# Patient Record
Sex: FEMALE | Race: WHITE | NOT HISPANIC OR LATINO | Employment: UNEMPLOYED | URBAN - METROPOLITAN AREA
[De-identification: names, ages, dates, MRNs, and addresses within clinical notes are randomized per-mention and may not be internally consistent; named-entity substitution may affect disease eponyms.]

---

## 2021-01-22 ENCOUNTER — HISTORICAL (OUTPATIENT)
Dept: LAB | Facility: HOSPITAL | Age: 36
End: 2021-01-22

## 2021-01-22 LAB
ALBUMIN SERPL-MCNC: 3.7 GM/DL (ref 3.5–5)
ALBUMIN/GLOB SERPL: 0.9 RATIO (ref 1.1–2)
ALP SERPL-CCNC: 54 UNIT/L (ref 40–150)
ALT SERPL-CCNC: 44 UNIT/L (ref 0–55)
AST SERPL-CCNC: 25 UNIT/L (ref 5–34)
BILIRUB SERPL-MCNC: 0.5 MG/DL
BILIRUBIN DIRECT+TOT PNL SERPL-MCNC: 0.2 MG/DL
BILIRUBIN DIRECT+TOT PNL SERPL-MCNC: 0.3 MG/DL (ref 0–0.5)
BUN SERPL-MCNC: 7 MG/DL (ref 7–18.7)
CALCIUM SERPL-MCNC: 9 MG/DL (ref 8.4–10.2)
CHLORIDE SERPL-SCNC: 104 MMOL/L (ref 98–107)
CO2 SERPL-SCNC: 27 MEQ/L (ref 22–29)
CREAT SERPL-MCNC: 0.58 MG/DL (ref 0.55–1.02)
GLOBULIN SER-MCNC: 3.9 GM/DL (ref 2.4–3.5)
GLUCOSE SERPL-MCNC: 98 MG/DL (ref 74–100)
POTASSIUM SERPL-SCNC: 4.4 MMOL/L (ref 3.5–5.1)
PROT SERPL-MCNC: 7.6 GM/DL (ref 6.4–8.3)
SODIUM SERPL-SCNC: 139 MMOL/L (ref 136–145)

## 2022-04-11 ENCOUNTER — HISTORICAL (OUTPATIENT)
Dept: ADMINISTRATIVE | Facility: HOSPITAL | Age: 37
End: 2022-04-11
Payer: MEDICAID

## 2022-04-24 VITALS
SYSTOLIC BLOOD PRESSURE: 131 MMHG | HEIGHT: 68 IN | BODY MASS INDEX: 31.67 KG/M2 | DIASTOLIC BLOOD PRESSURE: 84 MMHG | OXYGEN SATURATION: 99 % | WEIGHT: 209 LBS

## 2022-08-04 ENCOUNTER — HOSPITAL ENCOUNTER (EMERGENCY)
Facility: HOSPITAL | Age: 37
Discharge: HOME | End: 2022-08-04
Attending: GENERAL PRACTICE
Payer: MEDICAID

## 2022-08-04 VITALS
HEIGHT: 68 IN | SYSTOLIC BLOOD PRESSURE: 128 MMHG | RESPIRATION RATE: 19 BRPM | WEIGHT: 210 LBS | OXYGEN SATURATION: 99 % | BODY MASS INDEX: 31.83 KG/M2 | HEART RATE: 97 BPM | DIASTOLIC BLOOD PRESSURE: 84 MMHG | TEMPERATURE: 99 F

## 2022-08-04 DIAGNOSIS — F15.10 METHAMPHETAMINE ABUSE: Primary | ICD-10-CM

## 2022-08-04 DIAGNOSIS — M53.87 SCIATICA ASSOCIATED WITH DISORDER OF LUMBOSACRAL SPINE: ICD-10-CM

## 2022-08-04 LAB
AMPHET UR QL SCN: POSITIVE
B-HCG SERPL QL: NEGATIVE
BARBITURATE SCN PRESENT UR: NEGATIVE
BENZODIAZ UR QL SCN: NEGATIVE
CANNABINOIDS UR QL SCN: NEGATIVE
COCAINE UR QL SCN: NEGATIVE
FENTANYL UR QL SCN: POSITIVE
MDMA UR QL SCN: NEGATIVE
OPIATES UR QL SCN: NEGATIVE
PCP UR QL: NEGATIVE
PH UR: 6 [PH] (ref 3–11)
SPECIFIC GRAVITY, URINE AUTO (.000) (OHS): 1.02 (ref 1–1.03)

## 2022-08-04 PROCEDURE — 63600175 PHARM REV CODE 636 W HCPCS: Performed by: GENERAL PRACTICE

## 2022-08-04 PROCEDURE — 25000003 PHARM REV CODE 250: Performed by: GENERAL PRACTICE

## 2022-08-04 PROCEDURE — 96372 THER/PROPH/DIAG INJ SC/IM: CPT | Performed by: GENERAL PRACTICE

## 2022-08-04 PROCEDURE — 80307 DRUG TEST PRSMV CHEM ANLYZR: CPT | Performed by: GENERAL PRACTICE

## 2022-08-04 PROCEDURE — 81025 URINE PREGNANCY TEST: CPT | Performed by: GENERAL PRACTICE

## 2022-08-04 PROCEDURE — 99284 EMERGENCY DEPT VISIT MOD MDM: CPT | Mod: 25

## 2022-08-04 RX ORDER — METHOCARBAMOL 500 MG/1
500 TABLET, FILM COATED ORAL 4 TIMES DAILY
Qty: 40 TABLET | Refills: 0 | Status: SHIPPED | OUTPATIENT
Start: 2022-08-04 | End: 2022-08-14

## 2022-08-04 RX ORDER — HYDROXYZINE HYDROCHLORIDE 25 MG/ML
25 INJECTION, SOLUTION INTRAMUSCULAR
Status: COMPLETED | OUTPATIENT
Start: 2022-08-04 | End: 2022-08-04

## 2022-08-04 RX ORDER — KETOROLAC TROMETHAMINE 10 MG/1
10 TABLET, FILM COATED ORAL
Status: COMPLETED | OUTPATIENT
Start: 2022-08-04 | End: 2022-08-04

## 2022-08-04 RX ORDER — NABUMETONE 500 MG/1
500 TABLET, FILM COATED ORAL 2 TIMES DAILY PRN
Qty: 20 TABLET | Refills: 0 | Status: ON HOLD | OUTPATIENT
Start: 2022-08-04 | End: 2022-09-27

## 2022-08-04 RX ORDER — METHOCARBAMOL 500 MG/1
500 TABLET, FILM COATED ORAL
Status: COMPLETED | OUTPATIENT
Start: 2022-08-04 | End: 2022-08-04

## 2022-08-04 RX ADMIN — KETOROLAC TROMETHAMINE 10 MG: 10 TABLET, FILM COATED ORAL at 10:08

## 2022-08-04 RX ADMIN — HYDROXYZINE HYDROCHLORIDE 25 MG: 25 INJECTION, SOLUTION INTRAMUSCULAR at 10:08

## 2022-08-04 RX ADMIN — METHOCARBAMOL 500 MG: 500 TABLET ORAL at 10:08

## 2022-08-05 NOTE — ED PROVIDER NOTES
Encounter Date: 8/4/2022       History     Chief Complaint   Patient presents with    Leg Pain     Leg pain which she states is her sciatic pain onset this am.     Patient brought in by EMS for back pain.  According to the EMS report, the patient has track marks on her arms this is confirmed on visual inspection when the patient arrived.  She is extremely histrionic.  Received 75 mics of fentanyl in the field.  Per EMS she was cursing out the EMS personnel now using profanities.  The police had to be called, and they were going to have the patient sign refusal but when the police arrived the patient changed her attitude saying that she was not abusive to EMS.  EMS then transported her here, and the patient is extremely histrionic thrashing around despite complaining of back pain.  Patient does admit to recently doing meth.    The history is provided by the EMS personnel and the patient.   Back Pain   This is a recurrent problem. The current episode started 2 to 3 hours ago. The problem occurs constantly. The problem has been gradually worsening. The pain is associated with no known injury. The pain is present in the lumbar spine, sacro-iliac joint and gluteal region. The quality of the pain is described as stabbing and shooting. The pain radiates to the left thigh and left leg. The pain is at a severity of 3/10. The symptoms are aggravated by bending, twisting and certain positions. She has tried nothing for the symptoms. Risk factors include obesity.     Review of patient's allergies indicates:   Allergen Reactions    Acetaminophen Hives     No past medical history on file.  No past surgical history on file.  No family history on file.     Review of Systems   Constitutional: Negative.    HENT: Negative.    Eyes: Negative.    Respiratory: Negative.    Cardiovascular: Negative.    Gastrointestinal: Negative.    Endocrine: Negative.    Genitourinary: Negative.    Musculoskeletal: Positive for back pain and joint  swelling.   Skin: Negative.    Allergic/Immunologic: Negative.    Neurological: Negative.    Hematological: Negative.    Psychiatric/Behavioral: Negative.    All other systems reviewed and are negative.      Physical Exam     Initial Vitals [08/04/22 2134]   BP Pulse Resp Temp SpO2   122/82 (!) 125 18 99.1 °F (37.3 °C) 98 %      MAP       --         Physical Exam    Nursing note and vitals reviewed.  Constitutional: She appears well-developed and well-nourished.   HENT:   Head: Normocephalic and atraumatic.   Eyes: EOM are normal. Pupils are equal, round, and reactive to light.   Neck: Neck supple.   Normal range of motion.  Cardiovascular: Normal rate, regular rhythm, normal heart sounds and intact distal pulses.   Pulmonary/Chest: Breath sounds normal.   Abdominal: Abdomen is soft. Bowel sounds are normal.   Musculoskeletal:      Cervical back: Normal range of motion and neck supple.      Lumbar back: Tenderness present. No deformity.        Back:      Neurological: She is alert and oriented to person, place, and time. GCS score is 15. GCS eye subscore is 4. GCS verbal subscore is 5. GCS motor subscore is 6.   Skin: Skin is warm and dry.   Psychiatric:   Patient is extremely histrionic and extremely dramatic.  Pain out of proportion to exam.  Patient was able to move over from the stretcher to the bed half way before grabbing the EMS sensing of my Goodness my leg is locking up.         ED Course   Procedures  Labs Reviewed   DRUG SCREEN, URINE (BEAKER) - Abnormal; Notable for the following components:       Result Value    Amphetamines, Urine Positive (*)     Fentanyl, Urine Positive (*)     All other components within normal limits    Narrative:     Cut off concentrations:    Amphetamines - 1000 ng/ml  Barbiturates - 200 ng/ml  Benzodiazepine - 200 ng/ml  Cannabinoids (THC) - 50 ng/ml  Cocaine - 300 ng/ml  Fentanyl - 1.0 ng/ml  MDMA - 500 ng/ml  Opiates - 300 ng/ml   Phencyclidine (PCP) - 25 ng/ml    Specimen  submitted for drug analysis and tested for pH and specific gravity in order to evaluate sample integrity. Suspect tampering if specific gravity is <1.003 and/or pH is not within the range of 4.5 - 8.0  False negatives may result form substances such as bleach added to urine.  False positives may result for the presence of a substance with similar chemical structure to the drug or its metabolite.    This test provides only a PRELIMINARY analytical test result. A more specific alternate chemical method must be used in order to obtain a confirmed analytical result. Gas chromatography/mass spectrometry (GC/MS) is the preferred confirmatory method. Other chemical confirmation methods are available. Clinical consideration and professional judgement should be applied to any drug of abuse test result, particularly when preliminary positive results are used.    Positive results will be confirmed only at the physicians request. Unconfirmed screening results are to be used only for medical purposes (treatment).        PREGNANCY TEST, URINE RAPID - Normal          Imaging Results          X-Ray Hip 2 or 3 views Left (with Pelvis when performed) (Preliminary result)  Result time 08/04/22 22:43:39    ED Interpretation by Geraldo Austin MD (08/04/22 22:43:39, [facility] - Emergency Dept, Emergency Medicine)    No acute fractures or dislocations are noted.  There is marked scoliosis.                               Medications   hydrOXYzine injection 25 mg (has no administration in time range)   ketorolac tablet 10 mg (has no administration in time range)   methocarbamoL tablet 500 mg (has no administration in time range)     Medical Decision Making:   ED Management:  Long discussion with the mother.  Patient is still acting out in the room and requesting muscle relaxers.  So we will give Robaxin.  Reported given Toradol.  Patient did test positive for amphetamines.  Did discuss the use pain management for the patient  given her scoliosis with the mother.  She voiced understanding                      Clinical Impression:   Final diagnoses:  [M53.87] Sciatica associated with disorder of lumbosacral spine  [F15.10] Methamphetamine abuse (Primary)          ED Disposition Condition    Discharge Stable        ED Prescriptions     Medication Sig Dispense Start Date End Date Auth. Provider    nabumetone (RELAFEN) 500 MG tablet Take 1 tablet (500 mg total) by mouth 2 (two) times daily as needed for Pain. With food 20 tablet 8/4/2022  Geraldo Austin MD    methocarbamoL (ROBAXIN) 500 MG Tab Take 1 tablet (500 mg total) by mouth 4 (four) times daily. for 10 days 40 tablet 8/4/2022 8/14/2022 Geraldo Austin MD        Follow-up Information     Follow up With Specialties Details Why Contact Info    PCP  In 2 days If symptoms worsen            Geraldo Austin MD  08/04/22 8631

## 2022-08-15 ENCOUNTER — DOCUMENTATION ONLY (OUTPATIENT)
Dept: HEPATOLOGY | Facility: HOSPITAL | Age: 37
End: 2022-08-15
Payer: MEDICAID

## 2022-08-15 NOTE — PROGRESS NOTES
Outside Transfer Acceptance Note / Regional Referral Center    Referring facility: [facility], [facility]   Referring provider: CANDIE PROVIDER  Accepting facility: [facility]  Accepting provider: ELLE CHO  Admitting provider: Elle Cho MD  Reason for transfer:  Epidural abscess  Transfer diagnosis: Epidural abscess  Transfer specialty requested: Neurosurgery  Transfer specialty notified: yes  Transfer level: NUMBER 1-5: 2  Bed type requested: Med Surg  Admission class or status: IP- Inpatient      Narrative     Transfer Diagnosis:  Epidural abscess  Reason for Transfer:  Epidural abscess  Consultants:  ANGELINE  Transferring Facility:  [facility]   Transferring Destination: [place]    Ms. Kayla Clay is a 37 y.o. female with history of IVDA (last used 8/8), sciatica and chronic low back pain, who presented to the [place] ER on 8/9 for evaluation of lower back pain, bilateral leg numbness and inability to ambulate for 4 days.  Physical exam notable for 5/5 strength in BUE, 4/5 strength in BLE, and no spinal tenderness.  Labs on admit showed WBC 11, ESR 70.  UDS was positive for amphetamines.  She was initially started on Ceftriaxone for pyelo, which was stopped after 3 days.  Her hospital stay was complicated by methamphetamine withdrawal, so she was unable to cooperate with spinal imaging.  MRI was originally ordered without contrast because of length of study, she was unable to cooperate because of pain.  MRI lumbar spine without contrast 8/15 showed L3-L4 endplate erosions with fluid in the disc, epidural fluid extending from L2-L4 with moderate central canal narrowing at L2-L3 with severe central canal narrowing at L3-L4, findings suspicious for spondylodiscitis.  Additionally, fluid within the L1-L2 and L2-L3 disc space without significant endplate erosions, early spondylodiscitis can't be excluded. After the MRI resulted, Radiology suggested MRI  with contrast to further determine discitis.  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  The fluid collection may extend further into the thoracic spine as well.  Small millimetric fluid collections within the right and left psoas muscle suggesting early abscess formation.  She was started on Vanc and Ceftriaxone on 8/15.  Her case was discussed with Dr. Brewer ([facility] [place]) who agreed to consult.    · Consult NSGY Dr. Brewer  · Consult ID  · PT/OT      Objective     Allergies:   Review of patient's allergies indicates:   Allergen Reactions    Acetaminophen Hives      NPO: No      Anticoagulation:   Anticoagulants     None           Instructions      [facility]  Admit to Hospital Medicine  Upon patient arrival to floor, please contact Hospital Medicine on call.       Elle Nieto MD, [facility]  Senior Physician  [facility] Medicine

## 2022-08-17 ENCOUNTER — HOSPITAL ENCOUNTER (INPATIENT)
Facility: HOSPITAL | Age: 37
LOS: 4 days | Discharge: ANOTHER HOSPITAL | DRG: 028 | End: 2022-08-21
Attending: INTERNAL MEDICINE | Admitting: INTERNAL MEDICINE
Payer: MEDICAID

## 2022-08-17 DIAGNOSIS — R22.9 LOCALIZED SWELLING, MASS AND LUMP, UNSPECIFIED: ICD-10-CM

## 2022-08-17 DIAGNOSIS — G06.2 EPIDURAL ABSCESS: Primary | ICD-10-CM

## 2022-08-17 DIAGNOSIS — R07.9 CHEST PAIN: ICD-10-CM

## 2022-08-17 DIAGNOSIS — F19.90 IVDU (INTRAVENOUS DRUG USER): ICD-10-CM

## 2022-08-17 DIAGNOSIS — I10 HYPERTENSION, UNSPECIFIED TYPE: ICD-10-CM

## 2022-08-17 DIAGNOSIS — F41.9 ANXIETY: ICD-10-CM

## 2022-08-17 LAB
ABO + RH BLD: NORMAL
ALBUMIN SERPL BCP-MCNC: 2.9 G/DL (ref 3.5–5.2)
ALP SERPL-CCNC: 50 U/L (ref 55–135)
ALT SERPL W/O P-5'-P-CCNC: 246 U/L (ref 10–44)
ANION GAP SERPL CALC-SCNC: 12 MMOL/L (ref 8–16)
AST SERPL-CCNC: 86 U/L (ref 10–40)
B-HCG UR QL: NEGATIVE
BASOPHILS # BLD AUTO: 0.03 K/UL (ref 0–0.2)
BASOPHILS NFR BLD: 0.4 % (ref 0–1.9)
BILIRUB SERPL-MCNC: 0.3 MG/DL (ref 0.1–1)
BILIRUB UR QL STRIP: NEGATIVE
BLD GP AB SCN CELLS X3 SERPL QL: NORMAL
BUN SERPL-MCNC: 9 MG/DL (ref 6–20)
CALCIUM SERPL-MCNC: 9.1 MG/DL (ref 8.7–10.5)
CHLORIDE SERPL-SCNC: 100 MMOL/L (ref 95–110)
CLARITY UR: ABNORMAL
CO2 SERPL-SCNC: 25 MMOL/L (ref 23–29)
COLOR UR: YELLOW
CREAT SERPL-MCNC: 0.6 MG/DL (ref 0.5–1.4)
CRP SERPL-MCNC: 37.2 MG/L (ref 0–8.2)
DIFFERENTIAL METHOD: ABNORMAL
EOSINOPHIL # BLD AUTO: 0.1 K/UL (ref 0–0.5)
EOSINOPHIL NFR BLD: 0.7 % (ref 0–8)
ERYTHROCYTE [DISTWIDTH] IN BLOOD BY AUTOMATED COUNT: 16.7 % (ref 11.5–14.5)
ERYTHROCYTE [SEDIMENTATION RATE] IN BLOOD BY WESTERGREN METHOD: 45 MM/HR (ref 0–20)
EST. GFR  (NO RACE VARIABLE): >60 ML/MIN/1.73 M^2
GLUCOSE SERPL-MCNC: 88 MG/DL (ref 70–110)
GLUCOSE UR QL STRIP: NEGATIVE
HCT VFR BLD AUTO: 27.9 % (ref 37–48.5)
HGB BLD-MCNC: 8.9 G/DL (ref 12–16)
HGB UR QL STRIP: NEGATIVE
IMM GRANULOCYTES # BLD AUTO: 0.07 K/UL (ref 0–0.04)
IMM GRANULOCYTES NFR BLD AUTO: 0.9 % (ref 0–0.5)
KETONES UR QL STRIP: NEGATIVE
LEUKOCYTE ESTERASE UR QL STRIP: NEGATIVE
LYMPHOCYTES # BLD AUTO: 2.4 K/UL (ref 1–4.8)
LYMPHOCYTES NFR BLD: 31.4 % (ref 18–48)
MAGNESIUM SERPL-MCNC: 2.2 MG/DL (ref 1.6–2.6)
MCH RBC QN AUTO: 23.1 PG (ref 27–31)
MCHC RBC AUTO-ENTMCNC: 31.9 G/DL (ref 32–36)
MCV RBC AUTO: 72 FL (ref 82–98)
MICROSCOPIC COMMENT: NORMAL
MONOCYTES # BLD AUTO: 0.7 K/UL (ref 0.3–1)
MONOCYTES NFR BLD: 9.8 % (ref 4–15)
NEUTROPHILS # BLD AUTO: 4.3 K/UL (ref 1.8–7.7)
NEUTROPHILS NFR BLD: 56.8 % (ref 38–73)
NITRITE UR QL STRIP: NEGATIVE
NRBC BLD-RTO: 0 /100 WBC
PH UR STRIP: 8 [PH] (ref 5–8)
PHOSPHATE SERPL-MCNC: 4.8 MG/DL (ref 2.7–4.5)
PLATELET # BLD AUTO: 760 K/UL (ref 150–450)
PMV BLD AUTO: 8.7 FL (ref 9.2–12.9)
POTASSIUM SERPL-SCNC: 4.7 MMOL/L (ref 3.5–5.1)
PROT SERPL-MCNC: 6.9 G/DL (ref 6–8.4)
PROT UR QL STRIP: ABNORMAL
RBC # BLD AUTO: 3.86 M/UL (ref 4–5.4)
SARS-COV-2 RDRP RESP QL NAA+PROBE: NEGATIVE
SODIUM SERPL-SCNC: 137 MMOL/L (ref 136–145)
SP GR UR STRIP: 1.02 (ref 1–1.03)
URN SPEC COLLECT METH UR: ABNORMAL
UROBILINOGEN UR STRIP-ACNC: NEGATIVE EU/DL
WBC # BLD AUTO: 7.57 K/UL (ref 3.9–12.7)

## 2022-08-17 PROCEDURE — 80202 ASSAY OF VANCOMYCIN: CPT | Performed by: FAMILY MEDICINE

## 2022-08-17 PROCEDURE — 36415 COLL VENOUS BLD VENIPUNCTURE: CPT | Performed by: NEUROLOGICAL SURGERY

## 2022-08-17 PROCEDURE — 85651 RBC SED RATE NONAUTOMATED: CPT | Performed by: NEUROLOGICAL SURGERY

## 2022-08-17 PROCEDURE — 87040 BLOOD CULTURE FOR BACTERIA: CPT | Performed by: NURSE PRACTITIONER

## 2022-08-17 PROCEDURE — 11000001 HC ACUTE MED/SURG PRIVATE ROOM

## 2022-08-17 PROCEDURE — 25000003 PHARM REV CODE 250: Performed by: FAMILY MEDICINE

## 2022-08-17 PROCEDURE — 86901 BLOOD TYPING SEROLOGIC RH(D): CPT | Performed by: NEUROLOGICAL SURGERY

## 2022-08-17 PROCEDURE — 84100 ASSAY OF PHOSPHORUS: CPT | Performed by: NURSE PRACTITIONER

## 2022-08-17 PROCEDURE — 63600175 PHARM REV CODE 636 W HCPCS: Performed by: NURSE PRACTITIONER

## 2022-08-17 PROCEDURE — U0002 COVID-19 LAB TEST NON-CDC: HCPCS | Performed by: FAMILY MEDICINE

## 2022-08-17 PROCEDURE — 81000 URINALYSIS NONAUTO W/SCOPE: CPT | Performed by: NEUROLOGICAL SURGERY

## 2022-08-17 PROCEDURE — 83735 ASSAY OF MAGNESIUM: CPT | Performed by: NURSE PRACTITIONER

## 2022-08-17 PROCEDURE — 76937 US GUIDE VASCULAR ACCESS: CPT

## 2022-08-17 PROCEDURE — 81025 URINE PREGNANCY TEST: CPT | Performed by: NEUROLOGICAL SURGERY

## 2022-08-17 PROCEDURE — 36415 COLL VENOUS BLD VENIPUNCTURE: CPT | Performed by: FAMILY MEDICINE

## 2022-08-17 PROCEDURE — 85025 COMPLETE CBC W/AUTO DIFF WBC: CPT | Performed by: NURSE PRACTITIONER

## 2022-08-17 PROCEDURE — C1751 CATH, INF, PER/CENT/MIDLINE: HCPCS

## 2022-08-17 PROCEDURE — 63600175 PHARM REV CODE 636 W HCPCS: Performed by: FAMILY MEDICINE

## 2022-08-17 PROCEDURE — 99223 PR INITIAL HOSPITAL CARE,LEVL III: ICD-10-PCS | Mod: 57,,, | Performed by: NEUROLOGICAL SURGERY

## 2022-08-17 PROCEDURE — 80053 COMPREHEN METABOLIC PANEL: CPT | Performed by: FAMILY MEDICINE

## 2022-08-17 PROCEDURE — 86140 C-REACTIVE PROTEIN: CPT | Performed by: NEUROLOGICAL SURGERY

## 2022-08-17 PROCEDURE — 36415 COLL VENOUS BLD VENIPUNCTURE: CPT | Performed by: NURSE PRACTITIONER

## 2022-08-17 PROCEDURE — 25000003 PHARM REV CODE 250: Performed by: NURSE PRACTITIONER

## 2022-08-17 PROCEDURE — 36410 VNPNXR 3YR/> PHY/QHP DX/THER: CPT

## 2022-08-17 PROCEDURE — 99223 1ST HOSP IP/OBS HIGH 75: CPT | Mod: 57,,, | Performed by: NEUROLOGICAL SURGERY

## 2022-08-17 RX ORDER — MORPHINE SULFATE 4 MG/ML
4 INJECTION, SOLUTION INTRAMUSCULAR; INTRAVENOUS ONCE
Status: COMPLETED | OUTPATIENT
Start: 2022-08-17 | End: 2022-08-17

## 2022-08-17 RX ORDER — DIAZEPAM 10 MG/2ML
5 INJECTION INTRAMUSCULAR ONCE
Status: COMPLETED | OUTPATIENT
Start: 2022-08-17 | End: 2022-08-17

## 2022-08-17 RX ORDER — ONDANSETRON 2 MG/ML
4 INJECTION INTRAMUSCULAR; INTRAVENOUS EVERY 8 HOURS PRN
Status: DISCONTINUED | OUTPATIENT
Start: 2022-08-17 | End: 2022-08-21 | Stop reason: HOSPADM

## 2022-08-17 RX ORDER — NALOXONE HCL 0.4 MG/ML
0.02 VIAL (ML) INJECTION
Status: DISCONTINUED | OUTPATIENT
Start: 2022-08-17 | End: 2022-08-21 | Stop reason: HOSPADM

## 2022-08-17 RX ORDER — MAG HYDROX/ALUMINUM HYD/SIMETH 200-200-20
30 SUSPENSION, ORAL (FINAL DOSE FORM) ORAL 4 TIMES DAILY PRN
Status: DISCONTINUED | OUTPATIENT
Start: 2022-08-17 | End: 2022-08-21 | Stop reason: HOSPADM

## 2022-08-17 RX ORDER — IBUPROFEN 200 MG
16 TABLET ORAL
Status: DISCONTINUED | OUTPATIENT
Start: 2022-08-17 | End: 2022-08-21 | Stop reason: HOSPADM

## 2022-08-17 RX ORDER — SODIUM CHLORIDE 0.9 % (FLUSH) 0.9 %
10 SYRINGE (ML) INJECTION EVERY 12 HOURS PRN
Status: DISCONTINUED | OUTPATIENT
Start: 2022-08-17 | End: 2022-08-21 | Stop reason: HOSPADM

## 2022-08-17 RX ORDER — OXYCODONE HYDROCHLORIDE 5 MG/1
10 TABLET ORAL EVERY 6 HOURS PRN
Status: DISCONTINUED | OUTPATIENT
Start: 2022-08-17 | End: 2022-08-19

## 2022-08-17 RX ORDER — OXYCODONE HYDROCHLORIDE 5 MG/1
5 TABLET ORAL EVERY 6 HOURS PRN
Status: DISCONTINUED | OUTPATIENT
Start: 2022-08-17 | End: 2022-08-20

## 2022-08-17 RX ORDER — POLYETHYLENE GLYCOL 3350 17 G/17G
17 POWDER, FOR SOLUTION ORAL DAILY
Status: DISCONTINUED | OUTPATIENT
Start: 2022-08-17 | End: 2022-08-21 | Stop reason: HOSPADM

## 2022-08-17 RX ORDER — SODIUM CHLORIDE 9 MG/ML
INJECTION, SOLUTION INTRAVENOUS
Status: DISCONTINUED | OUTPATIENT
Start: 2022-08-17 | End: 2022-08-21 | Stop reason: HOSPADM

## 2022-08-17 RX ORDER — GLUCAGON 1 MG
1 KIT INJECTION
Status: DISCONTINUED | OUTPATIENT
Start: 2022-08-17 | End: 2022-08-21 | Stop reason: HOSPADM

## 2022-08-17 RX ORDER — IBUPROFEN 200 MG
24 TABLET ORAL
Status: DISCONTINUED | OUTPATIENT
Start: 2022-08-17 | End: 2022-08-21 | Stop reason: HOSPADM

## 2022-08-17 RX ORDER — LORAZEPAM 0.5 MG/1
2 TABLET ORAL EVERY 4 HOURS PRN
Status: DISCONTINUED | OUTPATIENT
Start: 2022-08-17 | End: 2022-08-21 | Stop reason: HOSPADM

## 2022-08-17 RX ADMIN — DIAZEPAM 5 MG: 10 INJECTION, SOLUTION INTRAMUSCULAR; INTRAVENOUS at 03:08

## 2022-08-17 RX ADMIN — LORAZEPAM 2 MG: 0.5 TABLET ORAL at 09:08

## 2022-08-17 RX ADMIN — OXYCODONE HYDROCHLORIDE 10 MG: 5 TABLET ORAL at 03:08

## 2022-08-17 RX ADMIN — CEFTRIAXONE 2 G: 2 INJECTION, SOLUTION INTRAVENOUS at 04:08

## 2022-08-17 RX ADMIN — POLYETHYLENE GLYCOL 3350 17 G: 17 POWDER, FOR SOLUTION ORAL at 08:08

## 2022-08-17 RX ADMIN — CEFTRIAXONE 2 G: 2 INJECTION, SOLUTION INTRAVENOUS at 08:08

## 2022-08-17 RX ADMIN — TRAZODONE HYDROCHLORIDE 25 MG: 50 TABLET ORAL at 10:08

## 2022-08-17 RX ADMIN — VANCOMYCIN HYDROCHLORIDE 2250 MG: 10 INJECTION, POWDER, LYOPHILIZED, FOR SOLUTION INTRAVENOUS at 11:08

## 2022-08-17 RX ADMIN — SODIUM CHLORIDE: 0.9 INJECTION, SOLUTION INTRAVENOUS at 08:08

## 2022-08-17 RX ADMIN — LORAZEPAM 2 MG: 0.5 TABLET ORAL at 11:08

## 2022-08-17 RX ADMIN — OXYCODONE HYDROCHLORIDE 10 MG: 5 TABLET ORAL at 10:08

## 2022-08-17 RX ADMIN — OXYCODONE HYDROCHLORIDE 10 MG: 5 TABLET ORAL at 04:08

## 2022-08-17 RX ADMIN — MORPHINE SULFATE 4 MG: 4 INJECTION INTRAVENOUS at 08:08

## 2022-08-17 NOTE — CONSULTS
CM left a message for [facility] to check status of delivery of TLSO brace.  CM awaiting a call back.

## 2022-08-17 NOTE — ASSESSMENT & PLAN NOTE
Per report of sending facility, confirmed epidural abscess on MRI. Initiated on ceftriaxone and Vancomycin on 8/15/22.    --Continue Ceftriaxone and Vancomycin  --Pain control per MAR  --CBC,CMP  --Consult neurosurgery  --NPO pending eval per neurosurgery  --Vitals Q4H

## 2022-08-17 NOTE — CONSULTS
Pharmacokinetic Initial Assessment: IV Vancomycin    Assessment/Plan:    Initiate intravenous vancomycin with loading dose of 2250 mg once followed by a maintenance dose of vancomycin 1750mg IV every 12 hours  Desired empiric serum trough concentration is 15 to 20 mcg/mL  Draw vancomycin trough level 60 min prior to fourth dose on 8/18 at approximately 2230  Pharmacy will continue to follow and monitor vancomycin.      Please contact pharmacy at extension [phone] with any questions regarding this assessment.     Thank you for the consult,   Anisa Prado       Patient brief summary:  Kayla Clay is a 37 y.o. female initiated on antimicrobial therapy with IV Vancomycin for treatment of suspected bone/joint infection, epidural abscess    Drug Allergies:   Review of patient's allergies indicates:   Allergen Reactions    Acetaminophen Hives       Actual Body Weight:   93 kg    Renal Function:   Estimated Creatinine Clearance: 151.5 mL/min (based on SCr of 0.6 mg/dL).,     Dialysis Method (if applicable):  N/A    CBC (last 72 hours):  Recent Labs   Lab Result Units 08/17/22  0835   WBC K/uL 7.57   Hemoglobin g/dL 8.9*   Hematocrit % 27.9*   Platelets K/uL 760*   Gran % % 56.8   Lymph % % 31.4   Mono % % 9.8   Eosinophil % % 0.7   Basophil % % 0.4   Differential Method  Automated       Metabolic Panel (last 72 hours):  Recent Labs   Lab Result Units 08/17/22  0835   Sodium mmol/L 137   Potassium mmol/L 4.7   Chloride mmol/L 100   CO2 mmol/L 25   Glucose mg/dL 88   BUN mg/dL 9   Creatinine mg/dL 0.6   Albumin g/dL 2.9*   Total Bilirubin mg/dL 0.3   Alkaline Phosphatase U/L 50*   AST U/L 86*   ALT U/L 246*   Magnesium mg/dL 2.2   Phosphorus mg/dL 4.8*       Drug levels (last 3 results):  No results for input(s): VANCOMYCINRA, VANCORANDOM, VANCOMYCINPE, VANCOPEAK, VANCOMYCINTR, VANCOTROUGH in the last 72 hours.    Microbiologic Results:  Microbiology Results (last 7 days)     ** No results found for the last 168 hours.  **

## 2022-08-17 NOTE — H&P
[facility] Medicine  History & Physical    Patient Name: Kayla Clay  MRN: [id number]  Patient Class: IP- Inpatient  Admission Date: 8/17/2022  Attending Physician: Ken Serrano MD   Primary Care Provider: Primary Doctor No         Patient information was obtained from patient, past medical records and ER records.     Subjective:     Principal Problem:Epidural abscess    Chief Complaint: Back Pain       HPI: 37 y.o. female with history of IVDA (last used 8/8), sciatica and chronic low back pain, who presented to the [place] ER on 8/9 for evaluation of lower back pain, bilateral leg numbness and inability to ambulate for 4 days.  Physical exam notable for 5/5 strength in BUE, 4/5 strength in BLE, and no spinal tenderness.  Labs on admit showed WBC 11, ESR 70.  UDS was positive for amphetamines.  She was initially started on Ceftriaxone for pyelo, which was stopped after 3 days.  Her hospital stay was complicated by methamphetamine withdrawal, so she was unable to cooperate with spinal imaging.  MRI was originally ordered without contrast because of length of study, she was unable to cooperate because of pain.  MRI lumbar spine without contrast 8/15 showed L3-L4 endplate erosions with fluid in the disc, epidural fluid extending from L2-L4 with moderate central canal narrowing at L2-L3 with severe central canal narrowing at L3-L4, findings suspicious for spondylodiscitis.  Additionally, fluid within the L1-L2 and L2-L3 disc space without significant endplate erosions, early spondylodiscitis can't be excluded. After the MRI resulted, Radiology suggested MRI with contrast to further determine discitis.  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  The fluid collection may extend further into the thoracic spine as well.  Small millimetric fluid collections within the right and left  psoas muscle suggesting early abscess formation.  She was started on Vanc and Ceftriaxone on 8/15.  Her case was discussed with Dr. Brewer ([facility] [place]) who agreed to consult. Patient transferred from [place] to [facility] via [facility]. Patient is a full code, surrogate decision maker is her mother, Shaista Engel. Admitted to inpatient under the care of [facility].       No past medical history on file.    No past surgical history on file.    Review of patient's allergies indicates:   Allergen Reactions    Acetaminophen Hives       No current facility-administered medications on file prior to encounter.     Current Outpatient Medications on File Prior to Encounter   Medication Sig    nabumetone (RELAFEN) 500 MG tablet Take 1 tablet (500 mg total) by mouth 2 (two) times daily as needed for Pain. With food     Family History    None       Tobacco Use    Smoking status: Not on file    Smokeless tobacco: Not on file   Substance and Sexual Activity    Alcohol use: Not on file    Drug use: Not on file    Sexual activity: Not on file     Review of Systems   Constitutional:  Positive for activity change and fatigue. Negative for appetite change, chills, diaphoresis, fever and unexpected weight change.   HENT:  Negative for congestion, hearing loss, mouth sores, postnasal drip, rhinorrhea, sore throat and trouble swallowing.    Eyes:  Negative for discharge and visual disturbance.   Respiratory:  Negative for cough, chest tightness and shortness of breath.    Cardiovascular:  Negative for chest pain, palpitations and leg swelling.   Gastrointestinal:  Positive for abdominal distention. Negative for blood in stool, constipation, diarrhea, nausea and vomiting.   Endocrine: Negative for cold intolerance and heat intolerance.   Genitourinary:  Negative for difficulty urinating, dyspareunia, flank pain and hematuria.   Musculoskeletal:  Positive for back pain and gait problem. Negative for arthralgias and  myalgias.   Skin: Negative.    Neurological:  Positive for weakness. Negative for dizziness, light-headedness and headaches.   Hematological:  Negative for adenopathy. Does not bruise/bleed easily.   Psychiatric/Behavioral:  Negative for agitation, behavioral problems and confusion. The patient is not nervous/anxious.    Objective:     Vital Signs (Most Recent):  Temp: 97.9 °F (36.6 °C) (08/17/22 0240)  Pulse: 86 (08/17/22 0240)  Resp: 18 (08/17/22 0240)  BP: (!) 166/79 (08/17/22 0240)  SpO2: 95 % (08/17/22 0240)   Vital Signs (24h Range):  Temp:  [97.9 °F (36.6 °C)] 97.9 °F (36.6 °C)  Pulse:  [86] 86  Resp:  [18] 18  SpO2:  [95 %] 95 %  BP: (166)/(79) 166/79        There is no height or weight on file to calculate BMI.    Physical Exam  Vitals and nursing note reviewed.   Constitutional:       General: She is not in acute distress.     Appearance: She is well-developed.   HENT:      Head: Normocephalic and atraumatic.      Right Ear: Hearing and external ear normal.      Left Ear: Hearing and external ear normal.      Nose: No rhinorrhea.      Right Sinus: No maxillary sinus tenderness or frontal sinus tenderness.      Left Sinus: No maxillary sinus tenderness or frontal sinus tenderness.      Mouth/Throat:      Mouth: No oral lesions.      Pharynx: Uvula midline.   Eyes:      General:         Right eye: No discharge.         Left eye: No discharge.      Conjunctiva/sclera: Conjunctivae normal.      Pupils: Pupils are equal, round, and reactive to light.   Neck:      Thyroid: No thyromegaly.      Vascular: No carotid bruit.      Trachea: No tracheal deviation.   Cardiovascular:      Rate and Rhythm: Normal rate and regular rhythm.      Pulses:           Dorsalis pedis pulses are 2+ on the right side and 2+ on the left side.      Heart sounds: Normal heart sounds, S1 normal and S2 normal. No murmur heard.  Pulmonary:      Effort: Pulmonary effort is normal. No respiratory distress.      Breath sounds: Normal breath  sounds.   Chest:   Breasts:      Right: No supraclavicular adenopathy.      Left: No supraclavicular adenopathy.     Abdominal:      General: Bowel sounds are normal. There is no distension.      Palpations: Abdomen is soft. There is no mass.      Tenderness: There is no abdominal tenderness.   Musculoskeletal:      Cervical back: Normal range of motion.      Thoracic back: Tenderness present. Decreased range of motion.      Lumbar back: Tenderness present. Decreased range of motion.   Lymphadenopathy:      Cervical: No cervical adenopathy.      Upper Body:      Right upper body: No supraclavicular adenopathy.      Left upper body: No supraclavicular adenopathy.   Skin:     General: Skin is warm and dry.      Capillary Refill: Capillary refill takes less than 2 seconds.      Findings: No rash.   Neurological:      Mental Status: She is alert and oriented to person, place, and time.      Sensory: No sensory deficit.      Coordination: Coordination normal.      Gait: Gait normal.   Psychiatric:         Mood and Affect: Mood is not anxious or depressed.         Speech: Speech normal.         Behavior: Behavior normal.         Thought Content: Thought content normal.         Judgment: Judgment normal.         CRANIAL NERVES     CN III, IV, VI   Pupils are equal, round, and reactive to light.     Significant Labs:   Reviewed lab results from sending facility.     Significant Imaging: I have reviewed all pertinent imaging results/findings within the past 24 hours.    Assessment/Plan:     * Epidural abscess  Per report of sending facility, confirmed epidural abscess on MRI. Initiated on ceftriaxone and Vancomycin on 8/15/22.    --Continue Ceftriaxone and Vancomycin  --Pain control per MAR  --CBC,CMP  --Consult neurosurgery  --NPO pending eval per neurosurgery  --Vitals Q4H        Hypertension  Hx HTN, not currently treated with medications as outpatient    --Hydralazine PRN  --Vitals Q4H        VTE Risk Mitigation (From  admission, onward)         Ordered     IP VTE HIGH RISK PATIENT  Once         08/17/22 0259     Place sequential compression device  Until discontinued         08/17/22 0259     Reason for No Pharmacological VTE Prophylaxis  Once        Question:  Reasons:  Answer:  Physician Provided (leave comment)    08/17/22 0259                   Cassy Valdes NP  Department of Hospital Medicine   '[facility]

## 2022-08-17 NOTE — SUBJECTIVE & OBJECTIVE
No past medical history on file.    No past surgical history on file.    Review of patient's allergies indicates:   Allergen Reactions    Acetaminophen Hives       No current facility-administered medications on file prior to encounter.     Current Outpatient Medications on File Prior to Encounter   Medication Sig    nabumetone (RELAFEN) 500 MG tablet Take 1 tablet (500 mg total) by mouth 2 (two) times daily as needed for Pain. With food     Family History    None       Tobacco Use    Smoking status: Not on file    Smokeless tobacco: Not on file   Substance and Sexual Activity    Alcohol use: Not on file    Drug use: Not on file    Sexual activity: Not on file     Review of Systems   Constitutional:  Positive for activity change and fatigue. Negative for appetite change, chills, diaphoresis, fever and unexpected weight change.   HENT:  Negative for congestion, hearing loss, mouth sores, postnasal drip, rhinorrhea, sore throat and trouble swallowing.    Eyes:  Negative for discharge and visual disturbance.   Respiratory:  Negative for cough, chest tightness and shortness of breath.    Cardiovascular:  Negative for chest pain, palpitations and leg swelling.   Gastrointestinal:  Positive for abdominal distention. Negative for blood in stool, constipation, diarrhea, nausea and vomiting.   Endocrine: Negative for cold intolerance and heat intolerance.   Genitourinary:  Negative for difficulty urinating, dyspareunia, flank pain and hematuria.   Musculoskeletal:  Positive for back pain and gait problem. Negative for arthralgias and myalgias.   Skin: Negative.    Neurological:  Positive for weakness. Negative for dizziness, light-headedness and headaches.   Hematological:  Negative for adenopathy. Does not bruise/bleed easily.   Psychiatric/Behavioral:  Negative for agitation, behavioral problems and confusion. The patient is not nervous/anxious.    Objective:     Vital Signs (Most Recent):  Temp: 97.9 °F (36.6 °C)  (08/17/22 0240)  Pulse: 86 (08/17/22 0240)  Resp: 18 (08/17/22 0240)  BP: (!) 166/79 (08/17/22 0240)  SpO2: 95 % (08/17/22 0240)   Vital Signs (24h Range):  Temp:  [97.9 °F (36.6 °C)] 97.9 °F (36.6 °C)  Pulse:  [86] 86  Resp:  [18] 18  SpO2:  [95 %] 95 %  BP: (166)/(79) 166/79        There is no height or weight on file to calculate BMI.    Physical Exam  Vitals and nursing note reviewed.   Constitutional:       General: She is not in acute distress.     Appearance: She is well-developed.   HENT:      Head: Normocephalic and atraumatic.      Right Ear: Hearing and external ear normal.      Left Ear: Hearing and external ear normal.      Nose: No rhinorrhea.      Right Sinus: No maxillary sinus tenderness or frontal sinus tenderness.      Left Sinus: No maxillary sinus tenderness or frontal sinus tenderness.      Mouth/Throat:      Mouth: No oral lesions.      Pharynx: Uvula midline.   Eyes:      General:         Right eye: No discharge.         Left eye: No discharge.      Conjunctiva/sclera: Conjunctivae normal.      Pupils: Pupils are equal, round, and reactive to light.   Neck:      Thyroid: No thyromegaly.      Vascular: No carotid bruit.      Trachea: No tracheal deviation.   Cardiovascular:      Rate and Rhythm: Normal rate and regular rhythm.      Pulses:           Dorsalis pedis pulses are 2+ on the right side and 2+ on the left side.      Heart sounds: Normal heart sounds, S1 normal and S2 normal. No murmur heard.  Pulmonary:      Effort: Pulmonary effort is normal. No respiratory distress.      Breath sounds: Normal breath sounds.   Chest:   Breasts:      Right: No supraclavicular adenopathy.      Left: No supraclavicular adenopathy.     Abdominal:      General: Bowel sounds are normal. There is no distension.      Palpations: Abdomen is soft. There is no mass.      Tenderness: There is no abdominal tenderness.   Musculoskeletal:      Cervical back: Normal range of motion.      Thoracic back: Tenderness  present. Decreased range of motion.      Lumbar back: Tenderness present. Decreased range of motion.   Lymphadenopathy:      Cervical: No cervical adenopathy.      Upper Body:      Right upper body: No supraclavicular adenopathy.      Left upper body: No supraclavicular adenopathy.   Skin:     General: Skin is warm and dry.      Capillary Refill: Capillary refill takes less than 2 seconds.      Findings: No rash.   Neurological:      Mental Status: She is alert and oriented to person, place, and time.      Sensory: No sensory deficit.      Coordination: Coordination normal.      Gait: Gait normal.   Psychiatric:         Mood and Affect: Mood is not anxious or depressed.         Speech: Speech normal.         Behavior: Behavior normal.         Thought Content: Thought content normal.         Judgment: Judgment normal.         CRANIAL NERVES     CN III, IV, VI   Pupils are equal, round, and reactive to light.     Significant Labs:   Reviewed lab results from sending facility.     Significant Imaging: I have reviewed all pertinent imaging results/findings within the past 24 hours.

## 2022-08-17 NOTE — ASSESSMENT & PLAN NOTE
Per report of sending facility, confirmed epidural abscess on MRI. Initiated on ceftriaxone and Vancomycin on 8/15/22.    --Continue Ceftriaxone and Vancomycin  --Pain control per MAR  --CBC,CMP  --Consult neurosurgery  --NPO pending eval per neurosurgery  --Vitals Q4H  08/17:  --MRI thoracic spine pending  --echo pending  --continue IV abx  --ID consulted

## 2022-08-17 NOTE — CONSULTS
[facility]  Neurosurgery  Progress Note    Subjective:         History of Present Illness:   Pt 36 yo Fwith long hx of IVDA as well as back pain presets through outside hospital for epidural abscess   She intially was being treated with vanc and rocepohin   transferred to  for higher level of care     Pain 10/10   Has N/T to LE   Bed rest since diagnosis was made but she can ambulate   Denies weakness   No urinary retention       MRI Lumbar      MRI lumbar spine without contrast 8/15 showed L3-L4 endplate erosions with fluid in the disc, epidural fluid extending from L2-L4 with moderate central canal narrowing at L2-L3 with severe central canal narrowing at L3-L4, findings suspicious for spondylodiscitis.  Additionally, fluid within the L1-L2 and L2-L3 disc space without significant endplate erosions, early spondylodiscitis can't be excluded. After the MRI resulted, Radiology suggested MRI with contrast to further determine discitis.  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  The fluid collection may extend further into the thoracic spine as well.  Small millimetric fluid collections within the right and left psoas muscle suggesting early abscess formation.           Post-Op Info:  Procedure(s) (LRB):  LAMINECTOMY, SPINE, LUMBAR, POSTERIOR APPROACH, WITH EXCISION OF NEOPLASM OR LESION OF SPINAL CORD (N/A)          Medications:  Continuous Infusions:  Scheduled Meds:   cefTRIAXone (ROCEPHIN) IVPB  2 g Intravenous Q12H    polyethylene glycol  17 g Oral Daily    vancomycin (VANCOCIN) IVPB  2,250 mg Intravenous Once     PRN Meds:sodium chloride 0.9%, aluminum-magnesium hydroxide-simethicone, dextrose 10%, dextrose 10%, glucagon (human recombinant), glucose, glucose, LORazepam, naloxone, ondansetron, oxyCODONE, oxyCODONE, sodium chloride 0.9%, trazodone, Pharmacy to dose Vancomycin consult  **AND** vancomycin - pharmacy to dose     Review of Systems   Constitutional: Negative for activity change, appetite change and chills.   HENT: Negative for hearing loss, sore throat and tinnitus.    Eyes: Negative for pain, discharge and itching.   Cardiovascular: Negative for chest pain.   Gastrointestinal: Negative for abdominal pain.   Endocrine: Negative for cold intolerance and heat intolerance.   Genitourinary: Negative for difficulty urinating and dysuria.   Musculoskeletal: Positive for back pain and gait problem.   Allergic/Immunologic: Negative for environmental allergies.   Neurological: Positive for weakness. Negative for dizziness, tremors, light-headedness and headaches.   Hematological: Negative for adenopathy.   Psychiatric/Behavioral: Negative for agitation, behavioral problems and confusion.     Objective:     Weight: 93 kg (205 lb 0.4 oz)  Body mass index is 31.17 kg/m².  Vital Signs (Most Recent):  Temp: 98.4 °F (36.9 °C) (08/17/22 0736)  Pulse: 73 (08/17/22 0736)  Resp: 17 (08/17/22 0839)  BP: (!) 143/82 (08/17/22 0736)  SpO2: 97 % (08/17/22 0736) Vital Signs (24h Range):  Temp:  [97.9 °F (36.6 °C)-98.4 °F (36.9 °C)] 98.4 °F (36.9 °C)  Pulse:  [64-86] 73  Resp:  [16-18] 17  SpO2:  [95 %-97 %] 97 %  BP: (114-166)/(70-82) 143/82                          Physical Exam:    Constitutional: She appears distressed.     Eyes: Pupils are equal, round, and reactive to light. EOM are normal.     Cardiovascular: Normal rate.     Abdominal: Soft.     Skin: Skin displays no rash on trunk.     Psych/Behavior: She is alert. She is oriented to person, place, and time. She has a normal mood and affect.     Musculoskeletal:        Neck: Range of motion is limited. Muscle strength is 5/5. Tone is normal.        Back: Range of motion is limited. There is tenderness. Muscle strength is 5/5. Tone is normal.        Right Upper Extremities: There is no tenderness. Muscle strength is 5/5. Tone is normal.        Left  Upper Extremities: There is no tenderness. Muscle strength is 5/5. Tone is normal.       Right Lower Extremities: There is no tenderness. Muscle strength is 5/5.        Left Lower Extremities: There is no tenderness. Muscle strength is 5/5.     Neurological:        Sensory: There is no sensory deficit in the trunk. There is no sensory deficit in the extremities.        DTRs: DTRs are normal.        Cranial nerves: Cranial nerve(s) II, III, IV, V, VI, VII, VIII, IX, X, XI and XII are intact.       Significant Labs:  Recent Labs   Lab 08/17/22  0835   GLU 88      K 4.7      CO2 25   BUN 9   CREATININE 0.6   CALCIUM 9.1   MG 2.2     Recent Labs   Lab 08/17/22  0835   WBC 7.57   HGB 8.9*   HCT 27.9*   *     No results for input(s): LABPT, INR, APTT in the last 48 hours.  Microbiology Results (last 7 days)     ** No results found for the last 168 hours. **          Assessment/Plan:     Active Diagnoses:    Diagnosis Date Noted POA    PRINCIPAL PROBLEM:  Epidural abscess [G06.2] 08/17/2022 Yes    Hypertension [I10] 08/17/2022 Yes      Problems Resolved During this Admission:     Patient needs MR T spine   Upload her previous MR lumbar into our PACS   She has exsquisit tenderness over the right hip with PROM   Will order MRI  As well   NPO after MN   To OR for lumbar decompression     Floyd Brewer MD  Neurosurgery  [facility]

## 2022-08-17 NOTE — HOSPITAL COURSE
Patient admitted for epidural abscess under the caer [facility] medicine. Neurosurgery was consulted and she was started on IV abx.  08/17: Patient to have MRI of hip and thoracic spine. Will need long term IV abx. - no cultures available - will draw BC despite already getting IV abx. ID consulted. Case management consulted for discharge planing. Echo ordered.   08/18:  Patient scheduled for surgery today. Mother and aunt at bedside. Patient wants to go to rehab after she recovers from this. We discussed need for long term IV abx - likely 3 months - not able to go to Rehab while on IV abx.  08/19:  Patient with high pain - 10/10, crying. 1X Morphine ordered - will change oxycodone 10mg to percocet 10 for severe pain. Added daily miralax - continue IV abx. Once cleared by neurosurgery, will transfer back to [place] for IV abx therapy    8/20: Patient refused MRI today, per review of note from neurosurgery, recommend repeat MRI in 6 weeks, will cancel current order. Neurosurgery has signed off, recommend to have staples removed on 9/1/22. Continue current antibiotic regimen, recommend to deescalate when final culture and sensitivity received. Called transfer center to initiate return transfer to [facility], patient no longer under the care of neurosurgery, can return to sending facility to continue IV antibiotics to be closer to her home and family.     8/21: Confirmation received for [facility], patient has been accepted by facility for return transfer, transport to be arranged.Transfer orders completed, will continue current plan of care, patient to continue IV antibiotics for up to 3 months pending course. Other recs per Neurosurgery. Updated patient, patient became tearful and reports she has not been able to see her family since the transfer, family unable to travel to [place], family will be able to visit at sending facility.

## 2022-08-17 NOTE — SUBJECTIVE & OBJECTIVE
Interval History: Patient to have MRI of hip and thoracic spine. Will need long term IV abx. - no cultures available - will draw BC despite already getting IV abx. ID consulted. Case management consulted for discharge planing. Echo ordered.    Review of Systems   Constitutional:  Positive for activity change. Negative for appetite change, chills, diaphoresis, fatigue, fever and unexpected weight change.   HENT:  Negative for congestion, dental problem, ear pain, hearing loss, mouth sores, postnasal drip, rhinorrhea, sinus pressure, sore throat, tinnitus and trouble swallowing.    Eyes:  Negative for pain, discharge, redness and visual disturbance.   Respiratory:  Negative for apnea, cough, choking, chest tightness, shortness of breath and wheezing.    Cardiovascular:  Negative for chest pain, palpitations and leg swelling.   Gastrointestinal:  Negative for abdominal distention, abdominal pain, anal bleeding, blood in stool, constipation, diarrhea, nausea, rectal pain and vomiting.   Endocrine: Negative for cold intolerance, heat intolerance, polydipsia and polyuria.   Genitourinary:  Negative for difficulty urinating, dyspareunia, dysuria, flank pain, frequency, hematuria and urgency.   Musculoskeletal:  Positive for back pain. Negative for arthralgias, gait problem, joint swelling, myalgias, neck pain and neck stiffness.   Skin: Negative.  Negative for color change, rash and wound.   Allergic/Immunologic: Negative.    Neurological:  Positive for weakness. Negative for dizziness, tremors, seizures, syncope, speech difficulty, light-headedness and headaches.   Hematological: Negative.  Negative for adenopathy. Does not bruise/bleed easily.   Psychiatric/Behavioral:  Negative for agitation, behavioral problems, confusion and sleep disturbance. The patient is nervous/anxious.    All other systems reviewed and are negative.  Objective:     Vital Signs (Most Recent):  Temp: 98.7 °F (37.1 °C) (08/17/22 1647)  Pulse: 92  (08/17/22 1647)  Resp: 16 (08/17/22 1647)  BP: 126/77 (08/17/22 1647)  SpO2: 95 % (08/17/22 1647) Vital Signs (24h Range):  Temp:  [97.9 °F (36.6 °C)-98.8 °F (37.1 °C)] 98.7 °F (37.1 °C)  Pulse:  [64-92] 92  Resp:  [16-18] 16  SpO2:  [95 %-97 %] 95 %  BP: (114-166)/(70-82) 126/77     Weight: 93 kg (205 lb 0.4 oz)  Body mass index is 31.17 kg/m².  No intake or output data in the 24 hours ending 08/17/22 1650   Physical Exam  Vitals and nursing note reviewed.   Constitutional:       General: She is not in acute distress.     Appearance: She is well-developed.   HENT:      Head: Normocephalic and atraumatic.      Right Ear: Hearing and external ear normal.      Left Ear: Hearing and external ear normal.      Nose: No rhinorrhea.      Right Sinus: No maxillary sinus tenderness or frontal sinus tenderness.      Left Sinus: No maxillary sinus tenderness or frontal sinus tenderness.      Mouth/Throat:      Mouth: No oral lesions.      Pharynx: Uvula midline.   Eyes:      General:         Right eye: No discharge.         Left eye: No discharge.      Conjunctiva/sclera: Conjunctivae normal.      Pupils: Pupils are equal, round, and reactive to light.   Neck:      Thyroid: No thyromegaly.      Vascular: No carotid bruit.      Trachea: No tracheal deviation.   Cardiovascular:      Rate and Rhythm: Normal rate and regular rhythm.      Pulses:           Dorsalis pedis pulses are 2+ on the right side and 2+ on the left side.      Heart sounds: Normal heart sounds, S1 normal and S2 normal. No murmur heard.  Pulmonary:      Effort: Pulmonary effort is normal. No respiratory distress.      Breath sounds: Normal breath sounds.   Chest:   Breasts:      Right: No supraclavicular adenopathy.      Left: No supraclavicular adenopathy.     Abdominal:      General: Bowel sounds are normal. There is no distension.      Palpations: Abdomen is soft. There is no mass.      Tenderness: There is no abdominal tenderness.   Musculoskeletal:       Cervical back: Normal range of motion.      Thoracic back: Tenderness present. Decreased range of motion.      Lumbar back: Tenderness present. Decreased range of motion.   Lymphadenopathy:      Cervical: No cervical adenopathy.      Upper Body:      Right upper body: No supraclavicular adenopathy.      Left upper body: No supraclavicular adenopathy.   Skin:     General: Skin is warm and dry.      Capillary Refill: Capillary refill takes less than 2 seconds.      Findings: No rash.   Neurological:      Mental Status: She is alert and oriented to person, place, and time.      Sensory: No sensory deficit.      Coordination: Coordination normal.      Gait: Gait normal.   Psychiatric:         Mood and Affect: Mood is not anxious or depressed.         Speech: Speech normal.         Behavior: Behavior normal.         Thought Content: Thought content normal.         Judgment: Judgment normal.       Significant Labs: All pertinent labs within the past 24 hours have been reviewed.  Recent Lab Results         08/17/22  1355   08/17/22  0835        Albumin   2.9       Alkaline Phosphatase   50       ALT   246       Anion Gap   12       AST   86       Baso #   0.03       Basophil %   0.4       BILIRUBIN TOTAL   0.3  Comment: For infants and newborns, interpretation of results should be based  on gestational age, weight and in agreement with clinical  observations.    Premature Infant recommended reference ranges:  Up to 24 hours.............<8.0 mg/dL  Up to 48 hours............<12.0 mg/dL  3-5 days..................<15.0 mg/dL  6-29 days.................<15.0 mg/dL         BUN   9       Calcium   9.1       Chloride   100       CO2   25       Creatinine   0.6       Differential Method   Automated       eGFR   >60       Eos #   0.1       Eosinophil %   0.7       Glucose   88       Gran # (ANC)   4.3       Gran %   56.8       Group & Rh   A NEG       Hematocrit   27.9       Hemoglobin   8.9       Immature Grans (Abs)    0.07  Comment: Mild elevation in immature granulocytes is non specific and   can be seen in a variety of conditions including stress response,   acute inflammation, trauma and pregnancy. Correlation with other   laboratory and clinical findings is essential.         Immature Granulocytes   0.9       INDIRECT JESSICA   NEG       Lymph #   2.4       Lymph %   31.4       Magnesium   2.2       MCH   23.1       MCHC   31.9       MCV   72       Mono #   0.7       Mono %   9.8       MPV   8.7       nRBC   0       Phosphorus   4.8       Platelets   760       Potassium   4.7       PROTEIN TOTAL   6.9       RBC   3.86       RDW   16.7       SARS-CoV-2 RNA, Amplification, Qual Negative  Comment: This test utilizes isothermal nucleic acid amplification   technology to detect the SARS-CoV-2 RdRp nucleic acid segment.   The analytical sensitivity (limit of detection) is 125 genome   equivalents/mL.     A POSITIVE result implies infection with the SARS-CoV-2 virus;  the patient is presumed to be contagious.    A NEGATIVE result means that SARS-CoV-2 nucleic acids are not  present above the limit of detection. A NEGATIVE result should be   treated as presumptive. It does not rule out the possibility of   COVID-19 and should not be the sole basis for treatment decisions.   If COVID-19 is strongly suspected based on clinical and exposure   history, re-testing using an alternate molecular assay should be   considered.       This test is only for use under the Food and Drug   Administration s Emergency Use Authorization (EUA).   Commercial kits are provided by [employer].   Performance characteristics of the EUA have been independently  verified by [facility] Department of  Pathology and Laboratory Medicine.   _________________________________________________________________  The ID NOW COVID-19 Letter of Authorization, along with the   authorized Fact Sheet for Healthcare Providers, the authorized Fact  Sheet for  Patients, and authorized labeling are available on the FDA   website:  www.fda.gov/MedicalDevices/Safety/EmergencySituations/sdo244106.htm           Sodium   137       WBC   7.57               Significant Imaging: I have reviewed all pertinent imaging results/findings within the past 24 hours.

## 2022-08-17 NOTE — PLAN OF CARE
Pt remains free of falls/injury this shift. Safety precautions maintained. Pain managed with PRN medications. IV abx given. VSS. No signs and symptoms of acute distress noted at this time. 12 hour chart check completed. Will continue to monitor.

## 2022-08-17 NOTE — HPI
37 y.o. female with history of IVDA (last used 8/8), sciatica and chronic low back pain, who presented to the [place] ER on 8/9 for evaluation of lower back pain, bilateral leg numbness and inability to ambulate for 4 days.  Physical exam notable for 5/5 strength in BUE, 4/5 strength in BLE, and no spinal tenderness.  Labs on admit showed WBC 11, ESR 70.  UDS was positive for amphetamines.  She was initially started on Ceftriaxone for pyelo, which was stopped after 3 days.  Her hospital stay was complicated by methamphetamine withdrawal, so she was unable to cooperate with spinal imaging.  MRI was originally ordered without contrast because of length of study, she was unable to cooperate because of pain.  MRI lumbar spine without contrast 8/15 showed L3-L4 endplate erosions with fluid in the disc, epidural fluid extending from L2-L4 with moderate central canal narrowing at L2-L3 with severe central canal narrowing at L3-L4, findings suspicious for spondylodiscitis.  Additionally, fluid within the L1-L2 and L2-L3 disc space without significant endplate erosions, early spondylodiscitis can't be excluded. After the MRI resulted, Radiology suggested MRI with contrast to further determine discitis.  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  The fluid collection may extend further into the thoracic spine as well.  Small millimetric fluid collections within the right and left psoas muscle suggesting early abscess formation.  She was started on Vanc and Ceftriaxone on 8/15.  [place] to [place]. Patient transferred from [place] to [facility] via [facility]. Patient is a full code, surrogate decision maker is her mother, Shaista Engel. Admitted to inpatient under the care of [facility].

## 2022-08-17 NOTE — PLAN OF CARE
Problem: Adult Inpatient Plan of Care  Goal: Readiness for Transition of Care  8/17/2022 0603 by Rosario Ellison RN  Outcome: Ongoing, Progressing  8/17/2022 0603 by Rosario Ellison RN  Outcome: Ongoing, Not Progressing     Problem: Adult Inpatient Plan of Care  Goal: Optimal Comfort and Wellbeing  8/17/2022 0603 by Rosario Ellison RN  Outcome: Ongoing, Progressing  8/17/2022 0603 by Rosario Ellison RN  Outcome: Ongoing, Not Progressing

## 2022-08-17 NOTE — PLAN OF CARE
[facility] - Med Surg  Initial Discharge Assessment       Primary Care Provider: [facility] [place]  Admission Diagnosis: Epidural abscess [G06.2]    Admission Date: 8/17/2022  Expected Discharge Date:     Discharge Barriers Identified: None    Payor: MEDICAID / Plan: [place] / Product Type: Managed Medicaid /     Extended Emergency Contact Information  Primary Emergency Contact: Shaista Engel  Mobile Phone: [phone]  Relation: Mother  Preferred language: English   needed? No    Discharge Plan A: Home with family  Discharge Plan B: Home with family      [id number] [place] - [address]  [address] [place] [address]  Phone: [phone] Fax: [phone]      Initial Assessment (most recent)     Adult Discharge Assessment - 08/17/22 1050        Discharge Assessment    Assessment Type Discharge Planning Assessment     Confirmed/corrected address, phone number and insurance Yes     Confirmed Demographics Correct on Facesheet     Source of Information patient     Communicated KIM with patient/caregiver Date not available/Unable to determine     Reason For Admission Transferred from [facility] for Neurosurgery care     Lives With child(michael), dependent;parent(s)     Facility Arrived From: Home     Do you expect to return to your current living situation? Yes     Do you have help at home or someone to help you manage your care at home? Yes     Who are your caregiver(s) and their phone number(s)? Shaista Engel, mother [phone]     Prior to hospitilization cognitive status: Alert/Oriented     Current cognitive status: Alert/Oriented     Walking or Climbing Stairs Difficulty none     Dressing/Bathing Difficulty none     Equipment Currently Used at Home none     Readmission within 30 days? No     Patient currently being followed by outpatient case management? No     Do you currently have service(s) that help  you manage your care at home? No     Do you take prescription medications? No     Do you have prescription coverage? Yes     Coverage Medicaid     Who is going to help you get home at discharge? Shaista Engel     How do you get to doctors appointments? car, drives self;family or friend will provide     Are you on dialysis? No     Do you take coumadin? No     Discharge Plan A Home with family     Discharge Plan B Home with family     DME Needed Upon Discharge  walker, rolling     Discharge Plan discussed with: Patient     Discharge Barriers Identified None        Relationship/Environment    Name(s) of Who Lives With Patient Shaista [place]               CM met with patient at the bedside to assess for discharge needs.  Patient was transferred from [facility] for a higher level of care.  Patient lives in [place], [place] with her mother and 14 year old son.  Prior to being hospitalized, patient was independent and cared for herself.  Discharge needs will be dependent on hospital progress. CM provided a transitional care folder, information on advanced directives, information on pharmacy bedside delivery, and discharge planning begins on admission with contact information for any needs/questions.

## 2022-08-17 NOTE — PROGRESS NOTES
[facility] Medicine  Progress Note    Patient Name: Kayla Clay  MRN: [id number]  Patient Class: IP- Inpatient   Admission Date: 8/17/2022  Length of Stay: 0 days  Attending Physician: Ken Serrano MD  Primary Care Provider: Primary Doctor No        Subjective:     Principal Problem:Epidural abscess        HPI:  37 y.o. 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  Physical exam notable for 5/5 strength in BUE, 4/5 strength in BLE, and no spinal tenderness.  Labs on admit showed WBC 11, ESR 70.  UDS was positive for amphetamines.  She was initially started on Ceftriaxone for pyelo, which was stopped after 3 days.  Her hospital stay was complicated by methamphetamine withdrawal, so she was unable to cooperate with spinal imaging.  MRI was originally ordered without contrast because of length of study, she was unable to cooperate because of pain.  MRI lumbar spine without contrast 8/15 showed L3-L4 endplate erosions with fluid in the disc, epidural fluid extending from L2-L4 with moderate central canal narrowing at L2-L3 with severe central canal narrowing at L3-L4, findings suspicious for spondylodiscitis.  Additionally, fluid within the L1-L2 and L2-L3 disc space without significant endplate erosions, early spondylodiscitis can't be excluded. After the MRI resulted, Radiology suggested MRI with contrast to further determine discitis.  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  The fluid collection may extend further into the thoracic spine as well.  Small millimetric fluid collections within the right and left psoas muscle suggesting early abscess formation.  She was started on Vanc and Ceftriaxone on 8/15.   Her case was discussed with Dr. Brewer ([facility]) who agreed to consult. Patient transferred from [place] to [facility] via [facility]. Patient is a full code, surrogate decision maker is her mother, Shaista Engel. Admitted to inpatient under the care of [facility].       Overview/Hospital Course:  Patient admitted for epidural abscess under the caer of [facility] medicine. Neurosurgery was consulted and she was started on IV abx.      Interval History: Patient to have MRI of hip and thoracic spine. Will need long term IV abx. - no cultures available - will draw BC despite already getting IV abx. ID consulted. Case management consulted for discharge planing. Echo ordered.    Review of Systems   Constitutional:  Positive for activity change. Negative for appetite change, chills, diaphoresis, fatigue, fever and unexpected weight change.   HENT:  Negative for congestion, dental problem, ear pain, hearing loss, mouth sores, postnasal drip, rhinorrhea, sinus pressure, sore throat, tinnitus and trouble swallowing.    Eyes:  Negative for pain, discharge, redness and visual disturbance.   Respiratory:  Negative for apnea, cough, choking, chest tightness, shortness of breath and wheezing.    Cardiovascular:  Negative for chest pain, palpitations and leg swelling.   Gastrointestinal:  Negative for abdominal distention, abdominal pain, anal bleeding, blood in stool, constipation, diarrhea, nausea, rectal pain and vomiting.   Endocrine: Negative for cold intolerance, heat intolerance, polydipsia and polyuria.   Genitourinary:  Negative for difficulty urinating, dyspareunia, dysuria, flank pain, frequency, hematuria and urgency.   Musculoskeletal:  Positive for back pain. Negative for arthralgias, gait problem, joint swelling, myalgias, neck pain and neck stiffness.   Skin: Negative.  Negative for color change, rash and wound.   Allergic/Immunologic: Negative.    Neurological:  Positive for weakness. Negative for  dizziness, tremors, seizures, syncope, speech difficulty, light-headedness and headaches.   Hematological: Negative.  Negative for adenopathy. Does not bruise/bleed easily.   Psychiatric/Behavioral:  Negative for agitation, behavioral problems, confusion and sleep disturbance. The patient is nervous/anxious.    All other systems reviewed and are negative.  Objective:     Vital Signs (Most Recent):  Temp: 98.7 °F (37.1 °C) (08/17/22 1647)  Pulse: 92 (08/17/22 1647)  Resp: 16 (08/17/22 1647)  BP: 126/77 (08/17/22 1647)  SpO2: 95 % (08/17/22 1647) Vital Signs (24h Range):  Temp:  [97.9 °F (36.6 °C)-98.8 °F (37.1 °C)] 98.7 °F (37.1 °C)  Pulse:  [64-92] 92  Resp:  [16-18] 16  SpO2:  [95 %-97 %] 95 %  BP: (114-166)/(70-82) 126/77     Weight: 93 kg (205 lb 0.4 oz)  Body mass index is 31.17 kg/m².  No intake or output data in the 24 hours ending 08/17/22 1650   Physical Exam  Vitals and nursing note reviewed.   Constitutional:       General: She is not in acute distress.     Appearance: She is well-developed.   HENT:      Head: Normocephalic and atraumatic.      Right Ear: Hearing and external ear normal.      Left Ear: Hearing and external ear normal.      Nose: No rhinorrhea.      Right Sinus: No maxillary sinus tenderness or frontal sinus tenderness.      Left Sinus: No maxillary sinus tenderness or frontal sinus tenderness.      Mouth/Throat:      Mouth: No oral lesions.      Pharynx: Uvula midline.   Eyes:      General:         Right eye: No discharge.         Left eye: No discharge.      Conjunctiva/sclera: Conjunctivae normal.      Pupils: Pupils are equal, round, and reactive to light.   Neck:      Thyroid: No thyromegaly.      Vascular: No carotid bruit.      Trachea: No tracheal deviation.   Cardiovascular:      Rate and Rhythm: Normal rate and regular rhythm.      Pulses:           Dorsalis pedis pulses are 2+ on the right side and 2+ on the left side.      Heart sounds: Normal heart sounds, S1 normal and S2  normal. No murmur heard.  Pulmonary:      Effort: Pulmonary effort is normal. No respiratory distress.      Breath sounds: Normal breath sounds.   Chest:   Breasts:      Right: No supraclavicular adenopathy.      Left: No supraclavicular adenopathy.     Abdominal:      General: Bowel sounds are normal. There is no distension.      Palpations: Abdomen is soft. There is no mass.      Tenderness: There is no abdominal tenderness.   Musculoskeletal:      Cervical back: Normal range of motion.      Thoracic back: Tenderness present. Decreased range of motion.      Lumbar back: Tenderness present. Decreased range of motion.   Lymphadenopathy:      Cervical: No cervical adenopathy.      Upper Body:      Right upper body: No supraclavicular adenopathy.      Left upper body: No supraclavicular adenopathy.   Skin:     General: Skin is warm and dry.      Capillary Refill: Capillary refill takes less than 2 seconds.      Findings: No rash.   Neurological:      Mental Status: She is alert and oriented to person, place, and time.      Sensory: No sensory deficit.      Coordination: Coordination normal.      Gait: Gait normal.   Psychiatric:         Mood and Affect: Mood is not anxious or depressed.         Speech: Speech normal.         Behavior: Behavior normal.         Thought Content: Thought content normal.         Judgment: Judgment normal.       Significant Labs: All pertinent labs within the past 24 hours have been reviewed.  Recent Lab Results         08/17/22  1355   08/17/22  0835        Albumin   2.9       Alkaline Phosphatase   50       ALT   246       Anion Gap   12       AST   86       Baso #   0.03       Basophil %   0.4       BILIRUBIN TOTAL   0.3  Comment: For infants and newborns, interpretation of results should be based  on gestational age, weight and in agreement with clinical  observations.    Premature Infant recommended reference ranges:  Up to 24 hours.............<8.0 mg/dL  Up to 48  hours............<12.0 mg/dL  3-5 days..................<15.0 mg/dL  6-29 days.................<15.0 mg/dL         BUN   9       Calcium   9.1       Chloride   100       CO2   25       Creatinine   0.6       Differential Method   Automated       eGFR   >60       Eos #   0.1       Eosinophil %   0.7       Glucose   88       Gran # (ANC)   4.3       Gran %   56.8       Group & Rh   A NEG       Hematocrit   27.9       Hemoglobin   8.9       Immature Grans (Abs)   0.07  Comment: Mild elevation in immature granulocytes is non specific and   can be seen in a variety of conditions including stress response,   acute inflammation, trauma and pregnancy. Correlation with other   laboratory and clinical findings is essential.         Immature Granulocytes   0.9       INDIRECT JESSICA   NEG       Lymph #   2.4       Lymph %   31.4       Magnesium   2.2       MCH   23.1       MCHC   31.9       MCV   72       Mono #   0.7       Mono %   9.8       MPV   8.7       nRBC   0       Phosphorus   4.8       Platelets   760       Potassium   4.7       PROTEIN TOTAL   6.9       RBC   3.86       RDW   16.7       SARS-CoV-2 RNA, Amplification, Qual Negative  Comment: This test utilizes isothermal nucleic acid amplification   technology to detect the SARS-CoV-2 RdRp nucleic acid segment.   The analytical sensitivity (limit of detection) is 125 genome   equivalents/mL.     A POSITIVE result implies infection with the SARS-CoV-2 virus;  the patient is presumed to be contagious.    A NEGATIVE result means that SARS-CoV-2 nucleic acids are not  present above the limit of detection. A NEGATIVE result should be   treated as presumptive. It does not rule out the possibility of   COVID-19 and should not be the sole basis for treatment decisions.   If COVID-19 is strongly suspected based on clinical and exposure   history, re-testing using an alternate molecular assay should be   considered.       This test is only for use under the Food and Drug    Administration s Emergency Use Authorization (EUA).   Commercial kits are provided by [employer].   Performance characteristics of the EUA have been independently  verified by [facility] Department of  Pathology and Laboratory Medicine.   _________________________________________________________________  The ID NOW COVID-19 Letter of Authorization, along with the   authorized Fact Sheet for Healthcare Providers, the authorized Fact  Sheet for Patients, and authorized labeling are available on the FDA   website:  www.fda.gov/MedicalDevices/Safety/EmergencySituations/mmv635905.htm           Sodium   137       WBC   7.57               Significant Imaging: I have reviewed all pertinent imaging results/findings within the past 24 hours.      Assessment/Plan:      * Epidural abscess  Per report of sending facility, confirmed epidural abscess on MRI. Initiated on ceftriaxone and Vancomycin on 8/15/22.    --Continue Ceftriaxone and Vancomycin  --Pain control per MAR  --CBC,CMP  --Consult neurosurgery  --NPO pending eval per neurosurgery  --Vitals Q4H  08/17:  --MRI thoracic spine pending  --echo pending  --continue IV abx  --ID consulted        Anxiety  --PRN ativan      Hypertension  Hx HTN, not currently treated with medications as outpatient    --Hydralazine PRN  --Vitals Q4H        VTE Risk Mitigation (From admission, onward)         Ordered     IP VTE HIGH RISK PATIENT  Once         08/17/22 0259     Place sequential compression device  Until discontinued         08/17/22 0259     Reason for No Pharmacological VTE Prophylaxis  Once        Question:  Reasons:  Answer:  Physician Provided (leave comment)    08/17/22 0259                Discharge Planning   KIM:      Code Status: Full Code   Is the patient medically ready for discharge?:     Reason for patient still in hospital (select all that apply): Patient trending condition, Treatment, Consult recommendations and Pending disposition  Discharge  Plan A: Home with family                  DWAYNE Torres-C  Department of Hospital Medicine   '[facility] Surg

## 2022-08-17 NOTE — CARE UPDATE
paitent in from transfer epidural abscess   Need tp upload cd of MR KEVIN arteaga into PACS   MR ordered for T spine w and w/o this AM   NPO for possible decompression later this evening vs yesi     Formal consult to follow

## 2022-08-18 ENCOUNTER — ANESTHESIA (OUTPATIENT)
Dept: SURGERY | Facility: HOSPITAL | Age: 37
DRG: 028 | End: 2022-08-18
Payer: MEDICAID

## 2022-08-18 ENCOUNTER — ANESTHESIA EVENT (OUTPATIENT)
Dept: SURGERY | Facility: HOSPITAL | Age: 37
DRG: 028 | End: 2022-08-18
Payer: MEDICAID

## 2022-08-18 PROBLEM — F19.90 IVDU (INTRAVENOUS DRUG USER): Status: ACTIVE | Noted: 2022-08-18

## 2022-08-18 PROBLEM — G06.2 EPIDURAL ABSCESS: Chronic | Status: ACTIVE | Noted: 2022-08-17

## 2022-08-18 PROBLEM — I10 HYPERTENSION: Chronic | Status: ACTIVE | Noted: 2022-08-17

## 2022-08-18 PROBLEM — F41.9 ANXIETY: Chronic | Status: ACTIVE | Noted: 2022-08-17

## 2022-08-18 LAB
ALBUMIN SERPL BCP-MCNC: 3 G/DL (ref 3.5–5.2)
ALP SERPL-CCNC: 59 U/L (ref 55–135)
ALT SERPL W/O P-5'-P-CCNC: 176 U/L (ref 10–44)
ANION GAP SERPL CALC-SCNC: 10 MMOL/L (ref 8–16)
AORTIC ROOT ANNULUS: 3.01 CM
ASCENDING AORTA: 2.51 CM
AST SERPL-CCNC: 35 U/L (ref 10–40)
AV INDEX (PROSTH): 0.75
AV MEAN GRADIENT: 5 MMHG
AV PEAK GRADIENT: 8 MMHG
AV VALVE AREA: 3.53 CM2
AV VELOCITY RATIO: 0.87
BASOPHILS # BLD AUTO: 0.04 K/UL (ref 0–0.2)
BASOPHILS NFR BLD: 0.6 % (ref 0–1.9)
BILIRUB SERPL-MCNC: 0.4 MG/DL (ref 0.1–1)
BSA FOR ECHO PROCEDURE: 2.13 M2
BUN SERPL-MCNC: 10 MG/DL (ref 6–20)
CALCIUM SERPL-MCNC: 9.6 MG/DL (ref 8.7–10.5)
CHLORIDE SERPL-SCNC: 97 MMOL/L (ref 95–110)
CO2 SERPL-SCNC: 27 MMOL/L (ref 23–29)
CREAT SERPL-MCNC: 0.6 MG/DL (ref 0.5–1.4)
CV ECHO LV RWT: 0.79 CM
DIFFERENTIAL METHOD: ABNORMAL
DOP CALC AO PEAK VEL: 1.45 M/S
DOP CALC AO VTI: 26.5 CM
DOP CALC LVOT AREA: 4.7 CM2
DOP CALC LVOT DIAMETER: 2.44 CM
DOP CALC LVOT PEAK VEL: 1.26 M/S
DOP CALC LVOT STROKE VOLUME: 93.47 CM3
DOP CALC RVOT PEAK VEL: 0.74 M/S
DOP CALC RVOT VTI: 12.1 CM
DOP CALCLVOT PEAK VEL VTI: 20 CM
E WAVE DECELERATION TIME: 166.43 MSEC
E/A RATIO: 1.21
E/E' RATIO: 5 M/S
ECHO LV POSTERIOR WALL: 1.41 CM (ref 0.6–1.1)
EJECTION FRACTION: 65 %
EOSINOPHIL # BLD AUTO: 0.1 K/UL (ref 0–0.5)
EOSINOPHIL NFR BLD: 1.1 % (ref 0–8)
ERYTHROCYTE [DISTWIDTH] IN BLOOD BY AUTOMATED COUNT: 17 % (ref 11.5–14.5)
EST. GFR  (NO RACE VARIABLE): >60 ML/MIN/1.73 M^2
FRACTIONAL SHORTENING: 33 % (ref 28–44)
GLUCOSE SERPL-MCNC: 96 MG/DL (ref 70–110)
HCT VFR BLD AUTO: 31 % (ref 37–48.5)
HGB BLD-MCNC: 9.5 G/DL (ref 12–16)
IMM GRANULOCYTES # BLD AUTO: 0.04 K/UL (ref 0–0.04)
IMM GRANULOCYTES NFR BLD AUTO: 0.6 % (ref 0–0.5)
INTERVENTRICULAR SEPTUM: 1.58 CM (ref 0.6–1.1)
IVC DIAMETER: 1.39 CM
IVRT: 39.96 MSEC
LA MAJOR: 4.95 CM
LA MINOR: 4.91 CM
LA WIDTH: 4.4 CM
LEFT ATRIUM SIZE: 3.39 CM
LEFT ATRIUM VOLUME INDEX: 30.1 ML/M2
LEFT ATRIUM VOLUME: 62.5 CM3
LEFT INTERNAL DIMENSION IN SYSTOLE: 2.38 CM (ref 2.1–4)
LEFT VENTRICLE DIASTOLIC VOLUME INDEX: 25.65 ML/M2
LEFT VENTRICLE DIASTOLIC VOLUME: 53.35 ML
LEFT VENTRICLE MASS INDEX: 95 G/M2
LEFT VENTRICLE SYSTOLIC VOLUME INDEX: 9.5 ML/M2
LEFT VENTRICLE SYSTOLIC VOLUME: 19.84 ML
LEFT VENTRICULAR INTERNAL DIMENSION IN DIASTOLE: 3.57 CM (ref 3.5–6)
LEFT VENTRICULAR MASS: 197.62 G
LV LATERAL E/E' RATIO: 3.89 M/S
LV SEPTAL E/E' RATIO: 7 M/S
LVOT MG: 3.52 MMHG
LVOT MV: 0.91 CM/S
LYMPHOCYTES # BLD AUTO: 1.8 K/UL (ref 1–4.8)
LYMPHOCYTES NFR BLD: 25.2 % (ref 18–48)
MAGNESIUM SERPL-MCNC: 2.1 MG/DL (ref 1.6–2.6)
MCH RBC QN AUTO: 22.6 PG (ref 27–31)
MCHC RBC AUTO-ENTMCNC: 30.6 G/DL (ref 32–36)
MCV RBC AUTO: 74 FL (ref 82–98)
MONOCYTES # BLD AUTO: 0.8 K/UL (ref 0.3–1)
MONOCYTES NFR BLD: 11.5 % (ref 4–15)
MV PEAK A VEL: 0.58 M/S
MV PEAK E VEL: 0.7 M/S
MV STENOSIS PRESSURE HALF TIME: 48.26 MS
MV VALVE AREA P 1/2 METHOD: 4.56 CM2
NEUTROPHILS # BLD AUTO: 4.4 K/UL (ref 1.8–7.7)
NEUTROPHILS NFR BLD: 61 % (ref 38–73)
NRBC BLD-RTO: 0 /100 WBC
PHOSPHATE SERPL-MCNC: 5.1 MG/DL (ref 2.7–4.5)
PISA MRMAX VEL: 5.21 M/S
PISA TR MAX VEL: 2.93 M/S
PLATELET # BLD AUTO: 781 K/UL (ref 150–450)
PMV BLD AUTO: 8.8 FL (ref 9.2–12.9)
POTASSIUM SERPL-SCNC: 4.6 MMOL/L (ref 3.5–5.1)
PROT SERPL-MCNC: 7.2 G/DL (ref 6–8.4)
PV MEAN GRADIENT: 1.3 MMHG
RA MAJOR: 5.12 CM
RA PRESSURE: 3 MMHG
RA WIDTH: 2.3 CM
RBC # BLD AUTO: 4.2 M/UL (ref 4–5.4)
SODIUM SERPL-SCNC: 134 MMOL/L (ref 136–145)
STJ: 2.54 CM
TDI LATERAL: 0.18 M/S
TDI SEPTAL: 0.1 M/S
TDI: 0.14 M/S
TR MAX PG: 34 MMHG
TRICUSPID ANNULAR PLANE SYSTOLIC EXCURSION: 2.3 CM
TV REST PULMONARY ARTERY PRESSURE: 37 MMHG
VANCOMYCIN SERPL-MCNC: 9.9 UG/ML
WBC # BLD AUTO: 7.13 K/UL (ref 3.9–12.7)

## 2022-08-18 PROCEDURE — 88304 PR  SURG PATH,LEVEL III: ICD-10-PCS | Mod: 26,,, | Performed by: STUDENT IN AN ORGANIZED HEALTH CARE EDUCATION/TRAINING PROGRAM

## 2022-08-18 PROCEDURE — 25000003 PHARM REV CODE 250: Performed by: NURSE PRACTITIONER

## 2022-08-18 PROCEDURE — 63267 PR EXCIS INTRASP LESN,XDURAL,LUMBAR: ICD-10-PCS | Mod: AS,,, | Performed by: PHYSICIAN ASSISTANT

## 2022-08-18 PROCEDURE — 88312 PR  SPECIAL STAINS,GROUP I: ICD-10-PCS | Mod: 26,,, | Performed by: STUDENT IN AN ORGANIZED HEALTH CARE EDUCATION/TRAINING PROGRAM

## 2022-08-18 PROCEDURE — 11000001 HC ACUTE MED/SURG PRIVATE ROOM

## 2022-08-18 PROCEDURE — 37000009 HC ANESTHESIA EA ADD 15 MINS: Performed by: NEUROLOGICAL SURGERY

## 2022-08-18 PROCEDURE — 61782 PR STEREOTACTIC COMP ASSIST PROC,CRANIAL,EXTRADURAL: ICD-10-PCS | Mod: ,,, | Performed by: NEUROLOGICAL SURGERY

## 2022-08-18 PROCEDURE — 88304 TISSUE EXAM BY PATHOLOGIST: CPT | Mod: 26,,, | Performed by: STUDENT IN AN ORGANIZED HEALTH CARE EDUCATION/TRAINING PROGRAM

## 2022-08-18 PROCEDURE — 71000039 HC RECOVERY, EACH ADD'L HOUR: Performed by: NEUROLOGICAL SURGERY

## 2022-08-18 PROCEDURE — 63600175 PHARM REV CODE 636 W HCPCS: Performed by: NEUROLOGICAL SURGERY

## 2022-08-18 PROCEDURE — 61782 SCAN PROC CRANIAL EXTRA: CPT | Mod: ,,, | Performed by: NEUROLOGICAL SURGERY

## 2022-08-18 PROCEDURE — 80053 COMPREHEN METABOLIC PANEL: CPT | Performed by: FAMILY MEDICINE

## 2022-08-18 PROCEDURE — C1729 CATH, DRAINAGE: HCPCS | Performed by: NEUROLOGICAL SURGERY

## 2022-08-18 PROCEDURE — 88304 TISSUE EXAM BY PATHOLOGIST: CPT | Mod: 59 | Performed by: STUDENT IN AN ORGANIZED HEALTH CARE EDUCATION/TRAINING PROGRAM

## 2022-08-18 PROCEDURE — 87206 SMEAR FLUORESCENT/ACID STAI: CPT | Performed by: NEUROLOGICAL SURGERY

## 2022-08-18 PROCEDURE — 63267 EXCISE INTRSPINL LESION LMBR: CPT | Mod: ,,, | Performed by: NEUROLOGICAL SURGERY

## 2022-08-18 PROCEDURE — 83735 ASSAY OF MAGNESIUM: CPT | Performed by: NURSE PRACTITIONER

## 2022-08-18 PROCEDURE — 99024 PR POST-OP FOLLOW-UP VISIT: ICD-10-PCS | Mod: ,,, | Performed by: NEUROLOGICAL SURGERY

## 2022-08-18 PROCEDURE — 85025 COMPLETE CBC W/AUTO DIFF WBC: CPT | Performed by: NURSE PRACTITIONER

## 2022-08-18 PROCEDURE — 25000003 PHARM REV CODE 250: Performed by: FAMILY MEDICINE

## 2022-08-18 PROCEDURE — 94761 N-INVAS EAR/PLS OXIMETRY MLT: CPT

## 2022-08-18 PROCEDURE — 71000033 HC RECOVERY, INTIAL HOUR: Performed by: NEUROLOGICAL SURGERY

## 2022-08-18 PROCEDURE — 37000008 HC ANESTHESIA 1ST 15 MINUTES: Performed by: NEUROLOGICAL SURGERY

## 2022-08-18 PROCEDURE — 99024 POSTOP FOLLOW-UP VISIT: CPT | Mod: ,,, | Performed by: NEUROLOGICAL SURGERY

## 2022-08-18 PROCEDURE — 36415 COLL VENOUS BLD VENIPUNCTURE: CPT | Performed by: NURSE PRACTITIONER

## 2022-08-18 PROCEDURE — 63600175 PHARM REV CODE 636 W HCPCS: Performed by: FAMILY MEDICINE

## 2022-08-18 PROCEDURE — 87070 CULTURE OTHR SPECIMN AEROBIC: CPT | Mod: 59 | Performed by: NEUROLOGICAL SURGERY

## 2022-08-18 PROCEDURE — 88342 IMHCHEM/IMCYTCHM 1ST ANTB: CPT | Mod: 26,,, | Performed by: STUDENT IN AN ORGANIZED HEALTH CARE EDUCATION/TRAINING PROGRAM

## 2022-08-18 PROCEDURE — 25000003 PHARM REV CODE 250: Performed by: NURSE ANESTHETIST, CERTIFIED REGISTERED

## 2022-08-18 PROCEDURE — 88342 CHG IMMUNOCYTOCHEMISTRY: ICD-10-PCS | Mod: 26,,, | Performed by: STUDENT IN AN ORGANIZED HEALTH CARE EDUCATION/TRAINING PROGRAM

## 2022-08-18 PROCEDURE — 36000710: Performed by: NEUROLOGICAL SURGERY

## 2022-08-18 PROCEDURE — 63600175 PHARM REV CODE 636 W HCPCS: Performed by: NURSE ANESTHETIST, CERTIFIED REGISTERED

## 2022-08-18 PROCEDURE — 63267 PR EXCIS INTRASP LESN,XDURAL,LUMBAR: ICD-10-PCS | Mod: ,,, | Performed by: NEUROLOGICAL SURGERY

## 2022-08-18 PROCEDURE — 36000711: Performed by: NEUROLOGICAL SURGERY

## 2022-08-18 PROCEDURE — 88342 IMHCHEM/IMCYTCHM 1ST ANTB: CPT | Performed by: STUDENT IN AN ORGANIZED HEALTH CARE EDUCATION/TRAINING PROGRAM

## 2022-08-18 PROCEDURE — 88312 SPECIAL STAINS GROUP 1: CPT | Performed by: STUDENT IN AN ORGANIZED HEALTH CARE EDUCATION/TRAINING PROGRAM

## 2022-08-18 PROCEDURE — C1713 ANCHOR/SCREW BN/BN,TIS/BN: HCPCS | Performed by: NEUROLOGICAL SURGERY

## 2022-08-18 PROCEDURE — 87116 MYCOBACTERIA CULTURE: CPT | Performed by: NEUROLOGICAL SURGERY

## 2022-08-18 PROCEDURE — 27000221 HC OXYGEN, UP TO 24 HOURS

## 2022-08-18 PROCEDURE — 63600175 PHARM REV CODE 636 W HCPCS: Performed by: ANESTHESIOLOGY

## 2022-08-18 PROCEDURE — 87102 FUNGUS ISOLATION CULTURE: CPT | Mod: 59 | Performed by: NEUROLOGICAL SURGERY

## 2022-08-18 PROCEDURE — 87075 CULTR BACTERIA EXCEPT BLOOD: CPT | Mod: 59 | Performed by: NEUROLOGICAL SURGERY

## 2022-08-18 PROCEDURE — 84100 ASSAY OF PHOSPHORUS: CPT | Performed by: NURSE PRACTITIONER

## 2022-08-18 PROCEDURE — 99900035 HC TECH TIME PER 15 MIN (STAT)

## 2022-08-18 PROCEDURE — 87205 SMEAR GRAM STAIN: CPT | Mod: 59 | Performed by: NEUROLOGICAL SURGERY

## 2022-08-18 PROCEDURE — 63600175 PHARM REV CODE 636 W HCPCS: Performed by: NURSE PRACTITIONER

## 2022-08-18 PROCEDURE — 88312 SPECIAL STAINS GROUP 1: CPT | Mod: 26,,, | Performed by: STUDENT IN AN ORGANIZED HEALTH CARE EDUCATION/TRAINING PROGRAM

## 2022-08-18 PROCEDURE — 87015 SPECIMEN INFECT AGNT CONCNTJ: CPT | Performed by: NEUROLOGICAL SURGERY

## 2022-08-18 PROCEDURE — 87106 FUNGI IDENTIFICATION YEAST: CPT | Performed by: NEUROLOGICAL SURGERY

## 2022-08-18 PROCEDURE — 63600175 PHARM REV CODE 636 W HCPCS: Performed by: STUDENT IN AN ORGANIZED HEALTH CARE EDUCATION/TRAINING PROGRAM

## 2022-08-18 PROCEDURE — 27201423 OPTIME MED/SURG SUP & DEVICES STERILE SUPPLY: Performed by: NEUROLOGICAL SURGERY

## 2022-08-18 PROCEDURE — 63267 EXCISE INTRSPINL LESION LMBR: CPT | Mod: AS,,, | Performed by: PHYSICIAN ASSISTANT

## 2022-08-18 DEVICE — KIT GRAFT BONE/SUB STIM 10ML: Type: IMPLANTABLE DEVICE | Site: BACK | Status: FUNCTIONAL

## 2022-08-18 RX ORDER — FENTANYL CITRATE 50 UG/ML
INJECTION, SOLUTION INTRAMUSCULAR; INTRAVENOUS
Status: DISCONTINUED | OUTPATIENT
Start: 2022-08-18 | End: 2022-08-18

## 2022-08-18 RX ORDER — QUETIAPINE FUMARATE 25 MG/1
25 TABLET, FILM COATED ORAL EVERY 6 HOURS PRN
Status: DISCONTINUED | OUTPATIENT
Start: 2022-08-18 | End: 2022-08-21 | Stop reason: HOSPADM

## 2022-08-18 RX ORDER — PROPOFOL 10 MG/ML
VIAL (ML) INTRAVENOUS
Status: DISCONTINUED | OUTPATIENT
Start: 2022-08-18 | End: 2022-08-18

## 2022-08-18 RX ORDER — DIPHENHYDRAMINE HYDROCHLORIDE 50 MG/ML
25 INJECTION INTRAMUSCULAR; INTRAVENOUS EVERY 6 HOURS PRN
Status: DISCONTINUED | OUTPATIENT
Start: 2022-08-18 | End: 2022-08-18 | Stop reason: HOSPADM

## 2022-08-18 RX ORDER — ROCURONIUM BROMIDE 10 MG/ML
INJECTION, SOLUTION INTRAVENOUS
Status: DISCONTINUED | OUTPATIENT
Start: 2022-08-18 | End: 2022-08-18

## 2022-08-18 RX ORDER — OXYCODONE HYDROCHLORIDE 5 MG/1
5 TABLET ORAL
Status: DISCONTINUED | OUTPATIENT
Start: 2022-08-18 | End: 2022-08-18 | Stop reason: HOSPADM

## 2022-08-18 RX ORDER — SODIUM CHLORIDE 0.9 % (FLUSH) 0.9 %
3 SYRINGE (ML) INJECTION
Status: DISCONTINUED | OUTPATIENT
Start: 2022-08-18 | End: 2022-08-21 | Stop reason: HOSPADM

## 2022-08-18 RX ORDER — PROCHLORPERAZINE EDISYLATE 5 MG/ML
5 INJECTION INTRAMUSCULAR; INTRAVENOUS EVERY 30 MIN PRN
Status: DISCONTINUED | OUTPATIENT
Start: 2022-08-18 | End: 2022-08-18 | Stop reason: HOSPADM

## 2022-08-18 RX ORDER — VANCOMYCIN HCL IN 5 % DEXTROSE 1G/250ML
1000 PLASTIC BAG, INJECTION (ML) INTRAVENOUS
Status: COMPLETED | OUTPATIENT
Start: 2022-08-18 | End: 2022-08-19

## 2022-08-18 RX ORDER — SCOLOPAMINE TRANSDERMAL SYSTEM 1 MG/1
PATCH, EXTENDED RELEASE TRANSDERMAL
Status: DISCONTINUED | OUTPATIENT
Start: 2022-08-18 | End: 2022-08-18

## 2022-08-18 RX ORDER — ONDANSETRON 2 MG/ML
4 INJECTION INTRAMUSCULAR; INTRAVENOUS DAILY PRN
Status: DISCONTINUED | OUTPATIENT
Start: 2022-08-18 | End: 2022-08-18 | Stop reason: HOSPADM

## 2022-08-18 RX ORDER — HYDROMORPHONE HYDROCHLORIDE 2 MG/ML
0.2 INJECTION, SOLUTION INTRAMUSCULAR; INTRAVENOUS; SUBCUTANEOUS EVERY 5 MIN PRN
Status: DISCONTINUED | OUTPATIENT
Start: 2022-08-18 | End: 2022-08-18 | Stop reason: HOSPADM

## 2022-08-18 RX ORDER — DIPHENHYDRAMINE HCL 25 MG
50 CAPSULE ORAL EVERY 6 HOURS PRN
Status: DISCONTINUED | OUTPATIENT
Start: 2022-08-18 | End: 2022-08-21 | Stop reason: HOSPADM

## 2022-08-18 RX ORDER — TOBRAMYCIN 1.2 G/30ML
1.2 INJECTION, POWDER, LYOPHILIZED, FOR SOLUTION INTRAVENOUS ONCE AS NEEDED
Status: COMPLETED | OUTPATIENT
Start: 2022-08-18 | End: 2022-08-18

## 2022-08-18 RX ORDER — MIRTAZAPINE 15 MG/1
15 TABLET, FILM COATED ORAL NIGHTLY PRN
Status: DISCONTINUED | OUTPATIENT
Start: 2022-08-18 | End: 2022-08-21 | Stop reason: HOSPADM

## 2022-08-18 RX ORDER — CYCLOBENZAPRINE HCL 5 MG
5 TABLET ORAL EVERY 8 HOURS PRN
Status: DISCONTINUED | OUTPATIENT
Start: 2022-08-18 | End: 2022-08-21 | Stop reason: HOSPADM

## 2022-08-18 RX ORDER — PROCHLORPERAZINE EDISYLATE 5 MG/ML
5 INJECTION INTRAMUSCULAR; INTRAVENOUS EVERY 6 HOURS PRN
Status: DISCONTINUED | OUTPATIENT
Start: 2022-08-18 | End: 2022-08-21 | Stop reason: HOSPADM

## 2022-08-18 RX ORDER — SCOLOPAMINE TRANSDERMAL SYSTEM 1 MG/1
1 PATCH, EXTENDED RELEASE TRANSDERMAL
Status: DISPENSED | OUTPATIENT
Start: 2022-08-18 | End: 2022-08-19

## 2022-08-18 RX ORDER — AMOXICILLIN 250 MG
2 CAPSULE ORAL NIGHTLY PRN
Status: DISCONTINUED | OUTPATIENT
Start: 2022-08-18 | End: 2022-08-21 | Stop reason: HOSPADM

## 2022-08-18 RX ORDER — LIDOCAINE HYDROCHLORIDE 20 MG/ML
INJECTION INTRAVENOUS
Status: DISCONTINUED | OUTPATIENT
Start: 2022-08-18 | End: 2022-08-18

## 2022-08-18 RX ORDER — VANCOMYCIN HYDROCHLORIDE 1 G/20ML
INJECTION, POWDER, LYOPHILIZED, FOR SOLUTION INTRAVENOUS
Status: DISCONTINUED | OUTPATIENT
Start: 2022-08-18 | End: 2022-08-18 | Stop reason: HOSPADM

## 2022-08-18 RX ORDER — ALBUTEROL SULFATE 0.83 MG/ML
2.5 SOLUTION RESPIRATORY (INHALATION) EVERY 4 HOURS PRN
Status: DISCONTINUED | OUTPATIENT
Start: 2022-08-18 | End: 2022-08-21 | Stop reason: HOSPADM

## 2022-08-18 RX ORDER — MIDAZOLAM HYDROCHLORIDE 1 MG/ML
INJECTION, SOLUTION INTRAMUSCULAR; INTRAVENOUS
Status: DISCONTINUED | OUTPATIENT
Start: 2022-08-18 | End: 2022-08-18

## 2022-08-18 RX ORDER — PHENYLEPHRINE HYDROCHLORIDE 10 MG/ML
INJECTION INTRAVENOUS
Status: DISCONTINUED | OUTPATIENT
Start: 2022-08-18 | End: 2022-08-18

## 2022-08-18 RX ORDER — MUPIROCIN 20 MG/G
OINTMENT TOPICAL 2 TIMES DAILY
Status: DISCONTINUED | OUTPATIENT
Start: 2022-08-18 | End: 2022-08-21 | Stop reason: HOSPADM

## 2022-08-18 RX ORDER — DOCUSATE SODIUM 100 MG/1
100 CAPSULE, LIQUID FILLED ORAL DAILY
Status: DISCONTINUED | OUTPATIENT
Start: 2022-08-19 | End: 2022-08-21 | Stop reason: HOSPADM

## 2022-08-18 RX ORDER — ONDANSETRON 2 MG/ML
INJECTION INTRAMUSCULAR; INTRAVENOUS
Status: DISCONTINUED | OUTPATIENT
Start: 2022-08-18 | End: 2022-08-18

## 2022-08-18 RX ORDER — MEPERIDINE HYDROCHLORIDE 25 MG/ML
12.5 INJECTION INTRAMUSCULAR; INTRAVENOUS; SUBCUTANEOUS ONCE
Status: COMPLETED | OUTPATIENT
Start: 2022-08-18 | End: 2022-08-18

## 2022-08-18 RX ORDER — SUCCINYLCHOLINE CHLORIDE 20 MG/ML
INJECTION INTRAMUSCULAR; INTRAVENOUS
Status: DISCONTINUED | OUTPATIENT
Start: 2022-08-18 | End: 2022-08-18

## 2022-08-18 RX ORDER — LOPERAMIDE HYDROCHLORIDE 2 MG/1
2 CAPSULE ORAL
Status: DISCONTINUED | OUTPATIENT
Start: 2022-08-18 | End: 2022-08-21 | Stop reason: HOSPADM

## 2022-08-18 RX ORDER — DEXAMETHASONE SODIUM PHOSPHATE 4 MG/ML
INJECTION, SOLUTION INTRA-ARTICULAR; INTRALESIONAL; INTRAMUSCULAR; INTRAVENOUS; SOFT TISSUE
Status: DISCONTINUED | OUTPATIENT
Start: 2022-08-18 | End: 2022-08-18

## 2022-08-18 RX ADMIN — ROCURONIUM BROMIDE 5 MG: 10 INJECTION, SOLUTION INTRAVENOUS at 01:08

## 2022-08-18 RX ADMIN — DEXAMETHASONE SODIUM PHOSPHATE 4 MG: 4 INJECTION, SOLUTION INTRAMUSCULAR; INTRAVENOUS at 04:08

## 2022-08-18 RX ADMIN — PHENYLEPHRINE HYDROCHLORIDE 20 MCG/MIN: 10 INJECTION INTRAVENOUS at 03:08

## 2022-08-18 RX ADMIN — PHENYLEPHRINE HYDROCHLORIDE 100 MCG: 10 INJECTION INTRAVENOUS at 03:08

## 2022-08-18 RX ADMIN — PHENYLEPHRINE HYDROCHLORIDE 50 MCG: 10 INJECTION INTRAVENOUS at 02:08

## 2022-08-18 RX ADMIN — PHENYLEPHRINE HYDROCHLORIDE 100 MCG: 10 INJECTION INTRAVENOUS at 02:08

## 2022-08-18 RX ADMIN — MEPERIDINE HYDROCHLORIDE 12.5 MG: 25 INJECTION INTRAMUSCULAR; INTRAVENOUS; SUBCUTANEOUS at 06:08

## 2022-08-18 RX ADMIN — MUPIROCIN: 20 OINTMENT TOPICAL at 08:08

## 2022-08-18 RX ADMIN — HYDROMORPHONE HYDROCHLORIDE 0.2 MG: 2 INJECTION INTRAMUSCULAR; INTRAVENOUS; SUBCUTANEOUS at 05:08

## 2022-08-18 RX ADMIN — FENTANYL CITRATE 100 MCG: 50 INJECTION, SOLUTION INTRAMUSCULAR; INTRAVENOUS at 01:08

## 2022-08-18 RX ADMIN — CEFTRIAXONE 2 G: 2 INJECTION, SOLUTION INTRAVENOUS at 04:08

## 2022-08-18 RX ADMIN — ONDANSETRON 4 MG: 2 INJECTION, SOLUTION INTRAMUSCULAR; INTRAVENOUS at 04:08

## 2022-08-18 RX ADMIN — MIDAZOLAM 2 MG: 1 INJECTION INTRAMUSCULAR; INTRAVENOUS at 01:08

## 2022-08-18 RX ADMIN — FENTANYL CITRATE 100 MCG: 50 INJECTION, SOLUTION INTRAMUSCULAR; INTRAVENOUS at 04:08

## 2022-08-18 RX ADMIN — PHENYLEPHRINE HYDROCHLORIDE 150 MCG: 10 INJECTION INTRAVENOUS at 03:08

## 2022-08-18 RX ADMIN — LORAZEPAM 2 MG: 0.5 TABLET ORAL at 10:08

## 2022-08-18 RX ADMIN — TOBRAMYCIN SULFATE 1.2 G: 1200 INJECTION, POWDER, FOR SOLUTION INTRAVENOUS at 12:08

## 2022-08-18 RX ADMIN — PROPOFOL 150 MG: 10 INJECTION, EMULSION INTRAVENOUS at 01:08

## 2022-08-18 RX ADMIN — SODIUM CHLORIDE, SODIUM LACTATE, POTASSIUM CHLORIDE, AND CALCIUM CHLORIDE: .6; .31; .03; .02 INJECTION, SOLUTION INTRAVENOUS at 01:08

## 2022-08-18 RX ADMIN — SCOPOLAMINE 1 PATCH: 1 PATCH, EXTENDED RELEASE TRANSDERMAL at 01:08

## 2022-08-18 RX ADMIN — VANCOMYCIN HYDROCHLORIDE 1000 MG: 1 INJECTION, POWDER, LYOPHILIZED, FOR SOLUTION INTRAVENOUS at 09:08

## 2022-08-18 RX ADMIN — SODIUM CHLORIDE, SODIUM LACTATE, POTASSIUM CHLORIDE, AND CALCIUM CHLORIDE: .6; .31; .03; .02 INJECTION, SOLUTION INTRAVENOUS at 03:08

## 2022-08-18 RX ADMIN — SUCCINYLCHOLINE CHLORIDE 120 MG: 20 INJECTION, SOLUTION INTRAMUSCULAR; INTRAVENOUS at 01:08

## 2022-08-18 RX ADMIN — LORAZEPAM 2 MG: 0.5 TABLET ORAL at 08:08

## 2022-08-18 RX ADMIN — VANCOMYCIN HYDROCHLORIDE 1000 MG: 1 INJECTION, POWDER, LYOPHILIZED, FOR SOLUTION INTRAVENOUS at 05:08

## 2022-08-18 RX ADMIN — ROCURONIUM BROMIDE 25 MG: 10 INJECTION, SOLUTION INTRAVENOUS at 02:08

## 2022-08-18 RX ADMIN — OXYCODONE HYDROCHLORIDE 10 MG: 5 TABLET ORAL at 08:08

## 2022-08-18 RX ADMIN — LIDOCAINE HYDROCHLORIDE 100 MG: 20 INJECTION, SOLUTION INTRAVENOUS at 01:08

## 2022-08-18 RX ADMIN — OXYCODONE HYDROCHLORIDE 10 MG: 5 TABLET ORAL at 10:08

## 2022-08-18 RX ADMIN — VANCOMYCIN HYDROCHLORIDE 1000 MG: 1 INJECTION, POWDER, LYOPHILIZED, FOR SOLUTION INTRAVENOUS at 02:08

## 2022-08-18 NOTE — PROGRESS NOTES
[facility] Medicine  Progress Note    Patient Name: Kayla Clay  MRN: [id number]  Patient Class: IP- Inpatient   Admission Date: 8/17/2022  Length of Stay: 1 days  Attending Physician: Ken Serrano MD  Primary Care Provider: Primary Doctor No        Subjective:     Principal Problem:Epidural abscess        HPI:  37 y.o. female with history of IVDA (last used 8/8), sciatica and chronic low back pain, who presented to the [facility] on 8/9 for evaluation of lower back pain, bilateral leg numbness and inability to ambulate for 4 days.  Physical exam notable for 5/5 strength in BUE, 4/5 strength in BLE, and no spinal tenderness.  Labs on admit showed WBC 11, ESR 70.  UDS was positive for amphetamines.  She was initially started on Ceftriaxone for pyelo, which was stopped after 3 days.  Her hospital stay was complicated by methamphetamine withdrawal, so she was unable to cooperate with spinal imaging.  MRI was originally ordered without contrast because of length of study, she was unable to cooperate because of pain.  MRI lumbar spine without contrast 8/15 showed L3-L4 endplate erosions with fluid in the disc, epidural fluid extending from L2-L4 with moderate central canal narrowing at L2-L3 with severe central canal narrowing at L3-L4, findings suspicious for spondylodiscitis.  Additionally, fluid within the L1-L2 and L2-L3 disc space without significant endplate erosions, early spondylodiscitis can't be excluded. After the MRI resulted, Radiology suggested MRI with contrast to further determine discitis.  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  The fluid collection may extend further into the thoracic spine as well.  Small millimetric fluid collections within the right and left psoas muscle suggesting early abscess formation.  She was started on Vanc and Ceftriaxone on  8/15.  Her case was discussed with Dr. Brewer ([facility]) who agreed to consult. Patient transferred from [place] to [facility] via [facility]. Patient is a full code, surrogate decision maker is her mother, Shaista Engel. Admitted to inpatient under the care of [facility].       Overview/Hospital Course:  Patient admitted for epidural abscess under the caer of [facility] medicine. Neurosurgery was consulted and she was started on IV abx.  08/17: Patient to have MRI of hip and thoracic spine. Will need long term IV abx. - no cultures available - will draw BC despite already getting IV abx. ID consulted. Case management consulted for discharge planing. Echo ordered.       Interval History: Patient scheduled for surgery today. Mother and aunt at bedside. Patient wants to go to rehab after she recovers from this. We discussed need for long term IV abx - likely 3 months - not able to go to Rehab while on IV abx.     Review of Systems   Constitutional:  Positive for activity change. Negative for appetite change, chills, diaphoresis, fatigue, fever and unexpected weight change.   HENT:  Negative for congestion, dental problem, ear pain, hearing loss, mouth sores, postnasal drip, rhinorrhea, sinus pressure, sore throat, tinnitus and trouble swallowing.    Eyes:  Negative for pain, discharge, redness and visual disturbance.   Respiratory:  Negative for apnea, cough, choking, chest tightness, shortness of breath and wheezing.    Cardiovascular:  Negative for chest pain, palpitations and leg swelling.   Gastrointestinal:  Negative for abdominal distention, abdominal pain, anal bleeding, blood in stool, constipation, diarrhea, nausea, rectal pain and vomiting.   Endocrine: Negative for cold intolerance, heat intolerance, polydipsia and polyuria.   Genitourinary:  Negative for difficulty urinating, dyspareunia, dysuria, flank pain, frequency, hematuria and urgency.   Musculoskeletal:  Positive for back pain. Negative  for arthralgias, gait problem, joint swelling, myalgias, neck pain and neck stiffness.   Skin: Negative.  Negative for color change, rash and wound.   Allergic/Immunologic: Negative.    Neurological:  Positive for weakness. Negative for dizziness, tremors, seizures, syncope, speech difficulty, light-headedness and headaches.   Hematological: Negative.  Negative for adenopathy. Does not bruise/bleed easily.   Psychiatric/Behavioral:  Negative for agitation, behavioral problems, confusion and sleep disturbance. The patient is nervous/anxious.    All other systems reviewed and are negative.  Objective:     Vital Signs (Most Recent):  Temp: 98.4 °F (36.9 °C) (08/18/22 1727)  Pulse: (!) 117 (08/18/22 1730)  Resp: 14 (08/18/22 1730)  BP: (!) 143/70 (08/18/22 1730)  SpO2: (!) 90 % (08/18/22 1730)   Vital Signs (24h Range):  Temp:  [97.8 °F (36.6 °C)-100.5 °F (38.1 °C)] 98.4 °F (36.9 °C)  Pulse:  [] 117  Resp:  [14-22] 14  SpO2:  [90 %-99 %] 90 %  BP: (111-143)/(60-88) 143/70     Weight: 94.8 kg (209 lb)  Body mass index is 31.78 kg/m².    Intake/Output Summary (Last 24 hours) at 8/18/2022 1811  Last data filed at 8/18/2022 1654  Gross per 24 hour   Intake 1738.97 ml   Output 100 ml   Net 1638.97 ml      Physical Exam  Vitals and nursing note reviewed.   Constitutional:       General: She is not in acute distress.     Appearance: She is well-developed.   HENT:      Head: Normocephalic and atraumatic.      Right Ear: Hearing and external ear normal.      Left Ear: Hearing and external ear normal.      Nose: No rhinorrhea.      Right Sinus: No maxillary sinus tenderness or frontal sinus tenderness.      Left Sinus: No maxillary sinus tenderness or frontal sinus tenderness.      Mouth/Throat:      Mouth: No oral lesions.      Pharynx: Uvula midline.   Eyes:      General:         Right eye: No discharge.         Left eye: No discharge.      Conjunctiva/sclera: Conjunctivae normal.      Pupils: Pupils are equal, round, and  reactive to light.   Neck:      Thyroid: No thyromegaly.      Vascular: No carotid bruit.      Trachea: No tracheal deviation.   Cardiovascular:      Rate and Rhythm: Normal rate and regular rhythm.      Pulses:           Dorsalis pedis pulses are 2+ on the right side and 2+ on the left side.      Heart sounds: Normal heart sounds, S1 normal and S2 normal. No murmur heard.  Pulmonary:      Effort: Pulmonary effort is normal. No respiratory distress.      Breath sounds: Normal breath sounds.   Chest:   Breasts:      Right: No supraclavicular adenopathy.      Left: No supraclavicular adenopathy.     Abdominal:      General: Bowel sounds are normal. There is no distension.      Palpations: Abdomen is soft. There is no mass.      Tenderness: There is no abdominal tenderness.   Musculoskeletal:      Cervical back: Normal range of motion.      Thoracic back: Tenderness present. Decreased range of motion.      Lumbar back: Tenderness present. Decreased range of motion.   Lymphadenopathy:      Cervical: No cervical adenopathy.      Upper Body:      Right upper body: No supraclavicular adenopathy.      Left upper body: No supraclavicular adenopathy.   Skin:     General: Skin is warm and dry.      Capillary Refill: Capillary refill takes less than 2 seconds.      Findings: No rash.   Neurological:      Mental Status: She is alert and oriented to person, place, and time.      Sensory: No sensory deficit.      Coordination: Coordination normal.      Gait: Gait normal.   Psychiatric:         Mood and Affect: Mood is not anxious or depressed.         Speech: Speech normal.         Behavior: Behavior normal.         Thought Content: Thought content normal.         Judgment: Judgment normal.       Significant Labs: All pertinent labs within the past 24 hours have been reviewed.  Recent Lab Results  (Last 5 results in the past 24 hours)        08/18/22  0811   08/18/22  0723   08/17/22  2342   08/17/22  2253   08/17/22  2252         Albumin   3.0             Alkaline Phosphatase   59             ALT   176             Anion Gap   10             Ao root annulus 3.01               Ascending aorta 2.51               Ao peak celestino 1.45               Ao VTI 26.5               Appearance, UA         Cloudy       AST   35             AV valve area 3.53               AV mean gradient 5               AV index (prosthetic) 0.75               AV peak gradient 8               AV Velocity Ratio 0.87               Baso #   0.04             Basophil %   0.6             Bilirubin (UA)         Negative       BILIRUBIN TOTAL   0.4  Comment: For infants and newborns, interpretation of results should be based  on gestational age, weight and in agreement with clinical  observations.    Premature Infant recommended reference ranges:  Up to 24 hours.............<8.0 mg/dL  Up to 48 hours............<12.0 mg/dL  3-5 days..................<15.0 mg/dL  6-29 days.................<15.0 mg/dL               BSA 2.13               BUN   10             Calcium   9.6             Chloride   97             CO2   27             Color, UA         Yellow       Creatinine   0.6             Left Ventricle Relative Wall Thickness 0.79               Differential Method   Automated             E/A ratio 1.21               E/E' ratio 5.00               eGFR   >60             EF 65               Eos #   0.1             Eosinophil %   1.1             E wave deceleration time 166.43               FS 33               Glucose   96             Glucose, UA         Negative       Gran # (ANC)   4.4             Gran %   61.0             Hematocrit   31.0             Hemoglobin   9.5             Immature Grans (Abs)   0.04  Comment: Mild elevation in immature granulocytes is non specific and   can be seen in a variety of conditions including stress response,   acute inflammation, trauma and pregnancy. Correlation with other   laboratory and clinical findings is essential.               Immature  Granulocytes   0.6             IVC diameter 1.39               IVRT 39.96               IVSd 1.58               Ketones, UA         Negative       LA WIDTH 4.40               Left Atrium Major Axis 4.95               Left Atrium Minor Axis 4.91               LA size 3.39               LA volume 62.50               LA vol index 30.1               LVOT area 4.7               Leukocytes, UA         Negative       LV LATERAL E/E' RATIO 3.89               LV SEPTAL E/E' RATIO 7.00               LV EDV BP 53.35               LV Diastolic Volume Index 25.65               LVIDd 3.57               LVIDs 2.38               LV mass 197.62               LV Mass Index 95               Left Ventricular Outflow Tract Mean Gradient 3.52               Left Ventricular Outflow Tract Mean Velocity 0.91               LVOT diameter 2.44               LVOT peak teja 1.26               LVOT stroke volume 93.47               LVOT peak VTI 20.00               LV ESV BP 19.84               LV Systolic Volume Index 9.5               Lymph #   1.8             Lymph %   25.2             Magnesium   2.1             MCH   22.6             MCHC   30.6             MCV   74             Mean e' 0.14               Microscopic Comment         SEE COMMENT  Comment: Other formed elements not mentioned in the report are not   present in the microscopic examination.          Mono #   0.8             Mono %   11.5             MPV   8.8             Mr max teja 5.21               MV valve area p 1/2 method 4.56               MV Peak A Teja 0.58               MV Peak E Teja 0.70               MV stenosis pressure 1/2 time 48.26               NITRITE UA         Negative       nRBC   0             Occult Blood UA         Negative       pH, UA         8.0       Phosphorus   5.1             Platelets   781             Potassium   4.6             Preg Test, Ur       Negative         PROTEIN TOTAL   7.2             Protein, UA         Trace  Comment: Recommend a 24 hour  urine protein or a urine   protein/creatinine ratio if globulin induced proteinuria is  clinically suspected.         PV mean gradient 1.30               Posterior Wall 1.41               Right Atrial Pressure (from IVC) 3               RA Major Axis 5.12               RA Width 2.30               RBC   4.20             RDW   17.0             RVOT peak teja 0.74               RVOT peak VTI 12.1               Sodium   134             Specific Gravity, UA         1.020       Specimen UA         Urine, Clean Catch       STJ 2.54               TAPSE 2.30               TDI SEPTAL 0.10               TDI LATERAL 0.18               Triscuspid Valve Regurgitation Peak Gradient 34               TR Max Teja 2.93               TV rest pulmonary artery pressure 37               UROBILINOGEN UA         Negative       Vancomycin, Random     9.9           WBC   7.13                                    Significant Imaging: I have reviewed all pertinent imaging results/findings within the past 24 hours.      Assessment/Plan:      * Epidural abscess  Per report of sending facility, confirmed epidural abscess on MRI. Initiated on ceftriaxone and Vancomycin on 8/15/22.    --Continue Ceftriaxone and Vancomycin  --Pain control per MAR  --CBC,CMP  --Consult neurosurgery  --NPO pending eval per neurosurgery  --Vitals Q4H  08/17:  --MRI thoracic spine pending  --echo pending  --continue IV abx  --ID consulted  08/18:  --surgery today      IVDU (intravenous drug user)  --pt wanting to quit  --supportive care  --PRN medications for withdrawal      Anxiety  --PRN ativan      Hypertension  Hx HTN, not currently treated with medications as outpatient    --Hydralazine PRN  --Vitals Q4H        VTE Risk Mitigation (From admission, onward)         Ordered     IP VTE HIGH RISK PATIENT  Once         08/17/22 0259     Place sequential compression device  Until discontinued         08/17/22 0259     Reason for No Pharmacological VTE Prophylaxis  Once         Question:  Reasons:  Answer:  Physician Provided (leave comment)    08/17/22 0259                Discharge Planning   KIM:      Code Status: Full Code   Is the patient medically ready for discharge?:     Reason for patient still in hospital (select all that apply): Patient trending condition, Treatment and Consult recommendations  Discharge Plan A: Home with family                  CORNELIA Torres  Department of Hospital Medicine   '[facility] ([facility])

## 2022-08-18 NOTE — PROGRESS NOTES
Pharmacokinetic Assessment Follow Up: IV Vancomycin    Vancomycin serum concentration assessment(s):  Random ordered after further chart review revealed patient was on vancomycin 1000 mg q8h at the previous facility.     The random level was drawn correctly and can be used to guide therapy at this time. The measurement is below the desired definitive target range of 15 to 20 mcg/mL.    Vancomycin Regimen Plan:    Change regimen to Vancomycin 1000 mg IV every 8 hours with next serum trough concentration measured at 0030 prior to 4th dose on 08/19    Drug levels (last 3 results):  Recent Labs   Lab Result Units 08/17/22  2342   Vancomycin, Random ug/mL 9.9       Pharmacy will continue to follow and monitor vancomycin.    Please contact pharmacy at extension [phone] for questions regarding this assessment.    Thank you for the consult,   Pinky Babcock       Patient brief summary:  Kayla Clay is a 37 y.o. female initiated on antimicrobial therapy with IV Vancomycin for treatment of  epidural abscess    The patient's current regimen is vancomycin 1000 mg IV every 8 hours.     Drug Allergies:   Review of patient's allergies indicates:   Allergen Reactions    Acetaminophen Hives       Actual Body Weight:   95.1 kg    Renal Function:   Estimated Creatinine Clearance: 153.3 mL/min (based on SCr of 0.6 mg/dL).,     Dialysis Method (if applicable):  N/A    CBC (last 72 hours):  Recent Labs   Lab Result Units 08/17/22  0835   WBC K/uL 7.57   Hemoglobin g/dL 8.9*   Hematocrit % 27.9*   Platelets K/uL 760*   Gran % % 56.8   Lymph % % 31.4   Mono % % 9.8   Eosinophil % % 0.7   Basophil % % 0.4   Differential Method  Automated       Metabolic Panel (last 72 hours):  Recent Labs   Lab Result Units 08/17/22  0835 08/17/22  2252   Sodium mmol/L 137  --    Potassium mmol/L 4.7  --    Chloride mmol/L 100  --    CO2 mmol/L 25  --    Glucose mg/dL 88  --    Glucose, UA   --  Negative   BUN mg/dL 9  --    Creatinine mg/dL 0.6  --     Albumin g/dL 2.9*  --    Total Bilirubin mg/dL 0.3  --    Alkaline Phosphatase U/L 50*  --    AST U/L 86*  --    ALT U/L 246*  --    Magnesium mg/dL 2.2  --    Phosphorus mg/dL 4.8*  --        Vancomycin Administrations:  vancomycin given in the last 96 hours                     vancomycin (VANCOCIN) 2,250 mg in dextrose 5 % 500 mL IVPB ()  Restarted 08/17/22 1309      Restarted  1208      Restarted  1200      Restarted  1156     2,250 mg New Bag  1135                    Microbiologic Results:  Microbiology Results (last 7 days)       Procedure Component Value Units Date/Time    Urine Culture High Risk [[id number]]     Order Status: No result Specimen: Urine, Clean Catch     Blood culture [[id number]] Collected: 08/17/22 1808    Order Status: Sent Specimen: Blood Updated: 08/17/22 1809    Narrative:      Collection has been rescheduled by KMD at 08/17/2022 16:47 Reason:   Could only collect one set  Unable to collect second  Collection has been rescheduled by KMD at 08/17/2022 16:47 Reason:   Could only collect one set  Unable to collect second    Blood culture [[id number]] Collected: 08/17/22 1647    Order Status: Sent Specimen: Blood from Peripheral, Right Arm Updated: 08/17/22 1647

## 2022-08-18 NOTE — ANESTHESIA PROCEDURE NOTES
Intubation    Date/Time: 8/18/2022 1:18 PM  Performed by: Tylor Macedo CRNA  Authorized by: Waqas Rodriguez MD     Intubation:     Induction:  Intravenous    Intubated:  Postinduction    Mask Ventilation:  Easy mask    Attempts:  1    Attempted By:  Student and CRNA    Method of Intubation:  Direct    Blade:  Carbajal 3    Laryngeal View Grade: Grade I - full view of cords      Difficult Airway Encountered?: No      Complications:  None    Airway Device:  Oral endotracheal tube    Airway Device Size:  7.5    Style/Cuff Inflation:  Cuffed (inflated to minimal occlusive pressure)    Tube secured:  21    Secured at:  The lips    Placement Verified By:  Capnometry    Complicating Factors:  None    Findings Post-Intubation:  BS equal bilateral and atraumatic/condition of teeth unchanged

## 2022-08-18 NOTE — ASSESSMENT & PLAN NOTE
Per report of sending facility, confirmed epidural abscess on MRI. Initiated on ceftriaxone and Vancomycin on 8/15/22.    --Continue Ceftriaxone and Vancomycin  --Pain control per MAR  --CBC,CMP  --Consult neurosurgery  --NPO pending eval per neurosurgery  --Vitals Q4H  08/17:  --MRI thoracic spine pending  --echo pending  --continue IV abx  --ID consulted  08/18:  --surgery today

## 2022-08-18 NOTE — ANESTHESIA PREPROCEDURE EVALUATION
08/18/2022  Kayla Clay is a 37 y.o., female.    Patient Active Problem List   Diagnosis    Hypertension    Epidural abscess    Anxiety     Pre-op Assessment    I have reviewed the Patient Summary Reports.    I have reviewed the NPO Status.   I have reviewed the Medications.     Review of Systems  Anesthesia Hx:  No problems with previous Anesthesia  Denies Family Hx of Anesthesia complications.   Denies Personal Hx of Anesthesia complications.   Social:  Former Smoker IV drug abuse   Hematology/Oncology:     Oncology Normal    -- Anemia:   EENT/Dental:EENT/Dental Normal   Cardiovascular:   Hypertension    Pulmonary:  Pulmonary Normal    Renal/:  Renal/ Normal     Hepatic/GI:  Hepatic/GI Normal    Musculoskeletal:  Musculoskeletal Normal    Neurological:   Epidural abscess   Endocrine:  Endocrine Normal  Obesity / BMI > 30  Dermatological:  Skin Normal    Psych:   anxiety          Physical Exam  General: Alert and Oriented    Airway:  Mallampati: II   Mouth Opening: Normal  TM Distance: Normal  Tongue: Normal  Neck ROM: Normal ROM        Anesthesia Plan  Type of Anesthesia, risks & benefits discussed:    Anesthesia Type: Gen ETT  Intra-op Monitoring Plan: Standard ASA Monitors  Induction:  IV  Informed Consent: Informed consent signed with the Patient and all parties understand the risks and agree with anesthesia plan.  All questions answered.   ASA Score: 3  Day of Surgery Review of History & Physical: I have interviewed and examined the patient. I have reviewed the patient's H&P dated:     Ready For Surgery From Anesthesia Perspective.     .

## 2022-08-18 NOTE — SUBJECTIVE & OBJECTIVE
Interval History: Patient scheduled for surgery today. Mother and aunt at bedside. Patient wants to go to rehab after she recovers from this. We discussed need for long term IV abx - likely 3 months - not able to go to Rehab while on IV abx.     Review of Systems   Constitutional:  Positive for activity change. Negative for appetite change, chills, diaphoresis, fatigue, fever and unexpected weight change.   HENT:  Negative for congestion, dental problem, ear pain, hearing loss, mouth sores, postnasal drip, rhinorrhea, sinus pressure, sore throat, tinnitus and trouble swallowing.    Eyes:  Negative for pain, discharge, redness and visual disturbance.   Respiratory:  Negative for apnea, cough, choking, chest tightness, shortness of breath and wheezing.    Cardiovascular:  Negative for chest pain, palpitations and leg swelling.   Gastrointestinal:  Negative for abdominal distention, abdominal pain, anal bleeding, blood in stool, constipation, diarrhea, nausea, rectal pain and vomiting.   Endocrine: Negative for cold intolerance, heat intolerance, polydipsia and polyuria.   Genitourinary:  Negative for difficulty urinating, dyspareunia, dysuria, flank pain, frequency, hematuria and urgency.   Musculoskeletal:  Positive for back pain. Negative for arthralgias, gait problem, joint swelling, myalgias, neck pain and neck stiffness.   Skin: Negative.  Negative for color change, rash and wound.   Allergic/Immunologic: Negative.    Neurological:  Positive for weakness. Negative for dizziness, tremors, seizures, syncope, speech difficulty, light-headedness and headaches.   Hematological: Negative.  Negative for adenopathy. Does not bruise/bleed easily.   Psychiatric/Behavioral:  Negative for agitation, behavioral problems, confusion and sleep disturbance. The patient is nervous/anxious.    All other systems reviewed and are negative.  Objective:     Vital Signs (Most Recent):  Temp: 98.4 °F (36.9 °C) (08/18/22 1727)  Pulse: (!)  117 (08/18/22 1730)  Resp: 14 (08/18/22 1730)  BP: (!) 143/70 (08/18/22 1730)  SpO2: (!) 90 % (08/18/22 1730)   Vital Signs (24h Range):  Temp:  [97.8 °F (36.6 °C)-100.5 °F (38.1 °C)] 98.4 °F (36.9 °C)  Pulse:  [] 117  Resp:  [14-22] 14  SpO2:  [90 %-99 %] 90 %  BP: (111-143)/(60-88) 143/70     Weight: 94.8 kg (209 lb)  Body mass index is 31.78 kg/m².    Intake/Output Summary (Last 24 hours) at 8/18/2022 1811  Last data filed at 8/18/2022 1654  Gross per 24 hour   Intake 1738.97 ml   Output 100 ml   Net 1638.97 ml      Physical Exam  Vitals and nursing note reviewed.   Constitutional:       General: She is not in acute distress.     Appearance: She is well-developed.   HENT:      Head: Normocephalic and atraumatic.      Right Ear: Hearing and external ear normal.      Left Ear: Hearing and external ear normal.      Nose: No rhinorrhea.      Right Sinus: No maxillary sinus tenderness or frontal sinus tenderness.      Left Sinus: No maxillary sinus tenderness or frontal sinus tenderness.      Mouth/Throat:      Mouth: No oral lesions.      Pharynx: Uvula midline.   Eyes:      General:         Right eye: No discharge.         Left eye: No discharge.      Conjunctiva/sclera: Conjunctivae normal.      Pupils: Pupils are equal, round, and reactive to light.   Neck:      Thyroid: No thyromegaly.      Vascular: No carotid bruit.      Trachea: No tracheal deviation.   Cardiovascular:      Rate and Rhythm: Normal rate and regular rhythm.      Pulses:           Dorsalis pedis pulses are 2+ on the right side and 2+ on the left side.      Heart sounds: Normal heart sounds, S1 normal and S2 normal. No murmur heard.  Pulmonary:      Effort: Pulmonary effort is normal. No respiratory distress.      Breath sounds: Normal breath sounds.   Chest:   Breasts:      Right: No supraclavicular adenopathy.      Left: No supraclavicular adenopathy.     Abdominal:      General: Bowel sounds are normal. There is no distension.       Palpations: Abdomen is soft. There is no mass.      Tenderness: There is no abdominal tenderness.   Musculoskeletal:      Cervical back: Normal range of motion.      Thoracic back: Tenderness present. Decreased range of motion.      Lumbar back: Tenderness present. Decreased range of motion.   Lymphadenopathy:      Cervical: No cervical adenopathy.      Upper Body:      Right upper body: No supraclavicular adenopathy.      Left upper body: No supraclavicular adenopathy.   Skin:     General: Skin is warm and dry.      Capillary Refill: Capillary refill takes less than 2 seconds.      Findings: No rash.   Neurological:      Mental Status: She is alert and oriented to person, place, and time.      Sensory: No sensory deficit.      Coordination: Coordination normal.      Gait: Gait normal.   Psychiatric:         Mood and Affect: Mood is not anxious or depressed.         Speech: Speech normal.         Behavior: Behavior normal.         Thought Content: Thought content normal.         Judgment: Judgment normal.       Significant Labs: All pertinent labs within the past 24 hours have been reviewed.  Recent Lab Results  (Last 5 results in the past 24 hours)        08/18/22  0811   08/18/22  0723   08/17/22  2342   08/17/22  2253   08/17/22  2252        Albumin   3.0             Alkaline Phosphatase   59             ALT   176             Anion Gap   10             Ao root annulus 3.01               Ascending aorta 2.51               Ao peak celestino 1.45               Ao VTI 26.5               Appearance, UA         Cloudy       AST   35             AV valve area 3.53               AV mean gradient 5               AV index (prosthetic) 0.75               AV peak gradient 8               AV Velocity Ratio 0.87               Baso #   0.04             Basophil %   0.6             Bilirubin (UA)         Negative       BILIRUBIN TOTAL   0.4  Comment: For infants and newborns, interpretation of results should be based  on gestational  age, weight and in agreement with clinical  observations.    Premature Infant recommended reference ranges:  Up to 24 hours.............<8.0 mg/dL  Up to 48 hours............<12.0 mg/dL  3-5 days..................<15.0 mg/dL  6-29 days.................<15.0 mg/dL               BSA 2.13               BUN   10             Calcium   9.6             Chloride   97             CO2   27             Color, UA         Yellow       Creatinine   0.6             Left Ventricle Relative Wall Thickness 0.79               Differential Method   Automated             E/A ratio 1.21               E/E' ratio 5.00               eGFR   >60             EF 65               Eos #   0.1             Eosinophil %   1.1             E wave deceleration time 166.43               FS 33               Glucose   96             Glucose, UA         Negative       Gran # (ANC)   4.4             Gran %   61.0             Hematocrit   31.0             Hemoglobin   9.5             Immature Grans (Abs)   0.04  Comment: Mild elevation in immature granulocytes is non specific and   can be seen in a variety of conditions including stress response,   acute inflammation, trauma and pregnancy. Correlation with other   laboratory and clinical findings is essential.               Immature Granulocytes   0.6             IVC diameter 1.39               IVRT 39.96               IVSd 1.58               Ketones, UA         Negative       LA WIDTH 4.40               Left Atrium Major Axis 4.95               Left Atrium Minor Axis 4.91               LA size 3.39               LA volume 62.50               LA vol index 30.1               LVOT area 4.7               Leukocytes, UA         Negative       LV LATERAL E/E' RATIO 3.89               LV SEPTAL E/E' RATIO 7.00               LV EDV BP 53.35               LV Diastolic Volume Index 25.65               LVIDd 3.57               LVIDs 2.38               LV mass 197.62               LV Mass Index 95               Left  Ventricular Outflow Tract Mean Gradient 3.52               Left Ventricular Outflow Tract Mean Velocity 0.91               LVOT diameter 2.44               LVOT peak teja 1.26               LVOT stroke volume 93.47               LVOT peak VTI 20.00               LV ESV BP 19.84               LV Systolic Volume Index 9.5               Lymph #   1.8             Lymph %   25.2             Magnesium   2.1             MCH   22.6             MCHC   30.6             MCV   74             Mean e' 0.14               Microscopic Comment         SEE COMMENT  Comment: Other formed elements not mentioned in the report are not   present in the microscopic examination.          Mono #   0.8             Mono %   11.5             MPV   8.8             Mr max teja 5.21               MV valve area p 1/2 method 4.56               MV Peak A Teja 0.58               MV Peak E Teja 0.70               MV stenosis pressure 1/2 time 48.26               NITRITE UA         Negative       nRBC   0             Occult Blood UA         Negative       pH, UA         8.0       Phosphorus   5.1             Platelets   781             Potassium   4.6             Preg Test, Ur       Negative         PROTEIN TOTAL   7.2             Protein, UA         Trace  Comment: Recommend a 24 hour urine protein or a urine   protein/creatinine ratio if globulin induced proteinuria is  clinically suspected.         PV mean gradient 1.30               Posterior Wall 1.41               Right Atrial Pressure (from IVC) 3               RA Major Axis 5.12               RA Width 2.30               RBC   4.20             RDW   17.0             RVOT peak teja 0.74               RVOT peak VTI 12.1               Sodium   134             Specific Gravity, UA         1.020       Specimen UA         Urine, Clean Catch       STJ 2.54               TAPSE 2.30               TDI SEPTAL 0.10               TDI LATERAL 0.18               Triscuspid Valve Regurgitation Peak Gradient 34                TR Max Teja 2.93               TV rest pulmonary artery pressure 37               UROBILINOGEN UA         Negative       Vancomycin, Random     9.9           WBC   7.13                                    Significant Imaging: I have reviewed all pertinent imaging results/findings within the past 24 hours.

## 2022-08-18 NOTE — OP NOTE
[facility] ([facility])  Neurosurgery  Operative Note    SUMMARY      Date of Procedure: 8/18/2022     Procedure: Procedure(s) (LRB):  LAMINECTOMY, SPINE, LUMBAR, POSTERIOR APPROACH, WITHepidural flow consistent with abscess versus epidural hematoma LESION OF SPINAL CORD (N/A) L2-4 right sided hemilaminectomy     Surgeon(s) and Role:     * Nasrin Brewer MD - Primary    Assistant: Charity OWEN     Pre-Operative Diagnosis: Epidural abscess [G06.2]    Post-Operative Diagnosis: Post-Op Diagnosis Codes:     * Epidural abscess [G06.2]    Anesthesia: General    Operative Findings (including complications, if any):   epidural fluid collection sent for culture and analysis    Description of Technical Procedures:   Right-sided hemilaminectomy from L2-L4 for PACU a shin of epidural fluid collection    Significant Surgical Tasks Conducted by the Assistant(s), if Applicable:  Thecal sac retraction during evaluation of the disc space    Estimated Blood Loss (EBL): * No values recorded between 8/18/2022  2:16 PM and 8/18/2022  4:52 PM *           Specimens:   Specimen (24h ago, onward)             Start     Ordered    08/18/22 1603  Specimen to Pathology, Surgery Neurosurgery  Once        Comments: Pre-op Diagnosis: Epidural abscess [G06.2]Procedure(s):LAMINECTOMY, SPINE, LUMBAR, POSTERIOR APPROACH, WITH EXCISION OF NEOPLASM OR LESION OF SPINAL CORD Number of specimens: 2Name of specimens: 1. Epidural ligament/ possible abscess-perm2. Epidural tissue-perm     References:    Click here for ordering Quick Tip   Question Answer Comment   Procedure Type: Neurosurgery    Specimen Class: Routine/Screening    Which provider would you like to cc? NASRIN BREWER    Release to patient Immediate        08/18/22 0310                 Implants:   Implant Name Type Inv. Item Serial No.  Lot No. LRB No. Used Action   KIT GRAFT BONE/SUB STIM 10ML - [id number]  KIT GRAFT BONE/SUB STIM 10ML  [employer] [id number] N/A  2 Implanted               Condition: Good    Disposition: PACU - hemodynamically stable.    Attestation: I was present for the entire procedure.     Patient is a 37-year-old female is transfer from an outside facility with the intractable back pain.  She had MRI which did show an epidural fluid collection expanding or extending from the epidural space from approximately L2-L4.  It was contrast-enhancing.  Patient had an elevated ESR and CRP with a previous history of IV drug abuse.  Fluid was consistent with probable epidural abscess causing compression.  She initially was placed on vancomycin and Rocephin and was transferred to [facility]. She also has a hx of severe scoliotic deformity         After discussing treatment options patient did agree and elect to undergo decompressive  laminectomy for drainage and biopsy of fluid.      Surgical Risks:  The patient was well apprised of all objectives, benefits, risks and potential complications of the procedure, including but not limited to:  Worsening of current status, the possible need for further procedures, the risk of infection, headaches, CSF leak, possible spinal nerve injury resulting in paralysis, infection, injury to major vessels causing hemorrhage, stroke, loss of language function, coma and even death.  No assurance was given whether the symptoms would improve following the procedure.  Informed consent was obtained and secured to the chart after the patient voiced understanding of these risks and decided to proceed with the operation.     Description of procedure  The patient was transferred to the operating room and was given preoperative prophylactic IV antibiotics.  The Anesthesia Service sedated and intubated the patient.  The eyes were taped shut after ointment was applied to prevent corneal abrasion.  A Alessandra Hugger was placed over the upper body to maintain control of the core body temperature.  Renner catheter was inserted.  The patient was  turned prone on the Joseph table.  All pressure points were carefully padded.  The electrophysiology monitoring team inserted needles in their proper locations.     Operative Technique:  The patient was prepped and draped in the standard sterile fashion.  The C-arm fluoroscopy was draped and brought into the operative field and the L2-4 was identified.  Local anesthetic was infiltrated midline.  The skin was subsequently opened sharply with a #10 blade.  Dissection was carried down superiorly and inferiorly in the midline to expose the supraspinous ligament and the lamina.  The musculature was elevated subperiosteally to expose the facets on the bilaterally with the Bovie electrocautery as well as the Maxwell elevator.  Hemostasis was achieved.  Self retraining retractors were then inserted.    Patient has severe scoliotic deformity in her anatomy was extremely difficult to ascertain the level based on anatomy.   It was at this time the O arm was brought and to provide an intraoperative reference point to make sure that we were indeed at the correct levels.     Next the stereotactic reference frame was fastened to the spinous process at the level above the planned procedure.  Sterile drapes were then prior placed around the operative site.  The O-arm navigation system was then brought into the operative field and intraoperative spin was done.  The O-arm was then removed.      At this point, a high-speed drill was used to remove the , right-sided usama lamina, and partial medial facets up. The thecal sac was decompressed on the right side the spinous process was undercut and the lateral recess on left was visualized     A blaise was placed to the epidural space.  Fluid was seen and cultured and further sent for analysis.  At this point its position was confirmed with an AP and lateral floro film.    A red rubber catheter was palced superior and inferior and the epidural space was washed out     The wound was copiously  irrigated with antibiotic saline solution.  A 1/8 Hemovac drain was placed and brought out through a separate stab incision. 1 g of vancomycin powder was placed to the surgical cavity.     Antibiotic vancomycin and tobramycin beads were placed to the incision cavity.    The fascia was subsequently closed utilizing 0 Vicryl sutures.  Exparel was injected into the muscle for postoperative pain control.  The subcuticular layer was closed with a 2 0 Vicryl in an inverted fashion.  The skin was then approximated with staples.  The incision was dressed in a clean and dry dressing.     All sponge counts, needle counts and instrument counts were correct at the end of the case x 2.  The patient tolerated the procedure well without any complication and was transferred in a stable condition to the recovery room.        Floyd Brewer MD  Neurosurgery     Disclaimer: This note was prepared using a voice recognition system and is likely to have sound alike errors within the text.

## 2022-08-18 NOTE — TRANSFER OF CARE
"Anesthesia Transfer of Care Note    Patient: Kayla Clay    Procedure(s) Performed: Procedure(s) (LRB):  LAMINECTOMY, SPINE, LUMBAR, POSTERIOR APPROACH, WITH EXCISION OF NEOPLASM OR LESION OF SPINAL CORD (N/A)    Patient location: PACU    Anesthesia Type: general    Transport from OR: Transported from OR on room air with adequate spontaneous ventilation    Post pain: adequate analgesia    Post assessment: no apparent anesthetic complications    Post vital signs: stable    Level of consciousness: sedated    Nausea/Vomiting: no nausea/vomiting    Complications: none    Transfer of care protocol was followed      Last vitals:   Visit Vitals  /88   Pulse 109   Temp 36.6 °C (97.9 °F)   Resp 17   Ht 5' 8" (1.727 m)   Wt 94.8 kg (209 lb)   LMP 08/07/2022 (Approximate)   SpO2 99%   Breastfeeding No   BMI 31.78 kg/m²     "

## 2022-08-18 NOTE — PROGRESS NOTES
[place] [facility] ([facility])  Neurosurgery  Progress Note    Subjective:     Interval History:   MR T spine and MR  Hip completed   Patient remains in pain   Radiates through B/L LE   N /T   No B/B     MR L spine please see report     Post-Op Info:  Procedure(s) (LRB):  LAMINECTOMY, SPINE, LUMBAR, POSTERIOR APPROACH, WITH EXCISION OF epidural abscess   Day of Surgery      Medications:  Continuous Infusions:  Scheduled Meds:   cefTRIAXone (ROCEPHIN) IVPB  2 g Intravenous Q12H    polyethylene glycol  17 g Oral Daily    vancomycin (VANCOCIN) IVPB  1,000 mg Intravenous Q8H     PRN Meds:sodium chloride 0.9%, aluminum-magnesium hydroxide-simethicone, dextrose 10%, dextrose 10%, glucagon (human recombinant), glucose, glucose, LORazepam, naloxone, ondansetron, oxyCODONE, oxyCODONE, sodium chloride 0.9%, trazodone, Pharmacy to dose Vancomycin consult **AND** vancomycin - pharmacy to dose     Review of Systems   Constitutional: Negative for activity change, appetite change and chills.   HENT: Negative for hearing loss, sore throat and tinnitus.    Eyes: Negative for pain, discharge and itching.   Cardiovascular: Negative for chest pain.   Gastrointestinal: Negative for abdominal pain.   Endocrine: Negative for cold intolerance and heat intolerance.   Genitourinary: Negative for difficulty urinating and dysuria.   Musculoskeletal: Positive for back pain and gait problem.   Allergic/Immunologic: Negative for environmental allergies.   Neurological: Negative for dizziness, tremors, light-headedness and headaches.   Hematological: Negative for adenopathy.   Psychiatric/Behavioral: Negative for agitation, behavioral problems and confusion.     Objective:     Weight: 94.8 kg (209 lb)  Body mass index is 31.78 kg/m².  Vital Signs (Most Recent):  Temp: 97.9 °F (36.6 °C) (08/18/22 1155)  Pulse: 109 (08/18/22 1155)  Resp: 17 (08/18/22 1155)  BP: 134/88 (08/18/22 1155)  SpO2: 99 % (08/18/22 1155) Vital Signs (24h Range):  Temp:  [97.8  °F (36.6 °C)-100.5 °F (38.1 °C)] 97.9 °F (36.6 °C)  Pulse:  [] 109  Resp:  [16-19] 17  SpO2:  [93 %-99 %] 99 %  BP: (111-134)/(60-88) 134/88     Date 08/18/22 0700 - 08/19/22 0659   Shift 9600-8774 6864-7167 0242-6718 24 Hour Total   INTAKE   P.O. 0   0   I.V.(mL/kg) 41.7(0.4)   41.7(0.4)   IV Piggyback 297.3   297.3   Shift Total(mL/kg) 339(3.6)   339(3.6)   OUTPUT   Shift Total(mL/kg)       Weight (kg) 94.8 94.8 94.8 94.8               MR Hip   Not completed secondary to patient not lying flat       MR T spine     There is suggestion of an  epidural collection adjacent to the T12 and T11 vertebral body measuring 5.1 cm in superior inferior dimension 5 mm in AP dimension and 17 mm in transverse dimension.  Comparison to the prior exam not available as prior exam is not diagnostic.  Recommend post-contrast imaging.  There is some extension of this smaller collection along the T10 vertebral body.       Physical Exam:  Nursing note and vitals reviewed.    Constitutional: She is not diaphoretic. She appears distressed.     Eyes: Pupils are equal, round, and reactive to light. EOM are normal.     Cardiovascular: Normal pulses, intact distal pulses, normal distal pulses, intact carotid pulses, normal carotid pulses and no edema.     Psych/Behavior: She is alert. She is oriented to person, place, and time. She has a normal mood and affect.     Musculoskeletal:        Neck: Range of motion is limited. There is no tenderness. Muscle strength is 5/5. Tone is normal.        Back: Range of motion is limited. There is tenderness. Muscle strength is 5/5. Tone is normal.        Right Upper Extremities: Range of motion is full. There is no tenderness. Muscle strength is 5/5. Tone is normal.        Left Upper Extremities: Range of motion is full. There is no tenderness. Muscle strength is 5/5. Tone is normal.       Right Lower Extremities: Range of motion is limited. There is tenderness. Muscle strength is 5/5. Tone is  normal.        Left Lower Extremities: Range of motion is limited. There is tenderness. Muscle strength is 5/5. Tone is normal.     Neurological:        Coordination: She has a normal Romberg Test, normal finger to nose coordination, normal heel to shin coordination and normal tandem walking coordination.        Sensory: There is no sensory deficit in the trunk. There is sensory deficit in the extremities. Sensory deficit is located L3 dim to light touch .        DTRs: DTRs are DTRS NORMAL AND SYMMETRICnormal and symmetric. Tricep reflexes are 2+ on the right side and 2+ on the left side. Bicep reflexes are 2+ on the right side and 2+ on the left side. Brachioradialis reflexes are 2+ on the right side and 2+ on the left side. Patellar reflexes are 2+ on the right side and 2+ on the left side. Achilles reflexes are 2+ on the right side and 2+ on the left side. She displays no Babinski's sign on the right side. She displays no Babinski's sign on the left side.        Cranial nerves: Cranial nerve(s) II, III, IV, V, VI, VII, VIII, IX, X, XI and XII are intact.       Significant Labs:  Recent Labs   Lab 08/17/22  0835 08/18/22  0723   GLU 88 96    134*   K 4.7 4.6    97   CO2 25 27   BUN 9 10   CREATININE 0.6 0.6   CALCIUM 9.1 9.6   MG 2.2 2.1     Recent Labs   Lab 08/17/22  0835 08/18/22  0723   WBC 7.57 7.13   HGB 8.9* 9.5*   HCT 27.9* 31.0*   * 781*     No results for input(s): LABPT, INR, APTT in the last 48 hours.  Microbiology Results (last 7 days)     Procedure Component Value Units Date/Time    Blood culture [[id number]] Collected: 08/17/22 1808    Order Status: Completed Specimen: Blood Updated: 08/18/22 0915     Blood Culture, Routine No Growth to date    Narrative:      Collection has been rescheduled by KMD at 08/17/2022 16:47 Reason:   Could only collect one set  Unable to collect second  Collection has been rescheduled by KMD at 08/17/2022 16:47 Reason:   Could only collect one set   Unable to collect second    Blood culture [[id number]] Collected: 08/17/22 1647    Order Status: Completed Specimen: Blood from Peripheral, Right Arm Updated: 08/18/22 0915     Blood Culture, Routine No Growth to date    Urine Culture High Risk [[id number]]     Order Status: No result Specimen: Urine, Clean Catch           Assessment/Plan:     Active Diagnoses:    Diagnosis Date Noted POA    PRINCIPAL PROBLEM:  Epidural abscess [G06.2] 08/17/2022 Yes    Hypertension [I10] 08/17/2022 Yes    Anxiety [F41.9] 08/17/2022 Yes      Problems Resolved During this Admission:     To OR for washout and laminectomy epidural abscess     Floyd Brewer MD  Neurosurgery  [facility] - Surgery ([facility])

## 2022-08-18 NOTE — PLAN OF CARE
Pt aaox4, poc reviewed with pt, verbalizes understanding. Pt remains free from injury throughout the shift, safety measures in place. No acute s/s of distress. Pt advised to stay in bed, per strict bed rest order. Pain managed per PRN medications. Hourly rounding done, will cont with poc.    Problem: Infection  Goal: Absence of Infection Signs and Symptoms  Outcome: Ongoing, Progressing     Problem: Adult Inpatient Plan of Care  Goal: Optimal Comfort and Wellbeing  Outcome: Ongoing, Progressing

## 2022-08-19 LAB
BASOPHILS # BLD AUTO: 0.05 K/UL (ref 0–0.2)
BASOPHILS NFR BLD: 0.6 % (ref 0–1.9)
DIFFERENTIAL METHOD: ABNORMAL
EOSINOPHIL # BLD AUTO: 0 K/UL (ref 0–0.5)
EOSINOPHIL NFR BLD: 0.1 % (ref 0–8)
ERYTHROCYTE [DISTWIDTH] IN BLOOD BY AUTOMATED COUNT: 16.7 % (ref 11.5–14.5)
GRAM STN SPEC: NORMAL
HCT VFR BLD AUTO: 34 % (ref 37–48.5)
HGB BLD-MCNC: 9.3 G/DL (ref 12–16)
IMM GRANULOCYTES # BLD AUTO: 0.04 K/UL (ref 0–0.04)
IMM GRANULOCYTES NFR BLD AUTO: 0.4 % (ref 0–0.5)
LYMPHOCYTES # BLD AUTO: 1.7 K/UL (ref 1–4.8)
LYMPHOCYTES NFR BLD: 18.5 % (ref 18–48)
MAGNESIUM SERPL-MCNC: 2.1 MG/DL (ref 1.6–2.6)
MCH RBC QN AUTO: 22.7 PG (ref 27–31)
MCHC RBC AUTO-ENTMCNC: 27.4 G/DL (ref 32–36)
MCV RBC AUTO: 83 FL (ref 82–98)
MONOCYTES # BLD AUTO: 0.7 K/UL (ref 0.3–1)
MONOCYTES NFR BLD: 7.9 % (ref 4–15)
NEUTROPHILS # BLD AUTO: 6.5 K/UL (ref 1.8–7.7)
NEUTROPHILS NFR BLD: 72.5 % (ref 38–73)
NRBC BLD-RTO: 0 /100 WBC
PHOSPHATE SERPL-MCNC: 4.2 MG/DL (ref 2.7–4.5)
PLATELET # BLD AUTO: 256 K/UL (ref 150–450)
PMV BLD AUTO: 10.9 FL (ref 9.2–12.9)
RBC # BLD AUTO: 4.1 M/UL (ref 4–5.4)
VANCOMYCIN TROUGH SERPL-MCNC: 8.2 UG/ML (ref 10–22)
WBC # BLD AUTO: 8.9 K/UL (ref 3.9–12.7)

## 2022-08-19 PROCEDURE — 25000003 PHARM REV CODE 250: Performed by: PHYSICIAN ASSISTANT

## 2022-08-19 PROCEDURE — 63600175 PHARM REV CODE 636 W HCPCS: Performed by: PHYSICIAN ASSISTANT

## 2022-08-19 PROCEDURE — 84100 ASSAY OF PHOSPHORUS: CPT | Performed by: PHYSICIAN ASSISTANT

## 2022-08-19 PROCEDURE — 36415 COLL VENOUS BLD VENIPUNCTURE: CPT | Performed by: PHYSICIAN ASSISTANT

## 2022-08-19 PROCEDURE — 99024 PR POST-OP FOLLOW-UP VISIT: ICD-10-PCS | Mod: ,,, | Performed by: PHYSICIAN ASSISTANT

## 2022-08-19 PROCEDURE — 80202 ASSAY OF VANCOMYCIN: CPT | Performed by: PHYSICIAN ASSISTANT

## 2022-08-19 PROCEDURE — 63600175 PHARM REV CODE 636 W HCPCS: Performed by: FAMILY MEDICINE

## 2022-08-19 PROCEDURE — 11000001 HC ACUTE MED/SURG PRIVATE ROOM

## 2022-08-19 PROCEDURE — 94760 N-INVAS EAR/PLS OXIMETRY 1: CPT

## 2022-08-19 PROCEDURE — 99024 POSTOP FOLLOW-UP VISIT: CPT | Mod: ,,, | Performed by: PHYSICIAN ASSISTANT

## 2022-08-19 PROCEDURE — 83735 ASSAY OF MAGNESIUM: CPT | Performed by: PHYSICIAN ASSISTANT

## 2022-08-19 PROCEDURE — 25000003 PHARM REV CODE 250: Performed by: NURSE PRACTITIONER

## 2022-08-19 PROCEDURE — 63600175 PHARM REV CODE 636 W HCPCS: Performed by: NURSE PRACTITIONER

## 2022-08-19 PROCEDURE — 85025 COMPLETE CBC W/AUTO DIFF WBC: CPT | Performed by: PHYSICIAN ASSISTANT

## 2022-08-19 PROCEDURE — 25000003 PHARM REV CODE 250: Performed by: FAMILY MEDICINE

## 2022-08-19 PROCEDURE — 94799 UNLISTED PULMONARY SVC/PX: CPT

## 2022-08-19 RX ORDER — OXYCODONE AND ACETAMINOPHEN 10; 325 MG/1; MG/1
1 TABLET ORAL EVERY 4 HOURS PRN
Status: DISCONTINUED | OUTPATIENT
Start: 2022-08-19 | End: 2022-08-20

## 2022-08-19 RX ORDER — MORPHINE SULFATE 4 MG/ML
4 INJECTION, SOLUTION INTRAMUSCULAR; INTRAVENOUS ONCE
Status: COMPLETED | OUTPATIENT
Start: 2022-08-19 | End: 2022-08-19

## 2022-08-19 RX ORDER — POLYETHYLENE GLYCOL 3350 17 G/17G
17 POWDER, FOR SOLUTION ORAL DAILY
Status: DISCONTINUED | OUTPATIENT
Start: 2022-08-19 | End: 2022-08-19

## 2022-08-19 RX ADMIN — LORAZEPAM 2 MG: 0.5 TABLET ORAL at 08:08

## 2022-08-19 RX ADMIN — MUPIROCIN: 20 OINTMENT TOPICAL at 08:08

## 2022-08-19 RX ADMIN — CEFTRIAXONE 2 G: 2 INJECTION, SOLUTION INTRAVENOUS at 04:08

## 2022-08-19 RX ADMIN — MORPHINE SULFATE 4 MG: 4 INJECTION INTRAVENOUS at 11:08

## 2022-08-19 RX ADMIN — POLYETHYLENE GLYCOL 3350 17 G: 17 POWDER, FOR SOLUTION ORAL at 08:08

## 2022-08-19 RX ADMIN — VANCOMYCIN HYDROCHLORIDE 1500 MG: 1.5 INJECTION, POWDER, LYOPHILIZED, FOR SOLUTION INTRAVENOUS at 05:08

## 2022-08-19 RX ADMIN — VANCOMYCIN HYDROCHLORIDE 1500 MG: 1.5 INJECTION, POWDER, LYOPHILIZED, FOR SOLUTION INTRAVENOUS at 09:08

## 2022-08-19 RX ADMIN — OXYCODONE AND ACETAMINOPHEN 1 TABLET: 10; 325 TABLET ORAL at 11:08

## 2022-08-19 RX ADMIN — CEFTRIAXONE 2 G: 2 INJECTION, SOLUTION INTRAVENOUS at 03:08

## 2022-08-19 RX ADMIN — VANCOMYCIN HYDROCHLORIDE 1000 MG: 1 INJECTION, POWDER, LYOPHILIZED, FOR SOLUTION INTRAVENOUS at 01:08

## 2022-08-19 RX ADMIN — OXYCODONE HYDROCHLORIDE 10 MG: 5 TABLET ORAL at 02:08

## 2022-08-19 RX ADMIN — OXYCODONE AND ACETAMINOPHEN 1 TABLET: 10; 325 TABLET ORAL at 06:08

## 2022-08-19 RX ADMIN — OXYCODONE AND ACETAMINOPHEN 1 TABLET: 10; 325 TABLET ORAL at 02:08

## 2022-08-19 RX ADMIN — THERA TABS 1 TABLET: TAB at 08:08

## 2022-08-19 RX ADMIN — LORAZEPAM 2 MG: 0.5 TABLET ORAL at 05:08

## 2022-08-19 RX ADMIN — CYCLOBENZAPRINE HYDROCHLORIDE 5 MG: 5 TABLET, FILM COATED ORAL at 01:08

## 2022-08-19 RX ADMIN — OXYCODONE HYDROCHLORIDE 10 MG: 5 TABLET ORAL at 08:08

## 2022-08-19 RX ADMIN — LORAZEPAM 2 MG: 0.5 TABLET ORAL at 02:08

## 2022-08-19 RX ADMIN — CYCLOBENZAPRINE HYDROCHLORIDE 5 MG: 5 TABLET, FILM COATED ORAL at 05:08

## 2022-08-19 RX ADMIN — DOCUSATE SODIUM 100 MG: 100 CAPSULE, LIQUID FILLED ORAL at 08:08

## 2022-08-19 NOTE — PROGRESS NOTES
Pt continues to refuse bed alarm, is able to turn off alarm herself. Ambulates with steady gait, however is mostly noncompliant with TLSO brace, impulsive. OX4, BETANCUR Pt educated on purpose of TLSO brace and importance of use. Pt ambulated in hallway with RN, TLSO brace and walker this evening. Rpts moderate relief with pain meds for back pain and relief with Ativan x2 for anxiety. Currently resting quietly in bed, SUDEEP.

## 2022-08-19 NOTE — PROGRESS NOTES
[facility] Medicine  Progress Note    Patient Name: Kayla Clay  MRN: [id number]  Patient Class: IP- Inpatient   Admission Date: 8/17/2022  Length of Stay: 2 days  Attending Physician: Ken Serrano MD  Primary Care Provider: Primary Doctor No        Subjective:     Principal Problem:Epidural abscess        HPI:  37 y.o. female with history of IVDA (last used 8/8), sciatica and chronic low back pain, who presented to the [place] ER on 8/9 for evaluation of lower back pain, bilateral leg numbness and inability to ambulate for 4 days.  Physical exam notable for 5/5 strength in BUE, 4/5 strength in BLE, and no spinal tenderness.  Labs on admit showed WBC 11, ESR 70.  UDS was positive for amphetamines.  She was initially started on Ceftriaxone for pyelo, which was stopped after 3 days.  Her hospital stay was complicated by methamphetamine withdrawal, so she was unable to cooperate with spinal imaging.  MRI was originally ordered without contrast because of length of study, she was unable to cooperate because of pain.  MRI lumbar spine without contrast 8/15 showed L3-L4 endplate erosions with fluid in the disc, epidural fluid extending from L2-L4 with moderate central canal narrowing at L2-L3 with severe central canal narrowing at L3-L4, findings suspicious for spondylodiscitis.  Additionally, fluid within the L1-L2 and L2-L3 disc space without significant endplate erosions, early spondylodiscitis can't be excluded. After the MRI resulted, Radiology suggested MRI with contrast to further determine discitis.  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  The fluid collection may extend further into the thoracic spine as well.  Small millimetric fluid collections within the right and left psoas muscle suggesting early abscess formation.  She was started on Vanc and Ceftriaxone on 8/15.   Her case was discussed with Dr. Brewer ([facility] who agreed to consult. Patient transferred from [place] to [facility] via [facility]. Patient is a full code, surrogate decision maker is her mother, Shaista Engel. Admitted to inpatient under the care of [facility] Medicine.       Overview/Hospital Course:  Patient admitted for epidural abscess under the caer of [facility] medicine. Neurosurgery was consulted and she was started on IV abx.  08/17: Patient to have MRI of hip and thoracic spine. Will need long term IV abx. - no cultures available - will draw BC despite already getting IV abx. ID consulted. Case management consulted for discharge planing. Echo ordered.   08/18:  Patient scheduled for surgery today. Mother and aunt at bedside. Patient wants to go to rehab after she recovers from this. We discussed need for long term IV abx - likely 3 months - not able to go to Rehab while on IV abx.        Interval History: Patient with high pain - 10/10, crying. 1X Morphine ordered - will change oxycodone 10mg to percocet 10 for severe pain. Added daily miralax - continue IV abx. Once cleared by neurosurgery, will transfer back to [place] for IV abx therapy.    Review of Systems   Constitutional:  Positive for activity change. Negative for appetite change, chills, diaphoresis, fatigue, fever and unexpected weight change.   HENT:  Negative for congestion, dental problem, ear pain, hearing loss, mouth sores, postnasal drip, rhinorrhea, sinus pressure, sore throat, tinnitus and trouble swallowing.    Eyes:  Negative for pain, discharge, redness and visual disturbance.   Respiratory:  Negative for apnea, cough, choking, chest tightness, shortness of breath and wheezing.    Cardiovascular:  Negative for chest pain, palpitations and leg swelling.   Gastrointestinal:  Negative for abdominal distention, abdominal pain, anal bleeding, blood in stool, constipation, diarrhea, nausea, rectal pain and vomiting.   Endocrine:  Negative for cold intolerance, heat intolerance, polydipsia and polyuria.   Genitourinary:  Negative for difficulty urinating, dyspareunia, dysuria, flank pain, frequency, hematuria and urgency.   Musculoskeletal:  Positive for back pain. Negative for arthralgias, gait problem, joint swelling, myalgias, neck pain and neck stiffness.   Skin: Negative.  Negative for color change, rash and wound.   Allergic/Immunologic: Negative.    Neurological:  Positive for weakness. Negative for dizziness, tremors, seizures, syncope, speech difficulty, light-headedness and headaches.   Hematological: Negative.  Negative for adenopathy. Does not bruise/bleed easily.   Psychiatric/Behavioral:  Negative for agitation, behavioral problems, confusion and sleep disturbance. The patient is nervous/anxious.    All other systems reviewed and are negative.  Objective:     Vital Signs (Most Recent):  Temp: 97.1 °F (36.2 °C) (08/19/22 0727)  Pulse: 90 (08/19/22 0727)  Resp: (!) 22 (08/19/22 0818)  BP: 129/87 (08/19/22 0727)  SpO2: 96 % (08/19/22 0831)   Vital Signs (24h Range):  Temp:  [97.1 °F (36.2 °C)-98.4 °F (36.9 °C)] 97.1 °F (36.2 °C)  Pulse:  [] 90  Resp:  [12-22] 22  SpO2:  [90 %-99 %] 96 %  BP: (118-143)/(65-88) 129/87     Weight: 94.8 kg (209 lb)  Body mass index is 31.78 kg/m².    Intake/Output Summary (Last 24 hours) at 8/19/2022 1046  Last data filed at 8/19/2022 0413  Gross per 24 hour   Intake 1794.68 ml   Output 540 ml   Net 1254.68 ml      Physical Exam  Vitals and nursing note reviewed.   Constitutional:       General: She is not in acute distress.     Appearance: She is well-developed.   HENT:      Head: Normocephalic and atraumatic.      Right Ear: Hearing and external ear normal.      Left Ear: Hearing and external ear normal.      Nose: No rhinorrhea.      Right Sinus: No maxillary sinus tenderness or frontal sinus tenderness.      Left Sinus: No maxillary sinus tenderness or frontal sinus tenderness.       Mouth/Throat:      Mouth: No oral lesions.      Pharynx: Uvula midline.   Eyes:      General:         Right eye: No discharge.         Left eye: No discharge.      Conjunctiva/sclera: Conjunctivae normal.      Pupils: Pupils are equal, round, and reactive to light.   Neck:      Thyroid: No thyromegaly.      Vascular: No carotid bruit.      Trachea: No tracheal deviation.   Cardiovascular:      Rate and Rhythm: Normal rate and regular rhythm.      Pulses:           Dorsalis pedis pulses are 2+ on the right side and 2+ on the left side.      Heart sounds: Normal heart sounds, S1 normal and S2 normal. No murmur heard.  Pulmonary:      Effort: Pulmonary effort is normal. No respiratory distress.      Breath sounds: Normal breath sounds.   Chest:   Breasts:      Right: No supraclavicular adenopathy.      Left: No supraclavicular adenopathy.     Abdominal:      General: Bowel sounds are normal. There is no distension.      Palpations: Abdomen is soft. There is no mass.      Tenderness: There is no abdominal tenderness.   Musculoskeletal:      Cervical back: Normal range of motion.      Thoracic back: Tenderness present. Decreased range of motion.      Lumbar back: Tenderness present. Decreased range of motion.   Lymphadenopathy:      Cervical: No cervical adenopathy.      Upper Body:      Right upper body: No supraclavicular adenopathy.      Left upper body: No supraclavicular adenopathy.   Skin:     General: Skin is warm and dry.      Capillary Refill: Capillary refill takes less than 2 seconds.      Findings: No rash.   Neurological:      Mental Status: She is alert and oriented to person, place, and time.      Sensory: No sensory deficit.      Coordination: Coordination normal.      Gait: Gait normal.   Psychiatric:         Mood and Affect: Mood is not anxious or depressed.         Speech: Speech normal.         Behavior: Behavior normal.         Thought Content: Thought content normal.         Judgment: Judgment  normal.       Significant Labs: All pertinent labs within the past 24 hours have been reviewed.  Recent Lab Results         08/19/22  0839   08/19/22  0052   08/18/22  1642   08/18/22  1559        Baso # 0.05             Basophil % 0.6             Differential Method Automated             Eos # 0.0             Eosinophil % 0.1             Gram Stain Result     No WBC's   Rare WBC's            No organisms seen   Rare Gram positive cocci       Gran # (ANC) 6.5             Gran % 72.5             Hematocrit 34.0             Hemoglobin 9.3             Immature Grans (Abs) 0.04  Comment: Mild elevation in immature granulocytes is non specific and   can be seen in a variety of conditions including stress response,   acute inflammation, trauma and pregnancy. Correlation with other   laboratory and clinical findings is essential.               Immature Granulocytes 0.4             Lymph # 1.7             Lymph % 18.5             Magnesium 2.1             MCH 22.7             MCHC 27.4             MCV 83             Mono # 0.7             Mono % 7.9             MPV 10.9             nRBC 0             Phosphorus 4.2             Platelets 256             RBC 4.10             RDW 16.7             Vancomycin-Trough   8.2           WBC 8.90                     Significant Imaging: I have reviewed all pertinent imaging results/findings within the past 24 hours.      Assessment/Plan:      * Epidural abscess  Per report of sending facility, confirmed epidural abscess on MRI. Initiated on ceftriaxone and Vancomycin on 8/15/22.    --Continue Ceftriaxone and Vancomycin  --Pain control per MAR  --CBC,CMP  --Consult neurosurgery  --NPO pending eval per neurosurgery  --Vitals Q4H  08/17:  --MRI thoracic spine pending  --echo pending  --continue IV abx  --ID consulted  08/18:  --surgery today  08/19:  --POD#1  --dressing c/d/i  --TLSO when OOB  --Neurosurgery managing      IVDU (intravenous drug user)  --pt wanting to quit  --supportive  care  --PRN medications for withdrawal      Anxiety  --PRN ativan      Hypertension  Hx HTN, not currently treated with medications as outpatient    --Hydralazine PRN  --Vitals Q4H        VTE Risk Mitigation (From admission, onward)         Ordered     IP VTE HIGH RISK PATIENT  Once         08/17/22 0259     Place sequential compression device  Until discontinued         08/17/22 0259     Reason for No Pharmacological VTE Prophylaxis  Once        Question:  Reasons:  Answer:  Physician Provided (leave comment)    08/17/22 0259                Discharge Planning   KIM:      Code Status: Full Code   Is the patient medically ready for discharge?:     Reason for patient still in hospital (select all that apply): Patient trending condition, Treatment and Consult recommendations  Discharge Plan A: Home with family                  CORNELIA Torres  Department of Hospital Medicine   [place] - Med Surg

## 2022-08-19 NOTE — ANESTHESIA POSTPROCEDURE EVALUATION
Anesthesia Post Evaluation    Patient: Kayla Clay    Procedure(s) Performed: Procedure(s) (LRB):  LAMINECTOMY, SPINE, LUMBAR, POSTERIOR APPROACH, WITH EXCISION OF NEOPLASM OR LESION OF SPINAL CORD (N/A)    Final Anesthesia Type: general      Patient location during evaluation: PACU  Patient participation: Yes- Able to Participate  Level of consciousness: awake  Post-procedure vital signs: reviewed and stable  Pain management: adequate  Airway patency: patent    PONV status at discharge: No PONV  Anesthetic complications: no      Cardiovascular status: hemodynamically stable  Respiratory status: unassisted  Hydration status: euvolemic  Follow-up not needed.          Vitals Value Taken Time   /72 08/18/22 1927   Temp 36.8 °C (98.3 °F) 08/18/22 1927   Pulse 95 08/18/22 1927   Resp 17 08/18/22 1927   SpO2 95 % 08/18/22 1927         Event Time   Out of Recovery 08/18/2022 18:45:00         Pain/Jarrod Score: Pain Rating Prior to Med Admin: 7 (8/18/2022  6:14 PM)  Pain Rating Post Med Admin: 5 (8/18/2022 11:35 AM)  Jarrod Score: 9 (8/18/2022  6:45 PM)

## 2022-08-19 NOTE — PROGRESS NOTES
[facility] Surg  Neurosurgery  Progress Note    Subjective:     Interval History:   The patient was seen earlier this morning.  Notified by nurse pt found walking out of bathroom after shower and not using TLSO.  HV drain 40 mL output  Afebrile  Reports back pain, incisional pain.  Denies leg pain, numbness/tingling, HA, dizziness, chest pain and shortness of breath.  No issues urinating.    Gram stain from OR (deep epidural space)- No orgs seen  Gram stain (epidural fluid collection)- Rare WBCs, Gram positive cocci    History of Present Illness: See H&P.    Post-Op Info:  Procedure(s) (LRB):  LAMINECTOMY, SPINE, LUMBAR, POSTERIOR APPROACH, WITH EXCISION OF NEOPLASM OR LESION OF SPINAL CORD (N/A)   1 Day Post-Op      Medications:  Continuous Infusions:  Scheduled Meds:   cefTRIAXone (ROCEPHIN) IVPB  2 g Intravenous Q12H    docusate sodium  100 mg Oral Daily    multivitamin  1 tablet Oral Daily    mupirocin   Nasal BID    polyethylene glycol  17 g Oral Daily    vancomycin (VANCOCIN) IVPB  15 mg/kg Intravenous Q8H     PRN Meds:sodium chloride 0.9%, albuterol sulfate, aluminum-magnesium hydroxide-simethicone, cyclobenzaprine, dextrose 10%, dextrose 10%, diphenhydrAMINE, glucagon (human recombinant), glucose, glucose, loperamide, LORazepam, mirtazapine, naloxone, ondansetron, oxyCODONE, oxyCODONE, prochlorperazine, QUEtiapine, senna-docusate 8.6-50 mg, sodium chloride 0.9%, sodium chloride 0.9%, sodium chloride 0.9%, trazodone, Pharmacy to dose Vancomycin consult **AND** vancomycin - pharmacy to dose     Review of Systems  Objective:     Weight: 94.8 kg (209 lb)  Body mass index is 31.78 kg/m².  Vital Signs (Most Recent):  Temp: 97.1 °F (36.2 °C) (08/19/22 0727)  Pulse: 90 (08/19/22 0727)  Resp: (!) 22 (08/19/22 0818)  BP: 129/87 (08/19/22 0727)  SpO2: 96 % (08/19/22 0831) Vital Signs (24h Range):  Temp:  [97.1 °F (36.2 °C)-98.4 °F (36.9 °C)] 97.1 °F (36.2 °C)  Pulse:  [] 90  Resp:  [12-22] 22  SpO2:  [90  %-99 %] 96 %  BP: (118-143)/(65-88) 129/87                Oxygen Concentration (%):  [2] 2         Closed/Suction Drain 08/18/22 1637 Right Back Accordion 10 Fr. (Active)   Site Description Unable to view 08/18/22 1935   Dressing Type Biopatch in place 08/18/22 1935   Dressing Status Clean;Dry;Intact 08/18/22 1935   Dressing Intervention Integrity maintained 08/18/22 1935   Drainage Bloody 08/18/22 1935   Status To bulb suction 08/18/22 1935   Output (mL) 40 mL 08/19/22 0413       Neurosurgery Physical Exam  Vitals and labs reviewed  Moves all 4 ext well  Incision/dressing CDI  HV drain removed  Drain site reinforced    Significant Labs:  Recent Labs   Lab 08/18/22 0723 08/19/22  0839   GLU 96  --    *  --    K 4.6  --    CL 97  --    CO2 27  --    BUN 10  --    CREATININE 0.6  --    CALCIUM 9.6  --    MG 2.1 2.1     Recent Labs   Lab 08/18/22 0723 08/19/22  0839   WBC 7.13 8.90   HGB 9.5* 9.3*   HCT 31.0* 34.0*   * 256     No results for input(s): LABPT, INR, APTT in the last 48 hours.  Microbiology Results (last 7 days)     Procedure Component Value Units Date/Time    Blood culture [[id number]] Collected: 08/17/22 1808    Order Status: Completed Specimen: Blood Updated: 08/19/22 0613     Blood Culture, Routine No Growth to date      No Growth to date    Narrative:      Collection has been rescheduled by KMD at 08/17/2022 16:47 Reason:   Could only collect one set  Unable to collect second  Collection has been rescheduled by KMD at 08/17/2022 16:47 Reason:   Could only collect one set  Unable to collect second    Blood culture [[id number]] Collected: 08/17/22 1647    Order Status: Completed Specimen: Blood from Peripheral, Right Arm Updated: 08/19/22 0613     Blood Culture, Routine No Growth to date      No Growth to date    Gram stain [[id number]] Collected: 08/18/22 1642    Order Status: Completed Specimen: Abscess from Back Updated: 08/19/22 0436     Gram Stain Result No WBC's      No organisms seen     Narrative:      Deep Epidural Space    Gram stain [[id number]] Collected: 08/18/22 1559    Order Status: Completed Specimen: Abscess from Back Updated: 08/19/22 0434     Gram Stain Result Rare WBC's      Rare Gram positive cocci    Narrative:      Epidural fluid collection    Culture, Anaerobic [[id number]] Collected: 08/18/22 1559    Order Status: Sent Specimen: Abscess from Back Updated: 08/19/22 0237    Aerobic culture [[id number]] Collected: 08/18/22 1559    Order Status: Sent Specimen: Abscess from Back Updated: 08/19/22 0237    AFB Culture & Smear [[id number]] Collected: 08/18/22 1559    Order Status: Sent Specimen: Abscess from Back Updated: 08/19/22 0237    AFB Culture & Smear [[id number]] Collected: 08/18/22 1642    Order Status: Sent Specimen: Abscess from Back Updated: 08/19/22 0237    Fungus culture [[id number]] Collected: 08/18/22 1559    Order Status: Sent Specimen: Abscess from Back Updated: 08/19/22 0237    Culture, Anaerobic [[id number]] Collected: 08/18/22 1642    Order Status: Sent Specimen: Abscess from Back Updated: 08/19/22 0237    Aerobic culture [[id number]] Collected: 08/18/22 1642    Order Status: Sent Specimen: Abscess from Back Updated: 08/19/22 0237    Fungus culture [[id number]] Collected: 08/18/22 1642    Order Status: Sent Specimen: Abscess from Back Updated: 08/19/22 0237    Urine Culture High Risk [[id number]]     Order Status: No result Specimen: Urine, Clean Catch         All pertinent labs from the last 24 hours have been reviewed.  Significant Diagnostics:  I have reviewed all pertinent imaging results/findings within the past 24 hours.    Assessment/Plan:     Active Diagnoses:    Diagnosis Date Noted POA    PRINCIPAL PROBLEM:  Epidural abscess [G06.2] 08/17/2022 Yes     Chronic    IVDU (intravenous drug user) [F19.90] 08/18/2022 Yes    Hypertension [I10] 08/17/2022 Yes     Chronic    Anxiety [F41.9] 08/17/2022 Yes     Chronic      Problems Resolved During this Admission:     POD  #1  - okay to patient to get up, amb around room  - TLSO when OOB  - Await all cultures.  --Gram stain (epidural fluid collection)- Rare WBCs, Gram positive cocci  Will consult ID regarding Abx    Please call with any changes.    Charity Davies PA-C  Neurosurgery  [facility] - Med S[facility]

## 2022-08-19 NOTE — ASSESSMENT & PLAN NOTE
Per report of sending facility, confirmed epidural abscess on MRI. Initiated on ceftriaxone and Vancomycin on 8/15/22.    --Continue Ceftriaxone and Vancomycin  --Pain control per MAR  --CBC,CMP  --Consult neurosurgery  --NPO pending eval per neurosurgery  --Vitals Q4H  08/17:  --MRI thoracic spine pending  --echo pending  --continue IV abx  --ID consulted  08/18:  --surgery today  08/19:  --POD#1  --dressing c/d/i  --TLSO when OOB  --Neurosurgery managing

## 2022-08-19 NOTE — NURSING
Pt advised multiple times throughout shift not get out of bed without TLSO brace as well as to call for assistance. Bed alarm is placed on pt, but pt continues to turn it off herself & get out of bed without brace. During my 5 o'clock rounds, I found my pt walking out of the bathroom from taking a shower. Aquacel dressing reinforced, hemovac remains in place. Pt educated on the importance of wearing TLSO when OOB. Bed alarm set, call bell within reach, pt advised to call for assistance.

## 2022-08-19 NOTE — PROGRESS NOTES
Pharmacokinetic Assessment Follow Up: IV Vancomycin    Vancomycin serum concentration assessment(s):    The trough level was drawn correctly and can be used to guide therapy at this time. The measurement is below the desired definitive target range of 15 to 20 mcg/mL.    Vancomycin Regimen Plan:    Change regimen to Vancomycin 1500 mg IV every 8 hours with next serum trough concentration measured at 0030 prior to 3rd dose on 08/20 to check for accumulation    Drug levels (last 3 results):  Recent Labs   Lab Result Units 08/17/22  2342 08/19/22  0052   Vancomycin, Random ug/mL 9.9  --    Vancomycin-Trough ug/mL  --  8.2*       Pharmacy will continue to follow and monitor vancomycin.    Please contact pharmacy at extension [phone] for questions regarding this assessment.    Thank you for the consult,   Pinky Babcock       Patient brief summary:  Kayla Clay is a 37 y.o. female initiated on antimicrobial therapy with IV Vancomycin for treatment of  epidural abscess    The patient's current regimen is vancomycin 1500 mg IV every 8 hours.     Drug Allergies:   Review of patient's allergies indicates:   Allergen Reactions    Acetaminophen Hives       Actual Body Weight:   94.8 kg    Renal Function:   Estimated Creatinine Clearance: 153.1 mL/min (based on SCr of 0.6 mg/dL).,     Dialysis Method (if applicable):  N/A    CBC (last 72 hours):  Recent Labs   Lab Result Units 08/17/22  0835 08/18/22  0723   WBC K/uL 7.57 7.13   Hemoglobin g/dL 8.9* 9.5*   Hematocrit % 27.9* 31.0*   Platelets K/uL 760* 781*   Gran % % 56.8 61.0   Lymph % % 31.4 25.2   Mono % % 9.8 11.5   Eosinophil % % 0.7 1.1   Basophil % % 0.4 0.6   Differential Method  Automated Automated       Metabolic Panel (last 72 hours):  Recent Labs   Lab Result Units 08/17/22  0835 08/17/22  2252 08/18/22  0723   Sodium mmol/L 137  --  134*   Potassium mmol/L 4.7  --  4.6   Chloride mmol/L 100  --  97   CO2 mmol/L 25  --  27   Glucose mg/dL 88  --  96   Glucose, UA    --  Negative  --    BUN mg/dL 9  --  10   Creatinine mg/dL 0.6  --  0.6   Albumin g/dL 2.9*  --  3.0*   Total Bilirubin mg/dL 0.3  --  0.4   Alkaline Phosphatase U/L 50*  --  59   AST U/L 86*  --  35   ALT U/L 246*  --  176*   Magnesium mg/dL 2.2  --  2.1   Phosphorus mg/dL 4.8*  --  5.1*       Vancomycin Administrations:  vancomycin given in the last 96 hours                     vancomycin in dextrose 5 % 1 gram/250 mL IVPB 1,000 mg (mg) 1,000 mg New Bag 08/19/22 0123     1,000 mg New Bag 08/18/22 1729     1,000 mg New Bag  0937     1,000 mg New Bag  0215    vancomycin injection (g) 2 g Given 08/18/22 1433    vancomycin (VANCOCIN) 2,250 mg in dextrose 5 % 500 mL IVPB ()  Restarted 08/17/22 1309      Restarted  1208      Restarted  1200      Restarted  1156     2,250 mg New Bag  1135                    Microbiologic Results:  Microbiology Results (last 7 days)       Procedure Component Value Units Date/Time    Gram stain [[id number]] Collected: 08/18/22 1559    Order Status: Sent Specimen: Abscess from Back Updated: 08/18/22 1757    AFB Culture & Smear [[id number]] Collected: 08/18/22 1559    Order Status: Sent Specimen: Abscess from Back Updated: 08/18/22 1756    Fungus culture [[id number]] Collected: 08/18/22 1559    Order Status: Sent Specimen: Abscess from Back Updated: 08/18/22 1756    Aerobic culture [[id number]] Collected: 08/18/22 1559    Order Status: Sent Specimen: Abscess from Back Updated: 08/18/22 1756    Culture, Anaerobic [[id number]] Collected: 08/18/22 1559    Order Status: Sent Specimen: Abscess from Back Updated: 08/18/22 1755    Fungus culture [[id number]] Collected: 08/18/22 1642    Order Status: Sent Specimen: Abscess from Back Updated: 08/18/22 1753    Gram stain [[id number]] Collected: 08/18/22 1642    Order Status: Sent Specimen: Abscess from Back Updated: 08/18/22 1752    AFB Culture & Smear [[id number]] Collected: 08/18/22 1642    Order Status: Sent Specimen: Abscess from Back Updated: 08/18/22  1752    Culture, Anaerobic [[id number]] Collected: 08/18/22 1642    Order Status: Sent Specimen: Abscess from Back Updated: 08/18/22 1751    Aerobic culture [[id number]] Collected: 08/18/22 1642    Order Status: Sent Specimen: Abscess from Back Updated: 08/18/22 1751    Blood culture [[id number]] Collected: 08/17/22 1808    Order Status: Completed Specimen: Blood Updated: 08/18/22 0915     Blood Culture, Routine No Growth to date    Narrative:      Collection has been rescheduled by KMD at 08/17/2022 16:47 Reason:   Could only collect one set  Unable to collect second  Collection has been rescheduled by KMD at 08/17/2022 16:47 Reason:   Could only collect one set  Unable to collect second    Blood culture [[id number]] Collected: 08/17/22 1647    Order Status: Completed Specimen: Blood from Peripheral, Right Arm Updated: 08/18/22 0915     Blood Culture, Routine No Growth to date    Urine Culture High Risk [[id number]]     Order Status: No result Specimen: Urine, Clean Catch

## 2022-08-19 NOTE — PLAN OF CARE
Pt aaox4, VSS. Poc reviewed with pt, verbalizes understanding. Pain managed with PRN medication. Hemovac remains in place. Pt remains free of injury throughout the shift, safety measures remain in place. No acute s/s of distress, hourly rounding done, will cont with poc.    Problem: Infection  Goal: Absence of Infection Signs and Symptoms  Outcome: Ongoing, Progressing     Problem: Adult Inpatient Plan of Care  Goal: Optimal Comfort and Wellbeing  Outcome: Ongoing, Progressing

## 2022-08-19 NOTE — SUBJECTIVE & OBJECTIVE
Interval History: Patient with high pain - 10/10, crying. 1X Morphine ordered - will change oxycodone 10mg to percocet 10 for severe pain. Added daily miralax - continue IV abx. Once cleared by neurosurgery, will transfer back to [place] for IV abx therapy.    Review of Systems   Constitutional:  Positive for activity change. Negative for appetite change, chills, diaphoresis, fatigue, fever and unexpected weight change.   HENT:  Negative for congestion, dental problem, ear pain, hearing loss, mouth sores, postnasal drip, rhinorrhea, sinus pressure, sore throat, tinnitus and trouble swallowing.    Eyes:  Negative for pain, discharge, redness and visual disturbance.   Respiratory:  Negative for apnea, cough, choking, chest tightness, shortness of breath and wheezing.    Cardiovascular:  Negative for chest pain, palpitations and leg swelling.   Gastrointestinal:  Negative for abdominal distention, abdominal pain, anal bleeding, blood in stool, constipation, diarrhea, nausea, rectal pain and vomiting.   Endocrine: Negative for cold intolerance, heat intolerance, polydipsia and polyuria.   Genitourinary:  Negative for difficulty urinating, dyspareunia, dysuria, flank pain, frequency, hematuria and urgency.   Musculoskeletal:  Positive for back pain. Negative for arthralgias, gait problem, joint swelling, myalgias, neck pain and neck stiffness.   Skin: Negative.  Negative for color change, rash and wound.   Allergic/Immunologic: Negative.    Neurological:  Positive for weakness. Negative for dizziness, tremors, seizures, syncope, speech difficulty, light-headedness and headaches.   Hematological: Negative.  Negative for adenopathy. Does not bruise/bleed easily.   Psychiatric/Behavioral:  Negative for agitation, behavioral problems, confusion and sleep disturbance. The patient is nervous/anxious.    All other systems reviewed and are negative.  Objective:     Vital Signs (Most Recent):  Temp: 97.1 °F (36.2 °C) (08/19/22  0727)  Pulse: 90 (08/19/22 0727)  Resp: (!) 22 (08/19/22 0818)  BP: 129/87 (08/19/22 0727)  SpO2: 96 % (08/19/22 0831)   Vital Signs (24h Range):  Temp:  [97.1 °F (36.2 °C)-98.4 °F (36.9 °C)] 97.1 °F (36.2 °C)  Pulse:  [] 90  Resp:  [12-22] 22  SpO2:  [90 %-99 %] 96 %  BP: (118-143)/(65-88) 129/87     Weight: 94.8 kg (209 lb)  Body mass index is 31.78 kg/m².    Intake/Output Summary (Last 24 hours) at 8/19/2022 1046  Last data filed at 8/19/2022 0413  Gross per 24 hour   Intake 1794.68 ml   Output 540 ml   Net 1254.68 ml      Physical Exam  Vitals and nursing note reviewed.   Constitutional:       General: She is not in acute distress.     Appearance: She is well-developed.   HENT:      Head: Normocephalic and atraumatic.      Right Ear: Hearing and external ear normal.      Left Ear: Hearing and external ear normal.      Nose: No rhinorrhea.      Right Sinus: No maxillary sinus tenderness or frontal sinus tenderness.      Left Sinus: No maxillary sinus tenderness or frontal sinus tenderness.      Mouth/Throat:      Mouth: No oral lesions.      Pharynx: Uvula midline.   Eyes:      General:         Right eye: No discharge.         Left eye: No discharge.      Conjunctiva/sclera: Conjunctivae normal.      Pupils: Pupils are equal, round, and reactive to light.   Neck:      Thyroid: No thyromegaly.      Vascular: No carotid bruit.      Trachea: No tracheal deviation.   Cardiovascular:      Rate and Rhythm: Normal rate and regular rhythm.      Pulses:           Dorsalis pedis pulses are 2+ on the right side and 2+ on the left side.      Heart sounds: Normal heart sounds, S1 normal and S2 normal. No murmur heard.  Pulmonary:      Effort: Pulmonary effort is normal. No respiratory distress.      Breath sounds: Normal breath sounds.   Chest:   Breasts:      Right: No supraclavicular adenopathy.      Left: No supraclavicular adenopathy.     Abdominal:      General: Bowel sounds are normal. There is no distension.       Palpations: Abdomen is soft. There is no mass.      Tenderness: There is no abdominal tenderness.   Musculoskeletal:      Cervical back: Normal range of motion.      Thoracic back: Tenderness present. Decreased range of motion.      Lumbar back: Tenderness present. Decreased range of motion.   Lymphadenopathy:      Cervical: No cervical adenopathy.      Upper Body:      Right upper body: No supraclavicular adenopathy.      Left upper body: No supraclavicular adenopathy.   Skin:     General: Skin is warm and dry.      Capillary Refill: Capillary refill takes less than 2 seconds.      Findings: No rash.   Neurological:      Mental Status: She is alert and oriented to person, place, and time.      Sensory: No sensory deficit.      Coordination: Coordination normal.      Gait: Gait normal.   Psychiatric:         Mood and Affect: Mood is not anxious or depressed.         Speech: Speech normal.         Behavior: Behavior normal.         Thought Content: Thought content normal.         Judgment: Judgment normal.       Significant Labs: All pertinent labs within the past 24 hours have been reviewed.  Recent Lab Results         08/19/22  0839   08/19/22  0052   08/18/22  1642   08/18/22  1559        Baso # 0.05             Basophil % 0.6             Differential Method Automated             Eos # 0.0             Eosinophil % 0.1             Gram Stain Result     No WBC's   Rare WBC's            No organisms seen   Rare Gram positive cocci       Gran # (ANC) 6.5             Gran % 72.5             Hematocrit 34.0             Hemoglobin 9.3             Immature Grans (Abs) 0.04  Comment: Mild elevation in immature granulocytes is non specific and   can be seen in a variety of conditions including stress response,   acute inflammation, trauma and pregnancy. Correlation with other   laboratory and clinical findings is essential.               Immature Granulocytes 0.4             Lymph # 1.7             Lymph % 18.5              Magnesium 2.1             MCH 22.7             MCHC 27.4             MCV 83             Mono # 0.7             Mono % 7.9             MPV 10.9             nRBC 0             Phosphorus 4.2             Platelets 256             RBC 4.10             RDW 16.7             Vancomycin-Trough   8.2           WBC 8.90                     Significant Imaging: I have reviewed all pertinent imaging results/findings within the past 24 hours.

## 2022-08-20 LAB
ALBUMIN SERPL BCP-MCNC: 2.9 G/DL (ref 3.5–5.2)
ALP SERPL-CCNC: 53 U/L (ref 55–135)
ALT SERPL W/O P-5'-P-CCNC: 121 U/L (ref 10–44)
ANION GAP SERPL CALC-SCNC: 15 MMOL/L (ref 8–16)
AST SERPL-CCNC: 21 U/L (ref 10–40)
BASOPHILS # BLD AUTO: 0.06 K/UL (ref 0–0.2)
BASOPHILS NFR BLD: 0.9 % (ref 0–1.9)
BILIRUB SERPL-MCNC: 0.3 MG/DL (ref 0.1–1)
BUN SERPL-MCNC: 10 MG/DL (ref 6–20)
CALCIUM SERPL-MCNC: 9 MG/DL (ref 8.7–10.5)
CHLORIDE SERPL-SCNC: 98 MMOL/L (ref 95–110)
CO2 SERPL-SCNC: 22 MMOL/L (ref 23–29)
CREAT SERPL-MCNC: 0.6 MG/DL (ref 0.5–1.4)
CREAT SERPL-MCNC: 0.7 MG/DL (ref 0.5–1.4)
DIFFERENTIAL METHOD: ABNORMAL
EOSINOPHIL # BLD AUTO: 0.1 K/UL (ref 0–0.5)
EOSINOPHIL NFR BLD: 1.3 % (ref 0–8)
ERYTHROCYTE [DISTWIDTH] IN BLOOD BY AUTOMATED COUNT: 16.5 % (ref 11.5–14.5)
EST. GFR  (NO RACE VARIABLE): >60 ML/MIN/1.73 M^2
EST. GFR  (NO RACE VARIABLE): >60 ML/MIN/1.73 M^2
GLUCOSE SERPL-MCNC: 91 MG/DL (ref 70–110)
HCT VFR BLD AUTO: 29.8 % (ref 37–48.5)
HGB BLD-MCNC: 9 G/DL (ref 12–16)
IMM GRANULOCYTES # BLD AUTO: 0.03 K/UL (ref 0–0.04)
IMM GRANULOCYTES NFR BLD AUTO: 0.4 % (ref 0–0.5)
LYMPHOCYTES # BLD AUTO: 1.4 K/UL (ref 1–4.8)
LYMPHOCYTES NFR BLD: 21.5 % (ref 18–48)
MCH RBC QN AUTO: 22.8 PG (ref 27–31)
MCHC RBC AUTO-ENTMCNC: 30.2 G/DL (ref 32–36)
MCV RBC AUTO: 75 FL (ref 82–98)
MONOCYTES # BLD AUTO: 0.7 K/UL (ref 0.3–1)
MONOCYTES NFR BLD: 10.8 % (ref 4–15)
NEUTROPHILS # BLD AUTO: 4.4 K/UL (ref 1.8–7.7)
NEUTROPHILS NFR BLD: 65.1 % (ref 38–73)
NRBC BLD-RTO: 0 /100 WBC
PLATELET # BLD AUTO: 623 K/UL (ref 150–450)
PLATELET BLD QL SMEAR: ABNORMAL
PMV BLD AUTO: 8.6 FL (ref 9.2–12.9)
POTASSIUM SERPL-SCNC: 4.2 MMOL/L (ref 3.5–5.1)
PROT SERPL-MCNC: 6.6 G/DL (ref 6–8.4)
RBC # BLD AUTO: 3.95 M/UL (ref 4–5.4)
SODIUM SERPL-SCNC: 135 MMOL/L (ref 136–145)
VANCOMYCIN TROUGH SERPL-MCNC: 23.4 UG/ML (ref 10–22)
WBC # BLD AUTO: 6.69 K/UL (ref 3.9–12.7)

## 2022-08-20 PROCEDURE — 36415 COLL VENOUS BLD VENIPUNCTURE: CPT | Performed by: NURSE PRACTITIONER

## 2022-08-20 PROCEDURE — 82565 ASSAY OF CREATININE: CPT | Performed by: FAMILY MEDICINE

## 2022-08-20 PROCEDURE — 80202 ASSAY OF VANCOMYCIN: CPT | Performed by: FAMILY MEDICINE

## 2022-08-20 PROCEDURE — 25000003 PHARM REV CODE 250: Performed by: PHYSICIAN ASSISTANT

## 2022-08-20 PROCEDURE — 63600175 PHARM REV CODE 636 W HCPCS: Performed by: FAMILY MEDICINE

## 2022-08-20 PROCEDURE — 63600175 PHARM REV CODE 636 W HCPCS: Performed by: PHYSICIAN ASSISTANT

## 2022-08-20 PROCEDURE — 94799 UNLISTED PULMONARY SVC/PX: CPT

## 2022-08-20 PROCEDURE — 25000003 PHARM REV CODE 250: Performed by: NURSE PRACTITIONER

## 2022-08-20 PROCEDURE — 80053 COMPREHEN METABOLIC PANEL: CPT | Performed by: NURSE PRACTITIONER

## 2022-08-20 PROCEDURE — 11000001 HC ACUTE MED/SURG PRIVATE ROOM

## 2022-08-20 PROCEDURE — 25000003 PHARM REV CODE 250: Performed by: FAMILY MEDICINE

## 2022-08-20 PROCEDURE — 85025 COMPLETE CBC W/AUTO DIFF WBC: CPT | Performed by: NURSE PRACTITIONER

## 2022-08-20 RX ORDER — OXYCODONE AND ACETAMINOPHEN 10; 325 MG/1; MG/1
1 TABLET ORAL EVERY 8 HOURS PRN
Status: DISCONTINUED | OUTPATIENT
Start: 2022-08-20 | End: 2022-08-21 | Stop reason: HOSPADM

## 2022-08-20 RX ADMIN — VANCOMYCIN HYDROCHLORIDE 1250 MG: 1.25 INJECTION, POWDER, LYOPHILIZED, FOR SOLUTION INTRAVENOUS at 07:08

## 2022-08-20 RX ADMIN — MUPIROCIN: 20 OINTMENT TOPICAL at 09:08

## 2022-08-20 RX ADMIN — DOCUSATE SODIUM 100 MG: 100 CAPSULE, LIQUID FILLED ORAL at 09:08

## 2022-08-20 RX ADMIN — OXYCODONE AND ACETAMINOPHEN 1 TABLET: 10; 325 TABLET ORAL at 03:08

## 2022-08-20 RX ADMIN — THERA TABS 1 TABLET: TAB at 09:08

## 2022-08-20 RX ADMIN — OXYCODONE AND ACETAMINOPHEN 1 TABLET: 10; 325 TABLET ORAL at 06:08

## 2022-08-20 RX ADMIN — VANCOMYCIN HYDROCHLORIDE 1500 MG: 1.5 INJECTION, POWDER, LYOPHILIZED, FOR SOLUTION INTRAVENOUS at 12:08

## 2022-08-20 RX ADMIN — CEFTRIAXONE 2 G: 2 INJECTION, SOLUTION INTRAVENOUS at 04:08

## 2022-08-20 RX ADMIN — LORAZEPAM 2 MG: 0.5 TABLET ORAL at 02:08

## 2022-08-20 RX ADMIN — CEFTRIAXONE 2 G: 2 INJECTION, SOLUTION INTRAVENOUS at 03:08

## 2022-08-20 RX ADMIN — POLYETHYLENE GLYCOL 3350 17 G: 17 POWDER, FOR SOLUTION ORAL at 09:08

## 2022-08-20 RX ADMIN — LORAZEPAM 2 MG: 0.5 TABLET ORAL at 09:08

## 2022-08-20 RX ADMIN — VANCOMYCIN HYDROCHLORIDE 1500 MG: 1.5 INJECTION, POWDER, LYOPHILIZED, FOR SOLUTION INTRAVENOUS at 08:08

## 2022-08-20 RX ADMIN — OXYCODONE AND ACETAMINOPHEN 1 TABLET: 10; 325 TABLET ORAL at 08:08

## 2022-08-20 NOTE — PROGRESS NOTES
[facility] Medicine  Progress Note    Patient Name: Kayla Clay  MRN: [id number]  Patient Class: IP- Inpatient   Admission Date: 8/17/2022  Length of Stay: 3 days  Attending Physician: Ken Serrano MD  Primary Care Provider: Primary Doctor No        Subjective:     Principal Problem:Epidural abscess        HPI:  37 y.o. female with history of IVDA (last used 8/8), sciatica and chronic low back pain, who presented to the [place] ER on 8/9 for evaluation of lower back pain, bilateral leg numbness and inability to ambulate for 4 days.  Physical exam notable for 5/5 strength in BUE, 4/5 strength in BLE, and no spinal tenderness.  Labs on admit showed WBC 11, ESR 70.  UDS was positive for amphetamines.  She was initially started on Ceftriaxone for pyelo, which was stopped after 3 days.  Her hospital stay was complicated by methamphetamine withdrawal, so she was unable to cooperate with spinal imaging.  MRI was originally ordered without contrast because of length of study, she was unable to cooperate because of pain.  MRI lumbar spine without contrast 8/15 showed L3-L4 endplate erosions with fluid in the disc, epidural fluid extending from L2-L4 with moderate central canal narrowing at L2-L3 with severe central canal narrowing at L3-L4, findings suspicious for spondylodiscitis.  Additionally, fluid within the L1-L2 and L2-L3 disc space without significant endplate erosions, early spondylodiscitis can't be excluded. After the MRI resulted, Radiology suggested MRI with contrast to further determine discitis.  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  The fluid collection may extend further into the thoracic spine as well.  Small millimetric fluid collections within the right and left psoas muscle suggesting early abscess formation.  She was started on Vanc and Ceftriaxone on 8/15.   Her case was discussed with Dr. Brewer (NS) who agreed to consult. Patient transferred from [place] to [facility] via [facility]. Patient is a full code, surrogate decision maker is her mother, Shaista Engel. Admitted to inpatient under the care of [facility] Medicine.       Overview/Hospital Course:  Patient admitted for epidural abscess under the caer of [facility] medicine. Neurosurgery was consulted and she was started on IV abx.  08/17: Patient to have MRI of hip and thoracic spine. Will need long term IV abx. - no cultures available - will draw BC despite already getting IV abx. ID consulted. Case management consulted for discharge planing. Echo ordered.   08/18:  Patient scheduled for surgery today. Mother and aunt at bedside. Patient wants to go to rehab after she recovers from this. We discussed need for long term IV abx - likely 3 months - not able to go to Rehab while on IV abx.  08/19:  Patient with high pain - 10/10, crying. 1X Morphine ordered - will change oxycodone 10mg to percocet 10 for severe pain. Added daily miralax - continue IV abx. Once cleared by neurosurgery, will transfer back to [place] for IV abx therapy    8/20: Patient refused MRI today, per review of note from neurosurgery, recommend repeat MRI in 6 weeks, will cancel current order. Neurosurgery has signed off, recommend to have staples removed on 9/1/22. Continue current antibiotic regimen, recommend to deescalate when final culture and sensitivity received. Called transfer center to initiate return transfer to [facility], patient no longer under the care of neurosurgery, can return to sending facility to continue IV antibiotics to be closer to her home and family.       Interval History: Pain better controlled today, plan to return transfer to sending facility, [facility]. Transfer Center notified.     Review of Systems   Constitutional:  Positive for activity change and fatigue. Negative  for appetite change, chills, diaphoresis, fever and unexpected weight change.   HENT:  Negative for congestion, hearing loss, mouth sores, postnasal drip, rhinorrhea, sore throat and trouble swallowing.    Eyes:  Negative for discharge and visual disturbance.   Respiratory:  Negative for cough, chest tightness and shortness of breath.    Cardiovascular:  Negative for chest pain, palpitations and leg swelling.   Gastrointestinal:  Positive for abdominal distention. Negative for blood in stool, constipation, diarrhea, nausea and vomiting.   Endocrine: Negative for cold intolerance and heat intolerance.   Genitourinary:  Negative for difficulty urinating, dyspareunia, flank pain and hematuria.   Musculoskeletal:  Positive for back pain. Negative for arthralgias, gait problem and myalgias.   Skin: Negative.    Neurological:  Positive for weakness. Negative for dizziness, light-headedness and headaches.   Hematological:  Negative for adenopathy. Does not bruise/bleed easily.   Psychiatric/Behavioral:  Negative for agitation, behavioral problems and confusion. The patient is not nervous/anxious.    Objective:     Vital Signs (Most Recent):  Temp: 98.6 °F (37 °C) (08/20/22 1221)  Pulse: 105 (08/20/22 1221)  Resp: 20 (08/20/22 1221)  BP: 115/67 (08/20/22 1221)  SpO2: 98 % (08/20/22 1221) Vital Signs (24h Range):  Temp:  [96.7 °F (35.9 °C)-98.6 °F (37 °C)] 98.6 °F (37 °C)  Pulse:  [] 105  Resp:  [16-20] 20  SpO2:  [92 %-98 %] 98 %  BP: (102-142)/(55-67) 115/67     Weight: 94 kg (207 lb 3.7 oz)  Body mass index is 31.51 kg/m².    Intake/Output Summary (Last 24 hours) at 8/20/2022 1507  Last data filed at 8/20/2022 0818  Gross per 24 hour   Intake 380 ml   Output --   Net 380 ml      Physical Exam  Vitals and nursing note reviewed.   Constitutional:       General: She is not in acute distress.     Appearance: She is well-developed.   HENT:      Head: Normocephalic and atraumatic.      Right Ear: Hearing and external ear  normal.      Left Ear: Hearing and external ear normal.      Nose: No rhinorrhea.      Right Sinus: No maxillary sinus tenderness or frontal sinus tenderness.      Left Sinus: No maxillary sinus tenderness or frontal sinus tenderness.      Mouth/Throat:      Mouth: No oral lesions.      Pharynx: Uvula midline.   Eyes:      General:         Right eye: No discharge.         Left eye: No discharge.      Conjunctiva/sclera: Conjunctivae normal.      Pupils: Pupils are equal, round, and reactive to light.   Neck:      Thyroid: No thyromegaly.      Vascular: No carotid bruit.      Trachea: No tracheal deviation.   Cardiovascular:      Rate and Rhythm: Normal rate and regular rhythm.      Pulses:           Dorsalis pedis pulses are 2+ on the right side and 2+ on the left side.      Heart sounds: Normal heart sounds, S1 normal and S2 normal. No murmur heard.  Pulmonary:      Effort: Pulmonary effort is normal. No respiratory distress.      Breath sounds: Normal breath sounds.   Chest:   Breasts:      Right: No supraclavicular adenopathy.      Left: No supraclavicular adenopathy.     Abdominal:      General: Bowel sounds are normal. There is no distension.      Palpations: Abdomen is soft. There is no mass.      Tenderness: There is no abdominal tenderness.   Musculoskeletal:      Cervical back: Normal range of motion.      Thoracic back: Tenderness present. Decreased range of motion.      Lumbar back: Tenderness present. Decreased range of motion.   Lymphadenopathy:      Cervical: No cervical adenopathy.      Upper Body:      Right upper body: No supraclavicular adenopathy.      Left upper body: No supraclavicular adenopathy.   Skin:     General: Skin is warm and dry.      Capillary Refill: Capillary refill takes less than 2 seconds.      Findings: No rash.          Neurological:      Mental Status: She is alert and oriented to person, place, and time.      Sensory: No sensory deficit.      Coordination: Coordination  normal.      Gait: Gait normal.   Psychiatric:         Mood and Affect: Mood is not anxious or depressed.         Speech: Speech normal.         Behavior: Behavior normal.         Thought Content: Thought content normal.         Judgment: Judgment normal.       Significant Labs: All pertinent labs within the past 24 hours have been reviewed.  CBC:   Recent Labs   Lab 08/19/22  0839 08/20/22  1206   WBC 8.90 6.69   HGB 9.3* 9.0*   HCT 34.0* 29.8*    623*     CMP:   Recent Labs   Lab 08/20/22  1206   *   K 4.2   CL 98   CO2 22*   GLU 91   BUN 10   CREATININE 0.6   CALCIUM 9.0   PROT 6.6   ALBUMIN 2.9*   BILITOT 0.3   ALKPHOS 53*   AST 21   *   ANIONGAP 15       Significant Imaging: I have reviewed all pertinent imaging results/findings within the past 24 hours.      Assessment/Plan:      * Epidural abscess  Per report of sending facility, confirmed epidural abscess on MRI. Initiated on ceftriaxone and Vancomycin on 8/15/22.    --Continue Ceftriaxone and Vancomycin  --Pain control per MAR  --CBC,CMP  --Consult neurosurgery  --NPO pending eval per neurosurgery  --Vitals Q4H  08/17:  --MRI thoracic spine pending  --echo pending  --continue IV abx  --ID consulted  08/18:  --surgery today  08/19:  --POD#1  --dressing c/d/i  --TLSO when OOB  --Neurosurgery managing    8/20: Neurosurgery, recommends repeat MRI in 6 weeks, staples to be removed on 9/1/22, will likely need to continue IV antibiotics for up to 3 months.   --Plan to transfer back to [facility] since Neurosurgery has signed off. Initiated transfer with transfer center.       IVDU (intravenous drug user)  --pt wanting to quit  --supportive care  --PRN medications for withdrawal      Anxiety  --PRN ativan      Hypertension  Hx HTN, not currently treated with medications as outpatient    --Hydralazine PRN  --Vitals Q4H        VTE Risk Mitigation (From admission, onward)         Ordered     IP VTE HIGH RISK PATIENT  Once         08/17/22  0259     Place sequential compression device  Until discontinued         08/17/22 0259     Reason for No Pharmacological VTE Prophylaxis  Once        Question:  Reasons:  Answer:  Physician Provided (leave comment)    08/17/22 0259                Discharge Planning   KIM:      Code Status: Full Code   Is the patient medically ready for discharge?:     Reason for patient still in hospital (select all that apply): Patient trending condition  Discharge Plan A: Home with family                  Cassy Valdes NP  Department of Hospital Medicine   [facility]

## 2022-08-20 NOTE — SUBJECTIVE & OBJECTIVE
Interval History: Pain better controlled today, plan to return transfer to sending facility, [facility]. Transfer Center notified.     Review of Systems   Constitutional:  Positive for activity change and fatigue. Negative for appetite change, chills, diaphoresis, fever and unexpected weight change.   HENT:  Negative for congestion, hearing loss, mouth sores, postnasal drip, rhinorrhea, sore throat and trouble swallowing.    Eyes:  Negative for discharge and visual disturbance.   Respiratory:  Negative for cough, chest tightness and shortness of breath.    Cardiovascular:  Negative for chest pain, palpitations and leg swelling.   Gastrointestinal:  Positive for abdominal distention. Negative for blood in stool, constipation, diarrhea, nausea and vomiting.   Endocrine: Negative for cold intolerance and heat intolerance.   Genitourinary:  Negative for difficulty urinating, dyspareunia, flank pain and hematuria.   Musculoskeletal:  Positive for back pain. Negative for arthralgias, gait problem and myalgias.   Skin: Negative.    Neurological:  Positive for weakness. Negative for dizziness, light-headedness and headaches.   Hematological:  Negative for adenopathy. Does not bruise/bleed easily.   Psychiatric/Behavioral:  Negative for agitation, behavioral problems and confusion. The patient is not nervous/anxious.    Objective:     Vital Signs (Most Recent):  Temp: 98.6 °F (37 °C) (08/20/22 1221)  Pulse: 105 (08/20/22 1221)  Resp: 20 (08/20/22 1221)  BP: 115/67 (08/20/22 1221)  SpO2: 98 % (08/20/22 1221) Vital Signs (24h Range):  Temp:  [96.7 °F (35.9 °C)-98.6 °F (37 °C)] 98.6 °F (37 °C)  Pulse:  [] 105  Resp:  [16-20] 20  SpO2:  [92 %-98 %] 98 %  BP: (102-142)/(55-67) 115/67     Weight: 94 kg (207 lb 3.7 oz)  Body mass index is 31.51 kg/m².    Intake/Output Summary (Last 24 hours) at 8/20/2022 1507  Last data filed at 8/20/2022 0818  Gross per 24 hour   Intake 380 ml   Output --   Net 380 ml       Physical Exam  Vitals and nursing note reviewed.   Constitutional:       General: She is not in acute distress.     Appearance: She is well-developed.   HENT:      Head: Normocephalic and atraumatic.      Right Ear: Hearing and external ear normal.      Left Ear: Hearing and external ear normal.      Nose: No rhinorrhea.      Right Sinus: No maxillary sinus tenderness or frontal sinus tenderness.      Left Sinus: No maxillary sinus tenderness or frontal sinus tenderness.      Mouth/Throat:      Mouth: No oral lesions.      Pharynx: Uvula midline.   Eyes:      General:         Right eye: No discharge.         Left eye: No discharge.      Conjunctiva/sclera: Conjunctivae normal.      Pupils: Pupils are equal, round, and reactive to light.   Neck:      Thyroid: No thyromegaly.      Vascular: No carotid bruit.      Trachea: No tracheal deviation.   Cardiovascular:      Rate and Rhythm: Normal rate and regular rhythm.      Pulses:           Dorsalis pedis pulses are 2+ on the right side and 2+ on the left side.      Heart sounds: Normal heart sounds, S1 normal and S2 normal. No murmur heard.  Pulmonary:      Effort: Pulmonary effort is normal. No respiratory distress.      Breath sounds: Normal breath sounds.   Chest:   Breasts:      Right: No supraclavicular adenopathy.      Left: No supraclavicular adenopathy.     Abdominal:      General: Bowel sounds are normal. There is no distension.      Palpations: Abdomen is soft. There is no mass.      Tenderness: There is no abdominal tenderness.   Musculoskeletal:      Cervical back: Normal range of motion.      Thoracic back: Tenderness present. Decreased range of motion.      Lumbar back: Tenderness present. Decreased range of motion.   Lymphadenopathy:      Cervical: No cervical adenopathy.      Upper Body:      Right upper body: No supraclavicular adenopathy.      Left upper body: No supraclavicular adenopathy.   Skin:     General: Skin is warm and dry.      Capillary  Refill: Capillary refill takes less than 2 seconds.      Findings: No rash.          Neurological:      Mental Status: She is alert and oriented to person, place, and time.      Sensory: No sensory deficit.      Coordination: Coordination normal.      Gait: Gait normal.   Psychiatric:         Mood and Affect: Mood is not anxious or depressed.         Speech: Speech normal.         Behavior: Behavior normal.         Thought Content: Thought content normal.         Judgment: Judgment normal.       Significant Labs: All pertinent labs within the past 24 hours have been reviewed.  CBC:   Recent Labs   Lab 08/19/22  0839 08/20/22  1206   WBC 8.90 6.69   HGB 9.3* 9.0*   HCT 34.0* 29.8*    623*     CMP:   Recent Labs   Lab 08/20/22  1206   *   K 4.2   CL 98   CO2 22*   GLU 91   BUN 10   CREATININE 0.6   CALCIUM 9.0   PROT 6.6   ALBUMIN 2.9*   BILITOT 0.3   ALKPHOS 53*   AST 21   *   ANIONGAP 15       Significant Imaging: I have reviewed all pertinent imaging results/findings within the past 24 hours.

## 2022-08-20 NOTE — ASSESSMENT & PLAN NOTE
Per report of sending facility, confirmed epidural abscess on MRI. Initiated on ceftriaxone and Vancomycin on 8/15/22.    --Continue Ceftriaxone and Vancomycin  --Pain control per MAR  --CBC,CMP  --Consult neurosurgery  --NPO pending eval per neurosurgery  --Vitals Q4H  08/17:  --MRI thoracic spine pending  --echo pending  --continue IV abx  --ID consulted  08/18:  --surgery today  08/19:  --POD#1  --dressing c/d/i  --TLSO when OOB  --Neurosurgery managing    8/20: Neurosurgery, recommends repeat MRI in 6 weeks, staples to be removed on 9/1/22, will likely need to continue IV antibiotics for up to 3 months.   --Plan to transfer back to [facility] since Neurosurgery has signed off. Initiated transfer with transfer center.

## 2022-08-20 NOTE — PROGRESS NOTES
[facility]  Neurosurgery  Progress Note    Subjective:     History of Present Illness: No notes on file    Post-Op Info:  Procedure(s) (LRB):  LAMINECTOMY, SPINE, LUMBAR, POSTERIOR APPROACH, WITH EXCISION OF epidural abscses SPINAL CORD (N/A)   2 Days Post-Op     Pt c/o back pain   No leg pain or weakness    alexis diet   HV removed yest     Wound cultures + for G + cocci sensitivity pending   Antibiotics per ID       Assessment/Plan:     Wound care-change dressing daily and cover with anabiotic ointment   OK to shower, cover incision after and keep dry.    LSO when OOB   Remove staples August 1   Repeat MRI lumbar  in 6 week with and without contrast   OK for dvt prophylaxis    Please call with any questions     Floyd Brewer MD  Neurosurgery  [facility]

## 2022-08-20 NOTE — CONSULTS
Pharmacokinetic Assessment Follow Up: IV Vancomycin    Vancomycin serum concentration assessment(s):    · Vancomycin trough level on 08/20 @ 15:04 (~7 hours post dose) was 23.4 mcg/mL  · The trough level was drawn correctly and can be used to guide therapy at this time. The measurement is above the desired definitive target range of 15 to 20 mcg/mL.   · Renal function is stable (Scr = 0.7 mg/dL)    Vancomycin Regimen Plan:    Change regimen to Vancomycin 1250 mg IV every 8 hours with next serum trough concentration measured at 1830 prior to 4th dose of new regimen on 08/21    Drug levels (last 3 results):  Recent Labs   Lab Result Units 08/17/22  2342 08/19/22  0052 08/20/22  1504   Vancomycin, Random ug/mL 9.9  --   --    Vancomycin-Trough ug/mL  --  8.2* 23.4*       Pharmacy will continue to follow and monitor vancomycin.    Please contact pharmacy at extension [phone] for questions regarding this assessment.    Thank you for the consult,   Anisa Prado       Patient brief summary:  Kayla [place] is a 37 y.o. female initiated on antimicrobial therapy with IV Vancomycin for treatment of epidural abscess    The patient's current regimen is Vancomycin 1500 mg IV Q8H    Drug Allergies:   Review of patient's allergies indicates:   Allergen Reactions    Acetaminophen Hives       Actual Body Weight:   94 kg    Renal Function:   Estimated Creatinine Clearance: 130.6 mL/min (based on SCr of 0.7 mg/dL).,     Dialysis Method (if applicable):  N/A    CBC (last 72 hours):  Recent Labs   Lab Result Units 08/18/22  0723 08/19/22  0839 08/20/22  1206   WBC K/uL 7.13 8.90 6.69   Hemoglobin g/dL 9.5* 9.3* 9.0*   Hematocrit % 31.0* 34.0* 29.8*   Platelets K/uL 781* 256 623*   Gran % % 61.0 72.5 65.1   Lymph % % 25.2 18.5 21.5   Mono % % 11.5 7.9 10.8   Eosinophil % % 1.1 0.1 1.3   Basophil % % 0.6 0.6 0.9   Differential Method  Automated Automated Automated       Metabolic Panel (last 72 hours):  Recent Labs   Lab Result Units  08/17/22  2252 08/18/22  0723 08/19/22  0839 08/20/22  1206 08/20/22  1504   Sodium mmol/L  --  134*  --  135*  --    Potassium mmol/L  --  4.6  --  4.2  --    Chloride mmol/L  --  97  --  98  --    CO2 mmol/L  --  27  --  22*  --    Glucose mg/dL  --  96  --  91  --    Glucose, UA  Negative  --   --   --   --    BUN mg/dL  --  10  --  10  --    Creatinine mg/dL  --  0.6  --  0.6 0.7   Albumin g/dL  --  3.0*  --  2.9*  --    Total Bilirubin mg/dL  --  0.4  --  0.3  --    Alkaline Phosphatase U/L  --  59  --  53*  --    AST U/L  --  35  --  21  --    ALT U/L  --  176*  --  121*  --    Magnesium mg/dL  --  2.1 2.1  --   --    Phosphorus mg/dL  --  5.1* 4.2  --   --        Vancomycin Administrations:  vancomycin given in the last 96 hours                   vancomycin 1.5 g in dextrose 5 % 250 mL IVPB (ready to mix) (mg) 1,500 mg New Bag 08/20/22 0818     1,500 mg New Bag  0037     1,500 mg New Bag 08/19/22 1726     1,500 mg New Bag  0901    vancomycin in dextrose 5 % 1 gram/250 mL IVPB 1,000 mg (mg) 1,000 mg New Bag 08/19/22 0123     1,000 mg New Bag 08/18/22 1729     1,000 mg New Bag  0937     1,000 mg New Bag  0215    vancomycin injection (g) 2 g Given 08/18/22 1433    vancomycin (VANCOCIN) 2,250 mg in dextrose 5 % 500 mL IVPB ()  Restarted 08/17/22 1309      Restarted  1208      Restarted  1200      Restarted  1156     2,250 mg New Bag  1135                Microbiologic Results:  Microbiology Results (last 7 days)     Procedure Component Value Units Date/Time    AFB Culture & Smear [[id number]] Collected: 08/18/22 1642    Order Status: Completed Specimen: Abscess from Back Updated: 08/20/22 0927     AFB Culture & Smear Culture in progress     AFB CULTURE STAIN No acid fast bacilli seen.    Narrative:      Deep Epidural Space    AFB Culture & Smear [[id number]] Collected: 08/18/22 1559    Order Status: Completed Specimen: Abscess from Back Updated: 08/20/22 0927     AFB Culture & Smear Culture in progress     AFB  CULTURE STAIN No acid fast bacilli seen.    Narrative:      Epidural fluid collection    Aerobic culture [[id number]] Collected: 08/18/22 1559    Order Status: Completed Specimen: Abscess from Back Updated: 08/20/22 0902     Aerobic Bacterial Culture No growth    Narrative:      Epidural fluid collection    Aerobic culture [[id number]] Collected: 08/18/22 1642    Order Status: Completed Specimen: Abscess from Back Updated: 08/20/22 0902     Aerobic Bacterial Culture No growth    Narrative:      Deep Epidural Space    Blood culture [[id number]] Collected: 08/17/22 1808    Order Status: Completed Specimen: Blood Updated: 08/20/22 0612     Blood Culture, Routine No Growth to date      No Growth to date      No Growth to date    Narrative:      Collection has been rescheduled by KMD at 08/17/2022 16:47 Reason:   Could only collect one set  Unable to collect second  Collection has been rescheduled by KMD at 08/17/2022 16:47 Reason:   Could only collect one set  Unable to collect second    Blood culture [[id number]] Collected: 08/17/22 1647    Order Status: Completed Specimen: Blood from Peripheral, Right Arm Updated: 08/20/22 0612     Blood Culture, Routine No Growth to date      No Growth to date      No Growth to date    Gram stain [[id number]] Collected: 08/18/22 1642    Order Status: Completed Specimen: Abscess from Back Updated: 08/19/22 0436     Gram Stain Result No WBC's      No organisms seen    Narrative:      Deep Epidural Space    Gram stain [[id number]] Collected: 08/18/22 1559    Order Status: Completed Specimen: Abscess from Back Updated: 08/19/22 0434     Gram Stain Result Rare WBC's      Rare Gram positive cocci    Narrative:      Epidural fluid collection    Culture, Anaerobic [[id number]] Collected: 08/18/22 1559    Order Status: Sent Specimen: Abscess from Back Updated: 08/19/22 0237    Fungus culture [[id number]] Collected: 08/18/22 1559    Order Status: Sent Specimen: Abscess from Back Updated: 08/19/22 0237     Culture, Anaerobic [[id number]] Collected: 08/18/22 1642    Order Status: Sent Specimen: Abscess from Back Updated: 08/19/22 0237    Fungus culture [[id number]] Collected: 08/18/22 1642    Order Status: Sent Specimen: Abscess from Back Updated: 08/19/22 0237    Urine Culture High Risk [[id number]]     Order Status: No result Specimen: Urine, Clean Catch

## 2022-08-21 VITALS
HEART RATE: 105 BPM | SYSTOLIC BLOOD PRESSURE: 114 MMHG | TEMPERATURE: 99 F | BODY MASS INDEX: 31.41 KG/M2 | RESPIRATION RATE: 18 BRPM | OXYGEN SATURATION: 97 % | DIASTOLIC BLOOD PRESSURE: 57 MMHG | HEIGHT: 68 IN | WEIGHT: 207.25 LBS

## 2022-08-21 LAB
ALBUMIN SERPL BCP-MCNC: 3 G/DL (ref 3.5–5.2)
ALP SERPL-CCNC: 56 U/L (ref 55–135)
ALT SERPL W/O P-5'-P-CCNC: 97 U/L (ref 10–44)
ANION GAP SERPL CALC-SCNC: 13 MMOL/L (ref 8–16)
AST SERPL-CCNC: 13 U/L (ref 10–40)
BASOPHILS # BLD AUTO: 0.04 K/UL (ref 0–0.2)
BASOPHILS NFR BLD: 0.5 % (ref 0–1.9)
BILIRUB SERPL-MCNC: 0.6 MG/DL (ref 0.1–1)
BUN SERPL-MCNC: 8 MG/DL (ref 6–20)
CALCIUM SERPL-MCNC: 9.6 MG/DL (ref 8.7–10.5)
CHLORIDE SERPL-SCNC: 97 MMOL/L (ref 95–110)
CO2 SERPL-SCNC: 24 MMOL/L (ref 23–29)
CREAT SERPL-MCNC: 0.6 MG/DL (ref 0.5–1.4)
DIFFERENTIAL METHOD: ABNORMAL
EOSINOPHIL # BLD AUTO: 0 K/UL (ref 0–0.5)
EOSINOPHIL NFR BLD: 0.1 % (ref 0–8)
ERYTHROCYTE [DISTWIDTH] IN BLOOD BY AUTOMATED COUNT: 16.3 % (ref 11.5–14.5)
EST. GFR  (NO RACE VARIABLE): >60 ML/MIN/1.73 M^2
GLUCOSE SERPL-MCNC: 114 MG/DL (ref 70–110)
HCT VFR BLD AUTO: 29.5 % (ref 37–48.5)
HGB BLD-MCNC: 9.1 G/DL (ref 12–16)
IMM GRANULOCYTES # BLD AUTO: 0.06 K/UL (ref 0–0.04)
IMM GRANULOCYTES NFR BLD AUTO: 0.7 % (ref 0–0.5)
LYMPHOCYTES # BLD AUTO: 1.1 K/UL (ref 1–4.8)
LYMPHOCYTES NFR BLD: 12.1 % (ref 18–48)
MCH RBC QN AUTO: 22.6 PG (ref 27–31)
MCHC RBC AUTO-ENTMCNC: 30.8 G/DL (ref 32–36)
MCV RBC AUTO: 73 FL (ref 82–98)
MONOCYTES # BLD AUTO: 0.9 K/UL (ref 0.3–1)
MONOCYTES NFR BLD: 10.5 % (ref 4–15)
NEUTROPHILS # BLD AUTO: 6.6 K/UL (ref 1.8–7.7)
NEUTROPHILS NFR BLD: 76.1 % (ref 38–73)
NRBC BLD-RTO: 0 /100 WBC
PLATELET # BLD AUTO: 697 K/UL (ref 150–450)
PMV BLD AUTO: 8.9 FL (ref 9.2–12.9)
POTASSIUM SERPL-SCNC: 4.3 MMOL/L (ref 3.5–5.1)
PROT SERPL-MCNC: 6.9 G/DL (ref 6–8.4)
RBC # BLD AUTO: 4.03 M/UL (ref 4–5.4)
SODIUM SERPL-SCNC: 134 MMOL/L (ref 136–145)
WBC # BLD AUTO: 8.7 K/UL (ref 3.9–12.7)

## 2022-08-21 PROCEDURE — 25000003 PHARM REV CODE 250: Performed by: PHYSICIAN ASSISTANT

## 2022-08-21 PROCEDURE — 25000003 PHARM REV CODE 250: Performed by: FAMILY MEDICINE

## 2022-08-21 PROCEDURE — 80053 COMPREHEN METABOLIC PANEL: CPT | Performed by: NURSE PRACTITIONER

## 2022-08-21 PROCEDURE — 94799 UNLISTED PULMONARY SVC/PX: CPT

## 2022-08-21 PROCEDURE — 63600175 PHARM REV CODE 636 W HCPCS: Performed by: PHYSICIAN ASSISTANT

## 2022-08-21 PROCEDURE — 36415 COLL VENOUS BLD VENIPUNCTURE: CPT | Performed by: NURSE PRACTITIONER

## 2022-08-21 PROCEDURE — 63600175 PHARM REV CODE 636 W HCPCS: Performed by: NURSE PRACTITIONER

## 2022-08-21 PROCEDURE — 63600175 PHARM REV CODE 636 W HCPCS: Performed by: FAMILY MEDICINE

## 2022-08-21 PROCEDURE — 85025 COMPLETE CBC W/AUTO DIFF WBC: CPT | Performed by: NURSE PRACTITIONER

## 2022-08-21 RX ORDER — ONDANSETRON 2 MG/ML
4 INJECTION INTRAMUSCULAR; INTRAVENOUS EVERY 8 HOURS PRN
Status: ON HOLD
Start: 2022-08-21 | End: 2022-09-27

## 2022-08-21 RX ORDER — OXYCODONE AND ACETAMINOPHEN 10; 325 MG/1; MG/1
1 TABLET ORAL EVERY 8 HOURS PRN
Refills: 0 | Status: ON HOLD
Start: 2022-08-21 | End: 2022-09-27

## 2022-08-21 RX ORDER — TRAZODONE HYDROCHLORIDE 50 MG/1
25 TABLET ORAL NIGHTLY PRN
Status: ON HOLD
Start: 2022-08-21 | End: 2022-09-27

## 2022-08-21 RX ORDER — DOCUSATE SODIUM 100 MG/1
100 CAPSULE, LIQUID FILLED ORAL DAILY
Refills: 0 | Status: ON HOLD
Start: 2022-08-22 | End: 2022-09-27

## 2022-08-21 RX ORDER — ALBUTEROL SULFATE 0.83 MG/ML
2.5 SOLUTION RESPIRATORY (INHALATION) EVERY 4 HOURS PRN
Refills: 0 | Status: ON HOLD
Start: 2022-08-21 | End: 2022-09-27

## 2022-08-21 RX ORDER — MIRTAZAPINE 15 MG/1
15 TABLET, FILM COATED ORAL NIGHTLY PRN
Status: ON HOLD
Start: 2022-08-21 | End: 2022-09-27

## 2022-08-21 RX ORDER — IBUPROFEN 200 MG
24 TABLET ORAL
Refills: 12 | Status: ON HOLD
Start: 2022-08-21 | End: 2022-09-27

## 2022-08-21 RX ORDER — CYCLOBENZAPRINE HCL 5 MG
5 TABLET ORAL EVERY 8 HOURS PRN
Status: ON HOLD
Start: 2022-08-21 | End: 2022-09-27

## 2022-08-21 RX ORDER — QUETIAPINE FUMARATE 25 MG/1
25 TABLET, FILM COATED ORAL EVERY 6 HOURS PRN
Status: ON HOLD
Start: 2022-08-21 | End: 2022-09-27

## 2022-08-21 RX ORDER — IBUPROFEN 200 MG
16 TABLET ORAL
Refills: 12 | Status: ON HOLD
Start: 2022-08-21 | End: 2022-09-27

## 2022-08-21 RX ORDER — LORAZEPAM 2 MG/1
2 TABLET ORAL EVERY 4 HOURS PRN
Status: ON HOLD
Start: 2022-08-21 | End: 2022-09-27 | Stop reason: HOSPADM

## 2022-08-21 RX ORDER — DIPHENHYDRAMINE HCL 50 MG
50 CAPSULE ORAL EVERY 6 HOURS PRN
Refills: 0 | Status: ON HOLD
Start: 2022-08-21 | End: 2022-09-27

## 2022-08-21 RX ORDER — PROCHLORPERAZINE EDISYLATE 5 MG/ML
5 INJECTION INTRAMUSCULAR; INTRAVENOUS EVERY 6 HOURS PRN
Status: ON HOLD
Start: 2022-08-21 | End: 2022-09-27

## 2022-08-21 RX ORDER — LOPERAMIDE HYDROCHLORIDE 2 MG/1
2 CAPSULE ORAL
Refills: 0 | Status: ON HOLD
Start: 2022-08-21 | End: 2022-09-27

## 2022-08-21 RX ORDER — MUPIROCIN 20 MG/G
OINTMENT TOPICAL 2 TIMES DAILY
Status: ON HOLD
Start: 2022-08-21 | End: 2022-09-27

## 2022-08-21 RX ORDER — POLYETHYLENE GLYCOL 3350 17 G/17G
17 POWDER, FOR SOLUTION ORAL DAILY
Refills: 0 | Status: ON HOLD
Start: 2022-08-22 | End: 2022-09-27

## 2022-08-21 RX ORDER — AMOXICILLIN 250 MG
2 CAPSULE ORAL NIGHTLY PRN
Status: ON HOLD
Start: 2022-08-21 | End: 2022-09-27

## 2022-08-21 RX ORDER — MAG HYDROX/ALUMINUM HYD/SIMETH 200-200-20
30 SUSPENSION, ORAL (FINAL DOSE FORM) ORAL 4 TIMES DAILY PRN
Status: ON HOLD
Start: 2022-08-21 | End: 2022-09-27

## 2022-08-21 RX ADMIN — CEFTRIAXONE 2 G: 2 INJECTION, SOLUTION INTRAVENOUS at 05:08

## 2022-08-21 RX ADMIN — LORAZEPAM 2 MG: 0.5 TABLET ORAL at 06:08

## 2022-08-21 RX ADMIN — VANCOMYCIN HYDROCHLORIDE 1250 MG: 1.25 INJECTION, POWDER, LYOPHILIZED, FOR SOLUTION INTRAVENOUS at 03:08

## 2022-08-21 RX ADMIN — MUPIROCIN: 20 OINTMENT TOPICAL at 08:08

## 2022-08-21 RX ADMIN — IRON SUCROSE 200 MG: 20 INJECTION, SOLUTION INTRAVENOUS at 09:08

## 2022-08-21 RX ADMIN — DOCUSATE SODIUM 100 MG: 100 CAPSULE, LIQUID FILLED ORAL at 08:08

## 2022-08-21 RX ADMIN — POLYETHYLENE GLYCOL 3350 17 G: 17 POWDER, FOR SOLUTION ORAL at 08:08

## 2022-08-21 RX ADMIN — THERA TABS 1 TABLET: TAB at 08:08

## 2022-08-21 RX ADMIN — OXYCODONE AND ACETAMINOPHEN 1 TABLET: 10; 325 TABLET ORAL at 06:08

## 2022-08-21 NOTE — PLAN OF CARE
Pt is stable and eager to go home. New IV placed this AM. Pt received IV push of iron. Pt is on RA. Continuing to receive IV abx. Pain is well controlled with ordered meds.

## 2022-08-21 NOTE — DISCHARGE SUMMARY
[facility] Medicine  Discharge Summary      Patient Name: Kayla Clay  MRN: [id number]  Patient Class: IP- Inpatient  Admission Date: 8/17/2022  Hospital Length of Stay: 4 days  Discharge Date and Time:  08/21/2022 10:46 AM  Attending Physician: Ken Serrano MD   Discharging Provider: Cassy Valdes NP  Primary Care Provider: Primary Doctor No      HPI:   37 y.o. female with history of IVDA (last used 8/8), sciatica and chronic low back pain, who presented to the [place] ER on 8/9 for evaluation of lower back pain, bilateral leg numbness and inability to ambulate for 4 days.  Physical exam notable for 5/5 strength in BUE, 4/5 strength in BLE, and no spinal tenderness.  Labs on admit showed WBC 11, ESR 70.  UDS was positive for amphetamines.  She was initially started on Ceftriaxone for pyelo, which was stopped after 3 days.  Her hospital stay was complicated by methamphetamine withdrawal, so she was unable to cooperate with spinal imaging.  MRI was originally ordered without contrast because of length of study, she was unable to cooperate because of pain.  MRI lumbar spine without contrast 8/15 showed L3-L4 endplate erosions with fluid in the disc, epidural fluid extending from L2-L4 with moderate central canal narrowing at L2-L3 with severe central canal narrowing at L3-L4, findings suspicious for spondylodiscitis.  Additionally, fluid within the L1-L2 and L2-L3 disc space without significant endplate erosions, early spondylodiscitis can't be excluded. After the MRI resulted, Radiology suggested MRI with contrast to further determine discitis.  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  The fluid collection may extend further into the thoracic spine as well.  Small millimetric fluid collections within the right and left psoas muscle suggesting early abscess formation.  She  was started on Vanc and Ceftriaxone on 8/15.  Her case was discussed with Dr. Brewer ([facility]) who agreed to consult. Patient transferred from [place] to [facility] via [facility]. Patient is a full code, surrogate decision maker is her mother, Shaista Engel. Admitted to inpatient under the care of [facility].       Procedure(s) (LRB):  LAMINECTOMY, SPINE, LUMBAR, POSTERIOR APPROACH, WITH EXCISION OF NEOPLASM OR LESION OF SPINAL CORD (N/A)      Hospital Course:   Patient admitted for epidural abscess under the caer of [facility] medicine. Neurosurgery was consulted and she was started on IV abx.  08/17: Patient to have MRI of hip and thoracic spine. Will need long term IV abx. - no cultures available - will draw BC despite already getting IV abx. ID consulted. Case management consulted for discharge planing. Echo ordered.   08/18:  Patient scheduled for surgery today. Mother and aunt at bedside. Patient wants to go to rehab after she recovers from this. We discussed need for long term IV abx - likely 3 months - not able to go to Rehab while on IV abx.  08/19:  Patient with high pain - 10/10, crying. 1X Morphine ordered - will change oxycodone 10mg to percocet 10 for severe pain. Added daily miralax - continue IV abx. Once cleared by neurosurgery, will transfer back to [place] for IV abx therapy    8/20: Patient refused MRI today, per review of note from neurosurgery, recommend repeat MRI in 6 weeks, will cancel current order. Neurosurgery has signed off, recommend to have staples removed on 9/1/22. Continue current antibiotic regimen, recommend to deescalate when final culture and sensitivity received. Called transfer center to initiate return transfer to [facility], patient no longer under the care of neurosurgery, can return to sending facility to continue IV antibiotics to be closer to her home and family.     8/21: Confirmation received for [facility], patient has been  "accepted by facility for return transfer, transport to be arranged.Transfer orders completed, will continue current plan of care, patient to continue IV antibiotics for up to 3 months pending course. Other recs per Neurosurgery. Updated patient, patient became tearful and reports she has not been able to see her family since the transfer, family unable to travel to [place], family will be able to visit at sending facility.        Goals of Care Treatment Preferences:  Code Status: Full Code      Consults:   Consults (From admission, onward)        Status Ordering Provider     Inpatient consult to Infectious Diseases  Once        Provider:  (Not yet assigned)    Acknowledged LIZETH BRUNER     Inpatient consult to Infectious Diseases  Once        Provider:  James Paez MD    Acknowledged LIZETH BRUNER     Inpatient consult to Social Work  Once        Provider:  (Not yet assigned)    Completed NASRIN ASIF     Inpatient consult to Neurosurgery  Once        Provider:  (Not yet assigned)    Completed ASHLEY WAGNER     Inpatient consult to Midline team  Once        Provider:  (Not yet assigned)    Acknowledged LIZETH BRUNER     Pharmacy to dose Vancomycin consult  Once        Provider:  (Not yet assigned)   "And" Linked Group Details    Acknowledged LIZETH BRUNER          * Epidural abscess  Per report of sending facility, confirmed epidural abscess on MRI. Initiated on ceftriaxone and Vancomycin on 8/15/22.    --Continue Ceftriaxone and Vancomycin  --Pain control per MAR  --CBC,CMP  --Consult neurosurgery  --NPO pending eval per neurosurgery  --Vitals Q4H  08/17:  --MRI thoracic spine pending  --echo pending  --continue IV abx  --ID consulted  08/18:  --surgery today  08/19:  --POD#1  --dressing c/d/i  --TLSO when OOB  --Neurosurgery managing    8/20: Neurosurgery, recommends repeat MRI in 6 weeks, staples to be removed on 9/1/22, will likely need to continue IV antibiotics for up " to 3 months.   --Plan to transfer back to [facility] since Neurosurgery has signed off. Initiated transfer with transfer center.     8/21: Acceptance by [facility]. Transport to be arranged, will continue current plan of care, transfer orders completed. Patient in agreement with transfer.       IVDU (intravenous drug user)  --pt wanting to quit  --supportive care  --PRN medications for withdrawal      Anxiety  --PRN ativan      Hypertension  Hx HTN, not currently treated with medications as outpatient    --Hydralazine PRN  --Vitals Q4H        Final Active Diagnoses:    Diagnosis Date Noted POA    PRINCIPAL PROBLEM:  Epidural abscess [G06.2] 08/17/2022 Yes     Chronic    IVDU (intravenous drug user) [F19.90] 08/18/2022 Yes    Hypertension [I10] 08/17/2022 Yes     Chronic    Anxiety [F41.9] 08/17/2022 Yes     Chronic      Problems Resolved During this Admission:       Discharged Condition: stable    Disposition: Home or Self Care    Follow Up:   Follow-up Information     Floyd Brewer MD Follow up in 6 week(s).    Specialty: Neurosurgery  Contact information:  [address] [place] [address]  [place] [address]  [phone]             [facility] Follow up today.    Contact information:  [address] [place] [address] [address] [place] [address] [phone]                       Patient Instructions:      Diet Adult Regular     Notify your health care provider if you experience any of the following:  temperature >100.4     Notify your health care provider if you experience any of the following:  persistent nausea and vomiting or diarrhea     Notify your health care provider if you experience any of the following:  severe uncontrolled pain     Change dressing (specify)   Order Comments: Dressing change:     Dry dressing to lower back daily     Activity as tolerated   Order Comments: Must have TLSO brace in place when OOB       Significant Diagnostic Studies: Labs: All labs within the past 24  hours have been reviewed  Radiology: All imaging studies reviewed.     Pending Diagnostic Studies:     Procedure Component Value Units Date/Time    MRI Hip W WO Contrast Right [[id number]] Resulted: 08/17/22 1526    Order Status: Sent Lab Status: No result Updated: 08/20/22 1246    Specimen to Pathology, Surgery Neurosurgery [[id number]] Collected: 08/18/22 1642    Order Status: Sent Lab Status: In process Updated: 08/19/22 0891    Specimen: Tissue          Medications:  Reconciled Home Medications:      Medication List      START taking these medications    albuterol 2.5 mg /3 mL (0.083 %) nebulizer solution  Commonly known as: PROVENTIL  Take 3 mLs (2.5 mg total) by nebulization every 4 (four) hours as needed (shortness of breath). Rescue     aluminum-magnesium hydroxide-simethicone 200-200-20 mg/5 mL Susp  Commonly known as: MAALOX  Take 30 mLs by mouth 4 (four) times daily as needed.     cefTRIAXone 2 g/50 mL Pgbk IVPB  Commonly known as: ROCEPHIN  Inject 50 mLs (2 g total) into the vein every 12 (twelve) hours.     cyclobenzaprine 5 MG tablet  Commonly known as: FLEXERIL  Take 1 tablet (5 mg total) by mouth every 8 (eight) hours as needed for Muscle spasms.     diphenhydrAMINE 50 MG capsule  Commonly known as: BENADRYL  Take 1 capsule (50 mg total) by mouth every 6 (six) hours as needed for Itching.     docusate sodium 100 MG capsule  Commonly known as: COLACE  Take 1 capsule (100 mg total) by mouth once daily.  Start taking on: August 22, 2022     * glucose 4 GM chewable tablet  Take 4 tablets (16 g total) by mouth as needed.     * glucose 4 GM chewable tablet  Take 6 tablets (24 g total) by mouth as needed.     iron sucrose 100 mg iron/5 mL injection  Commonly known as: VENOFER  Inject 10 mLs (200 mg total) into the vein once daily.     loperamide 2 mg capsule  Commonly known as: IMODIUM  Take 1 capsule (2 mg total) by mouth as needed for Diarrhea (after each loose stool for a maximum of 16 mg per 24 hours).      LORazepam 2 MG Tab  Commonly known as: ATIVAN  Take 1 tablet (2 mg total) by mouth every 4 (four) hours as needed for Anxiety.     mirtazapine 15 MG tablet  Commonly known as: REMERON  Take 1 tablet (15 mg total) by mouth nightly as needed (insomnia).     multivitamin Tab  Take 1 tablet by mouth once daily.  Start taking on: August 22, 2022     mupirocin 2 % ointment  Commonly known as: BACTROBAN  by Nasal route 2 (two) times daily.     ondansetron 4 mg/2 mL Soln  Inject 4 mg into the vein every 8 (eight) hours as needed.     oxyCODONE-acetaminophen  mg per tablet  Commonly known as: PERCOCET  Take 1 tablet by mouth every 8 (eight) hours as needed.     polyethylene glycol 17 gram Pwpk  Commonly known as: GLYCOLAX  Take 17 g by mouth once daily.  Start taking on: August 22, 2022     prochlorperazine 10 mg/2 mL (5 mg/mL) Soln injection  Commonly known as: COMPAZINE  Inject 1 mL (5 mg total) into the vein every 6 (six) hours as needed.     QUEtiapine 25 MG Tab  Commonly known as: SEROQUEL  Take 1 tablet (25 mg total) by mouth every 6 (six) hours as needed (anxiety/agitation).     senna-docusate 8.6-50 mg 8.6-50 mg per tablet  Commonly known as: PERICOLACE  Take 2 tablets by mouth nightly as needed for Constipation.     traZODone 50 MG tablet  Commonly known as: DESYREL  Take 0.5 tablets (25 mg total) by mouth nightly as needed for Insomnia.     VANCOMYCIN 1.25 G/250 ML D5W (READY TO MIX)  Inject 250 mLs (1,250 mg total) into the vein every 8 (eight) hours.         * This list has 2 medication(s) that are the same as other medications prescribed for you. Read the directions carefully, and ask your doctor or other care provider to review them with you.            CONTINUE taking these medications    nabumetone 500 MG tablet  Commonly known as: RELAFEN  Take 1 tablet (500 mg total) by mouth 2 (two) times daily as needed for Pain. With food            Indwelling Lines/Drains at time of discharge:    Lines/Drains/Airways     None                 Time spent on the discharge of patient: 60 minutes         April CORINNA Valdes NP  Department of Hospital Medicine  [facility] Surg

## 2022-08-21 NOTE — NURSING
Pt discharged via ambulance to [facility]. IV intact. Pt in stable condition. Pt mother notified of transfer.

## 2022-08-21 NOTE — PLAN OF CARE
08/21/22 1119   Final Note   Assessment Type Final Discharge Note   Anticipated Discharge Disposition ANOTHER INST

## 2022-08-21 NOTE — DISCHARGE INSTRUCTIONS
[facility] Surg  Facility Transfer Orders        Admit to: [facility]    Diagnoses:   Active Hospital Problems    Diagnosis  POA    *Epidural abscess [G06.2]  Yes     Chronic    IVDU (intravenous drug user) [F19.90]  Yes    Hypertension [I10]  Yes     Chronic    Anxiety [F41.9]  Yes     Chronic      Resolved Hospital Problems   No resolved problems to display.     Allergies:   Review of patient's allergies indicates:   Allergen Reactions    Acetaminophen Hives       Code Status: Full Code    Vitals: Routine       Diet: regular diet    Activity: Activity as tolerated, Must have TLSO brace in place when OOB and ambulating    Nursing Precautions: Fall    Bed/Surface: Low Air Loss    Consults: PT to evaluate and treat- 5 times a week and OT to evaluate and treat- 5 times a week    Oxygen: room air    Dialysis: Patient is not on dialysis.     Labs: per Routine    Pending Diagnostic Studies:       Procedure Component Value Units Date/Time    MRI Hip W WO Contrast Right [[id number]] Resulted: 08/17/22 1526    Order Status: Sent Lab Status: No result Updated: 08/20/22 1246    Specimen to Pathology, Surgery Neurosurgery [[id number]] Collected: 08/18/22 1642    Order Status: Sent Lab Status: In process Updated: 08/19/22 0835    Specimen: Tissue           Imaging: MRI of Lumbar in 6 weeks    Miscellaneous Care:   Wound Care: Dry dressing to mid-back, remove staples on 9/1/22    IV Access: PICC     Medications: Discontinue all previous medication orders, if any. See new list below.  Current Discharge Medication List        START taking these medications    Details   albuterol (PROVENTIL) 2.5 mg /3 mL (0.083 %) nebulizer solution Take 3 mLs (2.5 mg total) by nebulization every 4 (four) hours as needed (shortness of breath). Rescue  Refills: 0      aluminum-magnesium hydroxide-simethicone (MAALOX) 200-200-20 mg/5 mL Susp Take 30 mLs by mouth 4 (four) times daily as needed.      cefTRIAXone (ROCEPHIN) 2 g/50 mL  PgBk IVPB Inject 50 mLs (2 g total) into the vein every 12 (twelve) hours.      cyclobenzaprine (FLEXERIL) 5 MG tablet Take 1 tablet (5 mg total) by mouth every 8 (eight) hours as needed for Muscle spasms.      diphenhydrAMINE (BENADRYL) 50 MG capsule Take 1 capsule (50 mg total) by mouth every 6 (six) hours as needed for Itching.  Refills: 0      docusate sodium (COLACE) 100 MG capsule Take 1 capsule (100 mg total) by mouth once daily.  Refills: 0      !! glucose 4 GM chewable tablet Take 4 tablets (16 g total) by mouth as needed.  Refills: 12      !! glucose 4 GM chewable tablet Take 6 tablets (24 g total) by mouth as needed.  Refills: 12      iron sucrose (VENOFER) 100 mg iron/5 mL injection Inject 10 mLs (200 mg total) into the vein once daily.      loperamide (IMODIUM) 2 mg capsule Take 1 capsule (2 mg total) by mouth as needed for Diarrhea (after each loose stool for a maximum of 16 mg per 24 hours).  Refills: 0      LORazepam (ATIVAN) 2 MG Tab Take 1 tablet (2 mg total) by mouth every 4 (four) hours as needed for Anxiety.      mirtazapine (REMERON) 15 MG tablet Take 1 tablet (15 mg total) by mouth nightly as needed (insomnia).      multivitamin Tab Take 1 tablet by mouth once daily.      mupirocin (BACTROBAN) 2 % ointment by Nasal route 2 (two) times daily.      ondansetron 4 mg/2 mL Soln Inject 4 mg into the vein every 8 (eight) hours as needed.      oxyCODONE-acetaminophen (PERCOCET)  mg per tablet Take 1 tablet by mouth every 8 (eight) hours as needed.  Refills: 0    Comments: Quantity prescribed more than 7 day supply? No      polyethylene glycol (GLYCOLAX) 17 gram PwPk Take 17 g by mouth once daily.  Refills: 0      prochlorperazine (COMPAZINE) 10 mg/2 mL (5 mg/mL) Soln injection Inject 1 mL (5 mg total) into the vein every 6 (six) hours as needed.      QUEtiapine (SEROQUEL) 25 MG Tab Take 1 tablet (25 mg total) by mouth every 6 (six) hours as needed (anxiety/agitation).      senna-docusate 8.6-50  mg (PERICOLACE) 8.6-50 mg per tablet Take 2 tablets by mouth nightly as needed for Constipation.      traZODone (DESYREL) 50 MG tablet Take 0.5 tablets (25 mg total) by mouth nightly as needed for Insomnia.      vancomycin HCl (VANCOMYCIN 1.25 G/250 ML D5W, READY TO MIX,) Inject 250 mLs (1,250 mg total) into the vein every 8 (eight) hours.       !! - Potential duplicate medications found. Please discuss with provider.        CONTINUE these medications which have NOT CHANGED    Details   nabumetone (RELAFEN) 500 MG tablet Take 1 tablet (500 mg total) by mouth 2 (two) times daily as needed for Pain. With food  Qty: 20 tablet, Refills: 0           Follow up:       Immunizations Administered as of 8/21/2022       No immunizations on file.            This patient has had both covid vaccinations    Some patients may experience side effects after vaccination.  These may include fever, headache, muscle or joint aches.  Most symptoms resolve with 24-48 hours and do not require urgent medical evaluation unless they persist for more than 72 hours or symptoms are concerning for an unrelated medical condition.          Cassy Valdes NP

## 2022-08-21 NOTE — ASSESSMENT & PLAN NOTE
Per report of sending facility, confirmed epidural abscess on MRI. Initiated on ceftriaxone and Vancomycin on 8/15/22.    --Continue Ceftriaxone and Vancomycin  --Pain control per MAR  --CBC,CMP  --Consult neurosurgery  --NPO pending eval per neurosurgery  --Vitals Q4H  08/17:  --MRI thoracic spine pending  --echo pending  --continue IV abx  --ID consulted  08/18:  --surgery today  08/19:  --POD#1  --dressing c/d/i  --TLSO when OOB  --Neurosurgery managing    8/20: Neurosurgery, recommends repeat MRI in 6 weeks, staples to be removed on 9/1/22, will likely need to continue IV antibiotics for up to 3 months.   --Plan to transfer back to [facility] since Neurosurgery has signed off. Initiated transfer with transfer center.     8/21: Acceptance by [place] confirmed. Transport to be arranged, will continue current plan of care, transfer orders completed. Patient in agreement with transfer.

## 2022-08-22 LAB
BACTERIA SPEC AEROBE CULT: NO GROWTH
BACTERIA SPEC AEROBE CULT: NO GROWTH

## 2022-08-22 NOTE — PLAN OF CARE
[facility] - Med Surg  Discharge Final Note    Primary Care Provider: Primary Doctor No    Expected Discharge Date: 8/21/2022    Final Discharge Note (most recent)     Final Note - 08/22/22 0811        Final Note    Assessment Type Final Discharge Note     Anticipated Discharge Disposition Short Term Hospital                 Important Message from Medicare             Contact Info     Floyd Brewer MD   Specialty: Neurosurgery    [address] [place] [address] [place] [address]   Phone: [phone]       Next Steps: Follow up in 6 week(s)    [address] [place] [address]   Phone: [phone]       Next Steps: Follow up today        Transferred to [facility]

## 2022-08-23 LAB
BACTERIA BLD CULT: NORMAL
BACTERIA BLD CULT: NORMAL
BACTERIA SPEC ANAEROBE CULT: NORMAL

## 2022-08-26 LAB
BACTERIA SPEC ANAEROBE CULT: NORMAL
FINAL PATHOLOGIC DIAGNOSIS: NORMAL
GROSS: NORMAL
Lab: NORMAL
MICROSCOPIC EXAM: NORMAL

## 2022-08-30 NOTE — PHYSICIAN QUERY
PT Name: Kayla Clay  MR #: [id number]    DOCUMENTATION CLARIFICATION     CDS/: Jcarlos Chowdhury, RN, BSN   Contact information: [email or web address]   This form is a permanent document in the medical record.     Query Date: August 30, 2022    By submitting this query, we are merely seeking further clarification of documentation.  Please utilize your independent clinical judgment when addressing the question(s) below.    The medical record contains the following:  Pathology Findings Location in Medical Record   Final Pathologic Diagnosis  1. Soft tissue, epidural ligament, excision:   - Acute osteomyelitis   - No infectious organisms identified on H&E stain or special stains   - Please correlate these findings with results of concurrent culture studies   (EPIC, 08/18/2022)   - Fragments of viable lamellar bone   - Negative for malignancy   NOTE: Special stains for infectious organisms (Gram, AFB, GMS) were performed   and were negative.  Immunostain for AE1/AE3 was performed and was negative.   Positive controls and internal negative controls for special stains and   immunostain were examined and were appropriate.   2. Soft tissue, epidural tissue, excision:   - Fragment of fibroadipose tissue with marked chronic inflammation   - No infectious organisms identified on H&E stain   - Negative for malignancy    Narrative  Pre-op Diagnosis: Epidural abscess [G06.2]   Procedure(s):   LAMINECTOMY, SPINE, LUMBAR, POSTERIOR APPROACH, WITH EXCISION   OF NEOPLASM OR LESION OF SPINAL CORD   Number of specimens: 2   Name of specimens:   1. Epidural ligament/ possible abscess-perm   2. Epidural tissue-perm   Pathology Results 8/18/22       Please clarify:    [  ] Pathology findings noted above are ruled in/confirmed as diagnoses     [  ] Pathology findings noted above are not confirmed as diagnoses     [  ] Pathology findings noted above are incidental     [  ] Other diagnosis (please specify): ___________     [ X ]  Clinically Undetermined  THIS CULTURE WAS NEGATIVE HOWEVER SEVERAL OTHER SWAB WERE SENT CONFIRMING INFECTION       Please document in your progress notes daily for the duration of treatment until resolved and include in your discharge summary.    Form No. [id number]

## 2022-09-06 ENCOUNTER — APPOINTMENT (OUTPATIENT)
Dept: RADIOLOGY | Facility: HOSPITAL | Age: 37
End: 2022-09-06
Attending: INTERNAL MEDICINE
Payer: MEDICAID

## 2022-09-06 PROCEDURE — 76705 ECHO EXAM OF ABDOMEN: CPT | Mod: TC

## 2022-09-09 PROBLEM — M46.46 SEPTIC DISCITIS OF LUMBAR REGION: Status: ACTIVE | Noted: 2022-09-09

## 2022-09-09 PROBLEM — T45.525A: Status: ACTIVE | Noted: 2022-09-09

## 2022-09-09 PROBLEM — R21 RASH: Status: ACTIVE | Noted: 2022-09-09

## 2022-09-09 PROBLEM — T50.905A DRUG REACTION: Status: ACTIVE | Noted: 2022-09-09

## 2022-09-09 PROBLEM — D72.819 LEUKOPENIA: Status: ACTIVE | Noted: 2022-09-09

## 2022-09-09 PROBLEM — M86.9: Status: ACTIVE | Noted: 2022-09-09

## 2022-09-19 LAB
FUNGUS SPEC CULT: ABNORMAL
FUNGUS SPEC CULT: NORMAL

## 2022-09-27 ENCOUNTER — TELEPHONE (OUTPATIENT)
Dept: NEUROSURGERY | Facility: CLINIC | Age: 37
End: 2022-09-27
Payer: MEDICAID

## 2022-09-27 PROBLEM — G06.2 EPIDURAL ABSCESS: Chronic | Status: RESOLVED | Noted: 2022-08-17 | Resolved: 2022-09-27

## 2022-09-27 NOTE — TELEPHONE ENCOUNTER
Spoke with pt informed pt of her post op appt pt states that her staples are removed pt vu and she's aware of her appt.

## 2022-09-27 NOTE — TELEPHONE ENCOUNTER
----- Message from Desire Warren sent at 9/27/2022  2:09 PM CDT -----  Contact: [phone]  Pt is being d/c today from [facility] and they are calling to scheduled pt a post op f/u. Pt had medicaid and nothing is showing up available. Can you please call pt to discuss. Doctor told nurse there pt needs f/u in 2 weeks and an MRI of lumbar and thoracic to go with that appt.

## 2022-10-04 ENCOUNTER — TELEPHONE (OUTPATIENT)
Dept: INFECTIOUS DISEASES | Facility: HOSPITAL | Age: 37
End: 2022-10-04
Payer: MEDICAID

## 2022-10-04 ENCOUNTER — TELEPHONE (OUTPATIENT)
Dept: INFECTIOUS DISEASES | Facility: CLINIC | Age: 37
End: 2022-10-04
Payer: MEDICAID

## 2022-10-04 NOTE — TELEPHONE ENCOUNTER
Called patient. Patient was told to stop taking doxycycline and continue taking fluconazole. Patient verbalized understanding.        ----- Message from Terry Roberts MD sent at 10/4/2022 10:00 AM CDT -----  Regarding: RE: PT. call  Please call patient, this is potential side effect to doxycycline.  Advise to stop doxycycline and continue fluconazole.  She has been on fluconazole without side effects for the past 5-6 weeks.  Thank you    ----- Message -----  From: Malathi Christianson MA  Sent: 10/4/2022   9:11 AM CDT  To: Terry Roberts MD  Subject: PT. call                                         Patient called and stated that the abx that she is on is causing her to break out with a rash all over her face and arms. Patient would like a call back. Patients f/u with ID is on 12/28/22.    Thanks

## 2022-10-04 NOTE — TELEPHONE ENCOUNTER
Patient reports rash developing on face and arms since discharge, likely reaction to doxycycline.  Given cultures have all been negative except for fungal culture growing Candida, will recommend discontinuing doxycycline, continuing fluconazole until I see the patient to decide duration of maintenance.  The patient has completed a course of 6 weeks of IV antibiotics with broad-spectrum antibiotics (daptomycin, ceftriaxone, high-dose fluconazole) and hardware was placed after initial course of antibiotics and washout of the area.  It is therefore reasonable to discontinue p.o. antibiotics in the absence of known bacterial culture and in the setting of her reaction to empiric suppressive therapy.

## 2022-10-07 LAB
ACID FAST MOD KINY STN SPEC: NORMAL
ACID FAST MOD KINY STN SPEC: NORMAL
MYCOBACTERIUM SPEC QL CULT: NORMAL
MYCOBACTERIUM SPEC QL CULT: NORMAL

## 2022-10-19 ENCOUNTER — OFFICE VISIT (OUTPATIENT)
Dept: FAMILY MEDICINE | Facility: CLINIC | Age: 37
End: 2022-10-19
Payer: MEDICAID

## 2022-10-19 VITALS
WEIGHT: 221 LBS | BODY MASS INDEX: 33.49 KG/M2 | DIASTOLIC BLOOD PRESSURE: 92 MMHG | HEART RATE: 95 BPM | SYSTOLIC BLOOD PRESSURE: 158 MMHG | HEIGHT: 68 IN | TEMPERATURE: 97 F | RESPIRATION RATE: 20 BRPM

## 2022-10-19 DIAGNOSIS — Z09 POSTOP CHECK: ICD-10-CM

## 2022-10-19 DIAGNOSIS — I10 HYPERTENSION, UNSPECIFIED TYPE: ICD-10-CM

## 2022-10-19 DIAGNOSIS — R42 DIZZINESS ON STANDING: ICD-10-CM

## 2022-10-19 DIAGNOSIS — F32.A DEPRESSION, UNSPECIFIED DEPRESSION TYPE: ICD-10-CM

## 2022-10-19 DIAGNOSIS — F41.9 ANXIETY: Chronic | ICD-10-CM

## 2022-10-19 PROCEDURE — 3080F DIAST BP >= 90 MM HG: CPT | Mod: CPTII,,, | Performed by: NURSE PRACTITIONER

## 2022-10-19 PROCEDURE — 1111F DSCHRG MED/CURRENT MED MERGE: CPT | Mod: CPTII,,, | Performed by: NURSE PRACTITIONER

## 2022-10-19 PROCEDURE — 90686 FLU VACCINE (QUAD) GREATER THAN OR EQUAL TO 3YO PRESERVATIVE FREE IM: ICD-10-PCS | Mod: ,,, | Performed by: NURSE PRACTITIONER

## 2022-10-19 PROCEDURE — 3077F SYST BP >= 140 MM HG: CPT | Mod: CPTII,,, | Performed by: NURSE PRACTITIONER

## 2022-10-19 PROCEDURE — 90471 FLU VACCINE (QUAD) GREATER THAN OR EQUAL TO 3YO PRESERVATIVE FREE IM: ICD-10-PCS | Mod: ,,, | Performed by: NURSE PRACTITIONER

## 2022-10-19 PROCEDURE — 3080F PR MOST RECENT DIASTOLIC BLOOD PRESSURE >= 90 MM HG: ICD-10-PCS | Mod: CPTII,,, | Performed by: NURSE PRACTITIONER

## 2022-10-19 PROCEDURE — 1111F PR DISCHARGE MEDS RECONCILED W/ CURRENT OUTPATIENT MED LIST: ICD-10-PCS | Mod: CPTII,,, | Performed by: NURSE PRACTITIONER

## 2022-10-19 PROCEDURE — 1159F MED LIST DOCD IN RCRD: CPT | Mod: CPTII,,, | Performed by: NURSE PRACTITIONER

## 2022-10-19 PROCEDURE — 1159F PR MEDICATION LIST DOCUMENTED IN MEDICAL RECORD: ICD-10-PCS | Mod: CPTII,,, | Performed by: NURSE PRACTITIONER

## 2022-10-19 PROCEDURE — 99213 OFFICE O/P EST LOW 20 MIN: CPT | Mod: 25,,, | Performed by: NURSE PRACTITIONER

## 2022-10-19 PROCEDURE — 90686 IIV4 VACC NO PRSV 0.5 ML IM: CPT | Mod: ,,, | Performed by: NURSE PRACTITIONER

## 2022-10-19 PROCEDURE — 99213 PR OFFICE/OUTPT VISIT, EST, LEVL III, 20-29 MIN: ICD-10-PCS | Mod: 25,,, | Performed by: NURSE PRACTITIONER

## 2022-10-19 PROCEDURE — 90471 IMMUNIZATION ADMIN: CPT | Mod: ,,, | Performed by: NURSE PRACTITIONER

## 2022-10-19 PROCEDURE — 3077F PR MOST RECENT SYSTOLIC BLOOD PRESSURE >= 140 MM HG: ICD-10-PCS | Mod: CPTII,,, | Performed by: NURSE PRACTITIONER

## 2022-10-19 RX ORDER — ESCITALOPRAM OXALATE 10 MG/1
10 TABLET ORAL DAILY
Qty: 10 TABLET | Refills: 6 | Status: SHIPPED | OUTPATIENT
Start: 2022-10-19 | End: 2022-10-31

## 2022-10-21 ENCOUNTER — OFFICE VISIT (OUTPATIENT)
Dept: NEUROSURGERY | Facility: CLINIC | Age: 37
End: 2022-10-21
Payer: MEDICAID

## 2022-10-21 VITALS
DIASTOLIC BLOOD PRESSURE: 95 MMHG | HEIGHT: 68 IN | WEIGHT: 220.44 LBS | RESPIRATION RATE: 18 BRPM | BODY MASS INDEX: 33.41 KG/M2 | HEART RATE: 87 BPM | SYSTOLIC BLOOD PRESSURE: 129 MMHG

## 2022-10-21 DIAGNOSIS — M46.46 SEPTIC DISCITIS OF LUMBAR REGION: ICD-10-CM

## 2022-10-21 DIAGNOSIS — Z98.890 STATUS POST LUMBAR LAMINECTOMY: Primary | ICD-10-CM

## 2022-10-21 DIAGNOSIS — Z48.89 ENCOUNTER FOR POSTOPERATIVE WOUND CHECK: ICD-10-CM

## 2022-10-21 PROCEDURE — 3080F DIAST BP >= 90 MM HG: CPT | Mod: CPTII,,, | Performed by: PHYSICIAN ASSISTANT

## 2022-10-21 PROCEDURE — 3074F SYST BP LT 130 MM HG: CPT | Mod: CPTII,,, | Performed by: PHYSICIAN ASSISTANT

## 2022-10-21 PROCEDURE — 99999 PR PBB SHADOW E&M-EST. PATIENT-LVL IV: ICD-10-PCS | Mod: PBBFAC,,, | Performed by: PHYSICIAN ASSISTANT

## 2022-10-21 PROCEDURE — 3074F PR MOST RECENT SYSTOLIC BLOOD PRESSURE < 130 MM HG: ICD-10-PCS | Mod: CPTII,,, | Performed by: PHYSICIAN ASSISTANT

## 2022-10-21 PROCEDURE — 3008F BODY MASS INDEX DOCD: CPT | Mod: CPTII,,, | Performed by: PHYSICIAN ASSISTANT

## 2022-10-21 PROCEDURE — 3080F PR MOST RECENT DIASTOLIC BLOOD PRESSURE >= 90 MM HG: ICD-10-PCS | Mod: CPTII,,, | Performed by: PHYSICIAN ASSISTANT

## 2022-10-21 PROCEDURE — 99214 OFFICE O/P EST MOD 30 MIN: CPT | Mod: PBBFAC | Performed by: PHYSICIAN ASSISTANT

## 2022-10-21 PROCEDURE — 3008F PR BODY MASS INDEX (BMI) DOCUMENTED: ICD-10-PCS | Mod: CPTII,,, | Performed by: PHYSICIAN ASSISTANT

## 2022-10-21 PROCEDURE — 99024 PR POST-OP FOLLOW-UP VISIT: ICD-10-PCS | Mod: ,,, | Performed by: PHYSICIAN ASSISTANT

## 2022-10-21 PROCEDURE — 99024 POSTOP FOLLOW-UP VISIT: CPT | Mod: ,,, | Performed by: PHYSICIAN ASSISTANT

## 2022-10-21 PROCEDURE — 99999 PR PBB SHADOW E&M-EST. PATIENT-LVL IV: CPT | Mod: PBBFAC,,, | Performed by: PHYSICIAN ASSISTANT

## 2022-10-21 RX ORDER — CIPROFLOXACIN 500 MG/1
TABLET ORAL
COMMUNITY
Start: 2022-08-07 | End: 2023-04-06

## 2022-10-21 RX ORDER — TRAMADOL HYDROCHLORIDE 50 MG/1
50 TABLET ORAL EVERY 6 HOURS PRN
COMMUNITY
Start: 2022-08-07 | End: 2023-04-06

## 2022-10-21 NOTE — PROGRESS NOTES
Subjective:      Patient ID: Kayla Clya is a 37 y.o. female.    Chief Complaint: Post-op Evaluation (The pt presents today for Po#1 Lami 8/18/22. Pt states that she's having very mild pain in her lbp pt denies physical therapy and currently takes Baclofen & Gabapentin to help ease pain. )    HPI  The patient is here today for postop evaluation.  Since discharge, she was transferred to [facility] in [place].  Released to home prior to October 1.   The patient states she tires easily- even with small amounts of walking or standing.  She does have back pain but it fluctuates- not always present.  Denies leg pain.  Denies numbness/tingling and weakness.  No bladder/bowel changes.  Patient denies HA, shortness of breath and chest pain.  Has not noticed any fevers or wound drainage.  Patient has swelling to left lower ext-calf, ankle and foot.  Has noticed color changes since surgery.  Patient was supposed to have ultrasound but never did.       Date of Procedure: 8/18/2022    Procedure: Procedure(s) (LRB):  LAMINECTOMY, SPINE, LUMBAR, POSTERIOR APPROACH, WITHepidural flow consistent with abscess versus epidural hematoma LESION OF SPINAL CORD (N/A) L2-4 right sided hemilaminectomy     Operative Findings (including complications, if any):   epidural fluid collection sent for culture and analysis   Description of Technical Procedures:   Right-sided hemilaminectomy from L2-L4 for PACU a shin of epidural fluid collection           Objective:            General    Constitutional: She is oriented to person, place, and time. She appears well-developed and well-nourished.   Cardiovascular:  Normal rate and regular rhythm.            Pulmonary/Chest: Effort normal.   Abdominal: Soft.   Neurological: She is alert and oriented to person, place, and time.   Psychiatric: She has a normal mood and affect. Her behavior is normal.           Neurosurgery exam:  Vitals reviewed  Moves all 4 ext  Mild swelling to left calf, ankle and  foot    Incision- lumbar spine CDI  Healing well  There is no swelling, erythema or fluctuance            Pathology:  Diagnosis 1. Soft tissue, epidural ligament, excision:   - Acute osteomyelitis   - No infectious organisms identified on H&E stain or special stains   - Please correlate these findings with results of concurrent culture studies   (EPIC, 08/18/2022)   - Fragments of viable lamellar bone   - Negative for malignancy   NOTE: Special stains for infectious organisms (Gram, AFB, GMS) were performed   and were negative.  Immunostain for AE1/AE3 was performed and was negative.   Positive controls and internal negative controls for special stains and   immunostain were examined and were appropriate.   2. Soft tissue, epidural tissue, excision:   - Fragment of fibroadipose tissue with marked chronic inflammation   - No infectious organisms identified on H&E stain   - Negative for malignancy        Component 2 mo ago    Fungus (Mycology) Culture  Abnormal   GERALDINE ALBICANS   Rare     Resulting Agency [facility]           Narrative  Performed by: [facility]  Epidural fluid collection             Assessment:       Encounter Diagnoses   Name Primary?    Septic discitis of lumbar region     Encounter for postoperative wound check     Status post lumbar laminectomy Yes          Plan:       Kayla was seen today for post-op evaluation.    Diagnoses and all orders for this visit:    Status post lumbar laminectomy  -     US Lower Extremity Veins Left; Future  -     MRI Lumbar Spine W WO Contrast; Future  -     MRI Lumbar Spine W WO Contrast    Septic discitis of lumbar region  -     US Lower Extremity Veins Left; Future  -     MRI Lumbar Spine W WO Contrast; Future  -     MRI Lumbar Spine W WO Contrast    Encounter for postoperative wound check  -     US Lower Extremity Veins Left; Future  -     MRI Lumbar Spine W WO Contrast; Future  -     MRI Lumbar Spine W WO Contrast    Will order US of LE- left to rule out possible DVT  .  Patient will also get MRI w and w/out contrast for follow-up postop study.  She will follow-up in approximately 3 weeks after MRI to discuss findings.  Please call with any changes.            Charity Davies PA-C

## 2022-10-27 ENCOUNTER — HOSPITAL ENCOUNTER (OUTPATIENT)
Dept: RADIOLOGY | Facility: HOSPITAL | Age: 37
Discharge: HOME | End: 2022-10-27
Attending: PHYSICIAN ASSISTANT
Payer: MEDICAID

## 2022-10-27 DIAGNOSIS — Z98.890 STATUS POST LUMBAR LAMINECTOMY: ICD-10-CM

## 2022-10-27 DIAGNOSIS — M46.46 SEPTIC DISCITIS OF LUMBAR REGION: ICD-10-CM

## 2022-10-27 DIAGNOSIS — Z48.89 ENCOUNTER FOR POSTOPERATIVE WOUND CHECK: ICD-10-CM

## 2022-10-27 PROCEDURE — 93971 US LOWER EXTREMITY VEINS LEFT: ICD-10-PCS | Mod: 26,LT,, | Performed by: RADIOLOGY

## 2022-10-27 PROCEDURE — 93971 EXTREMITY STUDY: CPT | Mod: 26,LT,, | Performed by: RADIOLOGY

## 2022-10-27 PROCEDURE — 93971 EXTREMITY STUDY: CPT | Mod: TC,LT

## 2022-10-31 DIAGNOSIS — F32.A DEPRESSION, UNSPECIFIED DEPRESSION TYPE: ICD-10-CM

## 2022-10-31 RX ORDER — ESCITALOPRAM OXALATE 20 MG/1
20 TABLET ORAL DAILY
Qty: 30 TABLET | Refills: 11 | Status: SHIPPED | OUTPATIENT
Start: 2022-10-31 | End: 2023-12-14 | Stop reason: SDUPTHER

## 2022-11-04 ENCOUNTER — OFFICE VISIT (OUTPATIENT)
Dept: FAMILY MEDICINE | Facility: CLINIC | Age: 37
End: 2022-11-04
Payer: MEDICAID

## 2022-11-04 VITALS
HEIGHT: 68 IN | WEIGHT: 220 LBS | HEART RATE: 107 BPM | DIASTOLIC BLOOD PRESSURE: 86 MMHG | TEMPERATURE: 97 F | SYSTOLIC BLOOD PRESSURE: 147 MMHG | BODY MASS INDEX: 33.34 KG/M2 | RESPIRATION RATE: 20 BRPM

## 2022-11-04 DIAGNOSIS — K65.1 LEFT UPPER QUADRANT ABDOMINAL ABSCESS: ICD-10-CM

## 2022-11-04 DIAGNOSIS — G47.00 INSOMNIA, UNSPECIFIED TYPE: ICD-10-CM

## 2022-11-04 PROBLEM — L02.91 ABSCESS: Status: ACTIVE | Noted: 2022-11-04

## 2022-11-04 PROCEDURE — 99213 OFFICE O/P EST LOW 20 MIN: CPT | Mod: ,,, | Performed by: NURSE PRACTITIONER

## 2022-11-04 PROCEDURE — 3077F PR MOST RECENT SYSTOLIC BLOOD PRESSURE >= 140 MM HG: ICD-10-PCS | Mod: CPTII,,, | Performed by: NURSE PRACTITIONER

## 2022-11-04 PROCEDURE — 3008F PR BODY MASS INDEX (BMI) DOCUMENTED: ICD-10-PCS | Mod: CPTII,,, | Performed by: NURSE PRACTITIONER

## 2022-11-04 PROCEDURE — 3008F BODY MASS INDEX DOCD: CPT | Mod: CPTII,,, | Performed by: NURSE PRACTITIONER

## 2022-11-04 PROCEDURE — 3079F PR MOST RECENT DIASTOLIC BLOOD PRESSURE 80-89 MM HG: ICD-10-PCS | Mod: CPTII,,, | Performed by: NURSE PRACTITIONER

## 2022-11-04 PROCEDURE — 3079F DIAST BP 80-89 MM HG: CPT | Mod: CPTII,,, | Performed by: NURSE PRACTITIONER

## 2022-11-04 PROCEDURE — 99213 PR OFFICE/OUTPT VISIT, EST, LEVL III, 20-29 MIN: ICD-10-PCS | Mod: ,,, | Performed by: NURSE PRACTITIONER

## 2022-11-04 PROCEDURE — 1159F PR MEDICATION LIST DOCUMENTED IN MEDICAL RECORD: ICD-10-PCS | Mod: CPTII,,, | Performed by: NURSE PRACTITIONER

## 2022-11-04 PROCEDURE — 3077F SYST BP >= 140 MM HG: CPT | Mod: CPTII,,, | Performed by: NURSE PRACTITIONER

## 2022-11-04 PROCEDURE — 1159F MED LIST DOCD IN RCRD: CPT | Mod: CPTII,,, | Performed by: NURSE PRACTITIONER

## 2022-11-04 RX ORDER — HYDROXYZINE PAMOATE 25 MG/1
25 CAPSULE ORAL NIGHTLY
Qty: 30 CAPSULE | Refills: 0 | Status: SHIPPED | OUTPATIENT
Start: 2022-11-04 | End: 2022-12-04

## 2022-11-04 RX ORDER — CEPHALEXIN 500 MG/1
500 CAPSULE ORAL EVERY 8 HOURS
Qty: 30 CAPSULE | Refills: 0 | Status: SHIPPED | OUTPATIENT
Start: 2022-11-04 | End: 2022-11-14

## 2022-11-04 RX ORDER — FLUCONAZOLE 200 MG/1
TABLET ORAL
COMMUNITY
Start: 2022-10-26 | End: 2023-04-06

## 2022-11-04 NOTE — PROGRESS NOTES
Subjective:       Patient ID: Kayla Clay is a 37 y.o. female.    Chief Complaint: Mass (Lump left side of abdomen X's 2 days)      Patient is a 37-year-old obese female who presents with concerns about a bump on her left upper quadrant of her abdomen.  Visual inspection reveals an abscess.  There is a white spot in it which is indicative of a head forming.    Abscess  Chronicity:  New  Onset:  2 days ago  Progression Since Onset: gradually worsening  Size:  Less than 2 cm  Location:  Torso (left upper quad abdomen)  Characteristics: painful    Pain Scale:  4/10  Treatments Tried:  NSAIDs and warm compresses  Review of Systems   Integumentary:         Abscess left upper quadrant abdomen.  See HPI   All other systems reviewed and are negative.      Objective:      Physical Exam  Vitals and nursing note reviewed.   Constitutional:       Appearance: She is obese.   HENT:      Head: Normocephalic.      Nose: Nose normal.      Mouth/Throat:      Mouth: Mucous membranes are moist.   Eyes:      Extraocular Movements: Extraocular movements intact.   Cardiovascular:      Rate and Rhythm: Normal rate and regular rhythm.      Pulses: Normal pulses.      Heart sounds: No murmur heard.  Pulmonary:      Effort: Pulmonary effort is normal.      Breath sounds: Normal breath sounds.   Abdominal:      General: Bowel sounds are normal.      Palpations: Abdomen is soft.   Musculoskeletal:         General: Normal range of motion.      Cervical back: Normal range of motion and neck supple.   Skin:     Findings: Erythema present.      Comments: Small red spot less than 2 cm.  Location left upper quadrant of the abdomen.  Tender to touch.   Neurological:      Mental Status: She is alert and oriented to person, place, and time.       Assessment:       Problem List Items Addressed This Visit          ID    Abscess    Relevant Medications    cephALEXin (KEFLEX) 500 MG capsule       Other    Insomnia    Relevant Medications    hydrOXYzine  pamoate (VISTARIL) 25 MG Cap       Plan:         Abscess:  Patient counseled that the abscess needs to be drained in order to heal because if it in closed pocket it will continue to get larger.  Patient would like to try warm compresses and black salve to cause the abscess to perform head.  Patient would like antibiotics.  I told her I would order them but she should be aware that they will have little effect and tell the abscess opens.  Patient instructed to return to the office if she would like for me to perform an incision and drainage.    Insomnia:  Take prescribed medication as ordered    Call the office with any questions or concerns

## 2022-12-07 ENCOUNTER — TELEPHONE (OUTPATIENT)
Dept: NEUROSURGERY | Facility: CLINIC | Age: 37
End: 2022-12-07
Payer: MEDICAID

## 2022-12-07 NOTE — TELEPHONE ENCOUNTER
Spoke with pt informed pt that she need to first complete her mri before proceeding to see the provider pt stated that she will call me back to send me the fax # to fax the mri order over pt was requesting to complete mri in [place].    Offered pt options to complete external open mri at [facility] imaging center pt declined offer.

## 2022-12-08 ENCOUNTER — PATIENT MESSAGE (OUTPATIENT)
Dept: PAIN MEDICINE | Facility: CLINIC | Age: 37
End: 2022-12-08
Payer: MEDICAID

## 2022-12-12 ENCOUNTER — TELEPHONE (OUTPATIENT)
Dept: NEUROSURGERY | Facility: CLINIC | Age: 37
End: 2022-12-12
Payer: MEDICAID

## 2022-12-12 NOTE — TELEPHONE ENCOUNTER
Spoke with pt pt states that she wants us to fax her mri order over to [facility] d/t her not being able to find a facility near home, pt was advised that I will fax order over and once she get a date please let us know so we can schedule her f/u appt pt also was advised to bring in a copy of her mri disc and report once she completes her mri. Pt darrin

## 2022-12-28 ENCOUNTER — OFFICE VISIT (OUTPATIENT)
Dept: INFECTIOUS DISEASES | Facility: CLINIC | Age: 37
End: 2022-12-28
Payer: MEDICAID

## 2022-12-28 VITALS
OXYGEN SATURATION: 97 % | DIASTOLIC BLOOD PRESSURE: 88 MMHG | BODY MASS INDEX: 38.09 KG/M2 | HEART RATE: 71 BPM | TEMPERATURE: 98 F | WEIGHT: 251.31 LBS | RESPIRATION RATE: 18 BRPM | HEIGHT: 68 IN | SYSTOLIC BLOOD PRESSURE: 131 MMHG

## 2022-12-28 DIAGNOSIS — L02.91 ABSCESS: ICD-10-CM

## 2022-12-28 DIAGNOSIS — M86.9 FUNGAL OSTEOMYELITIS: ICD-10-CM

## 2022-12-28 DIAGNOSIS — G06.2 EPIDURAL ABSCESS: Primary | ICD-10-CM

## 2022-12-28 DIAGNOSIS — M46.46 SEPTIC DISCITIS OF LUMBAR REGION: ICD-10-CM

## 2022-12-28 DIAGNOSIS — F19.90 IVDU (INTRAVENOUS DRUG USER): ICD-10-CM

## 2022-12-28 PROCEDURE — 3008F PR BODY MASS INDEX (BMI) DOCUMENTED: ICD-10-PCS | Mod: CPTII,,, | Performed by: GENERAL PRACTICE

## 2022-12-28 PROCEDURE — 99213 OFFICE O/P EST LOW 20 MIN: CPT | Mod: PBBFAC | Performed by: GENERAL PRACTICE

## 2022-12-28 PROCEDURE — 99215 OFFICE O/P EST HI 40 MIN: CPT | Mod: S$PBB,,, | Performed by: GENERAL PRACTICE

## 2022-12-28 PROCEDURE — 3075F SYST BP GE 130 - 139MM HG: CPT | Mod: CPTII,,, | Performed by: GENERAL PRACTICE

## 2022-12-28 PROCEDURE — 1159F PR MEDICATION LIST DOCUMENTED IN MEDICAL RECORD: ICD-10-PCS | Mod: CPTII,,, | Performed by: GENERAL PRACTICE

## 2022-12-28 PROCEDURE — 3008F BODY MASS INDEX DOCD: CPT | Mod: CPTII,,, | Performed by: GENERAL PRACTICE

## 2022-12-28 PROCEDURE — 99999 PR PBB SHADOW E&M-EST. PATIENT-LVL III: ICD-10-PCS | Mod: PBBFAC,,, | Performed by: GENERAL PRACTICE

## 2022-12-28 PROCEDURE — 3079F DIAST BP 80-89 MM HG: CPT | Mod: CPTII,,, | Performed by: GENERAL PRACTICE

## 2022-12-28 PROCEDURE — 3075F PR MOST RECENT SYSTOLIC BLOOD PRESS GE 130-139MM HG: ICD-10-PCS | Mod: CPTII,,, | Performed by: GENERAL PRACTICE

## 2022-12-28 PROCEDURE — 99999 PR PBB SHADOW E&M-EST. PATIENT-LVL III: CPT | Mod: PBBFAC,,, | Performed by: GENERAL PRACTICE

## 2022-12-28 PROCEDURE — 3079F PR MOST RECENT DIASTOLIC BLOOD PRESSURE 80-89 MM HG: ICD-10-PCS | Mod: CPTII,,, | Performed by: GENERAL PRACTICE

## 2022-12-28 PROCEDURE — 1159F MED LIST DOCD IN RCRD: CPT | Mod: CPTII,,, | Performed by: GENERAL PRACTICE

## 2022-12-28 PROCEDURE — 99215 PR OFFICE/OUTPT VISIT, EST, LEVL V, 40-54 MIN: ICD-10-PCS | Mod: S$PBB,,, | Performed by: GENERAL PRACTICE

## 2022-12-28 NOTE — PROGRESS NOTES
Subjective:       Patient ID: Kayla Clay 37 y.o.     Chief Complaint:   Chief Complaint   Patient presents with    OM Lumbar region    Follow-up    [facility] f/u        Hospital consultation 09/30/2022:  29-year-old female patient known to have a past medical history significant for active IVDU (drug of choice is methamphetamine), complicated by more epidural abscess status post washout and laminectomy who presented for continuation of IV antibiotics, ID is consulted for assistance and antibiotics management.  1. Epidural abscess  2. Candida albicans abscess  3. Surgical site swelling  4. IVDU  5. Rash  6. Leukopenia  -patient's major risk factor is IVDU, Candida albicans found in operative cultures on 08/18/2022, unfortunately this will be a difficult infection to treat, likely requiring prolonged period of anti fungal, it is also likely that this is a polymicrobial infection, the patient had been on at least 3-4 days of IV antibiotics prior to surgery being done, it is therefore likely that other pathogens may have been partly responsible for epidural abscess however masked by use of IV antibiotics    -continue ceftriaxone 2g IV q12h  -Continue Daptomycin 8mg/Kg IV q24h  -Continue fluconazole 400 mg p.o. Q 24 hours   -patient will require at least 6 weeks antibiotics from day of surgery 08/18/2022   -anticipated end date 09/29/2022   -following which would transition the patient to doxycycline 100 mg p.o. Q 12 hours and fluconazole 200 mg p.o. Q 24 hours  -will likely need this p.o. tail of antibiotics for at least 3 additional months depending on her clinical progression    12/28/2022:  Doing well, no residual pain, no fevers, no chills, completed Fluconazole 3 months course 2 weeks ago. Neurosurgery has scheduled a follow up MRI but otherwise she is doing very well.        Past Medical History:   Diagnosis Date    Epidural abscess     Generalized anxiety disorder     Hypertension     Obesity,  "unspecified         Past Surgical History:   Procedure Laterality Date    LUMBAR LAMINECTOMY WITH SURGICAL REMOVAL OF LESION OF SPINAL CORD BY POSTERIOR APPROACH N/A 8/18/2022    Procedure: LAMINECTOMY, SPINE, LUMBAR, POSTERIOR APPROACH, WITH EXCISION OF NEOPLASM OR LESION OF SPINAL CORD;  Surgeon: Floyd Brewer MD;  Location: [facility];  Service: Neurosurgery;  Laterality: N/A;        Social History     Socioeconomic History    Marital status: Single   Tobacco Use    Smoking status: Former    Smokeless tobacco: Never   Substance and Sexual Activity    Alcohol use: Not Currently    Drug use: Yes     Types: Heroin, Methamphetamines    Sexual activity: Yes     Partners: Male        Family History   Problem Relation Age of Onset    No Known Problems Mother     No Known Problems Father         Review of patient's allergies indicates:   Allergen Reactions    Acetaminophen Hives    Doxycycline Rash     Rash on face and arms        Immunization History   Administered Date(s) Administered    Influenza - Quadrivalent - PF *Preferred* (6 months and older) 10/19/2022        Review of Systems   All other systems reviewed and are negative.       Objective:      /88 (BP Location: Right arm)   Pulse 71   Temp 98.2 °F (36.8 °C) (Oral)   Resp 18   Ht 5' 8" (1.727 m)   Wt 114 kg (251 lb 5.2 oz)   SpO2 97%   BMI 38.21 kg/m²      Conducted with presence of MsNey Skinner as Chaperone    Physical Exam  Constitutional:       Appearance: Normal appearance.   HENT:      Head: Normocephalic and atraumatic.      Mouth/Throat:      Pharynx: No oropharyngeal exudate or posterior oropharyngeal erythema.   Eyes:      Extraocular Movements: Extraocular movements intact.      Pupils: Pupils are equal, round, and reactive to light.   Cardiovascular:      Rate and Rhythm: Normal rate and regular rhythm.      Heart sounds: No murmur heard.  Pulmonary:      Effort: No respiratory distress.      Breath sounds: No " wheezing, rhonchi or rales.   Abdominal:      General: Bowel sounds are normal. There is no distension.      Palpations: Abdomen is soft.      Tenderness: There is no abdominal tenderness. There is no right CVA tenderness or left CVA tenderness.   Musculoskeletal:         General: No swelling or tenderness.      Cervical back: Neck supple. No rigidity or tenderness.      Comments: Well healed surgical scar at the lumbar area. No tenderness to palpation, no erythema, no swelling.   Lymphadenopathy:      Cervical: No cervical adenopathy.   Skin:     Findings: No lesion or rash.   Neurological:      General: No focal deficit present.      Mental Status: She is alert and oriented to person, place, and time. Mental status is at baseline.      Cranial Nerves: No cranial nerve deficit.      Motor: No weakness.   Psychiatric:         Mood and Affect: Mood normal.         Behavior: Behavior normal.        Labs: Reviewed most recent relevant labs available, notable results highlighted in this note    Imaging: Reviewed most recent relevant imaging studies available, notable results highlighted in this note    Assessment:       Problem List Items Addressed This Visit          Psychiatric    IVDU (intravenous drug user)       ID    Septic discitis of lumbar region    Fungal osteomyelitis    Abscess     Other Visit Diagnoses       Epidural abscess    -  Primary    Relevant Orders    CBC Auto Differential (Completed)    Comprehensive Metabolic Panel (Completed)    C-Reactive Protein (Completed)    Sedimentation rate (Completed)               Plan:       -completed 6 weeks of IV antibiotics, empirically since initial cultures were negative Ceftriaxone and Daptomycin, completed 3 months of Fluconazole high dose at 400mg PO q24h given Candida albicans positive cultures  -Did not tolerate Vancomycin or Doxycycline  -doing well with negative inflammatory markers  -outpatient repeat MRI ordered by neurosurgery, would not expect it to be  back to normal but in setting of negative markers and significant clinical improvement, would not  at this time  -Would not recommend ongoing antimicrobial therapy at this time  -Counseled patient about drug use and she has been sober since prior to her hospitalization     Follow up if symptoms worsen or fail to improve.    40 minutes of total time spent on the encounter, which includes face to face time and non-face to face time preparing to see the patient (eg, review of tests), Obtaining and/or reviewing separately obtained history, Documenting clinical information in the electronic or other health record, Independently interpreting results (not separately reported) and communicating results to the patient/family/caregiver, or Care coordination (not separately reported).

## 2022-12-30 ENCOUNTER — TELEPHONE (OUTPATIENT)
Dept: NEUROSURGERY | Facility: CLINIC | Age: 37
End: 2022-12-30
Payer: MEDICAID

## 2022-12-30 NOTE — TELEPHONE ENCOUNTER
I called and spoke with Natacha at [facility] to give her the auth for the pt I was able to obtain auth information and provide to Natacha     approved auth#[id number] valid 12/30-2/27/23    Information verified      ----- Message from Claribel Lizama sent at 12/30/2022  8:42 AM CST -----  Contact: Malathi from [facility]  Malathi stated that patient will be having a MRI on 01/03/2023, will need authorization. Please call her back at

## 2023-01-06 ENCOUNTER — OFFICE VISIT (OUTPATIENT)
Dept: FAMILY MEDICINE | Facility: CLINIC | Age: 38
End: 2023-01-06
Payer: MEDICAID

## 2023-01-06 VITALS
DIASTOLIC BLOOD PRESSURE: 92 MMHG | HEART RATE: 98 BPM | RESPIRATION RATE: 20 BRPM | TEMPERATURE: 97 F | HEIGHT: 68 IN | BODY MASS INDEX: 38.49 KG/M2 | WEIGHT: 254 LBS | SYSTOLIC BLOOD PRESSURE: 145 MMHG

## 2023-01-06 DIAGNOSIS — L91.8 SKIN TAG: ICD-10-CM

## 2023-01-06 DIAGNOSIS — L65.9 ALOPECIA OF SCALP: ICD-10-CM

## 2023-01-06 DIAGNOSIS — F32.A DEPRESSION, UNSPECIFIED DEPRESSION TYPE: ICD-10-CM

## 2023-01-06 DIAGNOSIS — J32.9 SINUSITIS, UNSPECIFIED CHRONICITY, UNSPECIFIED LOCATION: ICD-10-CM

## 2023-01-06 PROCEDURE — 1159F MED LIST DOCD IN RCRD: CPT | Mod: CPTII,,, | Performed by: NURSE PRACTITIONER

## 2023-01-06 PROCEDURE — 3077F SYST BP >= 140 MM HG: CPT | Mod: CPTII,,, | Performed by: NURSE PRACTITIONER

## 2023-01-06 PROCEDURE — 3080F DIAST BP >= 90 MM HG: CPT | Mod: CPTII,,, | Performed by: NURSE PRACTITIONER

## 2023-01-06 PROCEDURE — 1159F PR MEDICATION LIST DOCUMENTED IN MEDICAL RECORD: ICD-10-PCS | Mod: CPTII,,, | Performed by: NURSE PRACTITIONER

## 2023-01-06 PROCEDURE — 3008F BODY MASS INDEX DOCD: CPT | Mod: CPTII,,, | Performed by: NURSE PRACTITIONER

## 2023-01-06 PROCEDURE — 99213 OFFICE O/P EST LOW 20 MIN: CPT | Mod: 25,,, | Performed by: NURSE PRACTITIONER

## 2023-01-06 PROCEDURE — 3080F PR MOST RECENT DIASTOLIC BLOOD PRESSURE >= 90 MM HG: ICD-10-PCS | Mod: CPTII,,, | Performed by: NURSE PRACTITIONER

## 2023-01-06 PROCEDURE — 99213 PR OFFICE/OUTPT VISIT, EST, LEVL III, 20-29 MIN: ICD-10-PCS | Mod: 25,,, | Performed by: NURSE PRACTITIONER

## 2023-01-06 PROCEDURE — 17000 DESTRUCT PREMALG LESION: CPT | Mod: ,,, | Performed by: NURSE PRACTITIONER

## 2023-01-06 PROCEDURE — 3008F PR BODY MASS INDEX (BMI) DOCUMENTED: ICD-10-PCS | Mod: CPTII,,, | Performed by: NURSE PRACTITIONER

## 2023-01-06 PROCEDURE — 3077F PR MOST RECENT SYSTOLIC BLOOD PRESSURE >= 140 MM HG: ICD-10-PCS | Mod: CPTII,,, | Performed by: NURSE PRACTITIONER

## 2023-01-06 PROCEDURE — 17000 PR DESTRUCTION(LASER SURGERY,CRYOSURGERY,CHEMOSURGERY),PREMALIGNANT LESIONS,FIRST LESION: ICD-10-PCS | Mod: ,,, | Performed by: NURSE PRACTITIONER

## 2023-01-06 RX ORDER — AMOXICILLIN 875 MG/1
875 TABLET, FILM COATED ORAL EVERY 12 HOURS
Qty: 20 TABLET | Refills: 0 | Status: SHIPPED | OUTPATIENT
Start: 2023-01-06 | End: 2023-01-16

## 2023-01-06 RX ORDER — MONTELUKAST SODIUM 10 MG/1
10 TABLET ORAL NIGHTLY
Qty: 30 TABLET | Refills: 6 | Status: SHIPPED | OUTPATIENT
Start: 2023-01-06 | End: 2023-02-05

## 2023-01-06 RX ORDER — METHYLPREDNISOLONE 4 MG/1
TABLET ORAL
Qty: 21 EACH | Refills: 0 | Status: SHIPPED | OUTPATIENT
Start: 2023-01-06 | End: 2023-01-27

## 2023-01-06 RX ORDER — LORATADINE 10 MG/1
10 TABLET ORAL DAILY
Qty: 30 TABLET | Refills: 6 | Status: SHIPPED | OUTPATIENT
Start: 2023-01-06 | End: 2023-03-16 | Stop reason: SDUPTHER

## 2023-01-06 NOTE — PROGRESS NOTES
Subjective:       Patient ID: Kayla Clay is a 37 y.o. female.    Chief Complaint: Mole (Mole to chest area X's 2 months), Cough (Cough, congestion, sinus HA X's 2 weeks), and Hair/Scalp Problem (Losing hair X's 3 months)      The patient is a 37-year-old obese female.  She presents complaining of a growth on her chest.  Visual inspection reveals a skin tag.      Patient states she is had sinus headaches and sinus congestion for 2 weeks.  She states the sinus congestion is rapidly becoming worse.  She is requesting antibiotics.    Patient states that she  has been losing her hair for 3 months.  Patient does have a history of COVID.  Patient tells me she is taking biotin, hair skin and nails, and collagen.    The patient is crying in the office stating that she is been through a lot this year.  She is requesting counseling services    Review of Systems   HENT:  Positive for nasal congestion, postnasal drip and sinus pressure/congestion.    Respiratory:  Positive for cough.    Integumentary:         Skin tag   Neurological:  Positive for headaches.   All other systems reviewed and are negative.      Objective:      Physical Exam  Vitals and nursing note reviewed.   Constitutional:       Appearance: She is obese.   HENT:      Head: Normocephalic.      Ears:      Comments: Bilateral TMs bulging with effusion     Nose: Congestion and rhinorrhea present.      Mouth/Throat:      Mouth: Mucous membranes are moist.      Pharynx: Oropharyngeal exudate and posterior oropharyngeal erythema present.   Eyes:      Extraocular Movements: Extraocular movements intact.   Cardiovascular:      Rate and Rhythm: Normal rate and regular rhythm.      Heart sounds: No murmur heard.  Pulmonary:      Effort: Pulmonary effort is normal.      Breath sounds: Normal breath sounds.   Abdominal:      General: Bowel sounds are normal.   Musculoskeletal:         General: Normal range of motion.      Cervical back: Normal range of motion and  neck supple.   Skin:     General: Skin is warm and dry.      Comments: Skin tag midline between breasts   Neurological:      Mental Status: She is alert and oriented to person, place, and time.       Assessment:       Problem List Items Addressed This Visit          Psychiatric    Depression    Relevant Orders    Ambulatory referral/consult to Psychiatry       ENT    Sinusitis    Relevant Medications    methylPREDNISolone (MEDROL DOSEPACK) 4 mg tablet    montelukast (SINGULAIR) 10 mg tablet    loratadine (CLARITIN) 10 mg tablet       Derm    Alopecia of scalp    Skin tag       Plan:         Depression:  Continue prescription SSRI  Refer to counseling services    Sinusitis  Take Singulair and antihistamine as prescribed  Medrol Dosepak  Call the office with any questions    Alopecia of the scalp:  Continue biotin, hair, skin and Nails vitamin and collagen.  Try biotin shampoo over-the-counter    Skin tag:  * Procedure Note *  Consent:  Signed consent placed in chart.  I reviewed the risk of procedure with patient.  Patient consented to procedure and had opportunity to ask questions.  Area of cleansed as per protocol for verruca freeze  Cryotherapy performed to skin tag  Post cryotherapy instructions given to patient   Patient tolerated procedure well  No complications noted    Call the office with any questions or concerns

## 2023-01-19 ENCOUNTER — PATIENT MESSAGE (OUTPATIENT)
Dept: NEUROSURGERY | Facility: CLINIC | Age: 38
End: 2023-01-19
Payer: MEDICAID

## 2023-01-19 DIAGNOSIS — Z98.890 STATUS POST LUMBAR LAMINECTOMY: Primary | ICD-10-CM

## 2023-01-19 DIAGNOSIS — M46.46 SEPTIC DISCITIS OF LUMBAR REGION: ICD-10-CM

## 2023-01-23 PROBLEM — Z09 POSTOP CHECK: Status: RESOLVED | Noted: 2022-10-19 | Resolved: 2023-01-23

## 2023-03-16 ENCOUNTER — PATIENT MESSAGE (OUTPATIENT)
Dept: NEUROSURGERY | Facility: CLINIC | Age: 38
End: 2023-03-16
Payer: MEDICAID

## 2023-03-27 ENCOUNTER — PATIENT OUTREACH (OUTPATIENT)
Dept: ADMINISTRATIVE | Facility: HOSPITAL | Age: 38
End: 2023-03-27
Payer: MEDICAID

## 2023-03-30 ENCOUNTER — OFFICE VISIT (OUTPATIENT)
Dept: INFECTIOUS DISEASES | Facility: CLINIC | Age: 38
End: 2023-03-30
Payer: MEDICAID

## 2023-03-30 ENCOUNTER — TELEPHONE (OUTPATIENT)
Dept: INFECTIOUS DISEASES | Facility: CLINIC | Age: 38
End: 2023-03-30
Payer: MEDICAID

## 2023-03-30 VITALS
SYSTOLIC BLOOD PRESSURE: 141 MMHG | HEIGHT: 68 IN | HEART RATE: 65 BPM | RESPIRATION RATE: 18 BRPM | DIASTOLIC BLOOD PRESSURE: 90 MMHG | OXYGEN SATURATION: 96 % | BODY MASS INDEX: 41.32 KG/M2 | WEIGHT: 272.63 LBS

## 2023-03-30 DIAGNOSIS — R29.898 WEAKNESS OF BOTH LOWER EXTREMITIES: ICD-10-CM

## 2023-03-30 DIAGNOSIS — L02.91 ABSCESS: ICD-10-CM

## 2023-03-30 DIAGNOSIS — M79.2 NEUROPATHIC PAIN: ICD-10-CM

## 2023-03-30 DIAGNOSIS — F32.A DEPRESSION, UNSPECIFIED DEPRESSION TYPE: ICD-10-CM

## 2023-03-30 DIAGNOSIS — F41.9 ANXIETY: Chronic | ICD-10-CM

## 2023-03-30 DIAGNOSIS — M46.26 OSTEOMYELITIS OF LUMBAR SPINE: Primary | ICD-10-CM

## 2023-03-30 DIAGNOSIS — M46.46 SEPTIC DISCITIS OF LUMBAR REGION: ICD-10-CM

## 2023-03-30 PROCEDURE — 99215 OFFICE O/P EST HI 40 MIN: CPT | Mod: S$PBB,,, | Performed by: GENERAL PRACTICE

## 2023-03-30 PROCEDURE — 3077F PR MOST RECENT SYSTOLIC BLOOD PRESSURE >= 140 MM HG: ICD-10-PCS | Mod: CPTII,,, | Performed by: GENERAL PRACTICE

## 2023-03-30 PROCEDURE — 99214 OFFICE O/P EST MOD 30 MIN: CPT | Mod: PBBFAC | Performed by: GENERAL PRACTICE

## 2023-03-30 PROCEDURE — 1159F MED LIST DOCD IN RCRD: CPT | Mod: CPTII,,, | Performed by: GENERAL PRACTICE

## 2023-03-30 PROCEDURE — 3008F BODY MASS INDEX DOCD: CPT | Mod: CPTII,,, | Performed by: GENERAL PRACTICE

## 2023-03-30 PROCEDURE — 3077F SYST BP >= 140 MM HG: CPT | Mod: CPTII,,, | Performed by: GENERAL PRACTICE

## 2023-03-30 PROCEDURE — 3080F DIAST BP >= 90 MM HG: CPT | Mod: CPTII,,, | Performed by: GENERAL PRACTICE

## 2023-03-30 PROCEDURE — 99215 PR OFFICE/OUTPT VISIT, EST, LEVL V, 40-54 MIN: ICD-10-PCS | Mod: S$PBB,,, | Performed by: GENERAL PRACTICE

## 2023-03-30 PROCEDURE — 99999 PR PBB SHADOW E&M-EST. PATIENT-LVL IV: CPT | Mod: PBBFAC,,, | Performed by: GENERAL PRACTICE

## 2023-03-30 PROCEDURE — 1159F PR MEDICATION LIST DOCUMENTED IN MEDICAL RECORD: ICD-10-PCS | Mod: CPTII,,, | Performed by: GENERAL PRACTICE

## 2023-03-30 PROCEDURE — 3080F PR MOST RECENT DIASTOLIC BLOOD PRESSURE >= 90 MM HG: ICD-10-PCS | Mod: CPTII,,, | Performed by: GENERAL PRACTICE

## 2023-03-30 PROCEDURE — 3008F PR BODY MASS INDEX (BMI) DOCUMENTED: ICD-10-PCS | Mod: CPTII,,, | Performed by: GENERAL PRACTICE

## 2023-03-30 PROCEDURE — 99999 PR PBB SHADOW E&M-EST. PATIENT-LVL IV: ICD-10-PCS | Mod: PBBFAC,,, | Performed by: GENERAL PRACTICE

## 2023-03-30 RX ORDER — GABAPENTIN 300 MG/1
300 CAPSULE ORAL 2 TIMES DAILY
Qty: 60 CAPSULE | Refills: 0 | Status: SHIPPED | OUTPATIENT
Start: 2023-03-30 | End: 2023-05-03

## 2023-03-30 NOTE — TELEPHONE ENCOUNTER
Called Roya at [facility] at the [facility]/ she states, referral must be faxed they do not have Epic  Faxed manually referral---success  [phone] fax #

## 2023-03-30 NOTE — PROGRESS NOTES
Subjective:       Patient ID: Kayla Clay 37 y.o.     Chief Complaint:   No chief complaint on file.       HPI:  Hospital consultation 09/30/2022:  29-year-old female patient known to have a past medical history significant for active IVDU (drug of choice is methamphetamine), complicated by more epidural abscess status post washout and laminectomy who presented for continuation of IV antibiotics, ID is consulted for assistance and antibiotics management.  1. Epidural abscess  2. Candida albicans abscess  3. Surgical site swelling  4. IVDU  5. Rash  6. Leukopenia  -patient's major risk factor is IVDU, Candida albicans found in operative cultures on 08/18/2022, unfortunately this will be a difficult infection to treat, likely requiring prolonged period of anti fungal, it is also likely that this is a polymicrobial infection, the patient had been on at least 3-4 days of IV antibiotics prior to surgery being done, it is therefore likely that other pathogens may have been partly responsible for epidural abscess however masked by use of IV antibiotics    -continue ceftriaxone 2g IV q12h  -Continue Daptomycin 8mg/Kg IV q24h  -Continue fluconazole 400 mg p.o. Q 24 hours   -patient will require at least 6 weeks antibiotics from day of surgery 08/18/2022   -anticipated end date 09/29/2022   -following which would transition the patient to doxycycline 100 mg p.o. Q 12 hours and fluconazole 200 mg p.o. Q 24 hours  -will likely need this p.o. tail of antibiotics for at least 3 additional months depending on her clinical progression    12/28/2022:  Doing well, no residual pain, no fevers, no chills, completed Fluconazole 3 months course 2 weeks ago. Neurosurgery has scheduled a follow up MRI but otherwise she is doing very well.     03/30/2023:  Patient presents today with no new infectious concerns but has requested evaluation due to ongoing lower extremity weakness and reported inability to be seen by her neurosurgeons  until May. She is concerned that she is not improving and her lower extremities have been weak to the point where she is unable to perform her daily tasks and she has significantly limited mobility. This has not been necessarily worsening but certainly not improving, she does have some neuropathic pain in he r back and down her legs which occurs sporadically and she used to be on gabapentin and baclofen for and she thinks this was helping. She also was using a walker and other assistance devices while in hospital but has not been able to get help with this as outpatient.     She is very overwhelmed and crying int he room, she is accompanied by her mother. Patient reports feelings of doom and feels like she will die in the next few years if this continues. She is on Escitalopram but does not think this is helping. She had an MRI done in early 02/2023 which has no evidence of ongoing infection, no enhancement and no ongoing inflammatory findings, only post operative changes per the report obtained (scanned in media)      Past Medical History:   Diagnosis Date    Epidural abscess     Generalized anxiety disorder     Hypertension     Obesity, unspecified         Past Surgical History:   Procedure Laterality Date    LUMBAR LAMINECTOMY WITH SURGICAL REMOVAL OF LESION OF SPINAL CORD BY POSTERIOR APPROACH N/A 8/18/2022    Procedure: LAMINECTOMY, SPINE, LUMBAR, POSTERIOR APPROACH, WITH EXCISION OF NEOPLASM OR LESION OF SPINAL CORD;  Surgeon: Floyd Brewer MD;  Location: [facility];  Service: Neurosurgery;  Laterality: N/A;        Social History     Socioeconomic History    Marital status: Single   Tobacco Use    Smoking status: Former    Smokeless tobacco: Never   Substance and Sexual Activity    Alcohol use: Not Currently    Drug use: Yes     Types: Heroin, Methamphetamines    Sexual activity: Yes     Partners: Male        Family History   Problem Relation Age of Onset    No Known Problems Mother     No Known Problems  "Father         Review of patient's allergies indicates:   Allergen Reactions    Acetaminophen Hives    Doxycycline Rash     Rash on face and arms        Immunization History   Administered Date(s) Administered    COVID-19, MRNA, LN-S, PF (MODERNA FULL 0.5 ML DOSE) 01/30/2021, 04/14/2021    Influenza - Quadrivalent - PF *Preferred* (6 months and older) 10/19/2022        Review of Systems   All other systems reviewed and are negative.       Objective:      BP (!) 141/90 (BP Location: Right arm)   Pulse 65   Resp 18   Ht 5' 8" (1.727 m)   Wt 123.7 kg (272 lb 9.6 oz)   SpO2 96%   BMI 41.45 kg/m²      Conducted with presence of MsNey Suni Skinner as Chaperone    Physical Exam  Constitutional:       Appearance: Normal appearance.   HENT:      Head: Normocephalic and atraumatic.      Mouth/Throat:      Pharynx: No oropharyngeal exudate or posterior oropharyngeal erythema.   Eyes:      Extraocular Movements: Extraocular movements intact.      Pupils: Pupils are equal, round, and reactive to light.   Cardiovascular:      Rate and Rhythm: Normal rate and regular rhythm.      Heart sounds: No murmur heard.  Pulmonary:      Effort: No respiratory distress.      Breath sounds: No wheezing, rhonchi or rales.   Abdominal:      General: Bowel sounds are normal. There is no distension.      Palpations: Abdomen is soft.      Tenderness: There is no abdominal tenderness. There is no right CVA tenderness or left CVA tenderness.   Musculoskeletal:         General: No swelling or tenderness.      Cervical back: Neck supple. No rigidity or tenderness.      Comments: Well healed surgical scar at the lumbar area. No tenderness to palpation, no erythema, no swelling.   Lymphadenopathy:      Cervical: No cervical adenopathy.   Skin:     Findings: No lesion or rash.   Neurological:      General: No focal deficit present.      Mental Status: She is alert and oriented to person, place, and time. Mental status is at baseline.      Cranial " Nerves: No cranial nerve deficit.      Motor: Weakness (bilateral lower extremities) present.   Psychiatric:         Mood and Affect: Mood normal.         Behavior: Behavior normal.        Labs: Reviewed most recent relevant labs available, notable results highlighted in this note    Imaging: Reviewed most recent relevant imaging studies available, notable results highlighted in this note    Assessment:       Problem List Items Addressed This Visit          Psychiatric    Anxiety (Chronic)    Depression    Relevant Orders    Ambulatory referral/consult to Physical/Occupational Therapy       ID    Septic discitis of lumbar region    Abscess       Orthopedic    Weakness of both lower extremities     Other Visit Diagnoses       Osteomyelitis of lumbar spine    -  Primary    Relevant Medications    gabapentin (NEURONTIN) 300 MG capsule    Other Relevant Orders    Ambulatory referral/consult to Psychiatry    Ambulatory referral/consult to Physical/Occupational Therapy    Neuropathic pain        Relevant Medications    gabapentin (NEURONTIN) 300 MG capsule                 Plan:       -completed 6 weeks of IV antibiotics, empirically since initial cultures were negative Ceftriaxone and Daptomycin, completed 3 months of Fluconazole high dose at 400mg PO q24h given Candida albicans positive cultures  -Did not tolerate Vancomycin or Doxycycline  -MRI with no findings significant for residual infection  -No indications from ID perspective for further investigations or management, she does certainly need follow up with her neurosurgeon to evaluate her MRI and weakness and determine if this is residual weakness from the previous cord compression or if some findings on MRI can explain this  -In the meantime, I think it I reasonable to add the following while awaiting neurosurgical evaluation  -Physical therapy for evaluation of gait and strength and determine if need for assistive device such as walker or cane  -Gabapentin refill  for the next month for help with neuropathic pain and assistance in tolerating some activities, I did however explain to the patient I will not be edna to continue refilling this medication and would prefer he surgeon to take this over especially in absence of active concern for infection  -Psychiatry referral as patient is clearly depressed and while she denies any ideation of self harm or harm to other she is very anxious and overwhelmed with current condition    Follow up in about 3 months (around 6/30/2023).    40 minutes of total time spent on the encounter, which includes face to face time and non-face to face time preparing to see the patient (eg, review of tests), Obtaining and/or reviewing separately obtained history, Documenting clinical information in the electronic or other health record, Independently interpreting results (not separately reported) and communicating results to the patient/family/caregiver, or Care coordination (not separately reported).

## 2023-03-30 NOTE — TELEPHONE ENCOUNTER
Called patient regarding psychiatry referral, patient states, she only has medicaid, advised patient to call [facility], no referral needed and they accept medicaid at  [phone], as they do not accept referrals patients must call themselves for an initial screening. She voiced understanding.       Alana with Dr Leiva office called/they donot accept medicaid healthy blue...

## 2023-04-06 ENCOUNTER — OFFICE VISIT (OUTPATIENT)
Dept: FAMILY MEDICINE | Facility: CLINIC | Age: 38
End: 2023-04-06
Payer: MEDICAID

## 2023-04-06 VITALS
WEIGHT: 268 LBS | HEART RATE: 93 BPM | SYSTOLIC BLOOD PRESSURE: 144 MMHG | RESPIRATION RATE: 20 BRPM | TEMPERATURE: 97 F | DIASTOLIC BLOOD PRESSURE: 90 MMHG | BODY MASS INDEX: 40.62 KG/M2 | HEIGHT: 68 IN

## 2023-04-06 DIAGNOSIS — R63.2 BINGE EATING: ICD-10-CM

## 2023-04-06 DIAGNOSIS — F90.9 ATTENTION DEFICIT HYPERACTIVITY DISORDER (ADHD), UNSPECIFIED ADHD TYPE: ICD-10-CM

## 2023-04-06 DIAGNOSIS — E66.01 CLASS 3 SEVERE OBESITY WITH BODY MASS INDEX (BMI) OF 40.0 TO 44.9 IN ADULT, UNSPECIFIED OBESITY TYPE, UNSPECIFIED WHETHER SERIOUS COMORBIDITY PRESENT: ICD-10-CM

## 2023-04-06 PROBLEM — E66.813 CLASS 3 SEVERE OBESITY WITH BODY MASS INDEX (BMI) OF 40.0 TO 44.9 IN ADULT: Status: ACTIVE | Noted: 2023-04-06

## 2023-04-06 PROCEDURE — 99401 PR PREVENT COUNSEL,INDIV,15 MIN: ICD-10-PCS | Mod: ,,, | Performed by: NURSE PRACTITIONER

## 2023-04-06 PROCEDURE — 1159F MED LIST DOCD IN RCRD: CPT | Mod: CPTII,,, | Performed by: NURSE PRACTITIONER

## 2023-04-06 PROCEDURE — 99213 PR OFFICE/OUTPT VISIT, EST, LEVL III, 20-29 MIN: ICD-10-PCS | Mod: 25,,, | Performed by: NURSE PRACTITIONER

## 2023-04-06 PROCEDURE — 99213 OFFICE O/P EST LOW 20 MIN: CPT | Mod: 25,,, | Performed by: NURSE PRACTITIONER

## 2023-04-06 PROCEDURE — 3008F BODY MASS INDEX DOCD: CPT | Mod: CPTII,,, | Performed by: NURSE PRACTITIONER

## 2023-04-06 PROCEDURE — 3077F SYST BP >= 140 MM HG: CPT | Mod: CPTII,,, | Performed by: NURSE PRACTITIONER

## 2023-04-06 PROCEDURE — 1159F PR MEDICATION LIST DOCUMENTED IN MEDICAL RECORD: ICD-10-PCS | Mod: CPTII,,, | Performed by: NURSE PRACTITIONER

## 2023-04-06 PROCEDURE — 99401 PREV MED CNSL INDIV APPRX 15: CPT | Mod: ,,, | Performed by: NURSE PRACTITIONER

## 2023-04-06 PROCEDURE — 3080F PR MOST RECENT DIASTOLIC BLOOD PRESSURE >= 90 MM HG: ICD-10-PCS | Mod: CPTII,,, | Performed by: NURSE PRACTITIONER

## 2023-04-06 PROCEDURE — 3077F PR MOST RECENT SYSTOLIC BLOOD PRESSURE >= 140 MM HG: ICD-10-PCS | Mod: CPTII,,, | Performed by: NURSE PRACTITIONER

## 2023-04-06 PROCEDURE — 3008F PR BODY MASS INDEX (BMI) DOCUMENTED: ICD-10-PCS | Mod: CPTII,,, | Performed by: NURSE PRACTITIONER

## 2023-04-06 PROCEDURE — 3080F DIAST BP >= 90 MM HG: CPT | Mod: CPTII,,, | Performed by: NURSE PRACTITIONER

## 2023-04-06 RX ORDER — LISDEXAMFETAMINE DIMESYLATE CAPSULES 20 MG/1
20 CAPSULE ORAL EVERY MORNING
Qty: 30 CAPSULE | Refills: 0 | Status: SHIPPED | OUTPATIENT
Start: 2023-04-06 | End: 2023-05-03 | Stop reason: DRUGHIGH

## 2023-04-06 NOTE — PROGRESS NOTES
"Subjective:       Patient ID: Kayla Clay is a 37 y.o. female.    Chief Complaint: ADHD (Evaluation for ADHD) and Binge Eating      The patient is a 37-year-old female who presents for evaluation of ADHD.  The patient filled out the scales and the responses are indicative of ADHD.      The patient is binge eating and gained 48 lb in 5 months.  I told the patient I can not manage weight gain but I can give her copy a weight loss diet.    Review of [id number] point review of systems conducted, negative except as stated in the history of present illness. See HPI for details.      Objective:      Visit Vitals  BP (!) 144/93   Pulse 93   Temp 97.3 °F (36.3 °C) (Temporal)   Resp 20   Ht 5' 8" (1.727 m)   Wt 121.6 kg (268 lb)   LMP 2023   BMI 40.75 kg/m²     Physical Exam  Vitals and nursing note reviewed.   Constitutional:       Appearance: She is obese.   HENT:      Head: Normocephalic.      Right Ear: Tympanic membrane normal.      Left Ear: Tympanic membrane normal.      Nose: Nose normal.      Mouth/Throat:      Mouth: Mucous membranes are moist.   Eyes:      Extraocular Movements: Extraocular movements intact.   Cardiovascular:      Rate and Rhythm: Normal rate and regular rhythm.      Heart sounds: No murmur heard.  Pulmonary:      Effort: Pulmonary effort is normal.      Breath sounds: Normal breath sounds.   Abdominal:      General: Bowel sounds are normal.      Palpations: Abdomen is soft.   Musculoskeletal:         General: Normal range of motion.      Cervical back: Normal range of motion and neck supple.   Skin:     General: Skin is warm and dry.   Neurological:      Mental Status: She is alert and oriented to person, place, and time.     Current Outpatient Medications   Medication Instructions    baclofen (LIORESAL) 20 mg, Oral, 2 times daily    ciprofloxacin HCl (CIPRO) 500 MG tablet SMARTSI Tablet(s) By Mouth Every 12 Hours    doxycycline (VIBRA-TABS) 100 MG tablet No dose, route, or frequency " recorded.    EScitalopram oxalate (LEXAPRO) 20 mg, Oral, Daily    fluconazole (DIFLUCAN) 200 MG Tab No dose, route, or frequency recorded.    gabapentin (NEURONTIN) 300 mg, Oral, 2 times daily    lisdexamfetamine (VYVANSE) 20 mg, Oral, Every morning    loratadine (CLARITIN) 10 mg, Oral, Daily    traMADoL (ULTRAM) 50 mg, Oral, Every 6 hours PRN     is allergic to acetaminophen and doxycycline.       Assessment:         ICD-10-CM ICD-9-CM   1. Attention deficit hyperactivity disorder (ADHD), unspecified ADHD type  F90.9 314.01   2. Binge eating  R63.2 783.6   3. Class 3 severe obesity with body mass index (BMI) of 40.0 to 44.9 in adult, unspecified obesity type, unspecified whether serious comorbidity present  E66.01 278.01    Z68.41 V85.41          Plan:       1. Attention deficit hyperactivity disorder (ADHD), unspecified ADHD type  We will start the patient on Vyvanse 20 mg  We will follow protocol and have her return in a month to evaluate effectiveness of medication  We will perform a urine drug screen upon return    2. Binge eating  Patient instructed to follow the weight loss diet    3. Obesity  Obesity  Reduced calorie diet modification  Frequent self weighing discussed  Exercise/lifestyle modification  8 minutes spent counseling    Call the office with any questions or concerns        Follow up in about 4 weeks (around 5/4/2023).     Future Appointments   Date Time Provider Department Center   5/4/2023 10:30 AM Cee Mccauley NP [facility] [place]   5/17/2023 12:00 PM HEIKE García [facility]   5/26/2023 10:00 AM Charity Davies PA-C [facility] [place]   7/3/2023 10:00 AM Terry Roberts MD [facility] [place]

## 2023-04-17 ENCOUNTER — PATIENT MESSAGE (OUTPATIENT)
Dept: ADMINISTRATIVE | Facility: HOSPITAL | Age: 38
End: 2023-04-17
Payer: MEDICAID

## 2023-05-03 ENCOUNTER — OFFICE VISIT (OUTPATIENT)
Dept: FAMILY MEDICINE | Facility: CLINIC | Age: 38
End: 2023-05-03
Payer: MEDICAID

## 2023-05-03 VITALS
BODY MASS INDEX: 39.56 KG/M2 | HEIGHT: 68 IN | DIASTOLIC BLOOD PRESSURE: 87 MMHG | HEART RATE: 79 BPM | TEMPERATURE: 97 F | SYSTOLIC BLOOD PRESSURE: 137 MMHG | WEIGHT: 261 LBS | RESPIRATION RATE: 20 BRPM

## 2023-05-03 DIAGNOSIS — F90.9 ATTENTION DEFICIT HYPERACTIVITY DISORDER (ADHD), UNSPECIFIED ADHD TYPE: Primary | ICD-10-CM

## 2023-05-03 PROCEDURE — 3008F PR BODY MASS INDEX (BMI) DOCUMENTED: ICD-10-PCS | Mod: CPTII,,, | Performed by: NURSE PRACTITIONER

## 2023-05-03 PROCEDURE — 3075F PR MOST RECENT SYSTOLIC BLOOD PRESS GE 130-139MM HG: ICD-10-PCS | Mod: CPTII,,, | Performed by: NURSE PRACTITIONER

## 2023-05-03 PROCEDURE — 3079F PR MOST RECENT DIASTOLIC BLOOD PRESSURE 80-89 MM HG: ICD-10-PCS | Mod: CPTII,,, | Performed by: NURSE PRACTITIONER

## 2023-05-03 PROCEDURE — 3008F BODY MASS INDEX DOCD: CPT | Mod: CPTII,,, | Performed by: NURSE PRACTITIONER

## 2023-05-03 PROCEDURE — 1159F PR MEDICATION LIST DOCUMENTED IN MEDICAL RECORD: ICD-10-PCS | Mod: CPTII,,, | Performed by: NURSE PRACTITIONER

## 2023-05-03 PROCEDURE — 3079F DIAST BP 80-89 MM HG: CPT | Mod: CPTII,,, | Performed by: NURSE PRACTITIONER

## 2023-05-03 PROCEDURE — 99213 OFFICE O/P EST LOW 20 MIN: CPT | Mod: ,,, | Performed by: NURSE PRACTITIONER

## 2023-05-03 PROCEDURE — 1159F MED LIST DOCD IN RCRD: CPT | Mod: CPTII,,, | Performed by: NURSE PRACTITIONER

## 2023-05-03 PROCEDURE — 99213 PR OFFICE/OUTPT VISIT, EST, LEVL III, 20-29 MIN: ICD-10-PCS | Mod: ,,, | Performed by: NURSE PRACTITIONER

## 2023-05-03 PROCEDURE — 3075F SYST BP GE 130 - 139MM HG: CPT | Mod: CPTII,,, | Performed by: NURSE PRACTITIONER

## 2023-05-03 RX ORDER — LISDEXAMFETAMINE DIMESYLATE 30 MG/1
30 CAPSULE ORAL EVERY MORNING
Qty: 30 CAPSULE | Refills: 0 | Status: SHIPPED | OUTPATIENT
Start: 2023-05-03 | End: 2023-06-05 | Stop reason: SDUPTHER

## 2023-05-03 NOTE — PROGRESS NOTES
"Subjective:       Patient ID: Kayla Clay is a 37 y.o. female.    Chief Complaint: ADHD (1 month follow up)      The patient is a 37 year female presents for ADHD follow-up.  The patient states that the medication is wearing around 2:00 a.m. in the afternoon.  The patient states she notices a loss concentration and focus.    Review of [id number] point review of systems conducted, negative except as stated in the history of present illness. See HPI for details.      Objective:      Visit Vitals  /87   Pulse 79   Temp 97.2 °F (36.2 °C) (Temporal)   Resp 20   Ht 5' 8" (1.727 m)   Wt 118.4 kg (261 lb)   LMP 04/25/2023   BMI 39.68 kg/m²     Physical Exam  Vitals and nursing note reviewed.   Constitutional:       Appearance: She is obese.   HENT:      Head: Normocephalic.      Right Ear: Tympanic membrane normal.      Left Ear: Tympanic membrane normal.      Nose: Nose normal.      Mouth/Throat:      Mouth: Mucous membranes are moist.   Eyes:      Extraocular Movements: Extraocular movements intact.   Cardiovascular:      Rate and Rhythm: Normal rate and regular rhythm.      Heart sounds: No murmur heard.  Pulmonary:      Effort: Pulmonary effort is normal.      Breath sounds: Normal breath sounds.   Abdominal:      General: Bowel sounds are normal.      Palpations: Abdomen is soft.   Musculoskeletal:         General: Normal range of motion.      Cervical back: Normal range of motion and neck supple.   Skin:     General: Skin is warm and dry.   Neurological:      Mental Status: She is alert and oriented to person, place, and time.     Current Outpatient Medications   Medication Instructions    EScitalopram oxalate (LEXAPRO) 20 mg, Oral, Daily    gabapentin (NEURONTIN) 300 mg, Oral, 2 times daily    lisdexamfetamine (VYVANSE) 30 mg, Oral, Every morning    loratadine (CLARITIN) 10 mg, Oral, Daily     is allergic to acetaminophen and doxycycline.       Assessment:         ICD-10-CM ICD-9-CM   1. Attention deficit " hyperactivity disorder (ADHD), unspecified ADHD type  F90.9 314.01          Plan:       1. Attention deficit hyperactivity disorder (ADHD), unspecified ADHD type  Increase the dose of medication from Vyvanse 20 mg to Vyvanse 30 oral daily  - POCT Urine Drug Screen (With BUP)  Call the office if this dose is not effective.              Follow up in about 3 months (around 8/3/2023).     Future Appointments   Date Time Provider Department Center   5[facility]17[facility]2023 12:[facility]   5/26[facility]2023 10:00 [facility]   6/15/2023  9:30 AM WALDEMAR Weber   7/3/2023 10:00 AM MD KURT Galindo [facility] [place]   8/2/2023  9:30 AM WALDEMAR Weber

## 2023-05-26 ENCOUNTER — OFFICE VISIT (OUTPATIENT)
Dept: NEUROSURGERY | Facility: CLINIC | Age: 38
End: 2023-05-26
Payer: MEDICAID

## 2023-05-26 VITALS
HEIGHT: 68 IN | BODY MASS INDEX: 39.4 KG/M2 | WEIGHT: 260 LBS | DIASTOLIC BLOOD PRESSURE: 88 MMHG | SYSTOLIC BLOOD PRESSURE: 126 MMHG

## 2023-05-26 DIAGNOSIS — G06.2 EPIDURAL ABSCESS: ICD-10-CM

## 2023-05-26 DIAGNOSIS — M46.46 SEPTIC DISCITIS OF LUMBAR REGION: ICD-10-CM

## 2023-05-26 DIAGNOSIS — Z98.890 STATUS POST LUMBAR LAMINECTOMY: Primary | ICD-10-CM

## 2023-05-26 DIAGNOSIS — M51.36 DEGENERATIVE DISC DISEASE, LUMBAR: ICD-10-CM

## 2023-05-26 PROCEDURE — 3074F SYST BP LT 130 MM HG: CPT | Mod: CPTII,,, | Performed by: NEUROLOGICAL SURGERY

## 2023-05-26 PROCEDURE — 1159F MED LIST DOCD IN RCRD: CPT | Mod: CPTII,,, | Performed by: NEUROLOGICAL SURGERY

## 2023-05-26 PROCEDURE — 3008F BODY MASS INDEX DOCD: CPT | Mod: CPTII,,, | Performed by: NEUROLOGICAL SURGERY

## 2023-05-26 PROCEDURE — 1159F PR MEDICATION LIST DOCUMENTED IN MEDICAL RECORD: ICD-10-PCS | Mod: CPTII,,, | Performed by: NEUROLOGICAL SURGERY

## 2023-05-26 PROCEDURE — 99213 OFFICE O/P EST LOW 20 MIN: CPT | Mod: PBBFAC | Performed by: NEUROLOGICAL SURGERY

## 2023-05-26 PROCEDURE — 3008F PR BODY MASS INDEX (BMI) DOCUMENTED: ICD-10-PCS | Mod: CPTII,,, | Performed by: NEUROLOGICAL SURGERY

## 2023-05-26 PROCEDURE — 3079F PR MOST RECENT DIASTOLIC BLOOD PRESSURE 80-89 MM HG: ICD-10-PCS | Mod: CPTII,,, | Performed by: NEUROLOGICAL SURGERY

## 2023-05-26 PROCEDURE — 99213 OFFICE O/P EST LOW 20 MIN: CPT | Mod: S$PBB,,, | Performed by: NEUROLOGICAL SURGERY

## 2023-05-26 PROCEDURE — 3079F DIAST BP 80-89 MM HG: CPT | Mod: CPTII,,, | Performed by: NEUROLOGICAL SURGERY

## 2023-05-26 PROCEDURE — 99999 PR PBB SHADOW E&M-EST. PATIENT-LVL III: CPT | Mod: PBBFAC,,, | Performed by: NEUROLOGICAL SURGERY

## 2023-05-26 PROCEDURE — 3074F PR MOST RECENT SYSTOLIC BLOOD PRESSURE < 130 MM HG: ICD-10-PCS | Mod: CPTII,,, | Performed by: NEUROLOGICAL SURGERY

## 2023-05-26 PROCEDURE — 99999 PR PBB SHADOW E&M-EST. PATIENT-LVL III: ICD-10-PCS | Mod: PBBFAC,,, | Performed by: NEUROLOGICAL SURGERY

## 2023-05-26 PROCEDURE — 99213 PR OFFICE/OUTPT VISIT, EST, LEVL III, 20-29 MIN: ICD-10-PCS | Mod: S$PBB,,, | Performed by: NEUROLOGICAL SURGERY

## 2023-05-26 RX ORDER — GABAPENTIN 300 MG/1
300 CAPSULE ORAL 3 TIMES DAILY
Qty: 90 CAPSULE | Refills: 11 | Status: SHIPPED | OUTPATIENT
Start: 2023-05-26 | End: 2023-09-19 | Stop reason: ALTCHOICE

## 2023-05-26 RX ORDER — BACLOFEN 10 MG/1
10 TABLET ORAL 3 TIMES DAILY
Qty: 90 TABLET | Refills: 11 | Status: SHIPPED | OUTPATIENT
Start: 2023-05-26 | End: 2024-05-25

## 2023-05-26 NOTE — PROGRESS NOTES
Subjective:      Patient ID: Kayla Clya is a 38 y.o. female.    Chief Complaint: Follow-up (Patient is here today for follow up MRI. Patient c/o lower back pain radiating into the buttocks and numbness in bilateral legs.Pain worsens with sitting and standing.  Patient rates her pain as a 5/10. She is taking Gabapentin but needs a RF. )    Pt here for follow up  S/p lumbar laminectomy for epidural abscess 8/2022  Hx of IVDA   Since then she has finished abx   Pain rated 5/10 bilateral le symptoms   Denies weakness   Ambulates unassisted   Was taking gabapentin and baclofen which helped but she ran out of medication   Currently doing outpatient PT     Here with a MR lumbar spine from outside facility  Showing resolution of epidural abscess   She has underlying DDD/ thoracolumbar scoliosis       Review of Systems   Constitutional:  Negative for activity change, appetite change and chills.   HENT:  Negative for hearing loss, sore throat and tinnitus.    Eyes:  Negative for pain, discharge and itching.   Cardiovascular:  Negative for chest pain.   Gastrointestinal:  Negative for abdominal pain.   Endocrine: Negative for cold intolerance and heat intolerance.   Genitourinary:  Negative for difficulty urinating and dysuria.   Musculoskeletal:  Positive for back pain and gait problem.   Allergic/Immunologic: Negative for environmental allergies.   Neurological:  Positive for weakness. Negative for dizziness, tremors, light-headedness and headaches.   Hematological:  Negative for adenopathy.   Psychiatric/Behavioral:  Negative for agitation, behavioral problems and confusion.        Objective:       Physical Exam:    Constitutional: She appears well-developed and well-nourished. No distress.     Eyes: Pupils are equal, round, and reactive to light. EOM are normal.     Cardiovascular: Normal rate.     Abdominal: Soft.     Skin: Skin displays no rash on trunk.     Psych/Behavior: She is alert. She is oriented to person,  place, and time. She has a normal mood and affect.     Musculoskeletal:        Neck: Range of motion is full. Muscle strength is 5/5. Tone is normal.        Back: Range of motion is limited. There is tenderness. Muscle strength is 5/5. Tone is normal.        Right Upper Extremities: There is no tenderness. Tone is normal.        Left Upper Extremities: There is no tenderness. Tone is normal.       Right Lower Extremities: There is no tenderness.        Left Lower Extremities: There is no tenderness.     Neurological:        Sensory: There is no sensory deficit in the trunk. There is no sensory deficit in the extremities.        DTRs: DTRs are normal.        Cranial nerves: Cranial nerve(s) II, III, IV, V, VI, VII, VIII, IX, X, XI and XII are intact.   General    Constitutional: She is oriented to person, place, and time. She appears well-developed and well-nourished. No distress.   Eyes: EOM are normal. Pupils are equal, round, and reactive to light.   Cardiovascular:  Normal rate.            Abdominal: Soft.   Neurological: She is alert and oriented to person, place, and time.   Psychiatric: She has a normal mood and affect.           Ortho Exam        See MRI lumbar results as interpreted in HPI   Radiology report located in the media folder     Assessment:     1. Status post lumbar laminectomy    2. Septic discitis of lumbar region    3. Epidural abscess    4. Degenerative disc disease, lumbar      Plan:     Status post lumbar laminectomy    Septic discitis of lumbar region    Epidural abscess    Degenerative disc disease, lumbar    Other orders  -     baclofen (LIORESAL) 10 MG tablet; Take 1 tablet (10 mg total) by mouth 3 (three) times daily.  Dispense: 90 tablet; Refill: 11  -     gabapentin (NEURONTIN) 300 MG capsule; Take 1 capsule (300 mg total) by mouth 3 (three) times daily.  Dispense: 90 capsule; Refill: 11    Post op lumbar laminectomy for epidural abscess   MR showing resolution of abscess   She has  underlying DDD with scoliosis   I recommend medications to take for symptom relief   Continue outpatient PT   RTC as needed     Thank you for the referral   Please call with any questions    Floyd Brewer MD  Neurosurgery     Disclaimer: This note was prepared using a voice recognition system and is likely to have sound alike errors within the text.

## 2023-06-01 ENCOUNTER — TELEPHONE (OUTPATIENT)
Dept: NEUROSURGERY | Facility: CLINIC | Age: 38
End: 2023-06-01
Payer: MEDICAID

## 2023-06-01 ENCOUNTER — OFFICE VISIT (OUTPATIENT)
Dept: FAMILY MEDICINE | Facility: CLINIC | Age: 38
End: 2023-06-01
Payer: MEDICAID

## 2023-06-01 VITALS
TEMPERATURE: 97 F | SYSTOLIC BLOOD PRESSURE: 158 MMHG | BODY MASS INDEX: 39.25 KG/M2 | WEIGHT: 259 LBS | RESPIRATION RATE: 20 BRPM | HEART RATE: 78 BPM | HEIGHT: 68 IN | DIASTOLIC BLOOD PRESSURE: 90 MMHG

## 2023-06-01 DIAGNOSIS — R29.898 WEAKNESS OF BOTH LOWER EXTREMITIES: ICD-10-CM

## 2023-06-01 DIAGNOSIS — E66.01 CLASS 2 SEVERE OBESITY DUE TO EXCESS CALORIES WITH SERIOUS COMORBIDITY IN ADULT, UNSPECIFIED BMI: ICD-10-CM

## 2023-06-01 DIAGNOSIS — F41.9 ANXIETY: Chronic | ICD-10-CM

## 2023-06-01 DIAGNOSIS — M54.50 LUMBAR PAIN: ICD-10-CM

## 2023-06-01 DIAGNOSIS — I10 HYPERTENSION, UNSPECIFIED TYPE: Chronic | ICD-10-CM

## 2023-06-01 PROBLEM — E66.812 CLASS 2 OBESITY DUE TO EXCESS CALORIES IN ADULT: Status: ACTIVE | Noted: 2023-04-06

## 2023-06-01 PROBLEM — E66.09 CLASS 2 OBESITY DUE TO EXCESS CALORIES IN ADULT: Status: ACTIVE | Noted: 2023-04-06

## 2023-06-01 PROCEDURE — 99213 OFFICE O/P EST LOW 20 MIN: CPT | Mod: ,,, | Performed by: NURSE PRACTITIONER

## 2023-06-01 PROCEDURE — 3080F PR MOST RECENT DIASTOLIC BLOOD PRESSURE >= 90 MM HG: ICD-10-PCS | Mod: CPTII,,, | Performed by: NURSE PRACTITIONER

## 2023-06-01 PROCEDURE — 1159F MED LIST DOCD IN RCRD: CPT | Mod: CPTII,,, | Performed by: NURSE PRACTITIONER

## 2023-06-01 PROCEDURE — 3008F BODY MASS INDEX DOCD: CPT | Mod: CPTII,,, | Performed by: NURSE PRACTITIONER

## 2023-06-01 PROCEDURE — 1159F PR MEDICATION LIST DOCUMENTED IN MEDICAL RECORD: ICD-10-PCS | Mod: CPTII,,, | Performed by: NURSE PRACTITIONER

## 2023-06-01 PROCEDURE — 3080F DIAST BP >= 90 MM HG: CPT | Mod: CPTII,,, | Performed by: NURSE PRACTITIONER

## 2023-06-01 PROCEDURE — 3077F SYST BP >= 140 MM HG: CPT | Mod: CPTII,,, | Performed by: NURSE PRACTITIONER

## 2023-06-01 PROCEDURE — 3077F PR MOST RECENT SYSTOLIC BLOOD PRESSURE >= 140 MM HG: ICD-10-PCS | Mod: CPTII,,, | Performed by: NURSE PRACTITIONER

## 2023-06-01 PROCEDURE — 99213 PR OFFICE/OUTPT VISIT, EST, LEVL III, 20-29 MIN: ICD-10-PCS | Mod: ,,, | Performed by: NURSE PRACTITIONER

## 2023-06-01 PROCEDURE — 3008F PR BODY MASS INDEX (BMI) DOCUMENTED: ICD-10-PCS | Mod: CPTII,,, | Performed by: NURSE PRACTITIONER

## 2023-06-01 NOTE — PROGRESS NOTES
"Subjective:       Patient ID: Kayla Clay is a 38 y.o. female.    Chief Complaint: Physical Exam (Physical exam for disability)    The patient is a 38-year-old female who presents for low back pain that she states is causing disability for her.  She just saw her neurosurgeon on May 26 and the surgeon wrote that she walks unassisted, is attending physical therapy, and states that the baclofen and gabapentin help her.    The patient's blood pressure is high this morning.  The patient is not taking any medication for her blood pressure.  The patient states she checks it at home and it is around 130/80.    The patient is complaining of pain to low back and weakness to lower extremities.  The patient states she does use a cane but she just does not have it with her today.  The patient went to see a neurosurgeon on 05/26.  The patient is post laminectomy and spinal abscess.  Patient states her level of pain is 5/10 all the time.  Patient is on baclofen and gabapentin which she says does help "some".  Patient is currently getting physical therapy.      Review of Systems 12 point review of systems conducted, negative except as stated in the history of present illness. See HPI for details.        Objective:        Visit Vitals  BP (!) 158/90   Pulse 78   Temp 97.2 °F (36.2 °C) (Temporal)   Resp 20   Ht 5' 8" (1.727 m)   Wt 117.5 kg (259 lb)   LMP 04/25/2023   BMI 39.38 kg/m²        Physical Exam  Vitals and nursing note reviewed.   Constitutional:       Appearance: She is obese.   HENT:      Head: Normocephalic.      Right Ear: Tympanic membrane normal.      Left Ear: Tympanic membrane normal.      Nose: Nose normal.      Mouth/Throat:      Mouth: Mucous membranes are moist.   Eyes:      Extraocular Movements: Extraocular movements intact.   Cardiovascular:      Rate and Rhythm: Normal rate and regular rhythm.      Heart sounds: No murmur heard.  Pulmonary:      Effort: Pulmonary effort is normal.      Breath sounds: " Normal breath sounds.   Abdominal:      General: Bowel sounds are normal.      Palpations: Abdomen is soft.   Musculoskeletal:         General: Normal range of motion.      Cervical back: Normal range of motion.   Skin:     General: Skin is warm.   Neurological:      Mental Status: She is alert and oriented to person, place, and time.         Assessment:       Lumbar pain  Weakness of both lower extremities  Anxiety  /2 obesity due to excess calories with serious comorbidity an adult, BMI unspecified  Hypertension      Plan:       1. Lumbar pain  I filled out paperwork for the patient  She is blind for disability  Continue baclofen and gabapentin  Continue physical therapy    2. Weakness of both lower extremities  Continue physical therapy    3. Anxiety  Continue Lexapro    4. Class 2 severe obesity due to excess calories with serious comorbidity in adult, unspecified BMI  Obesity  Reduced calorie diet modification  Frequent self weighing discussed  Exercise/lifestyle modification    5. Hypertension, unspecified type  Keep a log of blood pressure readings for 1 week and deliver to our office  Instructed patient she may to be on blood pressure medication        Follow up if symptoms worsen or fail to improve.     Future Appointments   Date Time Provider Department Center   7/3/2023 10:00 AM Terry Roberts MD [facility] [place]   7/6/2023 10:30 AM WALDEMAR Weber   7/17/2023  8:00 AM HEIKE García [facility]   8/2/2023  9:30 AM WALDEMAR Weber        Past Medical History:   Diagnosis Date    Epidural abscess     Generalized anxiety disorder     Hypertension     Obesity, unspecified         Review of patient's allergies indicates:   Allergen Reactions    Acetaminophen Hives    Doxycycline Rash     Rash on face and arms        Current Outpatient Medications   Medication Instructions    baclofen (LIORESAL) 10 mg, Oral, 3 times daily    EScitalopram  oxalate (LEXAPRO) 20 mg, Oral, Daily    gabapentin (NEURONTIN) 300 mg, Oral, 3 times daily    lisdexamfetamine (VYVANSE) 30 mg, Oral, Every morning    loratadine (CLARITIN) 10 mg, Oral, Daily          Patient Active Problem List   Diagnosis    Hypertension    Anxiety    IVDU (intravenous drug user)    Septic discitis of lumbar region    Rash    Leukopenia    Fungal osteomyelitis    Drug reaction    Depression    Dizziness on standing    Abscess    Insomnia    Sinusitis    Alopecia of scalp    Skin tag    Weakness of both lower extremities    Attention deficit hyperactivity disorder (ADHD)    Binge eating    Class 2 obesity due to excess calories in adult    Lumbar pain             Past Medical History:   Diagnosis Date    Epidural abscess     Generalized anxiety disorder     Hypertension     Obesity, unspecified           Past Surgical History:   Procedure Laterality Date    LUMBAR LAMINECTOMY WITH SURGICAL REMOVAL OF LESION OF SPINAL CORD BY POSTERIOR APPROACH N/A 8/18/2022    Procedure: LAMINECTOMY, SPINE, LUMBAR, POSTERIOR APPROACH, WITH EXCISION OF NEOPLASM OR LESION OF SPINAL CORD;  Surgeon: Floyd Brewer MD;  Location: [facility];  Service: Neurosurgery;  Laterality: N/A;           reports that she has quit smoking. She has never used smokeless tobacco. She reports that she does not currently use alcohol. She reports current drug use. Drugs: Heroin and Methamphetamines.    Immunization History   Administered Date(s) Administered    COVID-19, MRNA, LN-S, PF (MODERNA FULL 0.5 ML DOSE) 01/30/2021, 04/14/2021, 11/19/2021    Influenza - Quadrivalent - PF *Preferred* (6 months and older) 10/01/2021, 10/19/2022        Health Maintenance   Topic Date Due    Lipid Panel  Never done    TETANUS VACCINE  Never done    Hepatitis C Screening  Completed

## 2023-06-01 NOTE — TELEPHONE ENCOUNTER
I spoke with the pt regarding her concerns with her notes form her office visit on 5/26. After reviewing the pt notes I informed her that her office note is consistent with her weakness and having gait concerns. She stated the information was needed for her disability claim, I informed her that her official office notes reflects her concerns outlined above.    Understanding was verbalized      ----- Message from Chanelle Murphy sent at 6/1/2023 11:04 AM CDT -----  Contact: Kayla  Patient is calling to speak with someone regarding visit report. Patient reports receiving visit report and reports comments from provider are different than patient stated and request assistance. Please give patient a call back at [phone] to discuss further.   Thank you,  GH

## 2023-06-05 DIAGNOSIS — F90.9 ATTENTION DEFICIT HYPERACTIVITY DISORDER (ADHD), UNSPECIFIED ADHD TYPE: ICD-10-CM

## 2023-06-06 RX ORDER — LISDEXAMFETAMINE DIMESYLATE 30 MG/1
30 CAPSULE ORAL EVERY MORNING
Qty: 30 CAPSULE | Refills: 0 | Status: SHIPPED | OUTPATIENT
Start: 2023-06-06 | End: 2023-06-30 | Stop reason: SDUPTHER

## 2023-06-13 ENCOUNTER — TELEPHONE (OUTPATIENT)
Dept: FAMILY MEDICINE | Facility: CLINIC | Age: 38
End: 2023-06-13
Payer: MEDICAID

## 2023-06-13 DIAGNOSIS — I10 HYPERTENSION, UNSPECIFIED TYPE: Primary | ICD-10-CM

## 2023-06-13 RX ORDER — LISINOPRIL 10 MG/1
10 TABLET ORAL DAILY
Qty: 90 TABLET | Refills: 3 | Status: SHIPPED | OUTPATIENT
Start: 2023-06-13 | End: 2023-08-30

## 2023-06-13 NOTE — TELEPHONE ENCOUNTER
I called the patient and talked to her about her blood pressure log.  Her blood pressure is running 144/92, 132/94, 143/92.  I told her these blood pressures are too high and I will place her on some blood pressure medication.  I sent in some lisinopril

## 2023-06-26 ENCOUNTER — PATIENT MESSAGE (OUTPATIENT)
Dept: ADMINISTRATIVE | Facility: HOSPITAL | Age: 38
End: 2023-06-26
Payer: MEDICAID

## 2023-06-30 DIAGNOSIS — F90.9 ATTENTION DEFICIT HYPERACTIVITY DISORDER (ADHD), UNSPECIFIED ADHD TYPE: ICD-10-CM

## 2023-06-30 RX ORDER — LISDEXAMFETAMINE DIMESYLATE 30 MG/1
30 CAPSULE ORAL EVERY MORNING
Qty: 30 CAPSULE | Refills: 0 | Status: SHIPPED | OUTPATIENT
Start: 2023-06-30 | End: 2023-08-02

## 2023-06-30 NOTE — TELEPHONE ENCOUNTER
----- Message from Mireille Guajardo sent at 6/30/2023 10:50 AM CDT -----  Refill Vyvanse  [facility]

## 2023-07-05 ENCOUNTER — OFFICE VISIT (OUTPATIENT)
Dept: INFECTIOUS DISEASES | Facility: CLINIC | Age: 38
End: 2023-07-05
Payer: MEDICAID

## 2023-07-05 VITALS
SYSTOLIC BLOOD PRESSURE: 118 MMHG | DIASTOLIC BLOOD PRESSURE: 83 MMHG | HEIGHT: 68 IN | WEIGHT: 253 LBS | RESPIRATION RATE: 18 BRPM | BODY MASS INDEX: 38.34 KG/M2 | OXYGEN SATURATION: 96 % | HEART RATE: 99 BPM

## 2023-07-05 DIAGNOSIS — M46.46 SEPTIC DISCITIS OF LUMBAR REGION: ICD-10-CM

## 2023-07-05 DIAGNOSIS — E66.01 CLASS 2 SEVERE OBESITY DUE TO EXCESS CALORIES WITH SERIOUS COMORBIDITY AND BODY MASS INDEX (BMI) OF 38.0 TO 38.9 IN ADULT: ICD-10-CM

## 2023-07-05 DIAGNOSIS — F41.9 ANXIETY: Chronic | ICD-10-CM

## 2023-07-05 DIAGNOSIS — M54.50 LUMBAR PAIN: Primary | ICD-10-CM

## 2023-07-05 DIAGNOSIS — R29.898 WEAKNESS OF BOTH LOWER EXTREMITIES: ICD-10-CM

## 2023-07-05 PROCEDURE — 3008F BODY MASS INDEX DOCD: CPT | Mod: CPTII,,,

## 2023-07-05 PROCEDURE — 99213 OFFICE O/P EST LOW 20 MIN: CPT | Mod: S$PBB,,,

## 2023-07-05 PROCEDURE — 99213 PR OFFICE/OUTPT VISIT, EST, LEVL III, 20-29 MIN: ICD-10-PCS | Mod: S$PBB,,,

## 2023-07-05 PROCEDURE — 99999 PR PBB SHADOW E&M-EST. PATIENT-LVL III: CPT | Mod: PBBFAC,,,

## 2023-07-05 PROCEDURE — 3079F PR MOST RECENT DIASTOLIC BLOOD PRESSURE 80-89 MM HG: ICD-10-PCS | Mod: CPTII,,,

## 2023-07-05 PROCEDURE — 3079F DIAST BP 80-89 MM HG: CPT | Mod: CPTII,,,

## 2023-07-05 PROCEDURE — 4010F ACE/ARB THERAPY RXD/TAKEN: CPT | Mod: CPTII,,,

## 2023-07-05 PROCEDURE — 99213 OFFICE O/P EST LOW 20 MIN: CPT | Mod: PBBFAC

## 2023-07-05 PROCEDURE — 3074F SYST BP LT 130 MM HG: CPT | Mod: CPTII,,,

## 2023-07-05 PROCEDURE — 99999 PR PBB SHADOW E&M-EST. PATIENT-LVL III: ICD-10-PCS | Mod: PBBFAC,,,

## 2023-07-05 PROCEDURE — 3074F PR MOST RECENT SYSTOLIC BLOOD PRESSURE < 130 MM HG: ICD-10-PCS | Mod: CPTII,,,

## 2023-07-05 PROCEDURE — 4010F PR ACE/ARB THEARPY RXD/TAKEN: ICD-10-PCS | Mod: CPTII,,,

## 2023-07-05 PROCEDURE — 3008F PR BODY MASS INDEX (BMI) DOCUMENTED: ICD-10-PCS | Mod: CPTII,,,

## 2023-07-05 NOTE — PROGRESS NOTES
Subjective:       Patient ID: Kayla Clay 38 y.o.     Chief Complaint:   Chief Complaint   Patient presents with    3mnth f/u Fungal osteomyelitis        HPI:  Hospital consultation 09/30/2022:  29-year-old female patient known to have a past medical history significant for active IVDU (drug of choice is methamphetamine), complicated by more epidural abscess status post washout and laminectomy who presented for continuation of IV antibiotics, ID is consulted for assistance and antibiotics management.  1. Epidural abscess  2. Candida albicans abscess  3. Surgical site swelling  4. IVDU  5. Rash  6. Leukopenia  -patient's major risk factor is IVDU, Candida albicans found in operative cultures on 08/18/2022, unfortunately this will be a difficult infection to treat, likely requiring prolonged period of anti fungal, it is also likely that this is a polymicrobial infection, the patient had been on at least 3-4 days of IV antibiotics prior to surgery being done, it is therefore likely that other pathogens may have been partly responsible for epidural abscess however masked by use of IV antibiotics    -continue ceftriaxone 2g IV q12h  -Continue Daptomycin 8mg/Kg IV q24h  -Continue fluconazole 400 mg p.o. Q 24 hours   -patient will require at least 6 weeks antibiotics from day of surgery 08/18/2022   -anticipated end date 09/29/2022   -following which would transition the patient to doxycycline 100 mg p.o. Q 12 hours and fluconazole 200 mg p.o. Q 24 hours  -will likely need this p.o. tail of antibiotics for at least 3 additional months depending on her clinical progression    12/28/2022:  Doing well, no residual pain, no fevers, no chills, completed Fluconazole 3 months course 2 weeks ago. Neurosurgery has scheduled a follow up MRI but otherwise she is doing very well.     03/30/2023:  Patient presents today with no new infectious concerns but has requested evaluation due to ongoing lower extremity weakness and  reported inability to be seen by her neurosurgeons until May. She is concerned that she is not improving and her lower extremities have been weak to the point where she is unable to perform her daily tasks and she has significantly limited mobility. This has not been necessarily worsening but certainly not improving, she does have some neuropathic pain in he r back and down her legs which occurs sporadically and she used to be on gabapentin and baclofen for and she thinks this was helping. She also was using a walker and other assistance devices while in hospital but has not been able to get help with this as outpatient.     She is very overwhelmed and crying int he room, she is accompanied by her mother. Patient reports feelings of doom and feels like she will die in the next few years if this continues. She is on Escitalopram but does not think this is helping. She had an MRI done in early 02/2023 which has no evidence of ongoing infection, no enhancement and no ongoing inflammatory findings, only post operative changes per the report obtained (scanned in media)    7/5/23:   Ms. Miller presents for follow-up today. She continues to have ongoing lower extremity weakness. She uses a cane for assistance with ambulation. She denies any fevers, chills, nausea, vomiting and diarrhea. She had been working with PT, but hasn't been able to follow-up in the past month. She saw a neurosurgeon on 5/26/23 in which he reports her most recent MRI showed resolution of epidural abscess and degenerative disc disease. No planned interventions at this time.       Past Medical History:   Diagnosis Date    Epidural abscess     Generalized anxiety disorder     Hypertension     Obesity, unspecified         Past Surgical History:   Procedure Laterality Date    LUMBAR LAMINECTOMY WITH SURGICAL REMOVAL OF LESION OF SPINAL CORD BY POSTERIOR APPROACH N/A 8/18/2022    Procedure: LAMINECTOMY, SPINE, LUMBAR, POSTERIOR APPROACH, WITH EXCISION  "OF NEOPLASM OR LESION OF SPINAL CORD;  Surgeon: Floyd Brewer MD;  Location: [facility];  Service: Neurosurgery;  Laterality: N/A;        Social History     Socioeconomic History    Marital status: Single   Tobacco Use    Smoking status: Former    Smokeless tobacco: Never   Substance and Sexual Activity    Alcohol use: Not Currently    Drug use: Yes     Types: Heroin, Methamphetamines    Sexual activity: Yes     Partners: Male        Family History   Problem Relation Age of Onset    No Known Problems Mother     No Known Problems Father         Review of patient's allergies indicates:   Allergen Reactions    Acetaminophen Hives    Doxycycline Rash     Rash on face and arms        Immunization History   Administered Date(s) Administered    COVID-19, MRNA, LN-S, PF (MODERNA FULL 0.5 ML DOSE) 01/30/2021, 04/14/2021, 11/19/2021    Influenza - Quadrivalent - PF *Preferred* (6 months and older) 10/01/2021, 10/19/2022        Review of Systems   All other systems reviewed and are negative.       Objective:      /83 (BP Location: Right arm)   Pulse 99   Resp 18   Ht 5' 8" (1.727 m)   Wt 114.8 kg (253 lb)   SpO2 96%   BMI 38.47 kg/m²      Conducted with presence of Ms. Suni Skinner as Chaperone    Physical Exam  Constitutional:       Appearance: Normal appearance. She is obese.   HENT:      Head: Normocephalic and atraumatic.      Mouth/Throat:      Pharynx: No oropharyngeal exudate or posterior oropharyngeal erythema.   Eyes:      Extraocular Movements: Extraocular movements intact.      Pupils: Pupils are equal, round, and reactive to light.   Cardiovascular:      Rate and Rhythm: Normal rate and regular rhythm.      Heart sounds: No murmur heard.  Pulmonary:      Effort: No respiratory distress.      Breath sounds: No wheezing, rhonchi or rales.   Abdominal:      General: Bowel sounds are normal. There is no distension.      Palpations: Abdomen is soft.      Tenderness: There is no abdominal tenderness. " There is no right CVA tenderness or left CVA tenderness.   Musculoskeletal:         General: No swelling or tenderness.      Cervical back: Neck supple. No rigidity or tenderness.      Comments: Well healed surgical scar at the lumbar area. No tenderness to palpation, no erythema, no swelling.   Lymphadenopathy:      Cervical: No cervical adenopathy.   Skin:     Findings: No lesion or rash.   Neurological:      General: No focal deficit present.      Mental Status: She is alert and oriented to person, place, and time. Mental status is at baseline.      Cranial Nerves: No cranial nerve deficit.      Motor: Weakness (bilateral lower extremities) present.   Psychiatric:         Mood and Affect: Mood normal.         Behavior: Behavior normal.        Labs: Reviewed most recent relevant labs available, notable results highlighted in this note    Imaging: Reviewed most recent relevant imaging studies available, notable results highlighted in this note    Assessment:       Problem List Items Addressed This Visit          Psychiatric    Anxiety (Chronic)       ID    Septic discitis of lumbar region       Endocrine    Class 2 obesity due to excess calories in adult       Orthopedic    Weakness of both lower extremities    Lumbar pain - Primary              Plan:       -completed 6 weeks of IV antibiotics, empirically since initial cultures were negative Ceftriaxone and Daptomycin, completed 3 months of Fluconazole high dose at 400mg PO q24h given Candida albicans positive cultures  -Did not tolerate Vancomycin or Doxycycline  -MRI with no findings significant for residual infection  -Neurosurgery without any interventions planned at this time. MRI with resolution of epidural abscess.   -Continue to work with PT to work on gait strength.  -No need for ongoing infectious workup at this time. I believe her symptoms are unfortunately all residual side effects from her previous infection.   -Will follow-up in clinic as needed.    -Instructed patient that if she is to develop fever, chills, significant weight loss, worsening back pain, or bowel and bladder incontinence to report to the ER for further workup.        Follow up if symptoms worsen or fail to improve.

## 2023-07-06 ENCOUNTER — OFFICE VISIT (OUTPATIENT)
Dept: FAMILY MEDICINE | Facility: CLINIC | Age: 38
End: 2023-07-06
Payer: MEDICAID

## 2023-07-06 ENCOUNTER — PATIENT MESSAGE (OUTPATIENT)
Dept: NEUROSURGERY | Facility: CLINIC | Age: 38
End: 2023-07-06
Payer: MEDICAID

## 2023-07-06 VITALS
TEMPERATURE: 97 F | HEIGHT: 68 IN | RESPIRATION RATE: 20 BRPM | HEART RATE: 88 BPM | SYSTOLIC BLOOD PRESSURE: 133 MMHG | WEIGHT: 251 LBS | BODY MASS INDEX: 38.04 KG/M2 | DIASTOLIC BLOOD PRESSURE: 82 MMHG

## 2023-07-06 DIAGNOSIS — Z01.419 WELL WOMAN EXAM WITH ROUTINE GYNECOLOGICAL EXAM: ICD-10-CM

## 2023-07-06 DIAGNOSIS — Z12.4 ROUTINE CERVICAL SMEAR: ICD-10-CM

## 2023-07-06 PROCEDURE — 3075F SYST BP GE 130 - 139MM HG: CPT | Mod: CPTII,,, | Performed by: NURSE PRACTITIONER

## 2023-07-06 PROCEDURE — 3008F BODY MASS INDEX DOCD: CPT | Mod: CPTII,,, | Performed by: NURSE PRACTITIONER

## 2023-07-06 PROCEDURE — 4010F ACE/ARB THERAPY RXD/TAKEN: CPT | Mod: CPTII,,, | Performed by: NURSE PRACTITIONER

## 2023-07-06 PROCEDURE — 3075F PR MOST RECENT SYSTOLIC BLOOD PRESS GE 130-139MM HG: ICD-10-PCS | Mod: CPTII,,, | Performed by: NURSE PRACTITIONER

## 2023-07-06 PROCEDURE — 3008F PR BODY MASS INDEX (BMI) DOCUMENTED: ICD-10-PCS | Mod: CPTII,,, | Performed by: NURSE PRACTITIONER

## 2023-07-06 PROCEDURE — 3079F DIAST BP 80-89 MM HG: CPT | Mod: CPTII,,, | Performed by: NURSE PRACTITIONER

## 2023-07-06 PROCEDURE — 1159F MED LIST DOCD IN RCRD: CPT | Mod: CPTII,,, | Performed by: NURSE PRACTITIONER

## 2023-07-06 PROCEDURE — 4010F PR ACE/ARB THEARPY RXD/TAKEN: ICD-10-PCS | Mod: CPTII,,, | Performed by: NURSE PRACTITIONER

## 2023-07-06 PROCEDURE — 3079F PR MOST RECENT DIASTOLIC BLOOD PRESSURE 80-89 MM HG: ICD-10-PCS | Mod: CPTII,,, | Performed by: NURSE PRACTITIONER

## 2023-07-06 PROCEDURE — 99395 PREV VISIT EST AGE 18-39: CPT | Mod: ,,, | Performed by: NURSE PRACTITIONER

## 2023-07-06 PROCEDURE — 99395 PR PREVENTIVE VISIT,EST,18-39: ICD-10-PCS | Mod: ,,, | Performed by: NURSE PRACTITIONER

## 2023-07-06 PROCEDURE — 1159F PR MEDICATION LIST DOCUMENTED IN MEDICAL RECORD: ICD-10-PCS | Mod: CPTII,,, | Performed by: NURSE PRACTITIONER

## 2023-07-06 NOTE — PROGRESS NOTES
"Subjective:       Patient ID: Kayla Clay is a 38 y.o. female here today for annual wellness visit.     Chief Complaint: Gynecologic Exam (Well woman visit)    The patient is a 38-year-old female  She is   Menses started at 11 years of age  Menstrual cycles are regular and every 28 days  LMP 2023  The patient denies being sexually active and is not using any birth control.  Calcium: Dietary on a multivitamin as supplement  Mammo:  4 years ago normal  Colon cancer screening-never      Review of Systems   Integumentary:  Negative for color change, breast mass, breast discharge and breast tenderness.   Breast: Negative for mass and tenderness 12 point review of systems conducted, negative except as stated in the history of present illness. See HPI for details.        Objective:        Visit Vitals  /82   Pulse 88   Temp 96.7 °F (35.9 °C) (Temporal)   Resp 20   Ht 5' 8" (1.727 m)   Wt 113.9 kg (251 lb)   LMP 2023   BMI 38.16 kg/m²        Physical Exam  Vitals and nursing note reviewed.   Constitutional:       Appearance: She is obese.   HENT:      Head: Normocephalic.      Right Ear: Tympanic membrane normal.      Left Ear: Tympanic membrane normal.      Nose: Nose normal.      Mouth/Throat:      Mouth: Mucous membranes are moist.   Eyes:      Extraocular Movements: Extraocular movements intact.   Cardiovascular:      Rate and Rhythm: Normal rate and regular rhythm.   Pulmonary:      Effort: Pulmonary effort is normal.      Breath sounds: Normal breath sounds.      Comments: Breasts:  S no orange peel appearance, no masses palpated, no tenderness on palpation, and no redness or lesions visible  Abdominal:      Palpations: Abdomen is soft.      Hernia: There is no hernia in the left inguinal area or right inguinal area.   Genitourinary:     General: Normal vulva.      Exam position: Lithotomy position.      Labia:         Right: No rash, tenderness or lesion.         Left: No rash, tenderness " or lesion.       Rectum: Normal.          Comments: External genitalia:  Within normal limits for age.  Labia minora and majora:  No erythema or lesions visible.  Vagina:  Pinkish tissue with normal rugae.  Cervix: Nontender and nonfriable.  Uterus:  Normal mobility.  Uterus is midline and nontender to palpation.  Adnexa:  Nontender to palpation and no masses palpated.  It bladder:  Normal smooth contour without tenderness on palpation.  Groin: No lymphadenopathy.   rectum: Deferred    Musculoskeletal:         General: Normal range of motion.      Cervical back: Normal range of motion and neck supple.   Skin:     General: Skin is warm and dry.   Neurological:      Mental Status: She is alert and oriented to person, place, and time.         Assessment:         ICD-10-CM ICD-9-CM   1. Well woman exam with routine gynecological exam  Z01.419 V72.31   2. Routine cervical smear  Z12.4 V76.2            Plan:       1. Well woman exam with routine gynecological exam  Return to clinic next year for well-woman exam  mammogram ordered  Hemoccult slides x3    2. Routine cervical smear  Call patient with results  - Liquid-Based Pap Smear, Screening Screening    Cervical Cancer Screening - performed today.  Call patient with results  Breast Cancer Screening - Last Mammogram 4 years ago.  One ordered today   Dental Exam - Recommend biannually.  Vaccinations -   Immunization History   Administered Date(s) Administered    COVID-19, MRNA, LN-S, PF (MODERNA FULL 0.5 ML DOSE) 01/30/2021, 04/14/2021, 11/19/2021    Influenza - Quadrivalent - PF *Preferred* (6 months and older) 10/01/2021, 10/19/2022              Follow up in about 1 year (around 7/6/2024) for Well woman exam.     Future Appointments   Date Time Provider Department Center   7/17/2023  8:00 AM HEIKE García [facility]   8/2/2023  9:30 AM Cee Mccauley NP [facility]        Past Medical History:   Diagnosis Date    Epidural abscess      Generalized anxiety disorder     Hypertension     Obesity, unspecified         Review of patient's allergies indicates:   Allergen Reactions    Acetaminophen Hives    Doxycycline Rash     Rash on face and arms        Current Outpatient Medications   Medication Instructions    baclofen (LIORESAL) 10 mg, Oral, 3 times daily    EScitalopram oxalate (LEXAPRO) 20 mg, Oral, Daily    gabapentin (NEURONTIN) 300 mg, Oral, 3 times daily    lisdexamfetamine (VYVANSE) 30 mg, Oral, Every morning    lisinopriL 10 mg, Oral, Daily    loratadine (CLARITIN) 10 mg, Oral, Daily          Patient Active Problem List   Diagnosis    Hypertension    Anxiety    IVDU (intravenous drug user)    Septic discitis of lumbar region    Rash    Leukopenia    Fungal osteomyelitis    Drug reaction    Depression    Dizziness on standing    Abscess    Insomnia    Sinusitis    Alopecia of scalp    Skin tag    Weakness of both lower extremities    Attention deficit hyperactivity disorder (ADHD)    Binge eating    Class 2 obesity due to excess calories in adult    Lumbar pain    Well woman exam with routine gynecological exam             Past Medical History:   Diagnosis Date    Epidural abscess     Generalized anxiety disorder     Hypertension     Obesity, unspecified           Past Surgical History:   Procedure Laterality Date    LUMBAR LAMINECTOMY WITH SURGICAL REMOVAL OF LESION OF SPINAL CORD BY POSTERIOR APPROACH N/A 8/18/2022    Procedure: LAMINECTOMY, SPINE, LUMBAR, POSTERIOR APPROACH, WITH EXCISION OF NEOPLASM OR LESION OF SPINAL CORD;  Surgeon: Floyd Brewer MD;  Location: [facility];  Service: Neurosurgery;  Laterality: N/A;           reports that she has quit smoking. She has never used smokeless tobacco. She reports that she does not currently use alcohol. She reports current drug use. Drugs: Heroin and Methamphetamines.    Health Maintenance   Topic Date Due    Lipid Panel  Never done    TETANUS VACCINE  Never done    Hepatitis C Screening   Completed

## 2023-07-07 DIAGNOSIS — B37.2 YEAST DERMATITIS: Primary | ICD-10-CM

## 2023-07-07 RX ORDER — DOXYLAMINE SUCCINATE 25 MG
TABLET ORAL 2 TIMES DAILY
Qty: 18 G | Refills: 0 | Status: SHIPPED | OUTPATIENT
Start: 2023-07-07 | End: 2023-08-02

## 2023-07-07 NOTE — PROGRESS NOTES
There is a interaction Lexapro and fluconazole.  So I sent in Monistat cream for the yeast infection.

## 2023-07-11 LAB — PSYCHE PATHOLOGY RESULT: NORMAL

## 2023-08-02 ENCOUNTER — OFFICE VISIT (OUTPATIENT)
Dept: FAMILY MEDICINE | Facility: CLINIC | Age: 38
End: 2023-08-02
Payer: MEDICAID

## 2023-08-02 VITALS
WEIGHT: 249 LBS | DIASTOLIC BLOOD PRESSURE: 86 MMHG | BODY MASS INDEX: 37.74 KG/M2 | SYSTOLIC BLOOD PRESSURE: 132 MMHG | RESPIRATION RATE: 20 BRPM | TEMPERATURE: 97 F | HEART RATE: 92 BPM | HEIGHT: 68 IN

## 2023-08-02 DIAGNOSIS — F90.9 ATTENTION DEFICIT HYPERACTIVITY DISORDER (ADHD), UNSPECIFIED ADHD TYPE: ICD-10-CM

## 2023-08-02 DIAGNOSIS — R30.0 DYSURIA: ICD-10-CM

## 2023-08-02 DIAGNOSIS — N30.01 ACUTE CYSTITIS WITH HEMATURIA: ICD-10-CM

## 2023-08-02 LAB
AMP AMPHETAMINE 1000 NM/ML POC: ABNORMAL
BAR BARBITURATES 300 NG/ML POC: NEGATIVE
BILIRUB SERPL-MCNC: ABNORMAL MG/DL
BLOOD URINE, POC: ABNORMAL
BUP BUPRENORPHINE 10 NG/ML POC: NEGATIVE
BZO BENZODIAZEPINES 300 NG/ML POC: NEGATIVE
CLARITY, POC UA: ABNORMAL
COC COCAINE 300 NG/ML POC: NEGATIVE
COLOR, POC UA: ABNORMAL
CREATININE (CR) POC: NEGATIVE
CTP QC/QA: YES
GLUCOSE UR QL STRIP: ABNORMAL
KETONES UR QL STRIP: ABNORMAL
LEUKOCYTE ESTERASE URINE, POC: ABNORMAL
MET METHAMPHETAMINE 1000 NG/ML POC: NEGATIVE
MOP/OPI300 MORPHINE 300 NG/ML POC: NEGATIVE
MTD METHADONE 300 NG/ML POC: NEGATIVE
NITRITE, POC UA: ABNORMAL
OXIDANT (OX) POC: NEGATIVE
OXY OXYCODONE 100 NG/ML POC: NEGATIVE
PH, POC UA: 5.5
PROTEIN, POC: 300
SPECIFIC GRAVITY (SG) POC: 1.01
SPECIFIC GRAVITY, POC UA: 1.01
TEMPERATURE (°F) POC: 94
THC MARIJUANA 50 NG/ML POC: NEGATIVE
UROBILINOGEN, POC UA: 4

## 2023-08-02 PROCEDURE — 1159F MED LIST DOCD IN RCRD: CPT | Mod: CPTII,,, | Performed by: NURSE PRACTITIONER

## 2023-08-02 PROCEDURE — 3075F SYST BP GE 130 - 139MM HG: CPT | Mod: CPTII,,, | Performed by: NURSE PRACTITIONER

## 2023-08-02 PROCEDURE — 81002 POCT URINE DIPSTICK WITHOUT MICROSCOPE: ICD-10-PCS | Mod: 59,,, | Performed by: NURSE PRACTITIONER

## 2023-08-02 PROCEDURE — 80305 POCT URINE DRUG SCREEN (WITH BUP): ICD-10-PCS | Mod: QW,,, | Performed by: NURSE PRACTITIONER

## 2023-08-02 PROCEDURE — 3075F PR MOST RECENT SYSTOLIC BLOOD PRESS GE 130-139MM HG: ICD-10-PCS | Mod: CPTII,,, | Performed by: NURSE PRACTITIONER

## 2023-08-02 PROCEDURE — 4010F ACE/ARB THERAPY RXD/TAKEN: CPT | Mod: CPTII,,, | Performed by: NURSE PRACTITIONER

## 2023-08-02 PROCEDURE — 1159F PR MEDICATION LIST DOCUMENTED IN MEDICAL RECORD: ICD-10-PCS | Mod: CPTII,,, | Performed by: NURSE PRACTITIONER

## 2023-08-02 PROCEDURE — 3079F DIAST BP 80-89 MM HG: CPT | Mod: CPTII,,, | Performed by: NURSE PRACTITIONER

## 2023-08-02 PROCEDURE — 80305 DRUG TEST PRSMV DIR OPT OBS: CPT | Mod: QW,,, | Performed by: NURSE PRACTITIONER

## 2023-08-02 PROCEDURE — 3008F PR BODY MASS INDEX (BMI) DOCUMENTED: ICD-10-PCS | Mod: CPTII,,, | Performed by: NURSE PRACTITIONER

## 2023-08-02 PROCEDURE — 3008F BODY MASS INDEX DOCD: CPT | Mod: CPTII,,, | Performed by: NURSE PRACTITIONER

## 2023-08-02 PROCEDURE — 99214 OFFICE O/P EST MOD 30 MIN: CPT | Mod: ,,, | Performed by: NURSE PRACTITIONER

## 2023-08-02 PROCEDURE — 3079F PR MOST RECENT DIASTOLIC BLOOD PRESSURE 80-89 MM HG: ICD-10-PCS | Mod: CPTII,,, | Performed by: NURSE PRACTITIONER

## 2023-08-02 PROCEDURE — 81002 URINALYSIS NONAUTO W/O SCOPE: CPT | Mod: 59,,, | Performed by: NURSE PRACTITIONER

## 2023-08-02 PROCEDURE — 4010F PR ACE/ARB THEARPY RXD/TAKEN: ICD-10-PCS | Mod: CPTII,,, | Performed by: NURSE PRACTITIONER

## 2023-08-02 PROCEDURE — 99214 PR OFFICE/OUTPT VISIT, EST, LEVL IV, 30-39 MIN: ICD-10-PCS | Mod: ,,, | Performed by: NURSE PRACTITIONER

## 2023-08-02 RX ORDER — SULFAMETHOXAZOLE AND TRIMETHOPRIM 800; 160 MG/1; MG/1
1 TABLET ORAL 2 TIMES DAILY
Qty: 20 TABLET | Refills: 0 | Status: SHIPPED | OUTPATIENT
Start: 2023-08-02 | End: 2023-08-12

## 2023-08-02 RX ORDER — LISDEXAMFETAMINE DIMESYLATE 40 MG/1
40 CAPSULE ORAL DAILY
Qty: 30 CAPSULE | Refills: 0 | Status: SHIPPED | OUTPATIENT
Start: 2023-08-02 | End: 2023-09-06 | Stop reason: SDUPTHER

## 2023-08-02 NOTE — PROGRESS NOTES
"Subjective:       Patient ID: Kayla Clay is a 38 y.o. female.    Chief Complaint: ADHD (3 month follow up for ADHD) and Dysuria (Dysuria X's 3 days)    The patient presents for ADHD follow-up.  The patient is on Vyvanse 30 daily. Patient states that about 2 weeks ago she has noticed "brain fog and lack of concentration".     The patient is also complaining UTI symptoms.  The patient states she has had urinary discomfort and frequency with urgency for 3 days.      Her dip UA is as follows:      Color, UA Red   pH, UA 5.5   WBC, UA Large   Nitrite, UA +   Protein,    Glucose, UA -   Ketones, UA -   Urobilinogen, UA 4.0   Bilirubin, POC Trace   Blood, UA Large   Clarity, UA Slightly Cloudy   Spec Grav UA 1.015         Dysuria   This is a new problem. The current episode started in the past 7 days. The problem occurs every urination. The problem has been gradually worsening. The quality of the pain is described as burning. The pain is at a severity of 6/10. The pain is moderate. There has been no fever. She is Not sexually active. There is No history of pyelonephritis. Associated symptoms include flank pain, frequency and urgency. She has tried NSAIDs (AZO) for the symptoms.       Review of Systems   Genitourinary:  Positive for dysuria, flank pain, frequency and urgency.    12 point review of systems conducted, negative except as stated in the history of present illness. See HPI for details.        Objective:        Visit Vitals  /86   Pulse 92   Temp 97.1 °F (36.2 °C) (Temporal)   Resp 20   Ht 5' 8" (1.727 m)   Wt 112.9 kg (249 lb)   LMP 07/30/2023   BMI 37.86 kg/m²        Physical Exam  Vitals and nursing note reviewed.   Constitutional:       Appearance: She is obese.   HENT:      Head: Normocephalic.      Right Ear: Tympanic membrane normal.      Left Ear: Tympanic membrane normal.      Nose: Nose normal.      Mouth/Throat:      Mouth: Mucous membranes are moist.   Eyes:      Extraocular " Movements: Extraocular movements intact.   Cardiovascular:      Rate and Rhythm: Normal rate and regular rhythm.      Heart sounds: No murmur heard.  Pulmonary:      Effort: Pulmonary effort is normal.      Breath sounds: Normal breath sounds.   Abdominal:      General: Bowel sounds are normal.      Palpations: Abdomen is soft.   Musculoskeletal:         General: Normal range of motion.      Cervical back: Normal range of motion and neck supple.   Skin:     General: Skin is warm and dry.   Neurological:      Mental Status: She is alert and oriented to person, place, and time.           Assessment:           ICD-10-CM ICD-9-CM   1. Attention deficit hyperactivity disorder (ADHD), unspecified ADHD type  F90.9 314.01   2. Acute cystitis with hematuria  N30.01 595.0   3. Dysuria  R30.0 788.1            Plan:         1. Attention deficit hyperactivity disorder (ADHD), unspecified ADHD type  - POCT Urine Drug Screen (With BUP)  - lisdexamfetamine (VYVANSE) 40 MG Cap; Take 1 capsule (40 mg total) by mouth once daily.  Dispense: 30 capsule; Refill: 0    2. Acute cystitis with hematuria  4-6 8 oz glasses non caffeinated beverages daily  Do not take tub baths  Wear white cotton underwear  Wipe front to back  Void after sexual activity  - sulfamethoxazole-trimethoprim 800-160mg (BACTRIM DS) 800-160 mg Tab; Take 1 tablet by mouth 2 (two) times daily. for 10 days  Dispense: 20 tablet; Refill: 0    3. Dysuria  - POCT URINE DIPSTICK WITHOUT MICROSCOPE - positive          Follow up in about 3 months (around 11/2/2023).     Future Appointments   Date Time Provider Department Center   11/1/2023  9:30 AM Cee Mccauley NP [facility]        Past Medical History:   Diagnosis Date    Epidural abscess     Generalized anxiety disorder     Hypertension     Obesity, unspecified         Review of patient's allergies indicates:   Allergen Reactions    Acetaminophen Hives    Doxycycline Rash     Rash on face and arms        Current  Outpatient Medications   Medication Instructions    baclofen (LIORESAL) 10 mg, Oral, 3 times daily    EScitalopram oxalate (LEXAPRO) 20 mg, Oral, Daily    gabapentin (NEURONTIN) 300 mg, Oral, 3 times daily    lisdexamfetamine (VYVANSE) 40 mg, Oral, Daily    lisinopriL 10 mg, Oral, Daily    loratadine (CLARITIN) 10 mg, Oral, Daily    sulfamethoxazole-trimethoprim 800-160mg (BACTRIM DS) 800-160 mg Tab 1 tablet, Oral, 2 times daily          Patient Active Problem List   Diagnosis    Hypertension    Anxiety    IVDU (intravenous drug user)    Septic discitis of lumbar region    Rash    Leukopenia    Fungal osteomyelitis    Drug reaction    Depression    Dizziness on standing    Abscess    Insomnia    Sinusitis    Alopecia of scalp    Skin tag    Weakness of both lower extremities    Attention deficit hyperactivity disorder (ADHD)    Binge eating    Class 2 obesity due to excess calories in adult    Lumbar pain    Well woman exam with routine gynecological exam    Acute cystitis with hematuria    Dysuria             Past Medical History:   Diagnosis Date    Epidural abscess     Generalized anxiety disorder     Hypertension     Obesity, unspecified           Past Surgical History:   Procedure Laterality Date    LUMBAR LAMINECTOMY WITH SURGICAL REMOVAL OF LESION OF SPINAL CORD BY POSTERIOR APPROACH N/A 8/18/2022    Procedure: LAMINECTOMY, SPINE, LUMBAR, POSTERIOR APPROACH, WITH EXCISION OF NEOPLASM OR LESION OF SPINAL CORD;  Surgeon: Floyd Brewer MD;  Location: [facility];  Service: Neurosurgery;  Laterality: N/A;           reports that she has quit smoking. She has been exposed to tobacco smoke. She has never used smokeless tobacco. She reports that she does not currently use alcohol. She reports current drug use. Drugs: Heroin and Methamphetamines.    Immunization History   Administered Date(s) Administered    COVID-19, MRNA, LN-S, PF (MODERNA FULL 0.5 ML DOSE) 01/30/2021, 04/14/2021, 11/19/2021    Influenza -  Quadrivalent - PF *Preferred* (6 months and older) 10/01/2021, 10/19/2022        Health Maintenance   Topic Date Due    Lipid Panel  Never done    TETANUS VACCINE  Never done    Hepatitis C Screening  Completed

## 2023-08-30 DIAGNOSIS — I10 HYPERTENSION, UNSPECIFIED TYPE: Primary | ICD-10-CM

## 2023-08-30 DIAGNOSIS — Z12.31 BREAST CANCER SCREENING BY MAMMOGRAM: Primary | ICD-10-CM

## 2023-08-30 RX ORDER — AMLODIPINE BESYLATE 5 MG/1
5 TABLET ORAL DAILY
Qty: 30 TABLET | Refills: 11 | Status: SHIPPED | OUTPATIENT
Start: 2023-08-30 | End: 2024-08-29

## 2023-08-30 NOTE — PROGRESS NOTES
I called the patient who said that since she has been on Lisinopril she has a dry cough that is bothersome. I am discontinuing the Lisinopril 10 mg and placing her on Amlodipine 5 mg.

## 2023-09-06 DIAGNOSIS — F90.9 ATTENTION DEFICIT HYPERACTIVITY DISORDER (ADHD), UNSPECIFIED ADHD TYPE: ICD-10-CM

## 2023-09-06 RX ORDER — LISDEXAMFETAMINE DIMESYLATE 40 MG/1
40 CAPSULE ORAL DAILY
Qty: 30 CAPSULE | Refills: 0 | Status: SHIPPED | OUTPATIENT
Start: 2023-09-06 | End: 2023-10-10 | Stop reason: SDUPTHER

## 2023-09-19 ENCOUNTER — OFFICE VISIT (OUTPATIENT)
Dept: NEUROSURGERY | Facility: CLINIC | Age: 38
End: 2023-09-19
Payer: MEDICAID

## 2023-09-19 VITALS
BODY MASS INDEX: 36.19 KG/M2 | WEIGHT: 238.75 LBS | DIASTOLIC BLOOD PRESSURE: 95 MMHG | SYSTOLIC BLOOD PRESSURE: 134 MMHG | HEIGHT: 68 IN | HEART RATE: 89 BPM

## 2023-09-19 DIAGNOSIS — R29.898 WEAKNESS OF BOTH LOWER EXTREMITIES: ICD-10-CM

## 2023-09-19 DIAGNOSIS — M54.9 DORSALGIA, UNSPECIFIED: ICD-10-CM

## 2023-09-19 DIAGNOSIS — M54.50 LUMBAR PAIN: ICD-10-CM

## 2023-09-19 DIAGNOSIS — Z98.890 STATUS POST LUMBAR LAMINECTOMY: Primary | ICD-10-CM

## 2023-09-19 PROCEDURE — 1159F PR MEDICATION LIST DOCUMENTED IN MEDICAL RECORD: ICD-10-PCS | Mod: CPTII,,, | Performed by: PHYSICIAN ASSISTANT

## 2023-09-19 PROCEDURE — 99999 PR PBB SHADOW E&M-EST. PATIENT-LVL III: ICD-10-PCS | Mod: PBBFAC,,, | Performed by: PHYSICIAN ASSISTANT

## 2023-09-19 PROCEDURE — 99213 OFFICE O/P EST LOW 20 MIN: CPT | Mod: PBBFAC | Performed by: PHYSICIAN ASSISTANT

## 2023-09-19 PROCEDURE — 99214 OFFICE O/P EST MOD 30 MIN: CPT | Mod: S$PBB,,, | Performed by: PHYSICIAN ASSISTANT

## 2023-09-19 PROCEDURE — 3008F PR BODY MASS INDEX (BMI) DOCUMENTED: ICD-10-PCS | Mod: CPTII,,, | Performed by: PHYSICIAN ASSISTANT

## 2023-09-19 PROCEDURE — 99999 PR PBB SHADOW E&M-EST. PATIENT-LVL III: CPT | Mod: PBBFAC,,, | Performed by: PHYSICIAN ASSISTANT

## 2023-09-19 PROCEDURE — 1159F MED LIST DOCD IN RCRD: CPT | Mod: CPTII,,, | Performed by: PHYSICIAN ASSISTANT

## 2023-09-19 PROCEDURE — 4010F PR ACE/ARB THEARPY RXD/TAKEN: ICD-10-PCS | Mod: CPTII,,, | Performed by: PHYSICIAN ASSISTANT

## 2023-09-19 PROCEDURE — 3080F DIAST BP >= 90 MM HG: CPT | Mod: CPTII,,, | Performed by: PHYSICIAN ASSISTANT

## 2023-09-19 PROCEDURE — 3075F SYST BP GE 130 - 139MM HG: CPT | Mod: CPTII,,, | Performed by: PHYSICIAN ASSISTANT

## 2023-09-19 PROCEDURE — 4010F ACE/ARB THERAPY RXD/TAKEN: CPT | Mod: CPTII,,, | Performed by: PHYSICIAN ASSISTANT

## 2023-09-19 PROCEDURE — 3080F PR MOST RECENT DIASTOLIC BLOOD PRESSURE >= 90 MM HG: ICD-10-PCS | Mod: CPTII,,, | Performed by: PHYSICIAN ASSISTANT

## 2023-09-19 PROCEDURE — 99214 PR OFFICE/OUTPT VISIT, EST, LEVL IV, 30-39 MIN: ICD-10-PCS | Mod: S$PBB,,, | Performed by: PHYSICIAN ASSISTANT

## 2023-09-19 PROCEDURE — 3075F PR MOST RECENT SYSTOLIC BLOOD PRESS GE 130-139MM HG: ICD-10-PCS | Mod: CPTII,,, | Performed by: PHYSICIAN ASSISTANT

## 2023-09-19 PROCEDURE — 3008F BODY MASS INDEX DOCD: CPT | Mod: CPTII,,, | Performed by: PHYSICIAN ASSISTANT

## 2023-09-19 RX ORDER — PREGABALIN 75 MG/1
75 CAPSULE ORAL 2 TIMES DAILY
Qty: 60 CAPSULE | Refills: 1 | Status: SHIPPED | OUTPATIENT
Start: 2023-09-19 | End: 2024-02-15

## 2023-09-19 NOTE — PROGRESS NOTES
Subjective:      Patient ID: Kayla Clay is a 38 y.o. female.    HPI  The patient is here today for discussion regarding weakness of LE, worse with LLE.  She also has lower back pain.   She does not have pain in her legs at this time.  Patient reports that her weakness increases after a day of prolonged walking.  Also has numbness and tingling in lower legs (below knee and down to ankle).  She has been taking Gabapentin and Baclofen- off and on- for the past year.    No recent falls, injuries.  Currently taking Gabapentin, Ibuprofen, Baclofen and Tylenol (latter as needed).  Patient reports she has frequent urination but denies bladder/bowel incontinence.  Rates her pain 6/10 today.  Patient has participated in physical therapy this year. Her last session was 3 months ago.  Patient does home exercises and also uses a heating pad.  Patient is ambulating with a cane.    Prev note:5/26/23   Pt here for follow up  S/p lumbar laminectomy for epidural abscess 8/2022  Hx of IVDA   Since then she has finished abx   Pain rated 5/10 bilateral le symptoms   Denies weakness   Ambulates unassisted   Was taking gabapentin and baclofen which helped but she ran out of medication   Currently doing outpatient PT      Here with a MR lumbar spine from outside facility  Showing resolution of epidural abscess   She has underlying DDD/ thoracolumbar scoliosis      Objective:     Body mass index is 36.3 kg/m².  Vitals:    09/19/23 1256   BP: (!) 134/95   Pulse: 89        Back:  None  Paraspinal muscle spasms   None  Pain with flexion and extention   WNL  Range of motion    Neg L>R Straight leg raise     Motor   Right Right Left Left  Level Group   5 4+ 5 4+ L2 Hip flexor (Psoas)   5 4+ 5 4 L3 Leg extension (Quads)   5 4+ 5 4+ L4 Dorsiflexion & foot inversion (Tibialis Anterior)   5 5 5 4 L5 Great toe extension ( EHL)   5 4+ 5 4+ S1 Foot eversion (Gastroc, PL & PB)     Sensation  NL Decreased (R/L/BL) Level Sensation    X  L2  Anterio-medial thigh   X  L3 Medial thigh around knee   X  L4 Medial foot   X  L5 Dorsum foot   X  S1 Lateral foot   Back Exam     Tenderness   The patient is experiencing tenderness in the lumbar.    Tests   Straight leg raise right: positive  Straight leg raise left: positive    Other   Scars: present                Lab Results   Component Value Date    HSCRP 3.00 09/26/2022    WBC 4.1 (L) 12/28/2022    HCT 39.3 12/28/2022     INDEPENDENT INTERPRETATION OF TEST:  Relevant imaging results reviewed and interpreted by me, discussed with the patient and / or family today.  Assessment:     1. Status post lumbar laminectomy    2. Lumbar pain    3. Weakness of both lower extremities    4. Dorsalgia, unspecified      Plan:     Status post lumbar laminectomy  -     Creatinine, Serum; Future; Expected date: 09/19/2023    Lumbar pain  -     Creatinine, Serum; Future; Expected date: 09/19/2023    Weakness of both lower extremities  -     Creatinine, Serum; Future; Expected date: 09/19/2023    Dorsalgia, unspecified  -     MRI Lumbar Spine W WO Contrast; Future; Expected date: 09/19/2023    Other orders  -     pregabalin (LYRICA) 75 MG capsule; Take 1 capsule (75 mg total) by mouth 2 (two) times daily.  Dispense: 60 capsule; Refill: 1          Will order new MRI lumbar spine for evaluation of acute changes.  - She is able to do MRI at [place] ([facility]).  Rx given today for Lyrica. She will d/c Gabapentin, continue Baclofen.  Patient will follow up after MRI to discuss findings and treatment recommendations.    Charity Davies PA-C  [facility]

## 2023-09-25 ENCOUNTER — PATIENT MESSAGE (OUTPATIENT)
Dept: NEUROSURGERY | Facility: CLINIC | Age: 38
End: 2023-09-25
Payer: MEDICAID

## 2023-09-25 ENCOUNTER — HOSPITAL ENCOUNTER (OUTPATIENT)
Dept: RADIOLOGY | Facility: HOSPITAL | Age: 38
Discharge: HOME | End: 2023-09-25
Attending: NURSE PRACTITIONER
Payer: MEDICAID

## 2023-09-25 DIAGNOSIS — Z12.31 BREAST CANCER SCREENING BY MAMMOGRAM: ICD-10-CM

## 2023-09-25 PROCEDURE — 77067 SCR MAMMO BI INCL CAD: CPT | Mod: 26,,, | Performed by: STUDENT IN AN ORGANIZED HEALTH CARE EDUCATION/TRAINING PROGRAM

## 2023-09-25 PROCEDURE — 77067 MAMMO DIGITAL SCREENING BILAT WITH TOMO: ICD-10-PCS | Mod: 26,,, | Performed by: STUDENT IN AN ORGANIZED HEALTH CARE EDUCATION/TRAINING PROGRAM

## 2023-09-25 PROCEDURE — 77063 BREAST TOMOSYNTHESIS BI: CPT | Mod: 26,,, | Performed by: STUDENT IN AN ORGANIZED HEALTH CARE EDUCATION/TRAINING PROGRAM

## 2023-09-25 PROCEDURE — 77063 MAMMO DIGITAL SCREENING BILAT WITH TOMO: ICD-10-PCS | Mod: 26,,, | Performed by: STUDENT IN AN ORGANIZED HEALTH CARE EDUCATION/TRAINING PROGRAM

## 2023-09-25 PROCEDURE — 77067 SCR MAMMO BI INCL CAD: CPT | Mod: TC

## 2023-10-05 ENCOUNTER — TELEPHONE (OUTPATIENT)
Dept: FAMILY MEDICINE | Facility: CLINIC | Age: 38
End: 2023-10-05
Payer: MEDICAID

## 2023-10-05 DIAGNOSIS — G43.809 OTHER MIGRAINE WITHOUT STATUS MIGRAINOSUS, NOT INTRACTABLE: Primary | ICD-10-CM

## 2023-10-05 RX ORDER — SUMATRIPTAN 50 MG/1
50 TABLET, FILM COATED ORAL
Qty: 120 TABLET | Refills: 1 | Status: SHIPPED | OUTPATIENT
Start: 2023-10-05 | End: 2023-12-14 | Stop reason: SDUPTHER

## 2023-10-05 NOTE — TELEPHONE ENCOUNTER
I have had migraines in the past. Pain of left side of forehead. 6/10 pain sensitive to light and sound.

## 2023-10-10 DIAGNOSIS — F90.9 ATTENTION DEFICIT HYPERACTIVITY DISORDER (ADHD), UNSPECIFIED ADHD TYPE: ICD-10-CM

## 2023-10-10 RX ORDER — LISDEXAMFETAMINE DIMESYLATE 40 MG/1
40 CAPSULE ORAL DAILY
Qty: 30 CAPSULE | Refills: 0 | Status: SHIPPED | OUTPATIENT
Start: 2023-10-10 | End: 2023-11-01 | Stop reason: SDUPTHER

## 2023-10-10 NOTE — TELEPHONE ENCOUNTER
----- Message from Mireille Guajardo sent at 10/10/2023  8:08 AM CDT -----  Refill Vyvanse  [facility]

## 2023-10-12 ENCOUNTER — HOSPITAL ENCOUNTER (OUTPATIENT)
Dept: RADIOLOGY | Facility: HOSPITAL | Age: 38
Discharge: HOME | End: 2023-10-12
Attending: PHYSICIAN ASSISTANT
Payer: MEDICAID

## 2023-10-12 DIAGNOSIS — M54.9 DORSALGIA, UNSPECIFIED: ICD-10-CM

## 2023-10-12 PROCEDURE — 72158 MRI LUMBAR SPINE W/O & W/DYE: CPT | Mod: TC

## 2023-10-12 PROCEDURE — 25500020 PHARM REV CODE 255: Performed by: PHYSICIAN ASSISTANT

## 2023-10-12 PROCEDURE — A9577 INJ MULTIHANCE: HCPCS | Performed by: PHYSICIAN ASSISTANT

## 2023-10-12 RX ADMIN — GADOBENATE DIMEGLUMINE 20 ML: 529 INJECTION, SOLUTION INTRAVENOUS at 02:10

## 2023-10-27 ENCOUNTER — OFFICE VISIT (OUTPATIENT)
Dept: NEUROSURGERY | Facility: CLINIC | Age: 38
End: 2023-10-27
Payer: MEDICAID

## 2023-10-27 VITALS
WEIGHT: 234.25 LBS | DIASTOLIC BLOOD PRESSURE: 77 MMHG | HEART RATE: 75 BPM | BODY MASS INDEX: 35.62 KG/M2 | SYSTOLIC BLOOD PRESSURE: 124 MMHG

## 2023-10-27 DIAGNOSIS — M41.55 SCOLIOSIS OF THORACOLUMBAR REGION DUE TO DEGENERATIVE DISEASE OF SPINE IN ADULT: Primary | ICD-10-CM

## 2023-10-27 PROCEDURE — 4010F ACE/ARB THERAPY RXD/TAKEN: CPT | Mod: CPTII,,, | Performed by: NEUROLOGICAL SURGERY

## 2023-10-27 PROCEDURE — 99999 PR PBB SHADOW E&M-EST. PATIENT-LVL III: CPT | Mod: PBBFAC,,, | Performed by: NEUROLOGICAL SURGERY

## 2023-10-27 PROCEDURE — 99212 OFFICE O/P EST SF 10 MIN: CPT | Mod: S$PBB,,, | Performed by: NEUROLOGICAL SURGERY

## 2023-10-27 PROCEDURE — 3044F HG A1C LEVEL LT 7.0%: CPT | Mod: CPTII,,, | Performed by: NEUROLOGICAL SURGERY

## 2023-10-27 PROCEDURE — 3008F BODY MASS INDEX DOCD: CPT | Mod: CPTII,,, | Performed by: NEUROLOGICAL SURGERY

## 2023-10-27 PROCEDURE — 3078F DIAST BP <80 MM HG: CPT | Mod: CPTII,,, | Performed by: NEUROLOGICAL SURGERY

## 2023-10-27 PROCEDURE — 99213 OFFICE O/P EST LOW 20 MIN: CPT | Mod: PBBFAC,PO | Performed by: NEUROLOGICAL SURGERY

## 2023-10-27 PROCEDURE — 3074F SYST BP LT 130 MM HG: CPT | Mod: CPTII,,, | Performed by: NEUROLOGICAL SURGERY

## 2023-10-27 NOTE — PROGRESS NOTES
Subjective:      Patient ID: Kayla Clay is a 39 y.o. female.    HPI  The patient is here today for discussion regarding weakness of LE, worse with LLE.  She also has lower back pain.   She does not have pain in her legs at this time.  Patient reports that her weakness increases after a day of prolonged walking.  Also has numbness and tingling in lower legs (below knee and down to ankle).  She has been taking Gabapentin and Baclofen- off and on- for the past year.    No recent falls, injuries.  Currently taking Gabapentin, Ibuprofen, Baclofen and Tylenol (latter as needed).  Patient reports she has frequent urination but denies bladder/bowel incontinence.  Rates her pain 6/10 today.  Patient has participated in physical therapy this year. Her last session was 3 months ago.  Patient does home exercises and also uses a heating pad.  Patient is ambulating with a cane.    Prev note:5/26/23   Pt here for follow up  S/p lumbar laminectomy for epidural abscess 8/2022  Hx of IVDA   Since then she has finished abx   Pain rated 5/10 bilateral le symptoms   Denies weakness   Ambulates unassisted   Was taking gabapentin and baclofen which helped but she ran out of medication   Currently doing outpatient PT      Here with a MR lumbar spine from outside facility  Showing resolution of epidural abscess   She has underlying DDD/ thoracolumbar scoliosis      Objective:     Body mass index is 35.62 kg/m².  Vitals:    10/27/23 1510   BP: 124/77   Pulse: 75        Back:  None  Paraspinal muscle spasms   None  Pain with flexion and extention   WNL  Range of motion    Neg L>R Straight leg raise     Motor   Right Right Left Left  Level Group   5 4+ 5 4+ L2 Hip flexor (Psoas)   5 4+ 5 4 L3 Leg extension (Quads)   5 4+ 5 4+ L4 Dorsiflexion & foot inversion (Tibialis Anterior)   5 5 5 4 L5 Great toe extension ( EHL)   5 4+ 5 4+ S1 Foot eversion (Gastroc, PL & PB)     Sensation  NL Decreased (R/L/BL) Level Sensation    X  L2  Anterio-medial thigh   X  L3 Medial thigh around knee   X  L4 Medial foot   X  L5 Dorsum foot   X  S1 Lateral foot   Back Exam     Tenderness   The patient is experiencing tenderness in the lumbar.    Tests   Straight leg raise right: positive  Straight leg raise left: positive    Other   Scars: present                Lab Results   Component Value Date    HSCRP 3.00 09/26/2022    WBC 4.08 (L) 02/21/2024    HCT 44.1 02/21/2024     INDEPENDENT INTERPRETATION OF TEST:  Relevant imaging results reviewed and interpreted by me, discussed with the patient and / or family today.  Assessment:     1. Scoliosis of thoracolumbar region due to degenerative disease of spine in adult      Plan:     Scoliosis of thoracolumbar region due to degenerative disease of spine in adult  -     Cancel: X-Ray Spine Scoliosis 4 or 5 views; Future; Expected date: 10/27/2023    Refer to Les Cortés MD   At [facility]   Needs 36 in lat / AP cassette   Will place orders today   Inquiring about doing this in [place]   At this time I have no further surgical recommendation    Thank you for the referral   Please call with any questions    Floyd Brewer MD  Neurosurgery     Disclaimer: This note was prepared using a voice recognition system and is likely to have sound alike errors within the text.

## 2023-10-30 ENCOUNTER — HOSPITAL ENCOUNTER (OUTPATIENT)
Dept: RADIOLOGY | Facility: HOSPITAL | Age: 38
Discharge: HOME | End: 2023-10-30
Attending: NEUROLOGICAL SURGERY
Payer: MEDICAID

## 2023-10-30 DIAGNOSIS — M41.55 SCOLIOSIS OF THORACOLUMBAR REGION DUE TO DEGENERATIVE DISEASE OF SPINE IN ADULT: ICD-10-CM

## 2023-10-30 PROCEDURE — 72082 X-RAY EXAM ENTIRE SPI 2/3 VW: CPT | Mod: TC

## 2023-11-01 ENCOUNTER — OFFICE VISIT (OUTPATIENT)
Dept: FAMILY MEDICINE | Facility: CLINIC | Age: 38
End: 2023-11-01
Payer: MEDICAID

## 2023-11-01 VITALS
WEIGHT: 233 LBS | RESPIRATION RATE: 20 BRPM | HEIGHT: 68 IN | BODY MASS INDEX: 35.31 KG/M2 | TEMPERATURE: 99 F | SYSTOLIC BLOOD PRESSURE: 124 MMHG | DIASTOLIC BLOOD PRESSURE: 77 MMHG | HEART RATE: 75 BPM

## 2023-11-01 DIAGNOSIS — F90.9 ATTENTION DEFICIT HYPERACTIVITY DISORDER (ADHD), UNSPECIFIED ADHD TYPE: ICD-10-CM

## 2023-11-01 LAB
AMP AMPHETAMINE 1000 NM/ML POC: ABNORMAL
BAR BARBITURATES 300 NG/ML POC: NEGATIVE
BUP BUPRENORPHINE 10 NG/ML POC: NEGATIVE
BZO BENZODIAZEPINES 300 NG/ML POC: NEGATIVE
COC COCAINE 300 NG/ML POC: NEGATIVE
CREATININE (CR) POC: NEGATIVE
CTP QC/QA: YES
MET METHAMPHETAMINE 1000 NG/ML POC: NEGATIVE
MOP/OPI300 MORPHINE 300 NG/ML POC: NEGATIVE
MTD METHADONE 300 NG/ML POC: NEGATIVE
OXIDANT (OX) POC: NEGATIVE
OXY OXYCODONE 100 NG/ML POC: NEGATIVE
SPECIFIC GRAVITY (SG) POC: 1.02
TEMPERATURE (°F) POC: 94
THC MARIJUANA 50 NG/ML POC: NEGATIVE

## 2023-11-01 PROCEDURE — 90686 FLU VACCINE (QUAD) GREATER THAN OR EQUAL TO 3YO PRESERVATIVE FREE IM: ICD-10-PCS | Mod: ,,, | Performed by: NURSE PRACTITIONER

## 2023-11-01 PROCEDURE — 3008F BODY MASS INDEX DOCD: CPT | Mod: CPTII,,, | Performed by: NURSE PRACTITIONER

## 2023-11-01 PROCEDURE — 3078F DIAST BP <80 MM HG: CPT | Mod: CPTII,,, | Performed by: NURSE PRACTITIONER

## 2023-11-01 PROCEDURE — 4010F PR ACE/ARB THEARPY RXD/TAKEN: ICD-10-PCS | Mod: CPTII,,, | Performed by: NURSE PRACTITIONER

## 2023-11-01 PROCEDURE — 1159F MED LIST DOCD IN RCRD: CPT | Mod: CPTII,,, | Performed by: NURSE PRACTITIONER

## 2023-11-01 PROCEDURE — 80305 DRUG TEST PRSMV DIR OPT OBS: CPT | Mod: QW,,, | Performed by: NURSE PRACTITIONER

## 2023-11-01 PROCEDURE — 99213 PR OFFICE/OUTPT VISIT, EST, LEVL III, 20-29 MIN: ICD-10-PCS | Mod: 25,,, | Performed by: NURSE PRACTITIONER

## 2023-11-01 PROCEDURE — 3078F PR MOST RECENT DIASTOLIC BLOOD PRESSURE < 80 MM HG: ICD-10-PCS | Mod: CPTII,,, | Performed by: NURSE PRACTITIONER

## 2023-11-01 PROCEDURE — 3074F SYST BP LT 130 MM HG: CPT | Mod: CPTII,,, | Performed by: NURSE PRACTITIONER

## 2023-11-01 PROCEDURE — 90686 IIV4 VACC NO PRSV 0.5 ML IM: CPT | Mod: ,,, | Performed by: NURSE PRACTITIONER

## 2023-11-01 PROCEDURE — 3008F PR BODY MASS INDEX (BMI) DOCUMENTED: ICD-10-PCS | Mod: CPTII,,, | Performed by: NURSE PRACTITIONER

## 2023-11-01 PROCEDURE — 3074F PR MOST RECENT SYSTOLIC BLOOD PRESSURE < 130 MM HG: ICD-10-PCS | Mod: CPTII,,, | Performed by: NURSE PRACTITIONER

## 2023-11-01 PROCEDURE — 1159F PR MEDICATION LIST DOCUMENTED IN MEDICAL RECORD: ICD-10-PCS | Mod: CPTII,,, | Performed by: NURSE PRACTITIONER

## 2023-11-01 PROCEDURE — 4010F ACE/ARB THERAPY RXD/TAKEN: CPT | Mod: CPTII,,, | Performed by: NURSE PRACTITIONER

## 2023-11-01 PROCEDURE — 90471 IMMUNIZATION ADMIN: CPT | Mod: ,,, | Performed by: NURSE PRACTITIONER

## 2023-11-01 PROCEDURE — 90471 FLU VACCINE (QUAD) GREATER THAN OR EQUAL TO 3YO PRESERVATIVE FREE IM: ICD-10-PCS | Mod: ,,, | Performed by: NURSE PRACTITIONER

## 2023-11-01 PROCEDURE — 99213 OFFICE O/P EST LOW 20 MIN: CPT | Mod: 25,,, | Performed by: NURSE PRACTITIONER

## 2023-11-01 PROCEDURE — 80305 POCT URINE DRUG SCREEN (WITH BUP): ICD-10-PCS | Mod: QW,,, | Performed by: NURSE PRACTITIONER

## 2023-11-01 RX ORDER — GABAPENTIN 300 MG/1
300 CAPSULE ORAL 3 TIMES DAILY
COMMUNITY
End: 2024-02-15

## 2023-11-01 RX ORDER — LISDEXAMFETAMINE DIMESYLATE 40 MG/1
40 CAPSULE ORAL DAILY
Qty: 30 CAPSULE | Refills: 0 | Status: SHIPPED | OUTPATIENT
Start: 2023-11-01 | End: 2024-01-02 | Stop reason: SDUPTHER

## 2023-11-01 NOTE — PROGRESS NOTES
"Subjective:       Patient ID: Kayla Clay is a 38 y.o. female.    Chief Complaint: ADHD (3 month follow up for ADHD)    The patient is a 38-year-old female who presents for a focused visit for ADHD.  The patient has been on Vyvanse 40 mg and states that the dose is to be able to concentrate and focus.        Review of Systems 12 point review of systems conducted, negative except as stated in the history of present illness. See HPI for details.        Objective:        Visit Vitals  /77   Pulse 75   Temp 98.5 °F (36.9 °C) (Oral)   Resp 20   Ht 5' 8" (1.727 m)   Wt 105.7 kg (233 lb)   LMP 10/23/2023   BMI 35.43 kg/m²        Physical Exam  Vitals and nursing note reviewed.   Constitutional:       Appearance: She is obese.   HENT:      Head: Normocephalic.      Right Ear: Tympanic membrane normal.      Left Ear: Tympanic membrane normal.      Nose: Nose normal.      Mouth/Throat:      Mouth: Mucous membranes are moist.   Cardiovascular:      Rate and Rhythm: Normal rate and regular rhythm.      Heart sounds: No murmur heard.  Pulmonary:      Effort: Pulmonary effort is normal.      Breath sounds: Normal breath sounds.   Abdominal:      General: Bowel sounds are normal.      Palpations: Abdomen is soft.   Musculoskeletal:         General: Normal range of motion.      Cervical back: Normal range of motion and neck supple.   Skin:     General: Skin is warm and dry.   Neurological:      Mental Status: She is alert and oriented to person, place, and time.           Assessment:           ICD-10-CM ICD-9-CM   1. Attention deficit hyperactivity disorder (ADHD), unspecified ADHD type  F90.9 314.01            Plan:         1. Attention deficit hyperactivity disorder (ADHD), unspecified ADHD type  Continue current medication regimen  - POCT Urine Drug Screen (With BUP)  - lisdexamfetamine (VYVANSE) 40 MG Cap; Take 1 capsule (40 mg total) by mouth once daily.  Dispense: 30 capsule; Refill: 0          Follow up in " about 3 months (around 2/1/2024).     No future appointments.     Past Medical History:   Diagnosis Date    Epidural abscess     Generalized anxiety disorder     Hypertension     Obesity, unspecified         Review of patient's allergies indicates:   Allergen Reactions    Acetaminophen Hives    Doxycycline Rash     Rash on face and arms        Current Outpatient Medications   Medication Instructions    amLODIPine (NORVASC) 5 mg, Oral, Daily    baclofen (LIORESAL) 10 mg, Oral, 3 times daily    EScitalopram oxalate (LEXAPRO) 20 mg, Oral, Daily    gabapentin (NEURONTIN) 300 mg, Oral, 3 times daily    lisdexamfetamine (VYVANSE) 40 mg, Oral, Daily    loratadine (CLARITIN) 10 mg, Oral, Daily    pregabalin (LYRICA) 75 mg, Oral, 2 times daily    sumatriptan (IMITREX) 50 mg, Oral, Every 2 hours PRN          Patient Active Problem List   Diagnosis    Hypertension    Anxiety    IVDU (intravenous drug user)    Septic discitis of lumbar region    Rash    Leukopenia    Fungal osteomyelitis    Drug reaction    Depression    Dizziness on standing    Abscess    Insomnia    Sinusitis    Alopecia of scalp    Skin tag    Weakness of both lower extremities    Attention deficit hyperactivity disorder (ADHD)    Binge eating    Class 2 obesity due to excess calories in adult    Lumbar pain    Well woman exam with routine gynecological exam    Acute cystitis with hematuria    Dysuria             Past Medical History:   Diagnosis Date    Epidural abscess     Generalized anxiety disorder     Hypertension     Obesity, unspecified           Past Surgical History:   Procedure Laterality Date    LUMBAR LAMINECTOMY WITH SURGICAL REMOVAL OF LESION OF SPINAL CORD BY POSTERIOR APPROACH N/A 8/18/2022    Procedure: LAMINECTOMY, SPINE, LUMBAR, POSTERIOR APPROACH, WITH EXCISION OF NEOPLASM OR LESION OF SPINAL CORD;  Surgeon: Floyd Brewer MD;  Location: [facility];  Service: Neurosurgery;  Laterality: N/A;           reports that she has quit smoking.  She has been exposed to tobacco smoke. She has never used smokeless tobacco. She reports that she does not currently use alcohol. She reports current drug use. Drugs: Heroin and Methamphetamines.    Immunization History   Administered Date(s) Administered    COVID-19, MRNA, LN-S, PF (MODERNA FULL 0.5 ML DOSE) 01/30/2021, 04/14/2021, 11/19/2021    Influenza - Quadrivalent - PF *Preferred* (6 months and older) 10/01/2021, 10/19/2022, 11/01/2023        Health Maintenance   Topic Date Due    Lipid Panel  Never done    TETANUS VACCINE  Never done    Hepatitis C Screening  Completed

## 2023-11-10 ENCOUNTER — PATIENT MESSAGE (OUTPATIENT)
Dept: NEUROSURGERY | Facility: CLINIC | Age: 38
End: 2023-11-10
Payer: MEDICAID

## 2023-12-14 DIAGNOSIS — G43.809 OTHER MIGRAINE WITHOUT STATUS MIGRAINOSUS, NOT INTRACTABLE: ICD-10-CM

## 2023-12-14 RX ORDER — SUMATRIPTAN 50 MG/1
50 TABLET, FILM COATED ORAL
Qty: 120 TABLET | Refills: 1 | Status: SHIPPED | OUTPATIENT
Start: 2023-12-14 | End: 2024-02-26

## 2023-12-14 RX ORDER — ESCITALOPRAM OXALATE 20 MG/1
20 TABLET ORAL DAILY
Qty: 30 TABLET | Refills: 5 | Status: SHIPPED | OUTPATIENT
Start: 2023-12-14 | End: 2024-06-11

## 2024-01-02 DIAGNOSIS — F90.9 ATTENTION DEFICIT HYPERACTIVITY DISORDER (ADHD), UNSPECIFIED ADHD TYPE: ICD-10-CM

## 2024-01-02 RX ORDER — LISDEXAMFETAMINE DIMESYLATE CAPSULES 40 MG/1
40 CAPSULE ORAL DAILY
Qty: 30 CAPSULE | Refills: 0 | Status: SHIPPED | OUTPATIENT
Start: 2024-01-02 | End: 2024-02-26

## 2024-01-02 NOTE — TELEPHONE ENCOUNTER
----- Message from Mireille Guajardo sent at 1/2/2024 11:38 AM CST -----  Refill Vyvanse    [facility]

## 2024-01-05 ENCOUNTER — OFFICE VISIT (OUTPATIENT)
Dept: NEUROSURGERY | Facility: CLINIC | Age: 39
End: 2024-01-05
Payer: MEDICAID

## 2024-01-05 DIAGNOSIS — M48.07 SPINAL STENOSIS, LUMBOSACRAL REGION: ICD-10-CM

## 2024-01-05 DIAGNOSIS — M41.9 SCOLIOSIS, UNSPECIFIED SCOLIOSIS TYPE, UNSPECIFIED SPINAL REGION: Primary | ICD-10-CM

## 2024-01-05 PROCEDURE — 99213 OFFICE O/P EST LOW 20 MIN: CPT | Mod: S$PBB,,, | Performed by: NEUROLOGICAL SURGERY

## 2024-01-05 PROCEDURE — 99213 OFFICE O/P EST LOW 20 MIN: CPT | Mod: PBBFAC | Performed by: NEUROLOGICAL SURGERY

## 2024-01-05 PROCEDURE — 1159F MED LIST DOCD IN RCRD: CPT | Mod: CPTII,,, | Performed by: NEUROLOGICAL SURGERY

## 2024-01-05 PROCEDURE — 99999 PR PBB SHADOW E&M-EST. PATIENT-LVL III: CPT | Mod: PBBFAC,,, | Performed by: NEUROLOGICAL SURGERY

## 2024-01-05 NOTE — PROGRESS NOTES
"            Subjective:      Patient ID: Kayla Clay is a 38 y.o. female.    Chief Complaint: Follow-up (Follow up after X-Ray. Pain is 6/10. Standing and walking worsens the pain. Resting with fidgeting helps with the pain. Patient takes Baclofen and Gabapentin for the pain.)    Patient presents today for follow up with MRI l spine and scoliosis standing x rays. She is s/p lumbar decompression for epidural abscess. Pain is 6/10. Pain is persistent. States it is hard to walk. Combination of back and leg pain with associated numbness and weakness. She reports having to use her cane pretty much always. States she gets stiff and has to take frequent breaks. She endorses weakness in her legs. Patient is having weakness in BLLE after walking only a few minutes, feet feel "floppy".         Review of Systems   Constitutional:  Negative for activity change, appetite change and chills.   HENT:  Negative for hearing loss, sore throat and tinnitus.    Eyes:  Negative for pain, discharge and itching.   Cardiovascular:  Negative for chest pain.   Gastrointestinal:  Negative for abdominal pain.   Endocrine: Negative for cold intolerance and heat intolerance.   Genitourinary:  Negative for difficulty urinating and dysuria.   Musculoskeletal:  Positive for back pain and gait problem.   Allergic/Immunologic: Negative for environmental allergies.   Neurological:  Negative for dizziness, tremors, weakness, light-headedness and headaches.   Hematological:  Negative for adenopathy.   Psychiatric/Behavioral:  Negative for agitation, behavioral problems and confusion.          Objective:       Physical Exam:  Nursing note and vitals reviewed.    Constitutional: She appears well-nourished. She is not diaphoretic. No distress.     Eyes: Pupils are equal, round, and reactive to light. EOM are normal.     Cardiovascular: Normal rate and regular rhythm.     Psych/Behavior: She is alert. She is oriented to person, place, and time. She " has a normal mood and affect.     Musculoskeletal:        Back: Range of motion is limited. There is tenderness. Muscle strength is 5/5.       Right Lower Extremities: Range of motion is full. There is no tenderness. Muscle strength is 5/5. Tone is normal.        Left Lower Extremities: There is no tenderness. Muscle strength is 5/5. Tone is normal.     Neurological:        Sensory: There is no sensory deficit in the trunk. There is no sensory deficit in the extremities.        Cranial nerves: Cranial nerve(s) II, III, IV, V, VI, VII, VIII, IX, X, XI and XII are intact.     General    Nursing note and vitals reviewed.  Constitutional: She is oriented to person, place, and time. She appears well-nourished. No distress.   Eyes: EOM are normal. Pupils are equal, round, and reactive to light.   Cardiovascular:  Normal rate and regular rhythm.            Neurological: She is alert and oriented to person, place, and time.   Psychiatric: She has a normal mood and affect.             Ortho Exam          X-Ray Lumbar Spine Ap And Lateral    Narrative  EXAMINATION:  XR LUMBAR SPINE AP AND LATERAL    CLINICAL HISTORY:  XR LUMBAR SPINE AP AND LATERAL    COMPARISON:  None    FINDINGS:  Multiple radiographic views  were obtained.    Fluoroscopic assistance for or procedure.  One image was sent to PACS.  The total dose area product was 12.2 Gy cm squared    Impression  As above      Electronically signed by: Brant Flores  Date:    08/18/2022  Time:    17:25         I  reviewed all pertinent imaging regarding this case.  Assessment:     1. Scoliosis, unspecified scoliosis type, unspecified spinal region    2. Spinal stenosis, lumbosacral region      Plan:     Scoliosis, unspecified scoliosis type, unspecified spinal region  -     CT Thoracic Spine Without Contrast; Future; Expected date: 01/05/2024  -     CT Lumbar Spine Without Contrast; Future; Expected date: 01/05/2024    Spinal stenosis, lumbosacral region  -     CT Lumbar  Spine Without Contrast; Future; Expected date: 01/05/2024    Patient is s/p R usama lami for epidural abscess   Scoliosis with worsening back and LE symptoms   XR shows + sagittal balance   MR shows multilevel DDD throughout along with scoliotic deformity       Schedule televisit follow up with Dr. Brewer     Thank you for the referral   Please call with any questions    Floyd Brewer MD  Neurosurgery     Disclaimer: This note was prepared using a voice recognition system and is likely to have sound alike errors within the text.

## 2024-01-13 ENCOUNTER — HOSPITAL ENCOUNTER (OUTPATIENT)
Dept: RADIOLOGY | Facility: HOSPITAL | Age: 39
Discharge: HOME | End: 2024-01-13
Attending: PHYSICIAN ASSISTANT
Payer: MEDICAID

## 2024-01-13 DIAGNOSIS — M48.07 SPINAL STENOSIS, LUMBOSACRAL REGION: ICD-10-CM

## 2024-01-13 DIAGNOSIS — M41.9 SCOLIOSIS, UNSPECIFIED SCOLIOSIS TYPE, UNSPECIFIED SPINAL REGION: ICD-10-CM

## 2024-01-13 PROCEDURE — 72128 CT CHEST SPINE W/O DYE: CPT | Mod: TC

## 2024-01-13 PROCEDURE — 72131 CT LUMBAR SPINE W/O DYE: CPT | Mod: TC

## 2024-01-25 ENCOUNTER — TELEPHONE (OUTPATIENT)
Dept: NEUROSURGERY | Facility: CLINIC | Age: 39
End: 2024-01-25
Payer: MEDICAID

## 2024-01-25 ENCOUNTER — OFFICE VISIT (OUTPATIENT)
Dept: NEUROSURGERY | Facility: CLINIC | Age: 39
End: 2024-01-25
Payer: MEDICAID

## 2024-01-25 DIAGNOSIS — M41.9 SCOLIOSIS, UNSPECIFIED SCOLIOSIS TYPE, UNSPECIFIED SPINAL REGION: Primary | ICD-10-CM

## 2024-01-25 DIAGNOSIS — Z98.890 STATUS POST LUMBAR LAMINECTOMY: ICD-10-CM

## 2024-01-25 DIAGNOSIS — G06.2 EPIDURAL ABSCESS: ICD-10-CM

## 2024-01-25 PROCEDURE — 99443 PR PHYSICIAN TELEPHONE EVALUATION 21-30 MIN: CPT | Mod: 95,,, | Performed by: NEUROLOGICAL SURGERY

## 2024-01-25 PROCEDURE — 4010F ACE/ARB THERAPY RXD/TAKEN: CPT | Mod: CPTII,95,, | Performed by: NEUROLOGICAL SURGERY

## 2024-01-25 NOTE — PROGRESS NOTES
Audio Only Telehealth Visit     The patient location is: home   The chief complaint leading to consultation is: review CT   Visit type: Virtual visit with audio only (telephone)  Total time spent with patient: 15     The reason for the audio only service rather than synchronous audio and video virtual visit was related to technical difficulties or patient preference/necessity.     Each patient to whom I provide medical services by telemedicine is:  (1) informed of the relationship between the physician and patient and the respective role of any other health care provider with respect to management of the patient; and (2) notified that they may decline to receive medical services by telemedicine and may withdraw from such care at any time. Patient verbally consented to receive this service via voice-only telephone call.       HPI:   Follow up audio visit to go over her CT lumbar     She is s/p lumbar usama-laminectomy decompression for epidural abscess  Pain is 6/10. Pain is persistent. States it is hard to walk. Combination of back and leg pain with associated numbness and weakness. She reports having to use her cane pretty much always.   States she gets stiff and has to take frequent breaks. She endorses weakness in her legs.     MRI   The lowest fully formed disc space is labeled as L5-S1.  There is a severe leftward scoliotic curvature of the lumbar spine centered at L2.  The vertebral body heights are maintained.  There are degenerate endplate changes at L3-L4.  There are postoperative changes with prior partial laminectomies at L2-L3 and L3-L4.     CT    There is severe levoscoliosis of the lumbar spine with apex at L2-L3.  The vertebral body heights are well maintained.  No acute fracture is seen.  No dislocation is seen.   At the level of L1-L2 there is some endplate sclerosis seen.  There is loss of disc height.  There is a vacuum disc phenomena seen.  Moderate facet arthropathy seen bilaterally.  There is a  right paracentral disc osteophyte complex seen.  There is right foraminal stenosis.   At L2-L3 there is endplate sclerosis and loss of disc height.  Moderate facet arthropathy is seen bilaterally.  There is a right paracentral disc osteophyte complex seen.  There is right foraminal stenosis.   At L3-L4 there appears to be spinal fusion.  There is a lateral osteophyte seen on the right and left side which cause bilateral foraminal stenosis.  Moderate facet arthropathy is seen.   At L4-5 mild facet arthropathy is seen bilaterally.  There is a broad-based disc osteophyte complex seen.  It is left paracentral.  There is left foraminal stenosis.  Assessment and plan:    Patient appears to be fused anteriorly L3-4   There is severe levoscoliosis of the lumbar spine with apex at L2-L3.          PLAN   At this time pt s/p usama lami for epidural abscess with weakness which has resolved   She regained strength but continues to have chronic back pain   No focal compression of thecal sac   Severe scoliotic deformity   I do not recommend and surgical intervention but I do not perform scoliosis corrective procedures   Patient wished for second opinion regarding her case   Will refer to Dr Azalia Brewer MD  Neurosurgery     Disclaimer: This note was prepared using a voice recognition system and is likely to have sound alike errors within the text.             This service was not originating from a related E/M service provided within the previous 7 days nor will  to an E/M service or procedure within the next 24 hours or my soonest available appointment.  Prevailing standard of care was able to be met in this audio-only visit.

## 2024-01-25 NOTE — TELEPHONE ENCOUNTER
Patient with referral to Dr. Vieyra from Dr. Brewer. Message sent to Dr. Vieyra staff. Referral transferred in W.  RD

## 2024-02-02 ENCOUNTER — TELEPHONE (OUTPATIENT)
Dept: NEUROSURGERY | Facility: CLINIC | Age: 39
End: 2024-02-02
Payer: MEDICAID

## 2024-02-02 ENCOUNTER — PATIENT MESSAGE (OUTPATIENT)
Dept: NEUROSURGERY | Facility: CLINIC | Age: 39
End: 2024-02-02
Payer: MEDICAID

## 2024-02-02 NOTE — TELEPHONE ENCOUNTER
Called patient regarding a referral from Dr. Brewer.  Informed patient that we are not accepting new medicaid patients at this time and provided her with the number for Medicaid escalation.  Patient voiced understanding and thanked me.

## 2024-02-04 ENCOUNTER — PATIENT MESSAGE (OUTPATIENT)
Dept: NEUROSURGERY | Facility: CLINIC | Age: 39
End: 2024-02-04
Payer: MEDICAID

## 2024-02-04 ENCOUNTER — TELEPHONE (OUTPATIENT)
Dept: NEUROSURGERY | Facility: CLINIC | Age: 39
End: 2024-02-04
Payer: MEDICAID

## 2024-02-05 NOTE — TELEPHONE ENCOUNTER
----- Message from Elizabeth Rivera NP sent at 2/1/2024  2:55 PM CST -----  Yes!   ----- Message -----  From: Nohemi Payan RN  Sent: 2/1/2024   1:38 PM CST  To: Elizabeth Rivera NP; Gavin William MA    Can I book her with you as a VV so you can order all that imaging, so that it can get approved?  ----- Message -----  From: Elizabeth Rivera NP  Sent: 1/29/2024   3:55 PM CST  To: Nohemi Payan RN    MRI cervical and thoracic. X-ray lumbar flex/ex. New Scoliosis. X-ray cervical swim.   ----- Message -----  From: Nohemi Payan RN  Sent: 1/29/2024   2:47 PM CST  To: Elizabeth Rivera NP; Nohemi Payan RN    Pt ref.by roque for scoli.  Has mri L, scoli, and CT T&L.  Pt. Is medicaid.  What else does pt. Need.?      ----- Message -----  From: Sarah Beth Walter RN  Sent: 1/25/2024   2:27 PM CST  To: Azalia Rios    Good afternoon,    This patient is being referred to Dr. Vieyra by Dr. Brewer. Can someone reach out for scheduling?     Thanks,  RD

## 2024-02-05 NOTE — TELEPHONE ENCOUNTER
Msg sent to pt. Regarding VV with eufemia hermosillo    Future Appointments   Date Time Provider Department Center   2/9/2024 11:00 AM Eufemia Hermosillo, NP [facility] [place] [address]   2/15/2024 10:30 AM Cee Mccauley NP [facility]

## 2024-02-09 ENCOUNTER — OFFICE VISIT (OUTPATIENT)
Dept: NEUROSURGERY | Facility: CLINIC | Age: 39
End: 2024-02-09
Payer: MEDICAID

## 2024-02-09 DIAGNOSIS — M48.07 SPINAL STENOSIS, LUMBOSACRAL REGION: ICD-10-CM

## 2024-02-09 DIAGNOSIS — R29.898 WEAKNESS OF BOTH LOWER EXTREMITIES: ICD-10-CM

## 2024-02-09 DIAGNOSIS — M41.9 SCOLIOSIS, UNSPECIFIED SCOLIOSIS TYPE, UNSPECIFIED SPINAL REGION: Primary | ICD-10-CM

## 2024-02-09 DIAGNOSIS — M51.36 DEGENERATIVE DISC DISEASE, LUMBAR: ICD-10-CM

## 2024-02-09 PROCEDURE — 1159F MED LIST DOCD IN RCRD: CPT | Mod: CPTII,95,, | Performed by: NURSE PRACTITIONER

## 2024-02-09 PROCEDURE — 1160F RVW MEDS BY RX/DR IN RCRD: CPT | Mod: CPTII,95,, | Performed by: NURSE PRACTITIONER

## 2024-02-09 PROCEDURE — 4010F ACE/ARB THERAPY RXD/TAKEN: CPT | Mod: CPTII,95,, | Performed by: NURSE PRACTITIONER

## 2024-02-09 PROCEDURE — 99213 OFFICE O/P EST LOW 20 MIN: CPT | Mod: 95,,, | Performed by: NURSE PRACTITIONER

## 2024-02-09 NOTE — PROGRESS NOTES
Telehealth Visit     The patient location is: Home  The chief complaint leading to consultation is: Back Pain  Visit type: Virtual visit with audio  Total time spent with patient: 10 minutes     Each patient to whom I provide medical services by telemedicine is:  (1) informed of the relationship between the physician and patient and the respective role of any other health care provider with respect to management of the patient; and (2) notified that they may decline to receive medical services by telemedicine and may withdraw from such care at any time. Patient verbally consented to receive this service via voice-only telephone call.     This service was not originating from a related E/M service provided within the previous 7 days nor will  to an E/M service or procedure within the next 24 hours or my soonest available appointment.  Prevailing standard of care was able to be met in this audio-only visit.       Neurosurgery  History & Physical    SUBJECTIVE:     History of Present Illness: Kayla Clay is a 38 y.o. female with a past medical history significant for active IVDU (drug of choice is methamphetamine), MOY, and HTN. The patient is being seen today as a referral from Dr. Brewer to Dr. Vieyra to discuss deformity surgery. She has a surgical hx of L2-4 right sided hemilaminectomy for epidural abscess (Candida albicans found in operative cultures on 08/18/2022). Describes the pain as constant and severe throughout her low back. Rates the pain as a 10/10. Aggravating factors include standing and walking. She utilizes a cane with ambulation due to the weakness in her legs. Denies alleviating factors. Denies b/b dysfunction, saddle anesthesia, or gait instability.  She was previously obtaining PT without significant relief.     Review of patient's allergies indicates:   Allergen Reactions    Acetaminophen Hives    Doxycycline Rash     Rash on face and arms       Current Outpatient Medications    Medication Sig Dispense Refill    amLODIPine (NORVASC) 5 MG tablet Take 1 tablet (5 mg total) by mouth once daily. 30 tablet 11    baclofen (LIORESAL) 10 MG tablet Take 1 tablet (10 mg total) by mouth 3 (three) times daily. 90 tablet 11    EScitalopram oxalate (LEXAPRO) 20 MG tablet Take 1 tablet (20 mg total) by mouth once daily. 30 tablet 5    gabapentin (NEURONTIN) 300 MG capsule Take 300 mg by mouth 3 (three) times daily.      lisdexamfetamine (VYVANSE) 40 MG Cap Take 1 capsule (40 mg total) by mouth once daily. 30 capsule 0    loratadine (CLARITIN) 10 mg tablet Take 1 tablet (10 mg total) by mouth once daily. 30 tablet 6    pregabalin (LYRICA) 75 MG capsule Take 1 capsule (75 mg total) by mouth 2 (two) times daily. (Patient not taking: Reported on 11/1/2023) 60 capsule 1    sumatriptan (IMITREX) 50 MG tablet Take 1 tablet (50 mg total) by mouth every 2 (two) hours as needed for Migraine (up to 200 mg daily). 120 tablet 1     No current facility-administered medications for this visit.       Past Medical History:   Diagnosis Date    Epidural abscess     Generalized anxiety disorder     Hypertension     Obesity, unspecified      Past Surgical History:   Procedure Laterality Date    LUMBAR LAMINECTOMY WITH SURGICAL REMOVAL OF LESION OF SPINAL CORD BY POSTERIOR APPROACH N/A 8/18/2022    Procedure: LAMINECTOMY, SPINE, LUMBAR, POSTERIOR APPROACH, WITH EXCISION OF NEOPLASM OR LESION OF SPINAL CORD;  Surgeon: Floyd Brewer MD;  Location: [facility];  Service: Neurosurgery;  Laterality: N/A;     Family History       Problem Relation (Age of Onset)    No Known Problems Mother, Father          Social History     Socioeconomic History    Marital status:    Tobacco Use    Smoking status: Former     Passive exposure: Past    Smokeless tobacco: Never   Substance and Sexual Activity    Alcohol use: Not Currently    Drug use: Yes     Types: Heroin, Methamphetamines    Sexual activity: Yes     Partners: Male        Review of Systems    OBJECTIVE:     Vital Signs     There is no height or weight on file to calculate BMI.      Diagnostic Results:  I have personally reviewed the imaging:    CT thoracic and lumbar spine dated 1/13/24 shows levoscoliosis. Multilevel degenerative changes with left-sided facet arthropathy seen in the lower thoracic and vacuum disc phenomena at L1-2. Moderate neural foraminal narrowing L2-5.    2.   MRI lumbar spine W WO dated 10/12/23 shows disc protrusion at L4-L5 with severe neural foraminal narrowing. Severe leftward scoliotic curvature of the lumbar spine centered at L2. Multilevel neural foraminal stenoses.     ASSESSMENT/PLAN:   Kayla Clay is a 38 y.o. female  with a past medical history significant for active IVDU (drug of choice is methamphetamine), MOY, and HTN. The patient was seen today as a referral from Dr. Brewer to Dr. Vieyra to discuss deformity surgery. She has a surgical hx of L2-4 right sided hemilaminectomy for epidural abscess (Candida albicans found in operative cultures on 08/18/2022). I have ordered:    -MRI cervical and thoracic spine to evaluate for stenosis given scoliosis  -Dynamic x-rays  - Scoliosis x-ray to be performed on the day patient will see Dr. Vieyra    I would like the patient to follow-up in clinic with Dr. Vieyra to discuss the imaging and next steps. I have encouraged her to contact the clinic with any questions, concerns, or adverse clinical changes. She verbalized understanding.      CORNELIA Hinojosa  Neurosurgery  [facility].      Note dictated with voice recognition software, please excuse any grammatical errors.

## 2024-02-12 ENCOUNTER — TELEPHONE (OUTPATIENT)
Dept: NEUROSURGERY | Facility: CLINIC | Age: 39
End: 2024-02-12
Payer: MEDICAID

## 2024-02-14 ENCOUNTER — HOSPITAL ENCOUNTER (OUTPATIENT)
Dept: RADIOLOGY | Facility: HOSPITAL | Age: 39
Discharge: HOME | End: 2024-02-14
Attending: NURSE PRACTITIONER
Payer: MEDICAID

## 2024-02-14 DIAGNOSIS — M41.9 SCOLIOSIS, UNSPECIFIED SCOLIOSIS TYPE, UNSPECIFIED SPINAL REGION: ICD-10-CM

## 2024-02-14 PROCEDURE — 72114 X-RAY EXAM L-S SPINE BENDING: CPT | Mod: TC

## 2024-02-14 PROCEDURE — 72082 X-RAY EXAM ENTIRE SPI 2/3 VW: CPT | Mod: TC

## 2024-02-14 PROCEDURE — 72050 X-RAY EXAM NECK SPINE 4/5VWS: CPT | Mod: TC

## 2024-02-15 ENCOUNTER — OFFICE VISIT (OUTPATIENT)
Dept: FAMILY MEDICINE | Facility: CLINIC | Age: 39
End: 2024-02-15
Payer: MEDICAID

## 2024-02-15 VITALS
HEART RATE: 76 BPM | DIASTOLIC BLOOD PRESSURE: 78 MMHG | RESPIRATION RATE: 20 BRPM | SYSTOLIC BLOOD PRESSURE: 122 MMHG | BODY MASS INDEX: 34.4 KG/M2 | TEMPERATURE: 98 F | WEIGHT: 227 LBS | HEIGHT: 68 IN

## 2024-02-15 DIAGNOSIS — F32.A DEPRESSION, UNSPECIFIED DEPRESSION TYPE: ICD-10-CM

## 2024-02-15 DIAGNOSIS — E66.01 CLASS 2 SEVERE OBESITY DUE TO EXCESS CALORIES WITH SERIOUS COMORBIDITY AND BODY MASS INDEX (BMI) OF 38.0 TO 38.9 IN ADULT: ICD-10-CM

## 2024-02-15 DIAGNOSIS — F41.9 ANXIETY: Chronic | ICD-10-CM

## 2024-02-15 DIAGNOSIS — N94.6 DYSMENORRHEA: ICD-10-CM

## 2024-02-15 DIAGNOSIS — I10 HYPERTENSION, UNSPECIFIED TYPE: Chronic | ICD-10-CM

## 2024-02-15 PROCEDURE — 99214 OFFICE O/P EST MOD 30 MIN: CPT | Mod: ,,, | Performed by: NURSE PRACTITIONER

## 2024-02-15 PROCEDURE — 3074F SYST BP LT 130 MM HG: CPT | Mod: CPTII,,, | Performed by: NURSE PRACTITIONER

## 2024-02-15 PROCEDURE — 3008F BODY MASS INDEX DOCD: CPT | Mod: CPTII,,, | Performed by: NURSE PRACTITIONER

## 2024-02-15 PROCEDURE — 1160F RVW MEDS BY RX/DR IN RCRD: CPT | Mod: CPTII,,, | Performed by: NURSE PRACTITIONER

## 2024-02-15 PROCEDURE — 3078F DIAST BP <80 MM HG: CPT | Mod: CPTII,,, | Performed by: NURSE PRACTITIONER

## 2024-02-15 PROCEDURE — 1159F MED LIST DOCD IN RCRD: CPT | Mod: CPTII,,, | Performed by: NURSE PRACTITIONER

## 2024-02-15 PROCEDURE — 4010F ACE/ARB THERAPY RXD/TAKEN: CPT | Mod: CPTII,,, | Performed by: NURSE PRACTITIONER

## 2024-02-15 NOTE — PROGRESS NOTES
"Subjective:       Patient ID: Kayla Clay is a 38 y.o. female.    Chief Complaint: Menstrual Problem (Bad menstrual cramping in December)      The patient is a 38-year-old obese female with a BMI of 34.52.  The patient states she is having menstrual cramps.  The patient states she never had cramps prior to current episode.  The patient tells me she has  7/10 pain, cramping pain that is relieved with heat and ibuprofen 800 mg q.8 hours.  The patient would like to see a gyn physician.        Review of [id number] point review of systems conducted, negative except as stated in the history of present illness. See HPI for details.      Objective:      Visit Vitals  /78   Pulse 76   Temp 98.1 °F (36.7 °C)   Resp 20   Ht 5' 8" (1.727 m)   Wt 103 kg (227 lb)   LMP 02/14/2024   BMI 34.52 kg/m²     Physical Exam  Vitals and nursing note reviewed.   Constitutional:       Appearance: She is obese.   HENT:      Head: Normocephalic.      Right Ear: Tympanic membrane normal.      Left Ear: Tympanic membrane normal.      Nose: Nose normal.      Mouth/Throat:      Mouth: Mucous membranes are moist.   Eyes:      Extraocular Movements: Extraocular movements intact.   Cardiovascular:      Rate and Rhythm: Normal rate and regular rhythm.      Heart sounds: No murmur heard.  Pulmonary:      Effort: Pulmonary effort is normal.      Breath sounds: Normal breath sounds.   Abdominal:      General: Bowel sounds are normal.      Palpations: Abdomen is soft.   Musculoskeletal:         General: Normal range of motion.      Cervical back: Normal range of motion and neck supple.   Skin:     General: Skin is warm and dry.   Neurological:      Mental Status: She is alert and oriented to person, place, and time.       Current Outpatient Medications   Medication Instructions    amLODIPine (NORVASC) 5 mg, Oral, Daily    baclofen (LIORESAL) 10 mg, Oral, 3 times daily    EScitalopram oxalate (LEXAPRO) 20 mg, Oral, Daily    lisdexamfetamine " (VYVANSE) 40 mg, Oral, Daily    loratadine (CLARITIN) 10 mg, Oral, Daily    sumatriptan (IMITREX) 50 mg, Oral, Every 2 hours PRN     is allergic to acetaminophen and doxycycline.       Assessment:         ICD-10-CM ICD-9-CM   1. Dysmenorrhea  N94.6 625.3   2. Anxiety  F41.9 300.00   3. Depression, unspecified depression type  F32.A 311   4. Hypertension, unspecified type  I10 401.9   5. Class 2 severe obesity due to excess calories with serious comorbidity and body mass index (BMI) of 38.0 to 38.9 in adult  E66.01 278.01    Z68.38 V85.38          Plan:       1. Dysmenorrhea  Continue take ibuprofen 800 mg q.8 hours for pain  - Ambulatory referral/consult to Gynecology  - CBC Auto Differential; Future  - Comprehensive Metabolic Panel; Future  - Lipid Panel; Future  - TSH; Future  - Hemoglobin A1C; Future  - Urinalysis; Future    2. Anxiety  Controlled  Continue current prescription medication  - CBC Auto Differential; Future  - Comprehensive Metabolic Panel; Future  - Lipid Panel; Future  - TSH; Future  - Hemoglobin A1C; Future  - Urinalysis; Future    3. Depression, unspecified depression type  Controlled  Continue current prescription medication  - CBC Auto Differential; Future  - Comprehensive Metabolic Panel; Future  - Lipid Panel; Future  - TSH; Future  - Hemoglobin A1C; Future  - Urinalysis; Future    4. Hypertension, unspecified type  Hypertension  Controlled  Continue current medication  Return to clinic with any concerns  - CBC Auto Differential; Future  - Comprehensive Metabolic Panel; Future  - Lipid Panel; Future  - TSH; Future  - Hemoglobin A1C; Future  - Urinalysis; Future    5. Class 2 severe obesity due to excess calories with serious comorbidity and body mass index (BMI) of 38.0 to 38.9 in adult  Obesity  Reduced calorie diet modification  Frequent self weighing discussed  Exercise/lifestyle modification  - CBC Auto Differential; Future  - Comprehensive Metabolic Panel; Future  - Lipid Panel;  Future  - TSH; Future  - Hemoglobin A1C; Future  - Urinalysis; Future        Follow up in about 2 weeks (around 2/29/2024) for ADD.     Future Appointments   Date Time Provider Department Center   2/21/2024  8:30 AM NURSE, [facility]   2/28/2024 10:30 AM Cee Mccauley NP [facility]

## 2024-02-21 ENCOUNTER — CLINICAL SUPPORT (OUTPATIENT)
Dept: FAMILY MEDICINE | Facility: CLINIC | Age: 39
End: 2024-02-21
Payer: MEDICAID

## 2024-02-21 DIAGNOSIS — F41.9 ANXIETY: ICD-10-CM

## 2024-02-21 DIAGNOSIS — E66.01 CLASS 2 SEVERE OBESITY DUE TO EXCESS CALORIES WITH SERIOUS COMORBIDITY AND BODY MASS INDEX (BMI) OF 38.0 TO 38.9 IN ADULT: ICD-10-CM

## 2024-02-21 DIAGNOSIS — N94.6 DYSMENORRHEA: ICD-10-CM

## 2024-02-21 DIAGNOSIS — F32.A DEPRESSION, UNSPECIFIED DEPRESSION TYPE: ICD-10-CM

## 2024-02-21 DIAGNOSIS — I10 HYPERTENSION, UNSPECIFIED TYPE: ICD-10-CM

## 2024-02-21 LAB
ALBUMIN SERPL-MCNC: 4.3 G/DL (ref 3.5–5)
ALBUMIN/GLOB SERPL: 1.3 RATIO (ref 1.1–2)
ALP SERPL-CCNC: 51 UNIT/L (ref 40–150)
ALT SERPL-CCNC: 19 UNIT/L (ref 0–55)
APPEARANCE UR: ABNORMAL
AST SERPL-CCNC: 16 UNIT/L (ref 5–34)
BACTERIA #/AREA URNS AUTO: ABNORMAL /HPF
BASOPHILS # BLD AUTO: 0.05 X10(3)/MCL
BASOPHILS NFR BLD AUTO: 1.2 %
BILIRUB SERPL-MCNC: 0.7 MG/DL
BILIRUB UR QL STRIP.AUTO: NEGATIVE
BUN SERPL-MCNC: 17 MG/DL (ref 7–18.7)
CALCIUM SERPL-MCNC: 10 MG/DL (ref 8.4–10.2)
CHLORIDE SERPL-SCNC: 107 MMOL/L (ref 98–107)
CHOLEST SERPL-MCNC: 200 MG/DL
CHOLEST/HDLC SERPL: 4 {RATIO} (ref 0–5)
CO2 SERPL-SCNC: 22 MMOL/L (ref 22–29)
COLOR UR AUTO: YELLOW
CREAT SERPL-MCNC: 0.72 MG/DL (ref 0.55–1.02)
EOSINOPHIL # BLD AUTO: 0.06 X10(3)/MCL (ref 0–0.9)
EOSINOPHIL NFR BLD AUTO: 1.5 %
ERYTHROCYTE [DISTWIDTH] IN BLOOD BY AUTOMATED COUNT: 13.4 % (ref 11.5–17)
EST. AVERAGE GLUCOSE BLD GHB EST-MCNC: 96.8 MG/DL
GFR SERPLBLD CREATININE-BSD FMLA CKD-EPI: >60 MLS/MIN/1.73/M2
GLOBULIN SER-MCNC: 3.4 GM/DL (ref 2.4–3.5)
GLUCOSE SERPL-MCNC: 82 MG/DL (ref 74–100)
GLUCOSE UR QL STRIP.AUTO: NEGATIVE
HBA1C MFR BLD: 5 %
HCT VFR BLD AUTO: 44.1 % (ref 37–47)
HDLC SERPL-MCNC: 56 MG/DL (ref 35–60)
HGB BLD-MCNC: 14.5 G/DL (ref 12–16)
IMM GRANULOCYTES # BLD AUTO: 0.01 X10(3)/MCL (ref 0–0.04)
IMM GRANULOCYTES NFR BLD AUTO: 0.2 %
KETONES UR QL STRIP.AUTO: NEGATIVE
LDLC SERPL CALC-MCNC: 129 MG/DL (ref 50–140)
LEUKOCYTE ESTERASE UR QL STRIP.AUTO: ABNORMAL
LYMPHOCYTES # BLD AUTO: 1.63 X10(3)/MCL (ref 0.6–4.6)
LYMPHOCYTES NFR BLD AUTO: 40 %
MCH RBC QN AUTO: 28.5 PG (ref 27–31)
MCHC RBC AUTO-ENTMCNC: 32.9 G/DL (ref 33–36)
MCV RBC AUTO: 86.6 FL (ref 80–94)
MONOCYTES # BLD AUTO: 0.41 X10(3)/MCL (ref 0.1–1.3)
MONOCYTES NFR BLD AUTO: 10 %
NEUTROPHILS # BLD AUTO: 1.92 X10(3)/MCL (ref 2.1–9.2)
NEUTROPHILS NFR BLD AUTO: 47.1 %
NITRITE UR QL STRIP.AUTO: NEGATIVE
NRBC BLD AUTO-RTO: 0 %
PH UR STRIP.AUTO: 6 [PH]
PLATELET # BLD AUTO: 283 X10(3)/MCL (ref 130–400)
PMV BLD AUTO: 11.9 FL (ref 7.4–10.4)
POTASSIUM SERPL-SCNC: 4.2 MMOL/L (ref 3.5–5.1)
PROT SERPL-MCNC: 7.7 GM/DL (ref 6.4–8.3)
PROT UR QL STRIP.AUTO: NEGATIVE
RBC # BLD AUTO: 5.09 X10(6)/MCL (ref 4.2–5.4)
RBC #/AREA URNS AUTO: ABNORMAL /HPF
RBC UR QL AUTO: ABNORMAL
SODIUM SERPL-SCNC: 141 MMOL/L (ref 136–145)
SP GR UR STRIP.AUTO: 1.02 (ref 1–1.03)
SQUAMOUS #/AREA URNS AUTO: ABNORMAL /HPF
TRIGL SERPL-MCNC: 73 MG/DL (ref 37–140)
TSH SERPL-ACNC: 1.73 UIU/ML (ref 0.35–4.94)
UROBILINOGEN UR STRIP-ACNC: 0.2
VLDLC SERPL CALC-MCNC: 15 MG/DL
WBC # SPEC AUTO: 4.08 X10(3)/MCL (ref 4.5–11.5)
WBC #/AREA URNS AUTO: ABNORMAL /HPF

## 2024-02-21 PROCEDURE — 80053 COMPREHEN METABOLIC PANEL: CPT | Performed by: NURSE PRACTITIONER

## 2024-02-21 PROCEDURE — 83036 HEMOGLOBIN GLYCOSYLATED A1C: CPT | Performed by: NURSE PRACTITIONER

## 2024-02-21 PROCEDURE — 84443 ASSAY THYROID STIM HORMONE: CPT | Performed by: NURSE PRACTITIONER

## 2024-02-21 PROCEDURE — 85025 COMPLETE CBC W/AUTO DIFF WBC: CPT | Performed by: NURSE PRACTITIONER

## 2024-02-21 PROCEDURE — 36415 COLL VENOUS BLD VENIPUNCTURE: CPT

## 2024-02-21 PROCEDURE — 80061 LIPID PANEL: CPT | Performed by: NURSE PRACTITIONER

## 2024-02-21 PROCEDURE — 87086 URINE CULTURE/COLONY COUNT: CPT | Performed by: NURSE PRACTITIONER

## 2024-02-21 PROCEDURE — 81003 URINALYSIS AUTO W/O SCOPE: CPT | Performed by: NURSE PRACTITIONER

## 2024-02-24 LAB — BACTERIA UR CULT: NORMAL

## 2024-02-26 ENCOUNTER — OFFICE VISIT (OUTPATIENT)
Dept: OBSTETRICS AND GYNECOLOGY | Facility: CLINIC | Age: 39
End: 2024-02-26
Payer: MEDICAID

## 2024-02-26 VITALS
SYSTOLIC BLOOD PRESSURE: 124 MMHG | WEIGHT: 222 LBS | BODY MASS INDEX: 36.99 KG/M2 | DIASTOLIC BLOOD PRESSURE: 80 MMHG | TEMPERATURE: 98 F | HEIGHT: 65 IN

## 2024-02-26 DIAGNOSIS — Z13.31 POSITIVE DEPRESSION SCREENING: ICD-10-CM

## 2024-02-26 DIAGNOSIS — N94.6 DYSMENORRHEA: Primary | ICD-10-CM

## 2024-02-26 DIAGNOSIS — Z11.3 ENCOUNTER FOR SCREENING FOR INFECTIONS WITH A PREDOMINANTLY SEXUAL MODE OF TRANSMISSION: ICD-10-CM

## 2024-02-26 PROCEDURE — 1160F RVW MEDS BY RX/DR IN RCRD: CPT | Mod: CPTII,,,

## 2024-02-26 PROCEDURE — 4010F ACE/ARB THERAPY RXD/TAKEN: CPT | Mod: CPTII,,,

## 2024-02-26 PROCEDURE — 3079F DIAST BP 80-89 MM HG: CPT | Mod: CPTII,,,

## 2024-02-26 PROCEDURE — 99203 OFFICE O/P NEW LOW 30 MIN: CPT | Mod: ,,,

## 2024-02-26 PROCEDURE — 1159F MED LIST DOCD IN RCRD: CPT | Mod: CPTII,,,

## 2024-02-26 PROCEDURE — 3074F SYST BP LT 130 MM HG: CPT | Mod: CPTII,,,

## 2024-02-26 PROCEDURE — 3044F HG A1C LEVEL LT 7.0%: CPT | Mod: CPTII,,,

## 2024-02-26 PROCEDURE — 3008F BODY MASS INDEX DOCD: CPT | Mod: CPTII,,,

## 2024-02-26 RX ORDER — TRANEXAMIC ACID 650 MG/1
1300 TABLET ORAL 3 TIMES DAILY
Qty: 30 TABLET | Refills: 6 | Status: SHIPPED | OUTPATIENT
Start: 2024-02-26 | End: 2024-04-01

## 2024-02-26 RX ORDER — GABAPENTIN 300 MG/1
300 CAPSULE ORAL NIGHTLY
COMMUNITY

## 2024-02-26 NOTE — PROGRESS NOTES
I have used clinical judgement based on duration and functional status to consider definite necessity for treatment. Pt is currently on Lexapro and is being managed per PCP.   Detail Level: Zone Detail Level: Simple

## 2024-02-26 NOTE — PROGRESS NOTES
Chief Complaint:  New Patient (New patient present for Severe Dysmenorrhea since Dec. 2023.  LMP: 24, Menses monthly x 7 days with severe dysmenorrhea x 3 days.  Flow: moderate.  NO contraception.  Not currently sexually active.  Last Pap 23 - NIL w/ -HPV w/ Cee Mccauley NP. )    History of Present Illness:  Kayla Clay is a 38 y.o. year old  is a new patient who was referred by Cee Mccauley NP for severe dysmenorrhea. Currently relying on abstinence for birth control. Having regular monthly cycles lasting 7 days with normal bleeding. Denies any irregular menstrual bleeding. Pt states that her cycles since December have had severe cramping the first 3 days, to where she will be in bed all day. She is having to take Ibuprofen around the clock just to take the edge off. Pt states prior to this, she had only mild cramping. She denies any discharge, odor, or pelvic pain. Pt states she has not been sexually active in a few years. Last pap was 23 with her PCP.    LMP: 24  Frequency: monthly  Cycle length: 7 days  Flow: Normal  Intermenstrual bleeding: No  Postcoital bleeding: n/a  Dysmenorrhea: Yes, first 3 days  Sexually active: Not currently since 2022  Dyspareunia: n/a  Contraception: Abstinence  H/o STI: GC, CZ, and TV  Last pap: 23 - NIL w/ -HPV  H/o abnl pap: No  Colposcopy: n/a  Gardasil: 0/3  MMG: 10/26/23- Benign   H/o abnl MMG: No   Colonoscopy: Never      Review of Systems:  General/Constitutional: Chills denies. Fatigue/weakness denies. Fever denies. Night sweats denies. Hot flashes denies    Respiratory: Cough denies. Hemoptysis denies. SOB denies. Sputum production denies. Wheezing denies .   Cardiovascular: Chest pain denies. Dizziness denies. Palpitations denies. Swelling in hands/feet denies.                Breast: Dimpling denies. Breast mass denies. Breast pain/tenderness denies. Nipple discharge denies. Puckering denies.  Gastrointestinal: Abdominal pain denies.  Blood in stool denies. Constipation denies. Diarrhea denies. Heartburn denies. Nausea denies. Vomiting denies    Genitourinary: Incontinence denies. Blood in urine denies. Frequent urination denies. Painful urination denies. Urinary urgency denies. Nocturia denies    Gynecologic: Irregular menses denies. Heavy bleeding denies. Painful menses admits. Vaginal discharge denies. Vaginal odor denies. Vaginal itching denies. Vaginal lesion denies. Pelvic pain denies. Decreased libido denies. Vulvar lesion denies. Prolapse of genital organs denies. Painful intercourse denies. Postcoital bleeding denies    Psychiatric: Depression denies. Anxiety denies.     OB History    Para Term  AB Living   2 1 1 0 1 1   SAB IAB Ectopic Multiple Live Births   1 0 0 0 1      # 1 - Date: , Sex: None, Weight: None, GA: 4w0d, Delivery: Spontaneous , Apgar1: None, Apgar5: None, Living: Fetal Demise, Birth Comments: None    # 2 - Date: 08, Sex: Male, Weight: 4.097 kg (9 lb 0.5 oz), GA: None, Delivery: Vaginal, Spontaneous, Apgar1: None, Apgar5: None, Living: Living, Birth Comments: None      Past Medical History:   Diagnosis Date    Depression Early     Dysmenorrhea 2023    Epidural abscess     Generalized anxiety disorder     Hypertension     Obesity, unspecified     Scoliosis     Substance abuse Age 18    I used drugs off and on since i was 18 but im sober now and have been since early 2022       Current Outpatient Medications:     amLODIPine (NORVASC) 5 MG tablet, Take 1 tablet (5 mg total) by mouth once daily., Disp: 30 tablet, Rfl: 11    baclofen (LIORESAL) 10 MG tablet, Take 1 tablet (10 mg total) by mouth 3 (three) times daily., Disp: 90 tablet, Rfl: 11    EScitalopram oxalate (LEXAPRO) 20 MG tablet, Take 1 tablet (20 mg total) by mouth once daily., Disp: 30 tablet, Rfl: 5    gabapentin (NEURONTIN) 300 MG capsule, Take 300 mg by mouth every evening., Disp: , Rfl:     loratadine (CLARITIN)  10 mg tablet, Take 1 tablet (10 mg total) by mouth once daily., Disp: 30 tablet, Rfl: 6    sumatriptan (IMITREX) 50 MG tablet, Take 1 tablet (50 mg total) by mouth every 2 (two) hours as needed for Migraine (up to 200 mg daily)., Disp: 120 tablet, Rfl: 1    tranexamic acid (LYSTEDA) 650 mg tablet, Take 2 tablets (1,300 mg total) by mouth 3 (three) times daily. During the 5 heaviest days of your cycle., Disp: 30 tablet, Rfl: 6    Review of patient's allergies indicates:   Allergen Reactions    Acetaminophen Hives    Doxycycline Rash     Rash on face and arms       Past Surgical History:   Procedure Laterality Date    LUMBAR LAMINECTOMY WITH SURGICAL REMOVAL OF LESION OF SPINAL CORD BY POSTERIOR APPROACH N/A 8/18/2022    Procedure: LAMINECTOMY, SPINE, LUMBAR, POSTERIOR APPROACH, WITH EXCISION OF NEOPLASM OR LESION OF SPINAL CORD;  Surgeon: Floyd Brewer MD;  Location: [facility];  Service: Neurosurgery;  Laterality: N/A;     Family History   Problem Relation Age of Onset    Pancreatic cancer Paternal Grandfather         80's    Stomach cancer Maternal Grandfather         70's - +metastasis    No Known Problems Father     No Known Problems Mother     Breast cancer Neg Hx     Colon cancer Neg Hx     Ovarian cancer Neg Hx     Cervical cancer Neg Hx     Uterine cancer Neg Hx      Social History     Socioeconomic History    Marital status:    Tobacco Use    Smoking status: Never     Passive exposure: Current    Smokeless tobacco: Never    Tobacco comments:     Social smoker off and on since 14 but never regular smoker   Substance and Sexual Activity    Alcohol use: Not Currently    Drug use: Not Currently     Types: Heroin, Methamphetamines    Sexual activity: Not Currently     Partners: Male     Birth control/protection: Abstinence     Comment: STI: Chlamydia, Gonorrhea, Trich       Physical Exam:  /80 (BP Location: Left arm, Patient Position: Sitting, BP Method: Large (Manual))   Temp 97.5 °F (36.4 °C)  "(Temporal)   Ht 5' 4.5" (1.638 m)   Wt 100.7 kg (222 lb)   LMP 02/14/2024   BMI 37.52 kg/m²     Chaperone: present.     General appearance: healthy, well-nourished and well-developed     Psychiatric: Orientation to time, place and person. Normal mood and affect and active, alert     Skin: Appearance: no rashes or lesions.     Neck:   Neck: supple, FROM, trachea midline. and no masses   Thyroid: no enlargement or nodules and non-tender.       Cardiovascular:   Auscultation: RRR and no murmur.   Peripheral Vascular: no varicosities, LLE edema, RLE edema, calf tenderness, and palpable cord and pedal pulses intact.     Lungs:   Respiratory effort: no intercostal retractions or accessory muscle usage.   Auscultation: no wheezing, rales/crackles, or rhonchi and clear to auscultation.     Abdomen:   Auscultation/Inspection/Palpation: no hepatomegaly, splenomegaly, masses, tenderness or CVA tenderness and soft, non-distended bowel sounds preset.    Hernia: no palpable hernias.     Female Genitalia:    Vulva: no masses, tenderness or lesions    Bladder/Urethra: no urethral discharge or mass, normal meatus, bladder non-distended.    Vagina: no tenderness, erythema, cystocele, rectocele, abnormal vaginal discharge or vesicle(s) or ulcers, small amount  of white, thin discharge.    Cervix: no discharge, no cervical lacerations noted or motion tenderness and grossly normal    Uterus: normal size and shape and midline, non-tender, and no uterine prolapse.    Adnexa/Parametria: no parametrial tenderness or mass, no adnexal tenderness or ovarian mass.     Lymph Nodes:   Palpation: non tender submandibular nodes, axillary nodes, or inguinal nodes.     Rectal Exam:   Rectum: normal perianal skin.       Assessment/Plan:  1. Dysmenorrhea  -     tranexamic acid (LYSTEDA) 650 mg tablet; Take 2 tablets (1,300 mg total) by mouth 3 (three) times daily. During the 5 heaviest days of your cycle.  Dispense: 30 tablet; Refill: 6  -     MDL " Sendout Test    2. Positive depression screening  Comments:  I have reviewed the positive depression score which warrants active treatment with psychotherapy and/or medications.    3. Encounter for screening for infections with a predominantly sexual mode of transmission       One swab with leuk, myco, urea, AV, BV, and CV.  Rx: Lysteda for dysmenorrhea  F/u in 2 months after pt has used Lysteda for 2 cycles.  If dysmenorrhea has not improved, we will order a Pelvic US.  Depression is positive. She is currently on Lexapro and is being managed by her PCP.

## 2024-02-28 ENCOUNTER — OFFICE VISIT (OUTPATIENT)
Dept: FAMILY MEDICINE | Facility: CLINIC | Age: 39
End: 2024-02-28
Payer: MEDICAID

## 2024-02-28 VITALS
WEIGHT: 223 LBS | SYSTOLIC BLOOD PRESSURE: 117 MMHG | RESPIRATION RATE: 18 BRPM | HEIGHT: 65 IN | DIASTOLIC BLOOD PRESSURE: 73 MMHG | HEART RATE: 85 BPM | TEMPERATURE: 98 F | BODY MASS INDEX: 37.15 KG/M2

## 2024-02-28 DIAGNOSIS — F98.8 ATTENTION DEFICIT DISORDER, UNSPECIFIED HYPERACTIVITY PRESENCE: ICD-10-CM

## 2024-02-28 DIAGNOSIS — F98.8 ATTENTION DEFICIT DISORDER, UNSPECIFIED HYPERACTIVITY PRESENCE: Primary | ICD-10-CM

## 2024-02-28 DIAGNOSIS — R82.90 ABNORMAL URINALYSIS: ICD-10-CM

## 2024-02-28 LAB
AMP AMPHETAMINE 1000 NM/ML POC: NEGATIVE
BAR BARBITURATES 300 NG/ML POC: NEGATIVE
BILIRUB SERPL-MCNC: NORMAL MG/DL
BLOOD URINE, POC: NORMAL
BUP BUPRENORPHINE 10 NG/ML POC: NEGATIVE
BZO BENZODIAZEPINES 300 NG/ML POC: NEGATIVE
CLARITY, POC UA: CLEAR
COC COCAINE 300 NG/ML POC: NEGATIVE
COLOR, POC UA: YELLOW
CREATININE (CR) POC: NEGATIVE
CTP QC/QA: YES
GLUCOSE UR QL STRIP: NORMAL
KETONES UR QL STRIP: NORMAL
LEUKOCYTE ESTERASE URINE, POC: NORMAL
MET METHAMPHETAMINE 1000 NG/ML POC: NEGATIVE
MOP/OPI300 MORPHINE 300 NG/ML POC: NEGATIVE
MTD METHADONE 300 NG/ML POC: NEGATIVE
NITRITE, POC UA: NORMAL
OXIDANT (OX) POC: NEGATIVE
OXY OXYCODONE 100 NG/ML POC: NEGATIVE
PH, POC UA: 5.5
PROTEIN, POC: NORMAL
SPECIFIC GRAVITY (SG) POC: 1.02
SPECIFIC GRAVITY, POC UA: 1.01
TEMPERATURE (°F) POC: 94
THC MARIJUANA 50 NG/ML POC: NEGATIVE
UROBILINOGEN, POC UA: 0.2

## 2024-02-28 PROCEDURE — 3008F BODY MASS INDEX DOCD: CPT | Mod: CPTII,,, | Performed by: NURSE PRACTITIONER

## 2024-02-28 PROCEDURE — 3044F HG A1C LEVEL LT 7.0%: CPT | Mod: CPTII,,, | Performed by: NURSE PRACTITIONER

## 2024-02-28 PROCEDURE — 4010F ACE/ARB THERAPY RXD/TAKEN: CPT | Mod: CPTII,,, | Performed by: NURSE PRACTITIONER

## 2024-02-28 PROCEDURE — 1159F MED LIST DOCD IN RCRD: CPT | Mod: CPTII,,, | Performed by: NURSE PRACTITIONER

## 2024-02-28 PROCEDURE — 3078F DIAST BP <80 MM HG: CPT | Mod: CPTII,,, | Performed by: NURSE PRACTITIONER

## 2024-02-28 PROCEDURE — 81002 URINALYSIS NONAUTO W/O SCOPE: CPT | Mod: 59,,, | Performed by: NURSE PRACTITIONER

## 2024-02-28 PROCEDURE — 3074F SYST BP LT 130 MM HG: CPT | Mod: CPTII,,, | Performed by: NURSE PRACTITIONER

## 2024-02-28 PROCEDURE — 80305 DRUG TEST PRSMV DIR OPT OBS: CPT | Mod: QW,,, | Performed by: NURSE PRACTITIONER

## 2024-02-28 PROCEDURE — 99214 OFFICE O/P EST MOD 30 MIN: CPT | Mod: ,,, | Performed by: NURSE PRACTITIONER

## 2024-02-28 RX ORDER — DEXTROAMPHETAMINE SACCHARATE, AMPHETAMINE ASPARTATE, DEXTROAMPHETAMINE SULFATE AND AMPHETAMINE SULFATE 1.25; 1.25; 1.25; 1.25 MG/1; MG/1; MG/1; MG/1
5 TABLET ORAL DAILY
Qty: 30 TABLET | Refills: 0 | Status: SHIPPED | OUTPATIENT
Start: 2024-02-28 | End: 2024-03-29

## 2024-02-28 RX ORDER — LISDEXAMFETAMINE DIMESYLATE CAPSULES 10 MG/1
10 CAPSULE ORAL EVERY MORNING
Qty: 30 CAPSULE | Refills: 0 | Status: SHIPPED | OUTPATIENT
Start: 2024-02-28 | End: 2024-02-28

## 2024-02-28 NOTE — PROGRESS NOTES
"Subjective:       Patient ID: Kayla Clay is a 38 y.o. female.    Chief Complaint: Results (Lab results ) and Follow-up (Follow upx3 months  for ADHD )      I went over patient's lab work with her which is all within normal limits for age.    Patient states that she is having difficulty concentrating and focusing along with inability to complete tasks.  Patient completed the scales for ADD and her answers are indicative of a person who would benefit from being on medication.  We will put her on low-dose of Vyvanse.      Review of [id number] point review of systems conducted, negative except as stated in the history of present illness. See HPI for details.      Objective:      Visit Vitals  /73   Pulse 85   Temp 98 °F (36.7 °C) (Temporal)   Resp 18   Ht 5' 4.5" (1.638 m)   Wt 101.2 kg (223 lb)   LMP 02/14/2024   BMI 37.69 kg/m²     Physical Exam  Vitals and nursing note reviewed.   Constitutional:       Appearance: She is obese.   HENT:      Head: Normocephalic.      Right Ear: Tympanic membrane normal.      Left Ear: Tympanic membrane normal.      Nose: Nose normal.      Mouth/Throat:      Mouth: Mucous membranes are dry.   Eyes:      Extraocular Movements: Extraocular movements intact.   Cardiovascular:      Rate and Rhythm: Normal rate and regular rhythm.      Heart sounds: No murmur heard.  Pulmonary:      Effort: Pulmonary effort is normal.      Breath sounds: Normal breath sounds.   Abdominal:      General: Bowel sounds are normal.      Palpations: Abdomen is soft.   Musculoskeletal:         General: Normal range of motion.      Cervical back: Normal range of motion and neck supple.   Skin:     General: Skin is warm and dry.   Neurological:      Mental Status: She is alert and oriented to person, place, and time.       Current Outpatient Medications   Medication Instructions    amLODIPine (NORVASC) 5 mg, Oral, Daily    baclofen (LIORESAL) 10 mg, Oral, 3 times daily    EScitalopram oxalate " (LEXAPRO) 20 mg, Oral, Daily    gabapentin (NEURONTIN) 300 mg, Oral, Nightly    lisdexamfetamine (VYVANSE) 10 mg, Oral, Every morning    loratadine (CLARITIN) 10 mg, Oral, Daily    sumatriptan (IMITREX) 50 mg, Oral, Every 2 hours PRN    tranexamic acid (LYSTEDA) 1,300 mg, Oral, 3 times daily, During the 5 heaviest days of your cycle.     is allergic to acetaminophen and doxycycline.       Assessment:         ICD-10-CM ICD-9-CM   1. Attention deficit disorder, unspecified hyperactivity presence  F98.8 314.00   2. Abnormal urinalysis  R82.90 791.9          Plan:       1. Attention deficit disorder, unspecified hyperactivity presence  - POCT Urine Drug Screen (With BUP)-negative  - lisdexamfetamine (VYVANSE) 10 mg Cap; Take 1 capsule (10 mg total) by mouth every morning.  Dispense: 30 capsule; Refill: 0    2. Abnormal urinalysis  - POCT URINE DIPSTICK WITHOUT MICROSCOPE-negative          Follow up in about 4 weeks (around 3/27/2024).     Future Appointments   Date Time Provider Department Center   3/28/2024 10:00 AM Cee Mccauley NP [facility] RAMÓN [facility]   4/26/2024  9:20 AM Alyce Alvarado WHNP [facility] PRIYANK Carranza OB

## 2024-02-29 ENCOUNTER — TELEPHONE (OUTPATIENT)
Dept: OBSTETRICS AND GYNECOLOGY | Facility: CLINIC | Age: 39
End: 2024-02-29
Payer: MEDICAID

## 2024-02-29 DIAGNOSIS — N76.0 BV (BACTERIAL VAGINOSIS): Primary | ICD-10-CM

## 2024-02-29 DIAGNOSIS — B96.89 BV (BACTERIAL VAGINOSIS): Primary | ICD-10-CM

## 2024-02-29 RX ORDER — METRONIDAZOLE 500 MG/1
500 TABLET ORAL 2 TIMES DAILY
Qty: 14 TABLET | Refills: 0 | Status: SHIPPED | OUTPATIENT
Start: 2024-02-29 | End: 2024-03-07

## 2024-02-29 NOTE — TELEPHONE ENCOUNTER
----- Message from VICTORIANO Hernandez sent at 2/29/2024  9:24 AM CST -----  Please notify pt of +BV on one swab. She will need to be treated with Flagyl 500mg BID x 7 days. Thanks!

## 2024-02-29 NOTE — TELEPHONE ENCOUNTER
Called pt and informed +BV on oneswab and per Alyce Flagyl to be sent to pharmacy. Pt verbalized understanding

## 2024-03-23 NOTE — ASSESSMENT & PLAN NOTE
Hx HTN, not currently treated with medications as outpatient    --Hydralazine PRN  --Vitals Q4H     yes...

## 2024-04-22 DIAGNOSIS — J32.9 SINUSITIS, UNSPECIFIED CHRONICITY, UNSPECIFIED LOCATION: ICD-10-CM

## 2024-04-23 RX ORDER — LORATADINE 10 MG/1
10 TABLET ORAL
Qty: 30 TABLET | Refills: 5 | Status: SHIPPED | OUTPATIENT
Start: 2024-04-23

## 2024-05-21 ENCOUNTER — TELEPHONE (OUTPATIENT)
Dept: NEUROSURGERY | Facility: CLINIC | Age: 39
End: 2024-05-21
Payer: MEDICAID

## 2024-05-21 ENCOUNTER — PATIENT MESSAGE (OUTPATIENT)
Dept: NEUROSURGERY | Facility: CLINIC | Age: 39
End: 2024-05-21
Payer: MEDICAID

## 2024-05-21 NOTE — TELEPHONE ENCOUNTER
Called patient to schedule an upcoming appointment with Les Vieyra MD and provided next appointment date and time.  Future Appointments   Date Time Provider Department Center   5/30/2024 11:00 AM [facility] MRI1 350 LB LIMIT [facility] MRI [facility]   5/30/2024 12:00 PM [facility] MRI1 350 LB LIMIT [facility]   6/27/2024 11:00 AM Les Vieyra MD [facility] NEUROS[facility]        Patient voiced understanding and thanked me.

## 2024-05-30 ENCOUNTER — HOSPITAL ENCOUNTER (OUTPATIENT)
Dept: RADIOLOGY | Facility: HOSPITAL | Age: 39
Discharge: HOME | End: 2024-05-30
Attending: NURSE PRACTITIONER
Payer: MEDICAID

## 2024-05-30 DIAGNOSIS — R29.898 WEAKNESS OF BOTH LOWER EXTREMITIES: ICD-10-CM

## 2024-05-30 DIAGNOSIS — M41.9 SCOLIOSIS, UNSPECIFIED SCOLIOSIS TYPE, UNSPECIFIED SPINAL REGION: ICD-10-CM

## 2024-05-30 DIAGNOSIS — F41.9 ANXIETY: Primary | Chronic | ICD-10-CM

## 2024-05-30 RX ORDER — DIAZEPAM 5 MG/1
5 TABLET ORAL ONCE
Qty: 1 TABLET | Refills: 0 | Status: CANCELLED | OUTPATIENT
Start: 2024-05-30 | End: 2024-05-30

## 2024-05-30 RX ORDER — DIAZEPAM 2 MG/1
2 TABLET ORAL ONCE AS NEEDED
Qty: 2 TABLET | Refills: 0 | Status: SHIPPED | OUTPATIENT
Start: 2024-05-30 | End: 2024-05-30

## 2024-06-07 ENCOUNTER — HOSPITAL ENCOUNTER (OUTPATIENT)
Dept: RADIOLOGY | Facility: HOSPITAL | Age: 39
Discharge: HOME | End: 2024-06-07
Attending: NURSE PRACTITIONER
Payer: MEDICAID

## 2024-06-07 PROCEDURE — 72146 MRI CHEST SPINE W/O DYE: CPT | Mod: TC

## 2024-06-07 PROCEDURE — 72141 MRI NECK SPINE W/O DYE: CPT | Mod: TC

## 2024-06-21 RX ORDER — ESCITALOPRAM OXALATE 20 MG/1
20 TABLET ORAL
Qty: 30 TABLET | Refills: 5 | Status: SHIPPED | OUTPATIENT
Start: 2024-06-21

## 2024-06-27 ENCOUNTER — OFFICE VISIT (OUTPATIENT)
Dept: NEUROSURGERY | Facility: CLINIC | Age: 39
End: 2024-06-27
Payer: MEDICAID

## 2024-06-27 VITALS — DIASTOLIC BLOOD PRESSURE: 81 MMHG | SYSTOLIC BLOOD PRESSURE: 121 MMHG | HEART RATE: 75 BPM

## 2024-06-27 DIAGNOSIS — M51.36 DEGENERATIVE DISC DISEASE, LUMBAR: ICD-10-CM

## 2024-06-27 DIAGNOSIS — R29.898 WEAKNESS OF BOTH LOWER EXTREMITIES: ICD-10-CM

## 2024-06-27 DIAGNOSIS — M41.9 SCOLIOSIS, UNSPECIFIED SCOLIOSIS TYPE, UNSPECIFIED SPINAL REGION: Primary | ICD-10-CM

## 2024-06-27 DIAGNOSIS — M54.50 LUMBAR PAIN: ICD-10-CM

## 2024-06-27 DIAGNOSIS — L02.91 ABSCESS: ICD-10-CM

## 2024-06-27 PROCEDURE — 1160F RVW MEDS BY RX/DR IN RCRD: CPT | Mod: CPTII,,, | Performed by: NURSE PRACTITIONER

## 2024-06-27 PROCEDURE — 99213 OFFICE O/P EST LOW 20 MIN: CPT | Mod: PBBFAC | Performed by: NURSE PRACTITIONER

## 2024-06-27 PROCEDURE — 3074F SYST BP LT 130 MM HG: CPT | Mod: CPTII,,, | Performed by: NURSE PRACTITIONER

## 2024-06-27 PROCEDURE — 1159F MED LIST DOCD IN RCRD: CPT | Mod: CPTII,,, | Performed by: NURSE PRACTITIONER

## 2024-06-27 PROCEDURE — 3079F DIAST BP 80-89 MM HG: CPT | Mod: CPTII,,, | Performed by: NURSE PRACTITIONER

## 2024-06-27 PROCEDURE — 99999 PR PBB SHADOW E&M-EST. PATIENT-LVL III: CPT | Mod: PBBFAC,,, | Performed by: NURSE PRACTITIONER

## 2024-06-27 PROCEDURE — 4010F ACE/ARB THERAPY RXD/TAKEN: CPT | Mod: CPTII,,, | Performed by: NURSE PRACTITIONER

## 2024-06-27 PROCEDURE — 3044F HG A1C LEVEL LT 7.0%: CPT | Mod: CPTII,,, | Performed by: NURSE PRACTITIONER

## 2024-06-27 PROCEDURE — 99213 OFFICE O/P EST LOW 20 MIN: CPT | Mod: S$PBB,,, | Performed by: NURSE PRACTITIONER

## 2024-06-28 ENCOUNTER — TELEPHONE (OUTPATIENT)
Dept: PAIN MEDICINE | Facility: CLINIC | Age: 39
End: 2024-06-28
Payer: MEDICAID

## 2024-07-01 ENCOUNTER — HOSPITAL ENCOUNTER (OUTPATIENT)
Dept: RADIOLOGY | Facility: HOSPITAL | Age: 39
Discharge: HOME | End: 2024-07-01
Attending: NURSE PRACTITIONER
Payer: MEDICAID

## 2024-07-01 DIAGNOSIS — M51.36 DEGENERATIVE DISC DISEASE, LUMBAR: ICD-10-CM

## 2024-07-01 DIAGNOSIS — M41.9 SCOLIOSIS, UNSPECIFIED SCOLIOSIS TYPE, UNSPECIFIED SPINAL REGION: ICD-10-CM

## 2024-07-02 ENCOUNTER — HOSPITAL ENCOUNTER (OUTPATIENT)
Dept: RADIOLOGY | Facility: HOSPITAL | Age: 39
Discharge: HOME | End: 2024-07-02
Attending: NURSE PRACTITIONER
Payer: MEDICAID

## 2024-07-02 DIAGNOSIS — M51.36 DEGENERATIVE DISC DISEASE, LUMBAR: ICD-10-CM

## 2024-07-02 DIAGNOSIS — M41.9 SCOLIOSIS, UNSPECIFIED SCOLIOSIS TYPE, UNSPECIFIED SPINAL REGION: ICD-10-CM

## 2024-07-02 PROCEDURE — 72082 X-RAY EXAM ENTIRE SPI 2/3 VW: CPT | Mod: TC

## 2024-07-05 RX ORDER — BACLOFEN 10 MG/1
10 TABLET ORAL 3 TIMES DAILY
Qty: 90 TABLET | Refills: 2 | Status: SHIPPED | OUTPATIENT
Start: 2024-07-05 | End: 2024-10-03

## 2024-07-08 NOTE — PROGRESS NOTES
Neurosurgery  Established Patient    SUBJECTIVE:     History of Present Illness: Kayla Clay is a 38 y.o. female with a past medical history significant for active IVDU (drug of choice is methamphetamine), MOY, and HTN. The patient is being seen today as a referral from Dr. Brewer to Dr. Vieyra to discuss deformity surgery. She has a surgical hx of L2-4 right sided hemilaminectomy for epidural abscess (Candida albicans found in operative cultures on 08/18/2022). Describes the pain as constant and severe throughout her low back. Rates the pain as a 10/10. Aggravating factors include standing and walking. She utilizes a cane with ambulation due to the weakness in her legs. Denies alleviating factors. Denies b/b dysfunction, saddle anesthesia, or gait instability.  She was previously obtaining PT without significant relief.     Interval Hx 6/27/24: After the last appointment, it was recommended that the patient obtain updated imaging and follow-up with Dr. Vieyra to discuss if additional surgery would be beneficial. She is being seen in clinic today with myself and Dr. Vieyra to discuss the image findings. States that she continues to struggle with low back pain that radiates down her legs affecting her ability to stand and walk. Rates the pain as a 6/10. Reports numbness and tingling and weakness in the legs. States that she has to sit to obtain some relief.     Review of patient's allergies indicates:   Allergen Reactions    Acetaminophen Hives    Doxycycline Rash     Rash on face and arms       Current Outpatient Medications   Medication Sig Dispense Refill    amLODIPine (NORVASC) 5 MG tablet Take 1 tablet (5 mg total) by mouth once daily. 30 tablet 11    EScitalopram oxalate (LEXAPRO) 20 MG tablet TAKE 1 TABLET BY MOUTH EVERY DAY 30 tablet 5    loratadine (CLARITIN) 10 mg tablet TAKE 1 TABLET BY MOUTH EVERY DAY 30 tablet 5    sumatriptan (IMITREX) 50 MG tablet Take 1 tablet (50 mg total) by mouth every  2 (two) hours as needed for Migraine (up to 200 mg daily). 120 tablet 1    baclofen (LIORESAL) 10 MG tablet Take 1 tablet (10 mg total) by mouth 3 (three) times daily. 90 tablet 2    dextroamphetamine-amphetamine (ADDERALL) 5 mg Tab Take 5 mg by mouth Daily. 30 tablet 0    diazePAM (VALIUM) 2 MG tablet Take 1 tablet (2 mg total) by mouth once as needed for Anxiety. Take 1 tab 20-30 minutes prior to MRI; May repeat if needed 2 tablet 0    gabapentin (NEURONTIN) 300 MG capsule Take 300 mg by mouth every evening.       No current facility-administered medications for this visit.       Past Medical History:   Diagnosis Date    Depression Early 20's    Dysmenorrhea 12/23/2023    Epidural abscess     Generalized anxiety disorder     Hypertension     Obesity, unspecified     Scoliosis     Substance abuse Age 18    I used drugs off and on since i was 18 but im sober now and have been since early August 2022     Past Surgical History:   Procedure Laterality Date    LUMBAR LAMINECTOMY WITH SURGICAL REMOVAL OF LESION OF SPINAL CORD BY POSTERIOR APPROACH N/A 8/18/2022    Procedure: LAMINECTOMY, SPINE, LUMBAR, POSTERIOR APPROACH, WITH EXCISION OF NEOPLASM OR LESION OF SPINAL CORD;  Surgeon: Floyd Brewer MD;  Location: [facility];  Service: Neurosurgery;  Laterality: N/A;     Family History       Problem Relation (Age of Onset)    No Known Problems Father, Mother    Pancreatic cancer Paternal Grandfather    Stomach cancer Maternal Grandfather          Social History     Socioeconomic History    Marital status:    Tobacco Use    Smoking status: Never     Passive exposure: Current    Smokeless tobacco: Never    Tobacco comments:     Social smoker off and on since 14 but never regular smoker   Substance and Sexual Activity    Alcohol use: Not Currently    Drug use: Not Currently     Types: Heroin, Methamphetamines    Sexual activity: Not Currently     Partners: Male     Birth control/protection: Abstinence     Comment:  STI: Chlamydia, Gonorrhea, Trich     Social Determinants of Health     Financial Resource Strain: High Risk (2/27/2024)    Overall Financial Resource Strain (CARDIA)     Difficulty of Paying Living Expenses: Hard   Food Insecurity: Food Insecurity Present (2/27/2024)    Hunger Vital Sign     Worried About Running Out of Food in the Last Year: Sometimes true     Ran Out of Food in the Last Year: Sometimes true   Transportation Needs: No Transportation Needs (2/27/2024)    PRAPARE - Transportation     Lack of Transportation (Medical): No     Lack of Transportation (Non-Medical): No   Physical Activity: Inactive (2/27/2024)    Exercise Vital Sign     Days of Exercise per Week: 0 days     Minutes of Exercise per Session: 0 min   Stress: Stress Concern Present (2/27/2024)    [employer] - Occupational Stress Questionnaire     Feeling of Stress : Very much   Housing Stability: Low Risk  (2/27/2024)    Housing Stability Vital Sign     Unable to Pay for Housing in the Last Year: No     Number of Places Lived in the Last Year: 1     Unstable Housing in the Last Year: No       Review of Systems    OBJECTIVE:     Vital Signs  Pulse: 75  BP: 121/81  Pain Score:   6  There is no height or weight on file to calculate BMI.    Neurosurgery Physical Exam  General: well developed, well nourished, no distress.   Head: normocephalic, atraumatic  Neurologic: Alert and oriented. Thought content appropriate.  GCS: Motor: 6/Verbal: 5/Eyes: 4 GCS Total: 15  Mental Status: Awake, Alert, Oriented x 4  Language: No aphasia  Speech: No dysarthria  Cranial nerves: face symmetric, tongue midline, CN II-XII grossly intact.   Eyes: pupils equal, round, reactive to light with accomodation, EOMI.   Pulmonary: normal respirations, no signs of respiratory distress  Abdomen: soft, non-distended  Skin: Skin is warm, dry and intact.  Sensory: intact to light touch throughout  Motor Strength:Moves all extremities spontaneously  with good tone.   Strength  Deltoids Triceps Biceps Wrist Extension Wrist Flexion Hand    Upper: R 5/5 5/5 5/5 5/5 5/5 5/5    L 5/5 5/5 5/5 5/5 5/5 5/5     HF KE KF DF PF EHL   Lower: R 5/5 5/5 5/5 5/5 5/5 5/5    L 5/5 5/5 5/5 5/5 5/5 5/5     Reflexes:   Godinez's: Negative.     Cerebellar:   Gait antalgic     Cervical:   ROM: Full with flexion, extension, lateral rotation and ear-to-shoulder bend.   Midline TTP: Negative.     Thoracic:  Midline TTP: Positive.     Lumbar:  Midline TTP: Positive.    Diagnostic Results:  I have personally reviewed the imaging with Dr. Vieyra:    MRI thoracic spine dated 6/7/24 shows sinusoidal curvature of the thoracolumbar spine with the upper thoracic convexity to the right. Multilevel degenerative changes without high grade stenosis    2.   MRI cervical spine dated 6/7/24 shows mild multilevel degenerative changes    3.  Scoliosis series dated 2/14/24 shows S shaped scoliotic curvature of the thoracolumbar spine.     4.  X-ray cervical spine dated 2/14/24 shows no instability    5.  X-ray lumbar flex/ex dated 2/14/24 shows no instability  ASSESSMENT/PLAN:     Kayla Clay is a 39 y.o. female seen today as a referral from Dr. Brewer to Dr. Vieyra to discuss deformity surgery. She has a surgical hx of L2-4 right sided hemilaminectomy for epidural abscess (Candida albicans found in operative cultures on 08/18/2022). After reviewing the imaging and concerns, Dr. Vieyra discussed with the patient that the surgery would be a high thoracic to pelvis fusion and given her hx of abscess he would like to attempt injections with pain management again as it provided her relief in the past. Repeat the scoliosis x-ray and follow-up with Dr. Veiyra in 3 months.  I have encouraged her to contact the clinic with any questions, concerns, or adverse clinical changes. She verbalized understanding.      Elizabeth Rivera, CORNELIA  Neurosurgery  [facility].      Note  dictated with voice recognition software, please excuse any grammatical errors.

## 2024-07-10 ENCOUNTER — TELEPHONE (OUTPATIENT)
Dept: PAIN MEDICINE | Facility: CLINIC | Age: 39
End: 2024-07-10
Payer: MEDICAID

## 2024-07-10 NOTE — TELEPHONE ENCOUNTER
----- Message from Marge Guillermo LPN sent at 7/9/2024  1:06 PM CDT -----  Contact: self    ----- Message -----  From: Carrol Duenas  Sent: 7/9/2024   9:28 AM CDT  To: Pete Christian Staff    Type:  Patient Returning Call    Who Called:Kayla Clay  Who Left Message for Patient:unsure  Does the patient know what this is regarding?:update on referral/lower back pain  Would the patient rather a call back or a response via [facility]?   Best Call Back Number:[phone]  Additional Information: n/a

## 2024-07-10 NOTE — TELEPHONE ENCOUNTER
I spoke with Ms Clay to let her know unfortunately our offic eis not in network with any of the medicaids, I advised her to contact her PCP to request a new referral sent to a different provider.

## 2024-08-08 ENCOUNTER — OFFICE VISIT (OUTPATIENT)
Dept: FAMILY MEDICINE | Facility: CLINIC | Age: 39
End: 2024-08-08
Payer: MEDICAID

## 2024-08-08 VITALS
DIASTOLIC BLOOD PRESSURE: 75 MMHG | HEIGHT: 65 IN | HEART RATE: 77 BPM | TEMPERATURE: 98 F | BODY MASS INDEX: 35.16 KG/M2 | WEIGHT: 211 LBS | RESPIRATION RATE: 20 BRPM | SYSTOLIC BLOOD PRESSURE: 121 MMHG

## 2024-08-08 DIAGNOSIS — M54.42 CHRONIC LOW BACK PAIN WITH BILATERAL SCIATICA, UNSPECIFIED BACK PAIN LATERALITY: Primary | ICD-10-CM

## 2024-08-08 DIAGNOSIS — M54.41 CHRONIC LOW BACK PAIN WITH BILATERAL SCIATICA, UNSPECIFIED BACK PAIN LATERALITY: Primary | ICD-10-CM

## 2024-08-08 DIAGNOSIS — G89.29 CHRONIC LOW BACK PAIN WITH BILATERAL SCIATICA, UNSPECIFIED BACK PAIN LATERALITY: Primary | ICD-10-CM

## 2024-08-08 PROCEDURE — 1160F RVW MEDS BY RX/DR IN RCRD: CPT | Mod: CPTII,,, | Performed by: NURSE PRACTITIONER

## 2024-08-08 PROCEDURE — 99214 OFFICE O/P EST MOD 30 MIN: CPT | Mod: ,,, | Performed by: NURSE PRACTITIONER

## 2024-08-08 PROCEDURE — 3074F SYST BP LT 130 MM HG: CPT | Mod: CPTII,,, | Performed by: NURSE PRACTITIONER

## 2024-08-08 PROCEDURE — 4010F ACE/ARB THERAPY RXD/TAKEN: CPT | Mod: CPTII,,, | Performed by: NURSE PRACTITIONER

## 2024-08-08 PROCEDURE — 1159F MED LIST DOCD IN RCRD: CPT | Mod: CPTII,,, | Performed by: NURSE PRACTITIONER

## 2024-08-08 PROCEDURE — 3008F BODY MASS INDEX DOCD: CPT | Mod: CPTII,,, | Performed by: NURSE PRACTITIONER

## 2024-08-08 PROCEDURE — 3078F DIAST BP <80 MM HG: CPT | Mod: CPTII,,, | Performed by: NURSE PRACTITIONER

## 2024-08-08 PROCEDURE — 3044F HG A1C LEVEL LT 7.0%: CPT | Mod: CPTII,,, | Performed by: NURSE PRACTITIONER

## 2024-08-08 NOTE — PROGRESS NOTES
"Subjective:       Patient ID: Kayla Clay is a 39 y.o. female.    Chief Complaint: Physical Exam (Exam for SNAP disability verification)      The patient is a 39-year-old female who presents for checkup for food stamps.  The patient is status post spinal abscess removal.  Patient currently has severe scoliosis and is seeing a neurosurgeon to schedule surgery.  Patient has low back pain at 7/10 severity.  Patient describes the pain as throbbing and extending into her bilateral legs.  Patient states she is unable to stand more than water 2 minutes.      Review of [id number] point review of systems conducted, negative except as stated in the history of present illness. See HPI for details.      Objective:      Visit Vitals  /75   Pulse 77   Temp 98.3 °F (36.8 °C)   Resp 20   Ht 5' 4.5" (1.638 m)   Wt 95.7 kg (211 lb)   LMP 07/29/2024   BMI 35.66 kg/m²     Physical Exam  Vitals and nursing note reviewed.   Constitutional:       Appearance: She is obese.   HENT:      Head: Normocephalic.      Right Ear: Tympanic membrane normal.      Left Ear: Tympanic membrane normal.      Nose: Nose normal.   Eyes:      Extraocular Movements: Extraocular movements intact.   Cardiovascular:      Rate and Rhythm: Normal rate and regular rhythm.      Heart sounds: No murmur heard.  Pulmonary:      Effort: Pulmonary effort is normal.      Breath sounds: Normal breath sounds.   Abdominal:      Palpations: Abdomen is soft.   Musculoskeletal:      Cervical back: Normal range of motion and neck supple.      Comments: Limited range of motion low back due to pain   Skin:     General: Skin is warm and dry.   Neurological:      Mental Status: She is alert and oriented to person, place, and time.       Current Outpatient Medications   Medication Instructions    amLODIPine (NORVASC) 5 mg, Oral, Daily    baclofen (LIORESAL) 10 mg, Oral, 3 times daily    EScitalopram oxalate (LEXAPRO) 20 mg, Oral    gabapentin (NEURONTIN) 300 mg, Oral, " Nightly    loratadine (CLARITIN) 10 mg, Oral    sumatriptan (IMITREX) 50 mg, Oral, Every 2 hours PRN     is allergic to acetaminophen and doxycycline.       Assessment:         ICD-10-CM ICD-9-CM   1. Chronic low back pain with bilateral sciatica, unspecified back pain laterality  M54.41 724.2    G89.29 724.3    M54.42 338.29          Plan:       1. Chronic low back pain with bilateral sciatica, unspecified back pain laterality  Patient has tried physical therapy  The patient is currently taking over-the-counter NSAIDs and baclofen  Return to clinic in 3 months or if symptoms worsen        Follow up in about 3 months (around 11/8/2024), or if symptoms worsen or fail to improve.     Future Appointments   Date Time Provider Department Center   3/10/2025  9:00 AM Alyce Alvarado WHNP [facility] PRIYANK Carranza OB

## 2024-08-24 ENCOUNTER — PATIENT MESSAGE (OUTPATIENT)
Dept: NEUROSURGERY | Facility: CLINIC | Age: 39
End: 2024-08-24
Payer: MEDICAID

## 2024-08-26 ENCOUNTER — TELEPHONE (OUTPATIENT)
Dept: NEUROSURGERY | Facility: CLINIC | Age: 39
End: 2024-08-26
Payer: MEDICAID

## 2024-08-26 NOTE — TELEPHONE ENCOUNTER
Called patient to schedule an upcoming appointment with Les Vieyra MD. No response, LVM and provided next appointment date and time.  Future Appointments   Date Time Provider Department Center   10/14/2024  2:30 PM [facility]   3/10/2025  9:00 AM Alyce Alvarado WHNP [facility] OBMONTY Carranza OB

## 2024-09-15 DIAGNOSIS — I10 HYPERTENSION, UNSPECIFIED TYPE: ICD-10-CM

## 2024-09-16 RX ORDER — AMLODIPINE BESYLATE 5 MG/1
5 TABLET ORAL
Qty: 30 TABLET | Refills: 5 | Status: SHIPPED | OUTPATIENT
Start: 2024-09-16

## 2024-09-17 RX ORDER — BACLOFEN 10 MG/1
10 TABLET ORAL 3 TIMES DAILY
Qty: 90 TABLET | Refills: 2 | Status: SHIPPED | OUTPATIENT
Start: 2024-09-17

## 2024-10-01 ENCOUNTER — CLINICAL SUPPORT (OUTPATIENT)
Dept: FAMILY MEDICINE | Facility: CLINIC | Age: 39
End: 2024-10-01
Payer: MEDICAID

## 2024-10-01 DIAGNOSIS — Z23 FLU VACCINE NEED: Primary | ICD-10-CM

## 2024-10-01 PROCEDURE — 90471 IMMUNIZATION ADMIN: CPT | Mod: ,,, | Performed by: NURSE PRACTITIONER

## 2024-10-01 PROCEDURE — 90656 IIV3 VACC NO PRSV 0.5 ML IM: CPT | Mod: ,,, | Performed by: NURSE PRACTITIONER

## 2024-10-14 ENCOUNTER — OFFICE VISIT (OUTPATIENT)
Dept: SPINE | Facility: CLINIC | Age: 39
End: 2024-10-14
Payer: MEDICAID

## 2024-10-14 VITALS — HEART RATE: 68 BPM | DIASTOLIC BLOOD PRESSURE: 80 MMHG | SYSTOLIC BLOOD PRESSURE: 128 MMHG

## 2024-10-14 DIAGNOSIS — M41.50 DEGENERATIVE SCOLIOSIS IN ADULT PATIENT: Primary | ICD-10-CM

## 2024-10-14 PROCEDURE — 3074F SYST BP LT 130 MM HG: CPT | Mod: CPTII,,, | Performed by: NEUROLOGICAL SURGERY

## 2024-10-14 PROCEDURE — 99213 OFFICE O/P EST LOW 20 MIN: CPT | Mod: S$PBB,,, | Performed by: NEUROLOGICAL SURGERY

## 2024-10-14 PROCEDURE — 4010F ACE/ARB THERAPY RXD/TAKEN: CPT | Mod: CPTII,,, | Performed by: NEUROLOGICAL SURGERY

## 2024-10-14 PROCEDURE — 99999 PR PBB SHADOW E&M-EST. PATIENT-LVL II: CPT | Mod: PBBFAC,,, | Performed by: NEUROLOGICAL SURGERY

## 2024-10-14 PROCEDURE — 99212 OFFICE O/P EST SF 10 MIN: CPT | Mod: PBBFAC | Performed by: NEUROLOGICAL SURGERY

## 2024-10-14 PROCEDURE — 3044F HG A1C LEVEL LT 7.0%: CPT | Mod: CPTII,,, | Performed by: NEUROLOGICAL SURGERY

## 2024-10-14 PROCEDURE — 3079F DIAST BP 80-89 MM HG: CPT | Mod: CPTII,,, | Performed by: NEUROLOGICAL SURGERY

## 2024-10-14 NOTE — PROGRESS NOTES
Dear Dr. Walden ref. provider found,    Thank you for referring this patient to my clinic. The full details of my evaluation will also be forthcoming to you by letter.    CHIEF COMPLAINT:  Back pain    HPI:  Kayla Clay is a 39 y.o.  female with known spinal deformity presents with debilitating axial back pain and fatigue with walking. Her symptoms have improved significantly with injections but she is still debilitated. She stays mostly at home and can't walk too far. She uses the cane to offload some of her weight when walking.    Review of patient's allergies indicates:   Allergen Reactions    Acetaminophen Hives    Doxycycline Rash     Rash on face and arms       Past Medical History:   Diagnosis Date    Depression Early 20's    Dysmenorrhea 12/23/2023    Epidural abscess     Generalized anxiety disorder     Hypertension     Obesity, unspecified     Scoliosis     Substance abuse Age 18    I used drugs off and on since i was 18 but im sober now and have been since early August 2022     Past Surgical History:   Procedure Laterality Date    LUMBAR LAMINECTOMY WITH SURGICAL REMOVAL OF LESION OF SPINAL CORD BY POSTERIOR APPROACH N/A 8/18/2022    Procedure: LAMINECTOMY, SPINE, LUMBAR, POSTERIOR APPROACH, WITH EXCISION OF NEOPLASM OR LESION OF SPINAL CORD;  Surgeon: Floyd Brewer MD;  Location: [facility];  Service: Neurosurgery;  Laterality: N/A;     Family History   Problem Relation Name Age of Onset    Pancreatic cancer Paternal Grandfather          80's    Stomach cancer Maternal Grandfather          70's - +metastasis    No Known Problems Father      No Known Problems Mother      Breast cancer Neg Hx      Colon cancer Neg Hx      Ovarian cancer Neg Hx      Cervical cancer Neg Hx      Uterine cancer Neg Hx       Social History     Tobacco Use    Smoking status: Never     Passive exposure: Current    Smokeless tobacco: Never    Tobacco comments:     Social smoker off and on since 14 but never regular smoker    Substance Use Topics    Alcohol use: Not Currently    Drug use: Not Currently     Types: Heroin, Methamphetamines        Review of Systems    OBJECTIVE:       Vital Signs:  Pulse: 68 (10/14/24 1416)  BP: 128/80 (10/14/24 1416)    Physical Exam:    Vital signs: All nursing notes and vital signs reviewed -- afebrile, vital signs stable.  Constitutional: Patient sitting comfortably in chair. Appears well developed and well nourished.  Skin: Exposed areas are intact without abnormal markings, rashes or other lesions.  HEENT: Normocephalic. Normal conjunctivae.  Cardiovascular: Normal rate and regular rhythm.  Respiratory: Chest wall rises and falls symmetrically, without signs of respiratory distress.  Abdomen: Soft and non-tender.  Extremities: Warm and without edema. Calves supple, non-tender.  Psych/Behavior: Normal affect.    Neurological:    Mental status: Alert and oriented. Conversational and appropriate.       Cranial Nerves: Grossly intact    Motor:       Lower:  HF KE KF DF PF EHL    R 5/5 5/5 5/5 5/5 5/5 5/5    L 5/5 5/5 5/5 5/5 5/5 5/5       Spine:    Forward stooped posture. Right truncal shift    Diagnostic Results:  All imaging was independently reviewed by me.    MRI Cervical and thoracic shows no significant stenosis.     Scoliosis xrays are stable    ASSESSMENT/PLAN:     Kayla Clay has significant S shaped scoliosis with sagittal imbalance. She is debilitated by axial low back pain but has had significant response to lumbar injections which have made the pain somewhat more tolerable. She is a candidate for a high thoracic to pelvis fusion but she is also aware of the severe risks associated with this surgery -- especially with her history of candida infection and history of IV drug abuse in the remote past. Since she is doing somewhat better after injections I would recommend a course of PT prior to pursuing this big surgery.     The patient understands and agrees with the plan of care. All  questions were answered.     1. PT  2. RTC in 3 months pending #1          Les Vieyra M.D.  Department of Neurosurgery  [facility]      .

## 2024-10-16 ENCOUNTER — OFFICE VISIT (OUTPATIENT)
Dept: FAMILY MEDICINE | Facility: CLINIC | Age: 39
End: 2024-10-16
Payer: MEDICAID

## 2024-10-16 VITALS
RESPIRATION RATE: 20 BRPM | BODY MASS INDEX: 33.66 KG/M2 | HEART RATE: 65 BPM | HEIGHT: 65 IN | SYSTOLIC BLOOD PRESSURE: 133 MMHG | WEIGHT: 202 LBS | TEMPERATURE: 98 F | DIASTOLIC BLOOD PRESSURE: 81 MMHG

## 2024-10-16 DIAGNOSIS — L50.9 URTICARIA: ICD-10-CM

## 2024-10-16 DIAGNOSIS — L53.8 MACULAR ERYTHEMATOUS RASH: ICD-10-CM

## 2024-10-16 PROCEDURE — 99214 OFFICE O/P EST MOD 30 MIN: CPT | Mod: ,,, | Performed by: NURSE PRACTITIONER

## 2024-10-16 PROCEDURE — 4010F ACE/ARB THERAPY RXD/TAKEN: CPT | Mod: CPTII,,, | Performed by: NURSE PRACTITIONER

## 2024-10-16 PROCEDURE — 1160F RVW MEDS BY RX/DR IN RCRD: CPT | Mod: CPTII,,, | Performed by: NURSE PRACTITIONER

## 2024-10-16 PROCEDURE — 3044F HG A1C LEVEL LT 7.0%: CPT | Mod: CPTII,,, | Performed by: NURSE PRACTITIONER

## 2024-10-16 PROCEDURE — 3079F DIAST BP 80-89 MM HG: CPT | Mod: CPTII,,, | Performed by: NURSE PRACTITIONER

## 2024-10-16 PROCEDURE — 1159F MED LIST DOCD IN RCRD: CPT | Mod: CPTII,,, | Performed by: NURSE PRACTITIONER

## 2024-10-16 PROCEDURE — 3075F SYST BP GE 130 - 139MM HG: CPT | Mod: CPTII,,, | Performed by: NURSE PRACTITIONER

## 2024-10-16 PROCEDURE — 3008F BODY MASS INDEX DOCD: CPT | Mod: CPTII,,, | Performed by: NURSE PRACTITIONER

## 2024-10-16 RX ORDER — TRIAMCINOLONE ACETONIDE 1 MG/G
CREAM TOPICAL 2 TIMES DAILY
Qty: 1 EACH | Refills: 0 | Status: SHIPPED | OUTPATIENT
Start: 2024-10-16 | End: 2024-10-26

## 2024-10-16 RX ORDER — METHYLPREDNISOLONE 4 MG/1
TABLET ORAL
Qty: 21 EACH | Refills: 0 | Status: SHIPPED | OUTPATIENT
Start: 2024-10-19 | End: 2024-11-06

## 2024-10-16 NOTE — PROGRESS NOTES
"Subjective:       Patient ID: Kayla Clay is a 39 y.o. female.    Chief Complaint: Rash (Rash to both arms, itching X's 6 days)      The patient is a 39-year-old female who presents for rash.  The patient denies being exposed to any chemicals, plants, or changing laundry detergent, soap or lotion.  The rash is macular, erythemic, and itchy.  Relief measures at home have been none.      Review of [id number] point review of systems conducted, negative except as stated in the history of present illness. See HPI for details.      Objective:      Visit Vitals  /81   Pulse 65   Temp 97.7 °F (36.5 °C)   Resp 20   Ht 5' 4.5" (1.638 m)   Wt 91.6 kg (202 lb)   LMP 09/17/2024   BMI 34.14 kg/m²     Physical Exam  Vitals and nursing note reviewed.   Constitutional:       Appearance: She is obese.   HENT:      Head: Normocephalic.      Nose: Nose normal.      Mouth/Throat:      Mouth: Mucous membranes are moist.   Eyes:      Extraocular Movements: Extraocular movements intact.   Cardiovascular:      Rate and Rhythm: Normal rate and regular rhythm.      Heart sounds: No murmur heard.  Pulmonary:      Effort: Pulmonary effort is normal.      Breath sounds: Normal breath sounds.   Abdominal:      Palpations: Abdomen is soft.   Musculoskeletal:         General: Normal range of motion.      Cervical back: Normal range of motion and neck supple.   Skin:     General: Skin is warm and dry.   Neurological:      Mental Status: She is alert and oriented to person, place, and time.       Current Outpatient Medications   Medication Instructions    amLODIPine (NORVASC) 5 mg, Oral    baclofen (LIORESAL) 10 mg, Oral, 3 times daily    EScitalopram oxalate (LEXAPRO) 20 mg, Oral    gabapentin (NEURONTIN) 300 mg, Nightly    loratadine (CLARITIN) 10 mg, Oral    [START ON 10/19/2024] methylPREDNISolone (MEDROL DOSEPACK) 4 mg tablet use as directed    sumatriptan (IMITREX) 50 mg, Oral, Every 2 hours PRN    triamcinolone acetonide 0.1% " (KENALOG) 0.1 % cream Topical (Top), 2 times daily     is allergic to acetaminophen and doxycycline.       Assessment:         ICD-10-CM ICD-9-CM   1. Macular erythematous rash  L53.8 782.1   2. Urticaria  L50.9 708.9          Plan:       1. Macular erythematous rash  Take cool showers, Pat skin dry, apply triamcinolone  - methylPREDNISolone (MEDROL DOSEPACK) 4 mg tablet; use as directed  Dispense: 21 each; Refill: 0 take after 3 days if rash continues to be itchy and red  - triamcinolone acetonide 0.1% (KENALOG) 0.1 % cream; Apply topically 2 (two) times daily. for 10 days  Dispense: 1 each; Refill: 0    2. Urticaria  - methylPREDNISolone (MEDROL DOSEPACK) 4 mg tablet; use as directed  Dispense: 21 each; Refill: 0  - triamcinolone acetonide 0.1% (KENALOG) 0.1 % cream; Apply topically 2 (two) times daily. for 10 days  Dispense: 1 each; Refill: 0        Follow up in about 3 months (around 1/16/2025) for Wellness.     Future Appointments   Date Time Provider Department Center   3/10/2025  9:00 AM Alyce Alvarado WHNP [facility]

## 2024-10-21 ENCOUNTER — PATIENT MESSAGE (OUTPATIENT)
Dept: SPINE | Facility: CLINIC | Age: 39
End: 2024-10-21
Payer: MEDICAID

## 2024-10-22 ENCOUNTER — OFFICE VISIT (OUTPATIENT)
Dept: FAMILY MEDICINE | Facility: CLINIC | Age: 39
End: 2024-10-22
Payer: MEDICAID

## 2024-10-22 VITALS
HEART RATE: 64 BPM | HEIGHT: 65 IN | BODY MASS INDEX: 33.65 KG/M2 | WEIGHT: 201.94 LBS | TEMPERATURE: 98 F | SYSTOLIC BLOOD PRESSURE: 123 MMHG | RESPIRATION RATE: 20 BRPM | DIASTOLIC BLOOD PRESSURE: 78 MMHG

## 2024-10-22 DIAGNOSIS — R21 RASH: Primary | ICD-10-CM

## 2024-10-22 PROCEDURE — 4010F ACE/ARB THERAPY RXD/TAKEN: CPT | Mod: CPTII,,, | Performed by: NURSE PRACTITIONER

## 2024-10-22 PROCEDURE — 3008F BODY MASS INDEX DOCD: CPT | Mod: CPTII,,, | Performed by: NURSE PRACTITIONER

## 2024-10-22 PROCEDURE — 3078F DIAST BP <80 MM HG: CPT | Mod: CPTII,,, | Performed by: NURSE PRACTITIONER

## 2024-10-22 PROCEDURE — 3044F HG A1C LEVEL LT 7.0%: CPT | Mod: CPTII,,, | Performed by: NURSE PRACTITIONER

## 2024-10-22 PROCEDURE — 99214 OFFICE O/P EST MOD 30 MIN: CPT | Mod: ,,, | Performed by: NURSE PRACTITIONER

## 2024-10-22 PROCEDURE — 3074F SYST BP LT 130 MM HG: CPT | Mod: CPTII,,, | Performed by: NURSE PRACTITIONER

## 2024-10-22 PROCEDURE — 1160F RVW MEDS BY RX/DR IN RCRD: CPT | Mod: CPTII,,, | Performed by: NURSE PRACTITIONER

## 2024-10-22 PROCEDURE — 1159F MED LIST DOCD IN RCRD: CPT | Mod: CPTII,,, | Performed by: NURSE PRACTITIONER

## 2024-10-22 RX ORDER — FAMOTIDINE 40 MG/1
40 TABLET, FILM COATED ORAL DAILY
Qty: 30 TABLET | Refills: 0 | Status: SHIPPED | OUTPATIENT
Start: 2024-10-22 | End: 2025-10-22

## 2024-10-22 RX ORDER — CLOTRIMAZOLE AND BETAMETHASONE DIPROPIONATE 10; .64 MG/G; MG/G
CREAM TOPICAL 2 TIMES DAILY
Qty: 15 G | Refills: 0 | Status: SHIPPED | OUTPATIENT
Start: 2024-10-22

## 2024-10-22 NOTE — PROGRESS NOTES
"Subjective:       Patient ID: Kayla Clay is a 39 y.o. female.    Chief Complaint: Rash (Rash not better; now on legs)    Patient has a red itchy rash left arm and right arm.  The arms look better but the patient states that the rash is worse on her legs.  She states her whole-body itches.  However she does not have a rash everywhere.  She has stopped taking Benadryl.  Instructed her to continue taking the Benadryl.  I will also order some Pepcid for histamine 2 blocking effects        Review of Systems 12 point review of systems conducted, negative except as stated in the history of present illness. See HPI for details.        Objective:        Visit Vitals  /78   Pulse 64   Temp 97.7 °F (36.5 °C)   Resp 20   Ht 5' 4.5" (1.638 m)   Wt 91.6 kg (201 lb 15.1 oz)   LMP 09/17/2024   BMI 34.13 kg/m²        Physical Exam  Vitals and nursing note reviewed.   Constitutional:       Appearance: She is obese.   HENT:      Head: Normocephalic.      Nose: Nose normal.      Mouth/Throat:      Mouth: Mucous membranes are moist.   Eyes:      Extraocular Movements: Extraocular movements intact.   Cardiovascular:      Rate and Rhythm: Normal rate and regular rhythm.      Heart sounds: No murmur heard.  Pulmonary:      Effort: Pulmonary effort is normal.      Breath sounds: Normal breath sounds.   Abdominal:      Palpations: Abdomen is soft.   Musculoskeletal:         General: Normal range of motion.      Cervical back: Normal range of motion and neck supple.   Skin:     General: Skin is warm and dry.   Neurological:      Mental Status: She is alert and oriented to person, place, and time.           Assessment:           ICD-10-CM ICD-9-CM   1. Rash  R21 782.1            Plan:         1. Rash (Primary)  Continue with Medrol Dosepak until completed  Add Pepcid 40 mg oral daily   - Ambulatory referral/consult to Dermatology; Future  - clotrimazole-betamethasone 1-0.05% (LOTRISONE) cream; Apply topically 2 (two) times " daily.  Dispense: 15 g; Refill: 0                No follow-ups on file.     Future Appointments   Date Time Provider Department Center   3/10/2025  9:00 AM Alyce Alvarado WHNP [facility]        Past Medical History:   Diagnosis Date    Depression Early 20's    Dysmenorrhea 12/23/2023    Epidural abscess     Generalized anxiety disorder     Hypertension     Obesity, unspecified     Scoliosis     Substance abuse Age 18    I used drugs off and on since i was 18 but im sober now and have been since early August 2022        Review of patient's allergies indicates:   Allergen Reactions    Acetaminophen Hives    Doxycycline Rash     Rash on face and arms        Current Outpatient Medications   Medication Instructions    amLODIPine (NORVASC) 5 mg, Oral    baclofen (LIORESAL) 10 mg, Oral, 3 times daily    clotrimazole-betamethasone 1-0.05% (LOTRISONE) cream Topical (Top), 2 times daily    EScitalopram oxalate (LEXAPRO) 20 mg, Oral    famotidine (PEPCID) 40 mg, Oral, Daily    gabapentin (NEURONTIN) 300 mg, Nightly    loratadine (CLARITIN) 10 mg, Oral    methylPREDNISolone (MEDROL DOSEPACK) 4 mg tablet use as directed    sumatriptan (IMITREX) 50 mg, Oral, Every 2 hours PRN    triamcinolone acetonide 0.1% (KENALOG) 0.1 % cream Topical (Top), 2 times daily          Patient Active Problem List   Diagnosis    Hypertension    Anxiety    IVDU (intravenous drug user)    Septic discitis of lumbar region    Macular erythematous rash    Leukopenia    Fungal osteomyelitis    Drug reaction    Depression    Dizziness on standing    Abscess    Insomnia    Sinusitis    Alopecia of scalp    Skin tag    Weakness of both lower extremities    Attention deficit disorder    Binge eating    Class 2 obesity due to excess calories in adult    Lumbar pain    Well woman exam with routine gynecological exam    Acute cystitis with hematuria    Dysuria    Dysmenorrhea    Urticaria             Past Medical History:   Diagnosis Date     Depression Early 20's    Dysmenorrhea 12/23/2023    Epidural abscess     Generalized anxiety disorder     Hypertension     Obesity, unspecified     Scoliosis     Substance abuse Age 18    I used drugs off and on since i was 18 but im sober now and have been since early August 2022          Past Surgical History:   Procedure Laterality Date    LUMBAR LAMINECTOMY WITH SURGICAL REMOVAL OF LESION OF SPINAL CORD BY POSTERIOR APPROACH N/A 8/18/2022    Procedure: LAMINECTOMY, SPINE, LUMBAR, POSTERIOR APPROACH, WITH EXCISION OF NEOPLASM OR LESION OF SPINAL CORD;  Surgeon: Floyd Brewer MD;  Location: [facility];  Service: Neurosurgery;  Laterality: N/A;           reports that she has never smoked. She has been exposed to tobacco smoke. She has never used smokeless tobacco. She reports that she does not currently use alcohol. She reports that she does not currently use drugs after having used the following drugs: Heroin and Methamphetamines.    Immunization History   Administered Date(s) Administered    COVID-19 Vaccine 01/30/2021, 04/14/2021, 11/19/2021    COVID-19, MRNA, LN-S, PF (MODERNA FULL 0.5 ML DOSE) 01/30/2021, 04/14/2021, 11/19/2021    Influenza - Quadrivalent - PF *Preferred* (6 months and older) 10/01/2021, 10/19/2022, 11/01/2023    Influenza - Trivalent - Fluarix, Flulaval, Fluzone, Afluria - PF 10/01/2024        Health Maintenance   Topic Date Due    TETANUS VACCINE  Never done    Mammogram  10/26/2024    Hepatitis C Screening  Completed    Lipid Panel  Completed

## 2024-11-04 DIAGNOSIS — M51.369 DEGENERATION OF INTERVERTEBRAL DISC OF LUMBAR REGION, UNSPECIFIED WHETHER PAIN PRESENT: ICD-10-CM

## 2024-11-04 DIAGNOSIS — M41.9 SCOLIOSIS, UNSPECIFIED SCOLIOSIS TYPE, UNSPECIFIED SPINAL REGION: Primary | ICD-10-CM

## 2024-11-11 DIAGNOSIS — Z12.31 OTHER SCREENING MAMMOGRAM: Primary | ICD-10-CM

## 2024-11-14 ENCOUNTER — HOSPITAL ENCOUNTER (OUTPATIENT)
Dept: RADIOLOGY | Facility: HOSPITAL | Age: 39
Discharge: HOME | End: 2024-11-14
Attending: NURSE PRACTITIONER
Payer: MEDICAID

## 2024-11-14 DIAGNOSIS — Z12.31 OTHER SCREENING MAMMOGRAM: ICD-10-CM

## 2024-11-14 PROCEDURE — 77067 SCR MAMMO BI INCL CAD: CPT | Mod: 26,,, | Performed by: RADIOLOGY

## 2024-11-14 PROCEDURE — 77063 BREAST TOMOSYNTHESIS BI: CPT | Mod: TC

## 2024-11-14 PROCEDURE — 77063 BREAST TOMOSYNTHESIS BI: CPT | Mod: 26,,, | Performed by: RADIOLOGY

## 2024-11-18 ENCOUNTER — LAB VISIT (OUTPATIENT)
Dept: LAB | Facility: HOSPITAL | Age: 39
End: 2024-11-18
Attending: PATHOLOGY
Payer: MEDICAID

## 2024-11-18 DIAGNOSIS — Z79.899 ENCOUNTER FOR LONG-TERM (CURRENT) USE OF MEDICATIONS: ICD-10-CM

## 2024-11-18 DIAGNOSIS — R21 RASH: ICD-10-CM

## 2024-11-18 DIAGNOSIS — G71.038 LIMB-GIRDLE MUSCULAR DYSTROPHY R21 ASSOCIATED WITH MUTATION IN POGLUT1 GENE: Primary | ICD-10-CM

## 2024-11-18 LAB
CRP SERPL-MCNC: 3.3 MG/L
ERYTHROCYTE [SEDIMENTATION RATE] IN BLOOD: 24 MM/HR (ref 0–20)

## 2024-11-18 PROCEDURE — 85652 RBC SED RATE AUTOMATED: CPT

## 2024-11-18 PROCEDURE — 36415 COLL VENOUS BLD VENIPUNCTURE: CPT

## 2024-11-18 PROCEDURE — 86039 ANTINUCLEAR ANTIBODIES (ANA): CPT

## 2024-11-18 PROCEDURE — 86140 C-REACTIVE PROTEIN: CPT

## 2024-11-18 RX ORDER — FAMOTIDINE 40 MG/1
40 TABLET, FILM COATED ORAL
Qty: 30 TABLET | Refills: 5 | Status: SHIPPED | OUTPATIENT
Start: 2024-11-18

## 2024-11-19 ENCOUNTER — OFFICE VISIT (OUTPATIENT)
Dept: OBSTETRICS AND GYNECOLOGY | Facility: CLINIC | Age: 39
End: 2024-11-19
Payer: MEDICAID

## 2024-11-19 VITALS
SYSTOLIC BLOOD PRESSURE: 114 MMHG | WEIGHT: 202.19 LBS | BODY MASS INDEX: 34.17 KG/M2 | DIASTOLIC BLOOD PRESSURE: 76 MMHG

## 2024-11-19 DIAGNOSIS — N92.1 IRREGULAR INTERMENSTRUAL BLEEDING: Primary | ICD-10-CM

## 2024-11-19 LAB
ANA SER QL HEP2 SUBST: NORMAL
B-HCG UR QL: NEGATIVE
CTP QC/QA: YES

## 2024-11-19 PROCEDURE — 81025 URINE PREGNANCY TEST: CPT | Mod: ,,,

## 2024-11-19 PROCEDURE — 3008F BODY MASS INDEX DOCD: CPT | Mod: CPTII,,,

## 2024-11-19 PROCEDURE — 1160F RVW MEDS BY RX/DR IN RCRD: CPT | Mod: CPTII,,,

## 2024-11-19 PROCEDURE — 3078F DIAST BP <80 MM HG: CPT | Mod: CPTII,,,

## 2024-11-19 PROCEDURE — 99213 OFFICE O/P EST LOW 20 MIN: CPT | Mod: ,,,

## 2024-11-19 PROCEDURE — 3044F HG A1C LEVEL LT 7.0%: CPT | Mod: CPTII,,,

## 2024-11-19 PROCEDURE — 4010F ACE/ARB THERAPY RXD/TAKEN: CPT | Mod: CPTII,,,

## 2024-11-19 PROCEDURE — 1159F MED LIST DOCD IN RCRD: CPT | Mod: CPTII,,,

## 2024-11-19 PROCEDURE — 3074F SYST BP LT 130 MM HG: CPT | Mod: CPTII,,,

## 2024-11-19 NOTE — PROGRESS NOTES
Subjective     Patient ID: Kayla Clay is a 39 y.o. female.    Chief Complaint:  Irregular Bleeding (Pt states that she had an cycle on 10/21-10/27 but on  she started spotting and have been spotting ever since pt states that when she first started spotting it was a glossy red and now it is a dark red )      History of Present Illness  HPI  Kayla Clay 39 y.o. White female  presents to clinic with complaints of irregular bleeding in between cycles.  Patient states she had a normal cycle in October between  and  and states she then started spotting on  and has continued just spots since.  Patient denies any other symptoms including discharge, odor, pelvic pain, new sexual partner, or increase in stress.  Patient states she was treated for a diffuse rash with high dose steroids but other than that no change. Pt is not sexually active and is not on any form contraception.     GYN & OB History  Patient's last menstrual period was 10/21/2024.   Date of Last Pap: 2023    OB History    Para Term  AB Living   2 1 1   1 1   SAB IAB Ectopic Multiple Live Births   1       1      # Outcome Date GA Lbr Stefano/2nd Weight Sex Type Anes PTL Lv   2 Term 08   4.097 kg (9 lb 0.5 oz) M Vag-Spont EPI N ALFREDO   1 SAB  4w0d    SAB   FD       Review of Systems  Review of Systems   Constitutional: Negative.    HENT: Negative.     Eyes: Negative.    Respiratory: Negative.     Cardiovascular: Negative.    Gastrointestinal: Negative.    Endocrine: Negative.    Genitourinary:  Positive for menstrual problem.   Musculoskeletal: Negative.    Integumentary:  Negative.   Neurological: Negative.    Hematological: Negative.    Psychiatric/Behavioral: Negative.     Breast: negative.           Objective   /76 (BP Location: Right arm, Patient Position: Sitting)   Wt 91.7 kg (202 lb 3.2 oz)   LMP 10/21/2024   BMI 34.17 kg/m²     Physical Exam:   Constitutional: She is oriented to  person, place, and time. She appears well-developed and well-nourished.    HENT:   Head: Normocephalic.    Eyes: Pupils are equal, round, and reactive to light. EOM are normal.     Cardiovascular:  Normal rate.             Pulmonary/Chest: Effort normal.        Abdominal: Soft.     Genitourinary:    Inguinal canal, urethra and rectum normal.      Pelvic exam was performed with patient in the lithotomy position.   The external female genitalia was normal.     Labial bartholins normal.There is bleeding (small amount of brown blood) in the vagina. Vagina was moist.Normal urethral meatus.          Musculoskeletal: Normal range of motion.       Neurological: She is alert and oriented to person, place, and time.    Skin: Skin is warm and dry.    Psychiatric: She has a normal mood and affect.            Assessment and Plan     1. Irregular intermenstrual bleeding           Plan:  UPT negative  One swab with Leuk, myco, urea, AV, and BV panels    Patient will follow up pending results in bleeding pattern.  If bleeding continues even after potential treatment we will follow up with blood work and pelvic ultrasound.

## 2024-11-27 ENCOUNTER — TELEPHONE (OUTPATIENT)
Dept: OBSTETRICS AND GYNECOLOGY | Facility: CLINIC | Age: 39
End: 2024-11-27
Payer: MEDICAID

## 2024-11-27 DIAGNOSIS — A49.8 ENTEROCOCCUS FAECALIS INFECTION: ICD-10-CM

## 2024-11-27 DIAGNOSIS — N76.0 BV (BACTERIAL VAGINOSIS): Primary | ICD-10-CM

## 2024-11-27 DIAGNOSIS — B96.89 BV (BACTERIAL VAGINOSIS): Primary | ICD-10-CM

## 2024-11-27 RX ORDER — METRONIDAZOLE 500 MG/1
500 TABLET ORAL 2 TIMES DAILY
Qty: 14 TABLET | Refills: 0 | Status: SHIPPED | OUTPATIENT
Start: 2024-11-27 | End: 2024-12-04

## 2024-11-27 RX ORDER — AMPICILLIN 500 MG/1
500 CAPSULE ORAL 4 TIMES DAILY
Qty: 28 CAPSULE | Refills: 0 | Status: SHIPPED | OUTPATIENT
Start: 2024-11-27 | End: 2024-12-04

## 2024-11-27 NOTE — TELEPHONE ENCOUNTER
----- Message from VICTORIANO Malcolm sent at 11/27/2024 11:09 AM CST -----  Please notify patient of positive BV and Enterococcus on one swab.  She will need Flagyl 500 mg b.i.d. for 7 days and ampicillin 500 mg q.i.d. for 7 days.  Thanks!

## 2024-11-27 NOTE — TELEPHONE ENCOUNTER
Spoke with pt and informed of oneswab results +BV and +enterococcus and per Alyce Flagyl and Ampicillin to be sent to pharmacy. Pt verbalized understanding

## 2024-12-06 ENCOUNTER — PATIENT MESSAGE (OUTPATIENT)
Dept: NEUROSURGERY | Facility: CLINIC | Age: 39
End: 2024-12-06
Payer: MEDICAID

## 2024-12-10 RX ORDER — BACLOFEN 10 MG/1
10 TABLET ORAL 3 TIMES DAILY
Qty: 90 TABLET | Refills: 2 | Status: SHIPPED | OUTPATIENT
Start: 2024-12-10

## 2024-12-10 RX ORDER — ESCITALOPRAM OXALATE 20 MG/1
20 TABLET ORAL
Qty: 30 TABLET | Refills: 5 | Status: SHIPPED | OUTPATIENT
Start: 2024-12-10

## 2025-01-06 ENCOUNTER — PATIENT MESSAGE (OUTPATIENT)
Dept: NEUROSURGERY | Facility: CLINIC | Age: 40
End: 2025-01-06
Payer: MEDICAID

## 2025-01-29 ENCOUNTER — PATIENT MESSAGE (OUTPATIENT)
Dept: NEUROSURGERY | Facility: CLINIC | Age: 40
End: 2025-01-29
Payer: MEDICAID

## 2025-02-04 ENCOUNTER — OFFICE VISIT (OUTPATIENT)
Dept: OBSTETRICS AND GYNECOLOGY | Facility: CLINIC | Age: 40
End: 2025-02-04
Payer: MEDICAID

## 2025-02-04 VITALS
SYSTOLIC BLOOD PRESSURE: 126 MMHG | WEIGHT: 195.38 LBS | DIASTOLIC BLOOD PRESSURE: 98 MMHG | BODY MASS INDEX: 33.35 KG/M2 | HEIGHT: 64 IN

## 2025-02-04 DIAGNOSIS — Z11.3 ROUTINE SCREENING FOR STI (SEXUALLY TRANSMITTED INFECTION): ICD-10-CM

## 2025-02-04 DIAGNOSIS — N92.6 IRREGULAR MENSTRUAL CYCLE: ICD-10-CM

## 2025-02-04 DIAGNOSIS — N92.1 MENOMETRORRHAGIA: Primary | ICD-10-CM

## 2025-02-04 LAB
BILIRUB UR QL STRIP: NORMAL
GLUCOSE UR QL STRIP: NEGATIVE
KETONES UR QL STRIP: NORMAL
LEUKOCYTE ESTERASE UR QL STRIP: NEGATIVE
PH, POC UA: NORMAL
POC BLOOD, URINE: NORMAL
POC NITRATES, URINE: NEGATIVE
PROT UR QL STRIP: NEGATIVE
SP GR UR STRIP: NORMAL (ref 1–1.03)
UROBILINOGEN UR STRIP-ACNC: NORMAL (ref 0.3–2.2)

## 2025-02-04 PROCEDURE — 3008F BODY MASS INDEX DOCD: CPT | Mod: CPTII,,,

## 2025-02-04 PROCEDURE — 81003 URINALYSIS AUTO W/O SCOPE: CPT | Mod: QW,,,

## 2025-02-04 PROCEDURE — 3080F DIAST BP >= 90 MM HG: CPT | Mod: CPTII,,,

## 2025-02-04 PROCEDURE — 1160F RVW MEDS BY RX/DR IN RCRD: CPT | Mod: CPTII,,,

## 2025-02-04 PROCEDURE — 87661 TRICHOMONAS VAGINALIS AMPLIF: CPT

## 2025-02-04 PROCEDURE — 87491 CHLMYD TRACH DNA AMP PROBE: CPT

## 2025-02-04 PROCEDURE — 3074F SYST BP LT 130 MM HG: CPT | Mod: CPTII,,,

## 2025-02-04 PROCEDURE — 1159F MED LIST DOCD IN RCRD: CPT | Mod: CPTII,,,

## 2025-02-04 PROCEDURE — 99213 OFFICE O/P EST LOW 20 MIN: CPT | Mod: ,,,

## 2025-02-04 RX ORDER — MEDROXYPROGESTERONE ACETATE 10 MG/1
10 TABLET ORAL DAILY
Qty: 10 TABLET | Refills: 0 | Status: SHIPPED | OUTPATIENT
Start: 2025-02-04 | End: 2025-02-14

## 2025-02-04 NOTE — PROGRESS NOTES
Chief Complaint:  Follow-up (Pt states she is here for a follow-up .States she's having long and heavy cycle. )    History of Present Illness:  Patient is a 39 y.o.  presents c/o a prolonged cycle this month.  Patient states she has been bleeding heavy for the past 2 weeks going through 4-5 tampons a day.  Patient denies any other symptoms.  Patient has a history of BV and Enterococcus.  Denies any signs and symptoms of anemia.      Gyn History:  Menstrual History  Cycle: Yes (2025)  Menarche Age: 10 years  Flow Duration: 6  Flow: (!) Heavy (starts heavy, gets lighter on that last couple of days)  Interval:  (irregular)  Intermenstrual Bleeding: No  Dysmenorrhea: Yes  Dysmenorrhea Severity : Moderate    Menopause  Menopause Age: 1 years    Pap History  Last pap date: 23 (negative HR HPV)  Result: Normal  HPV Vaccine Completed: No (0/3)    [address]  Sexually Active: No  STI History: Yes  STI Type: Trichomonas, Chlamydia, GC  Contraception: No    Breast History  Last Breast Imaging Date: Yes  Date: 24  History of Abnormal Breast Imaging : No  History of Breast Biopsy: No      Review of systems:  General/Constitutional: Chills denies. Fatigue/weakness denies. Fever denies. Night sweats denies. Hot flashes denies  Breast: Dimpling denies. Breast mass denies. Breast pain/tenderness denies. Nipple discharge denies. Puckering denies.  Gastrointestinal: Abdominal pain denies. Blood in stool denies. Constipation denies. Diarrhea denies. Heartburn denies. Nausea denies. Vomiting denies   Genitourinary: Incontinence denies. Blood in urine denies. Frequent urination denies. Urgency denies. Painful urination denies. Nocturia denies   Gynecologic: Irregular menses admits. Heavy bleeding admits. Painful menses denies. Vaginal discharge admits. Vaginal odor denies. Vaginal itching/Irritation denies. Vaginal lesion denies.  Pelvic pain denies. Decreased libido denies. Vulvar lesion denies. Prolapse of  genital organs denies. Painful intercourse denies. Postcoital bleeding denies   Psychiatric: Mood lability denies. Depressed mood denies. Suicidal thoughts denies. Anxiety denies. Overwhelmed denies. Appetite normal. Energy level normal.     OB History    Para Term  AB Living   2 1 1 0 1 1   SAB IAB Ectopic Multiple Live Births   1 0 0 0 1      # 1 - Date: , Sex: None, Weight: None, GA: 4w0d, Type: Spontaneous , Apgar1: None, Apgar5: None, Living: Fetal Demise, Birth Comments: None    # 2 - Date: 08, Sex: Male, Weight: 4.097 kg (9 lb 0.5 oz), GA: None, Type: Vaginal, Spontaneous, Apgar1: None, Apgar5: None, Living: Living, Birth Comments: None      Past Medical History:   Diagnosis Date    Depression Early 's    Dysmenorrhea 2023    Epidural abscess     Generalized anxiety disorder     Hypertension     Obesity, unspecified     Scoliosis     Substance abuse Age 18    I used drugs off and on since i was 18 but im sober now and have been since early 2022       Current Outpatient Medications:     amLODIPine (NORVASC) 5 MG tablet, TAKE 1 TABLET BY MOUTH EVERY DAY, Disp: 30 tablet, Rfl: 5    EScitalopram oxalate (LEXAPRO) 20 MG tablet, TAKE 1 TABLET BY MOUTH EVERY DAY, Disp: 30 tablet, Rfl: 5    famotidine (PEPCID) 40 MG tablet, TAKE 1 TABLET BY MOUTH EVERY DAY, Disp: 30 tablet, Rfl: 5    gabapentin (NEURONTIN) 300 MG capsule, Take 300 mg by mouth every evening., Disp: , Rfl:     loratadine (CLARITIN) 10 mg tablet, TAKE 1 TABLET BY MOUTH EVERY DAY, Disp: 30 tablet, Rfl: 5    sumatriptan (IMITREX) 50 MG tablet, Take 1 tablet (50 mg total) by mouth every 2 (two) hours as needed for Migraine (up to 200 mg daily)., Disp: 120 tablet, Rfl: 1    baclofen (LIORESAL) 10 MG tablet, TAKE 1 TABLET BY MOUTH THREE TIMES A DAY, Disp: 90 tablet, Rfl: 2    clotrimazole-betamethasone 1-0.05% (LOTRISONE) cream, Apply topically 2 (two) times daily. (Patient not taking: Reported on 2025),  "Disp: 15 g, Rfl: 0    triamcinolone acetonide 0.1% (KENALOG) 0.1 % cream, Apply topically 2 (two) times daily. for 10 days, Disp: 1 each, Rfl: 0      Physical Exam:  BP (!) 126/98 (BP Location: Right arm, Patient Position: Sitting)   Ht 5' 4" (1.626 m)   Wt 88.6 kg (195 lb 6.4 oz)   LMP 01/20/2025 (Exact Date)   BMI 33.54 kg/m²     Chaperone present.       Constitutional: General appearance: healthy, well-nourished and well-developed  Psychiatric:  Orientation to time, place and person. Normal mood and affect and active, alert   Abdomen: Auscultation/Inspection/Palpation: No tenderness or masses. Soft, nondistended      Female Genitalia:      Vulva: no masses, atrophy or lesions      Bladder/Urethra: no urethral discharge or mass, normal meatus, bladder non-distended.      Vagina: no tenderness, erythema, cystocele, rectocele, abnormal vaginal discharge, or vesicle(s) or ulcers     Cervix: no discharge or cervical motion tenderness and grossly normal     Uterus: normal size and shape and midline, non-tender, and no uterine prolapse.     Adnexa/Parametria: no parametrial tenderness or mass, no adnexal tenderness or ovarian mass.         Assessment/Plan:  1. Menometrorrhagia  -     US Pelvis Comp with Transvag NON-OB (xpd; Future; Expected date: 02/11/2025  -     Trichomonas vaginalis Amplified RNA  -     C.trach/N.gonor AMP RNA    2. Irregular menstrual cycle  -     POCT Urinalysis, Dipstick, Automated, W/O Scope  -     Trichomonas vaginalis Amplified RNA  -     C.trach/N.gonor AMP RNA    3. Routine screening for STI (sexually transmitted infection)  -     Trichomonas vaginalis Amplified RNA  -     C.trach/N.gonor AMP RNA       One swab for myco and urea, AV, BV, CV  Aptima for GC, CZ, TV  UPT negative  Pelvic US  Rx: Provera 10mg daily x10 days    Will call pt with results; pt expresses interest in hysterectomy for definitive management.            "

## 2025-02-06 LAB
C TRACH RRNA SPEC QL NAA+PROBE: NEGATIVE
N GONORRHOEA RRNA SPEC QL NAA+PROBE: NEGATIVE
SPECIMEN SOURCE: NORMAL
T VAGINALIS RRNA SPEC QL NAA+PROBE: NEGATIVE

## 2025-02-10 ENCOUNTER — TELEPHONE (OUTPATIENT)
Dept: OBSTETRICS AND GYNECOLOGY | Facility: CLINIC | Age: 40
End: 2025-02-10
Payer: MEDICAID

## 2025-02-10 DIAGNOSIS — B96.89 BV (BACTERIAL VAGINOSIS): Primary | ICD-10-CM

## 2025-02-10 DIAGNOSIS — N76.0 BV (BACTERIAL VAGINOSIS): Primary | ICD-10-CM

## 2025-02-10 RX ORDER — METRONIDAZOLE 500 MG/1
500 TABLET ORAL EVERY 12 HOURS
Qty: 14 TABLET | Refills: 0 | Status: SHIPPED | OUTPATIENT
Start: 2025-02-10 | End: 2025-02-17

## 2025-02-10 NOTE — PROGRESS NOTES
Contacted pt per Alyce Alvarado NP for +BV on one swab. Flagyl 500mg PO BID x7 days sent to [facility]. Informed pt of neg infection but she has an absence of healthy bacteria which is indicative of sub optimal vaginal health. Informed pt that Alyce recommend a probiotic daily for a minimum of 6 months. Pt verbalized understanding.

## 2025-02-11 DIAGNOSIS — G43.809 OTHER MIGRAINE WITHOUT STATUS MIGRAINOSUS, NOT INTRACTABLE: ICD-10-CM

## 2025-02-11 RX ORDER — SUMATRIPTAN SUCCINATE 50 MG/1
TABLET ORAL
Qty: 9 TABLET | Refills: 5 | Status: SHIPPED | OUTPATIENT
Start: 2025-02-11 | End: 2025-02-13 | Stop reason: SDUPTHER

## 2025-02-13 DIAGNOSIS — G43.809 OTHER MIGRAINE WITHOUT STATUS MIGRAINOSUS, NOT INTRACTABLE: ICD-10-CM

## 2025-02-13 RX ORDER — SUMATRIPTAN SUCCINATE 50 MG/1
50 TABLET ORAL
Qty: 9 TABLET | Refills: 5 | Status: SHIPPED | OUTPATIENT
Start: 2025-02-13

## 2025-02-17 ENCOUNTER — PATIENT MESSAGE (OUTPATIENT)
Dept: OBSTETRICS AND GYNECOLOGY | Facility: CLINIC | Age: 40
End: 2025-02-17
Payer: MEDICAID

## 2025-02-19 ENCOUNTER — RESULTS FOLLOW-UP (OUTPATIENT)
Dept: OBSTETRICS AND GYNECOLOGY | Facility: CLINIC | Age: 40
End: 2025-02-19

## 2025-02-19 ENCOUNTER — HOSPITAL ENCOUNTER (OUTPATIENT)
Dept: RADIOLOGY | Facility: HOSPITAL | Age: 40
Discharge: HOME | End: 2025-02-19
Payer: MEDICAID

## 2025-02-19 DIAGNOSIS — N92.1 MENOMETRORRHAGIA: ICD-10-CM

## 2025-02-19 PROCEDURE — 76830 TRANSVAGINAL US NON-OB: CPT | Mod: TC

## 2025-03-03 ENCOUNTER — PATIENT MESSAGE (OUTPATIENT)
Dept: NEUROSURGERY | Facility: CLINIC | Age: 40
End: 2025-03-03
Payer: MEDICAID

## 2025-03-07 RX ORDER — BACLOFEN 10 MG/1
10 TABLET ORAL 3 TIMES DAILY
Qty: 90 TABLET | Refills: 2 | Status: SHIPPED | OUTPATIENT
Start: 2025-03-07

## 2025-03-10 ENCOUNTER — OFFICE VISIT (OUTPATIENT)
Dept: OBSTETRICS AND GYNECOLOGY | Facility: CLINIC | Age: 40
End: 2025-03-10
Payer: MEDICAID

## 2025-03-10 VITALS
SYSTOLIC BLOOD PRESSURE: 124 MMHG | BODY MASS INDEX: 32.95 KG/M2 | HEIGHT: 64 IN | WEIGHT: 193 LBS | DIASTOLIC BLOOD PRESSURE: 92 MMHG

## 2025-03-10 DIAGNOSIS — Z12.4 CERVICAL CANCER SCREENING: ICD-10-CM

## 2025-03-10 DIAGNOSIS — D25.1 INTRAMURAL LEIOMYOMA OF UTERUS: ICD-10-CM

## 2025-03-10 DIAGNOSIS — Z01.419 WELL WOMAN EXAM WITH ROUTINE GYNECOLOGICAL EXAM: Primary | ICD-10-CM

## 2025-03-10 PROCEDURE — 1159F MED LIST DOCD IN RCRD: CPT | Mod: CPTII,,,

## 2025-03-10 PROCEDURE — 3080F DIAST BP >= 90 MM HG: CPT | Mod: CPTII,,,

## 2025-03-10 PROCEDURE — 3008F BODY MASS INDEX DOCD: CPT | Mod: CPTII,,,

## 2025-03-10 PROCEDURE — 3074F SYST BP LT 130 MM HG: CPT | Mod: CPTII,,,

## 2025-03-10 PROCEDURE — 99396 PREV VISIT EST AGE 40-64: CPT | Mod: ,,,

## 2025-03-10 PROCEDURE — 87624 HPV HI-RISK TYP POOLED RSLT: CPT

## 2025-03-10 PROCEDURE — 1160F RVW MEDS BY RX/DR IN RCRD: CPT | Mod: CPTII,,,

## 2025-03-10 NOTE — PROGRESS NOTES
Chief Complaint:   Well Woman (Pt is here for annual GYN exam.)     History of Present Illness:  Kayla Clay is a 39 y.o. year old  presents for her well woman exam. Currently relying on abstinence for birth control. Patient's last menstrual period was 2025.. Having regular monthly cycles lasting 6 days with normal bleeding. Denies any irregular menstrual bleeding.    Pt is also here for follow up from 25 for c/o menometrorrhagia; results below. Pt was treated with Provera and treated for BV. Pt states bleeding has resolved.     Previous History of Present Illness (25):  Patient is a 39 y.o.  presents c/o a prolonged cycle this month.  Patient states she has been bleeding heavy for the past 2 weeks going through 4-5 tampons a day.  Patient denies any other symptoms.  Patient has a history of BV and Enterococcus.  Denies any signs and symptoms of anemia.    Results  One swab +BV (treated on 02/10/25)  GC, CZ, TV negative    Pelvic US (25)  FINDINGS:  Uterus:  Size: 11.6 x 6.7 x 7.6 cm  Masses: Several heterogeneous, hypoechoic masses compatible with fibroids.  Fibroid in the mid posterior wall measures 3.0 x 2.9 x 2.9 cm.  Fibroid in the anterior lower uterus on the left measures 3.1 x 2.6 x 2.4 cm.  Fibroid in the right anterior lower uterus measures 2.0 x 1.8 x 1.8 cm.  Large fibroid in the right posterior uterus measures 4.3 x 4.2 x 4.0 cm and demonstrates questionable submucosal components.     Endometrium: Measures 7 mm.     Right ovary:  Not visualized, likely obscured by overlying bowel gas.     Left ovary:  Size: 4.8 x 4.1 x 4.1 cm cm  Appearance: Complex cystic structure in the left ovary demonstrating low level internal echoes measures approximately 4.4 cm.  Vascular Flow: Normal.     Free Fluid:  None.     Impression:  1. Multiple uterine fibroids as described above.  2. Complex cystic structure in the left ovary demonstrating low level internal echoes  measures approximately 4.4 cm.  Considerations include an endometrioma or hemorrhagic cyst.       Gyn History:  Menstrual History  Cycle: Yes (3/10/25)  Menarche Age: 10 years  Flow Duration: 6  Flow: Normal  Interval:  (irregular)  Intermenstrual Bleeding: No  Dysmenorrhea: No    Menopause  Menopause Age: 1 years    Pap History  Last pap date: 23 (-HR HPV)  Result: Normal  History of Abnormal Pap: No  HPV Vaccine Completed: No    [place]  Sexually Active: No  STI History: Yes  STI Type: Chlamydia, GC, Trichomonas  Contraception: No    Breast History  Last Breast Imaging Date: Yes  Date: 24 (negative)  History of Abnormal Breast Imaging : No  History of Breast Biopsy: No        Review of Systems:  General/Constitutional: Chills denies. Fatigue/weakness denies. Fever denies. Night sweats denies. Hot flashes denies    Respiratory: Cough denies. Hemoptysis denies. SOB denies. Sputum production denies. Wheezing denies .   Cardiovascular: Chest pain denies. Dizziness denies. Palpitations denies. Swelling in hands/feet denies.                Breast: Dimpling denies. Breast mass denies. Breast pain/tenderness denies. Nipple discharge denies. Puckering denies.  Gastrointestinal: Abdominal pain denies. Blood in stool denies. Constipation denies. Diarrhea denies. Heartburn denies. Nausea denies. Vomiting denies    Genitourinary: Incontinence denies. Blood in urine denies. Frequent urination denies. Painful urination denies. Urinary urgency denies. Nocturia denies    Gynecologic: Irregular menses denies. Heavy bleeding denies. Painful menses denies. Vaginal discharge denies. Vaginal odor denies. Vaginal itching denies. Vaginal lesion denies. Pelvic pain denies. Decreased libido denies. Vulvar lesion denies. Prolapse of genital organs denies. Painful intercourse denies. Postcoital bleeding denies    Psychiatric: Depression denies. Anxiety denies.     OB History    Para Term  AB Living   2 1 1 0 1 1  "  SAB IAB Ectopic Multiple Live Births   1 0 0 0 1      # 1 - Date: , Sex: None, Weight: None, GA: 4w0d, Type: Spontaneous , Apgar1: None, Apgar5: None, Living: Fetal Demise, Birth Comments: None    # 2 - Date: 08, Sex: Male, Weight: 4.097 kg (9 lb 0.5 oz), GA: None, Type: Vaginal, Spontaneous, Apgar1: None, Apgar5: None, Living: Living, Birth Comments: None      Past Medical History:   Diagnosis Date    Depression Early     Dysmenorrhea 2023    Epidural abscess     Generalized anxiety disorder     Hypertension     Obesity, unspecified     Scoliosis     Substance abuse Age 18    I used drugs off and on since i was 18 but im sober now and have been since early 2022     Current Medications[1]    Review of patient's allergies indicates:   Allergen Reactions    Acetaminophen Hives    Doxycycline Rash     Rash on face and arms       Past Surgical History:   Procedure Laterality Date    LUMBAR LAMINECTOMY WITH SURGICAL REMOVAL OF LESION OF SPINAL CORD BY POSTERIOR APPROACH N/A 2022    Procedure: LAMINECTOMY, SPINE, LUMBAR, POSTERIOR APPROACH, WITH EXCISION OF NEOPLASM OR LESION OF SPINAL CORD;  Surgeon: Floyd Brewer MD;  Location: [facility] OR;  Service: Neurosurgery;  Laterality: N/A;    SPINE SURGERY  2022    Spinal absess and Laminectomy     Family History   Problem Relation Name Age of Onset    Pancreatic cancer Paternal Grandfather          80's    Stomach cancer Maternal Grandfather          70's - +metastasis    No Known Problems Father      No Known Problems Mother      Spine Surgery Paternal Uncle Reggie Engel         On my dads side of the family a lot of us have spine issues.    Breast cancer Neg Hx      Colon cancer Neg Hx      Ovarian cancer Neg Hx      Cervical cancer Neg Hx      Uterine cancer Neg Hx       Social History[2]    Physical Exam:  BP (!) 124/92 (BP Location: Right arm, Patient Position: Sitting)   Ht 5' 4" (1.626 m)   Wt 87.5 kg (193 lb) "   LMP 03/04/2025   BMI 33.13 kg/m²     Chaperone: present.       General appearance: healthy, well-nourished and well-developed     Psychiatric: Orientation to time, place and person. Normal mood and affect and active, alert     Skin: Appearance: no rashes or lesions.     Neck:   Neck: supple, FROM, trachea midline. and no masses   Thyroid: no enlargement or nodules and non-tender.       Cardiovascular:   Auscultation: RRR and no murmur.   Peripheral Vascular: no varicosities, LLE edema, RLE edema, calf tenderness, and palpable cord and pedal pulses intact.     Lungs:   Respiratory effort: no intercostal retractions or accessory muscle usage.   Auscultation: no wheezing, rales/crackles, or rhonchi and clear to auscultation.     Breast:   Inspection/Palpation: no tenderness, discrete/distinct masses, skin changes, or abnormal secretions. Nipple appearance normal.     Abdomen:   Auscultation/Inspection/Palpation: no hepatomegaly, splenomegaly, masses, tenderness or CVA tenderness and soft, non-distended bowel sounds preset.    Hernia: no palpable hernias.     Female Genitalia:    Vulva: no masses, tenderness or lesions    Bladder/Urethra: no urethral discharge or mass, normal meatus, bladder non-distended.    Vagina: no tenderness, erythema, cystocele, rectocele, abnormal vaginal discharge or vesicle(s) or ulcers    Cervix: no discharge, no cervical lacerations noted or motion tenderness and grossly normal    Uterus: normal size and shape and midline, non-tender, and no uterine prolapse.    Adnexa/Parametria: no parametrial tenderness or mass, no adnexal tenderness or ovarian mass.     Lymph Nodes:   Palpation: non tender submandibular nodes, axillary nodes, or inguinal nodes.     Rectal Exam:   Rectum: normal perianal skin.       Assessment/Plan:  1. Well woman exam with routine gynecological exam    2. Intramural leiomyoma of uterus    3. Cervical cancer screening       Pap   Recommend BSE monthly   Discussed  recommendations of annual screening after age of 40 with mammogram  Explained that screening is not 100% reliable. Advised patient if she notices any changes to her breast including a lump, mass, dimpling, discharge, rash, or tenderness she should contact us immediately.     Healthy diet, exercise   MMG-done 11/14/24 benign  Multivitamin   Seat belt   Sunscreen use   Safe sex/ STI education  Contraception: abstinence  Annual labs: per PCP  Colonoscopy: per PCP      Patient to follow up in 3 months on cycle.  Instructed to keep menstrual diary and we will review trends.  If bleeding stays regular no further treatment is warranted.  If bleeding becomes abnormal again patient is open to options such as tubal and ablation versus hysterectomy pending repeat ultrasound and follow up on fibroids and hemorrhagic cyst.    RTC in 3 months for cycle follow up or p.r.n..               [1]   Current Outpatient Medications:     amLODIPine (NORVASC) 5 MG tablet, TAKE 1 TABLET BY MOUTH EVERY DAY, Disp: 30 tablet, Rfl: 5    baclofen (LIORESAL) 10 MG tablet, TAKE 1 TABLET BY MOUTH THREE TIMES A DAY, Disp: 90 tablet, Rfl: 2    EScitalopram oxalate (LEXAPRO) 20 MG tablet, TAKE 1 TABLET BY MOUTH EVERY DAY, Disp: 30 tablet, Rfl: 5    gabapentin (NEURONTIN) 300 MG capsule, Take 300 mg by mouth every evening., Disp: , Rfl:     loratadine (CLARITIN) 10 mg tablet, TAKE 1 TABLET BY MOUTH EVERY DAY, Disp: 30 tablet, Rfl: 5    sumatriptan (IMITREX) 50 MG tablet, Take 1 tablet (50 mg total) by mouth as needed for Migraine., Disp: 9 tablet, Rfl: 5    famotidine (PEPCID) 40 MG tablet, TAKE 1 TABLET BY MOUTH EVERY DAY (Patient not taking: Reported on 3/10/2025), Disp: 30 tablet, Rfl: 5  [2]   Social History  Socioeconomic History    Marital status:    Tobacco Use    Smoking status: Never     Passive exposure: Current    Smokeless tobacco: Never    Tobacco comments:     Social smoker off and on since 14 but never regular smoker   Substance  and Sexual Activity    Alcohol use: Not Currently    Drug use: Not Currently     Types: Heroin, Methamphetamines    Sexual activity: Not Currently     Partners: Male     Birth control/protection: Abstinence     Comment: STI: Chlamydia, Gonorrhea, Trich     Social Drivers of Health     Financial Resource Strain: High Risk (2/27/2024)    Overall Financial Resource Strain (CARDIA)     Difficulty of Paying Living Expenses: Hard   Food Insecurity: Food Insecurity Present (2/27/2024)    Hunger Vital Sign     Worried About Running Out of Food in the Last Year: Sometimes true     Ran Out of Food in the Last Year: Sometimes true   Transportation Needs: No Transportation Needs (2/27/2024)    PRAPARE - Transportation     Lack of Transportation (Medical): No     Lack of Transportation (Non-Medical): No   Physical Activity: Inactive (2/27/2024)    Exercise Vital Sign     Days of Exercise per Week: 0 days     Minutes of Exercise per Session: 0 min   Stress: Stress Concern Present (2/27/2024)    [employer] - Occupational Stress Questionnaire     Feeling of Stress : Very much   Housing Stability: Low Risk  (2/27/2024)    Housing Stability Vital Sign     Unable to Pay for Housing in the Last Year: No     Number of Places Lived in the Last Year: 1     Unstable Housing in the Last Year: No

## 2025-03-11 ENCOUNTER — OFFICE VISIT (OUTPATIENT)
Dept: FAMILY MEDICINE | Facility: CLINIC | Age: 40
End: 2025-03-11
Payer: MEDICAID

## 2025-03-11 VITALS
DIASTOLIC BLOOD PRESSURE: 69 MMHG | HEART RATE: 71 BPM | HEIGHT: 64 IN | SYSTOLIC BLOOD PRESSURE: 112 MMHG | RESPIRATION RATE: 20 BRPM | BODY MASS INDEX: 32.78 KG/M2 | WEIGHT: 192 LBS | TEMPERATURE: 98 F

## 2025-03-11 DIAGNOSIS — E66.811 CLASS 1 OBESITY DUE TO EXCESS CALORIES WITH SERIOUS COMORBIDITY AND BODY MASS INDEX (BMI) OF 32.0 TO 32.9 IN ADULT: ICD-10-CM

## 2025-03-11 DIAGNOSIS — F41.9 ANXIETY: Chronic | ICD-10-CM

## 2025-03-11 DIAGNOSIS — I10 HYPERTENSION, UNSPECIFIED TYPE: ICD-10-CM

## 2025-03-11 DIAGNOSIS — E66.09 CLASS 1 OBESITY DUE TO EXCESS CALORIES WITH SERIOUS COMORBIDITY AND BODY MASS INDEX (BMI) OF 32.0 TO 32.9 IN ADULT: ICD-10-CM

## 2025-03-11 DIAGNOSIS — D22.9 NUMEROUS MOLES: ICD-10-CM

## 2025-03-11 PROBLEM — M86.9: Status: RESOLVED | Noted: 2022-09-09 | Resolved: 2025-03-11

## 2025-03-11 PROBLEM — F19.90 IVDU (INTRAVENOUS DRUG USER): Status: RESOLVED | Noted: 2022-08-18 | Resolved: 2025-03-11

## 2025-03-11 PROBLEM — L65.9 ALOPECIA OF SCALP: Status: RESOLVED | Noted: 2023-01-06 | Resolved: 2025-03-11

## 2025-03-11 PROBLEM — M46.46 SEPTIC DISCITIS OF LUMBAR REGION: Status: RESOLVED | Noted: 2022-09-09 | Resolved: 2025-03-11

## 2025-03-11 PROBLEM — L02.91 ABSCESS: Status: RESOLVED | Noted: 2022-11-04 | Resolved: 2025-03-11

## 2025-03-11 PROBLEM — N30.01 ACUTE CYSTITIS WITH HEMATURIA: Status: RESOLVED | Noted: 2023-08-02 | Resolved: 2025-03-11

## 2025-03-11 PROBLEM — R42 DIZZINESS ON STANDING: Status: RESOLVED | Noted: 2022-10-19 | Resolved: 2025-03-11

## 2025-03-11 PROBLEM — D72.819 LEUKOPENIA: Status: RESOLVED | Noted: 2022-09-09 | Resolved: 2025-03-11

## 2025-03-11 PROBLEM — L50.9 URTICARIA: Status: RESOLVED | Noted: 2024-10-16 | Resolved: 2025-03-11

## 2025-03-11 PROBLEM — L53.8 MACULAR ERYTHEMATOUS RASH: Status: RESOLVED | Noted: 2022-09-09 | Resolved: 2025-03-11

## 2025-03-11 PROBLEM — R30.0 DYSURIA: Status: RESOLVED | Noted: 2023-08-02 | Resolved: 2025-03-11

## 2025-03-11 PROBLEM — T50.905A DRUG REACTION: Status: RESOLVED | Noted: 2022-09-09 | Resolved: 2025-03-11

## 2025-03-11 PROBLEM — G06.2 EPIDURAL ABSCESS: Chronic | Status: RESOLVED | Noted: 2022-08-17 | Resolved: 2025-03-11

## 2025-03-11 PROBLEM — J32.9 SINUSITIS: Status: RESOLVED | Noted: 2023-01-06 | Resolved: 2025-03-11

## 2025-03-11 PROCEDURE — 3078F DIAST BP <80 MM HG: CPT | Mod: CPTII,,, | Performed by: NURSE PRACTITIONER

## 2025-03-11 PROCEDURE — 99214 OFFICE O/P EST MOD 30 MIN: CPT | Mod: ,,, | Performed by: NURSE PRACTITIONER

## 2025-03-11 PROCEDURE — 1160F RVW MEDS BY RX/DR IN RCRD: CPT | Mod: CPTII,,, | Performed by: NURSE PRACTITIONER

## 2025-03-11 PROCEDURE — 3008F BODY MASS INDEX DOCD: CPT | Mod: CPTII,,, | Performed by: NURSE PRACTITIONER

## 2025-03-11 PROCEDURE — 3074F SYST BP LT 130 MM HG: CPT | Mod: CPTII,,, | Performed by: NURSE PRACTITIONER

## 2025-03-11 PROCEDURE — 1159F MED LIST DOCD IN RCRD: CPT | Mod: CPTII,,, | Performed by: NURSE PRACTITIONER

## 2025-03-11 NOTE — PROGRESS NOTES
"Subjective:       Patient ID: Kayla Clay is a 39 y.o. female.    Chief Complaint: Hypertension (Blood pressure elevated yesterday)      The patient is a 39-year-old female who presents stating that she has high blood pressure on frequent occasions.  She states she went to the gyn physician and the diastolic was 92.  She states that the other day the diastolic was 90.  However today her blood pressure is 112/69.  I instructed the patient to keep a blood pressure log for 1 week and hand deliver it to our office.  Also discussed with the patient factors that raised blood pressures such as stress, pain, alcoholic beverages, etc. The patient verbalized understanding.  The patient is currently 5 mg amlodipine daily.     The patient does have anxiety, and obesity.  The patient also controlled depression and controlled attention deficit disorder.      Patient states she has several moles that itch and turn red.      Review of [id number] point review of systems conducted, negative except as stated in the history of present illness. See HPI for details.      Objective:      Visit Vitals  /69   Pulse 71   Temp 98.1 °F (36.7 °C)   Resp 20   Ht 5' 4" (1.626 m)   Wt 87.1 kg (192 lb)   LMP 03/04/2025   BMI 32.96 kg/m²     Physical Exam  Vitals and nursing note reviewed.   HENT:      Head: Normocephalic.      Right Ear: Tympanic membrane normal.      Left Ear: Tympanic membrane normal.      Nose: Nose normal.      Mouth/Throat:      Mouth: Mucous membranes are moist.   Eyes:      Extraocular Movements: Extraocular movements intact.   Cardiovascular:      Rate and Rhythm: Normal rate and regular rhythm.      Heart sounds: No murmur heard.  Pulmonary:      Effort: Pulmonary effort is normal.   Abdominal:      General: Bowel sounds are normal.      Palpations: Abdomen is soft.   Musculoskeletal:         General: Normal range of motion.      Cervical back: Normal range of motion and neck supple.   Skin:     General: Skin " is warm and dry.   Neurological:      Mental Status: She is alert and oriented to person, place, and time.       Current Outpatient Medications   Medication Instructions    amLODIPine (NORVASC) 5 mg, Oral    baclofen (LIORESAL) 10 mg, Oral, 3 times daily    EScitalopram oxalate (LEXAPRO) 20 mg, Oral    famotidine (PEPCID) 40 mg, Oral    gabapentin (NEURONTIN) 300 mg, Nightly    loratadine (CLARITIN) 10 mg, Oral    sumatriptan (IMITREX) 50 mg, Oral, As needed (PRN)     is allergic to acetaminophen and doxycycline.       Assessment:         ICD-10-CM ICD-9-CM   1. Hypertension, unspecified type  I10 401.9   2. Anxiety  F41.9 300.00   3. Numerous moles  D22.9 216.9   4. Class 1 obesity due to excess calories with serious comorbidity and body mass index (BMI) of 32.0 to 32.9 in adult  E66.811 278.00    E66.09 V85.32    Z68.32           Plan:       1. Hypertension, unspecified type  Hypertension  Controlled amlodipine  Continue current medication  Return to clinic with any concerns  Patient to hand deliver log of blood pressures after 1 week    2. Anxiety  Controlled continue Lexapro    3. Numerous moles  Patient to call dermatologist who she saw in October 2024    4. Class 1 obesity due to excess calories with serious comorbidity and body mass index (BMI) of 32.0-32.9 in adult  Obesity  Reduced calorie diet modification  Frequent self weighing discussed  Exercise/lifestyle modification          Follow up in about 2 weeks (around 3/25/2025).     Future Appointments   Date Time Provider Department Center   4/24/2025 11:00 AM Les Vieyra MD [address]   6/10/2025  9:20 AM Alyce Alvarado [facility]

## 2025-03-22 DIAGNOSIS — I10 HYPERTENSION, UNSPECIFIED TYPE: ICD-10-CM

## 2025-03-24 RX ORDER — AMLODIPINE BESYLATE 5 MG/1
5 TABLET ORAL
Qty: 30 TABLET | Refills: 1 | Status: SHIPPED | OUTPATIENT
Start: 2025-03-24

## 2025-04-04 ENCOUNTER — OFFICE VISIT (OUTPATIENT)
Dept: FAMILY MEDICINE | Facility: CLINIC | Age: 40
End: 2025-04-04
Payer: MEDICAID

## 2025-04-04 VITALS
RESPIRATION RATE: 20 BRPM | BODY MASS INDEX: 32.95 KG/M2 | HEART RATE: 66 BPM | WEIGHT: 193 LBS | DIASTOLIC BLOOD PRESSURE: 68 MMHG | TEMPERATURE: 98 F | HEIGHT: 64 IN | SYSTOLIC BLOOD PRESSURE: 105 MMHG

## 2025-04-04 DIAGNOSIS — L08.9 SKIN INFECTION: Primary | ICD-10-CM

## 2025-04-04 RX ORDER — CEPHALEXIN 500 MG/1
500 CAPSULE ORAL EVERY 8 HOURS
Qty: 15 CAPSULE | Refills: 0 | Status: SHIPPED | OUTPATIENT
Start: 2025-04-04 | End: 2025-04-09

## 2025-04-04 NOTE — PROGRESS NOTES
"Subjective:       Patient ID: Kayla Clay is a 39 y.o. female.    Chief Complaint: Pain (Right ankle pain, swelling due to tattoo she got on Monday)      Patient has a large tattoo that is new to her right ankle.  Tattoo was performed on March 31, 2025.  The patient now has a skin infection around the tattoo with swelling and significant redness.  The area is also warm to touch.  Patient has been elevating and putting ice on it.  Instructed to continue to do that and I will order her some Keflex.      Review of [id number] point review of systems conducted, negative except as stated in the history of present illness. See HPI for details.      Objective:      Visit Vitals  /68   Pulse 66   Temp 97.6 °F (36.4 °C)   Resp 20   Ht 5' 4" (1.626 m)   Wt 87.5 kg (193 lb)   LMP 03/04/2025   BMI 33.13 kg/m²     Physical Exam  Vitals and nursing note reviewed.   Constitutional:       Appearance: She is obese.   HENT:      Head: Normocephalic.      Nose: Nose normal.      Mouth/Throat:      Mouth: Mucous membranes are moist.   Eyes:      Extraocular Movements: Extraocular movements intact.   Cardiovascular:      Rate and Rhythm: Normal rate and regular rhythm.      Heart sounds: No murmur heard.  Pulmonary:      Effort: Pulmonary effort is normal.      Breath sounds: Normal breath sounds.   Abdominal:      General: Bowel sounds are normal.      Palpations: Abdomen is soft.   Musculoskeletal:         General: Normal range of motion.      Cervical back: Normal range of motion and neck supple.   Skin:     General: Skin is warm and dry.      Findings: Erythema present.             Comments: Infected tattoo site to right calf   Neurological:      Mental Status: She is alert and oriented to person, place, and time.       Current Outpatient Medications   Medication Instructions    amLODIPine (NORVASC) 5 mg, Oral    baclofen (LIORESAL) 10 mg, Oral, 3 times daily    cephALEXin (KEFLEX) 500 mg, Oral, Every 8 hours    " EScitalopram oxalate (LEXAPRO) 20 mg, Oral    famotidine (PEPCID) 40 mg, Oral    gabapentin (NEURONTIN) 300 mg, Nightly    loratadine (CLARITIN) 10 mg, Oral    sumatriptan (IMITREX) 50 mg, Oral, As needed (PRN)     is allergic to acetaminophen and doxycycline.       Assessment:         ICD-10-CM ICD-9-CM   1. Skin infection  L08.9 686.9          Plan:       1. Skin infection (Primary)  Continue to apply ice to swollen areas and elevate calf  - cephALEXin (KEFLEX) 500 MG capsule; Take 1 capsule (500 mg total) by mouth every 8 (eight) hours. for 5 days  Dispense: 15 capsule; Refill: 0        Follow up if symptoms worsen or fail to improve.     Future Appointments   Date Time Provider Department Center   4/24/2025 11:00 AM Les Vieyra MD [id number] [place] [address]   6/10/2025  9:20 AM Alyce Alvarado [facility] [place] [facility]

## 2025-04-24 ENCOUNTER — OFFICE VISIT (OUTPATIENT)
Dept: NEUROSURGERY | Facility: CLINIC | Age: 40
End: 2025-04-24
Payer: MEDICAID

## 2025-04-24 ENCOUNTER — HOSPITAL ENCOUNTER (OUTPATIENT)
Dept: RADIOLOGY | Facility: HOSPITAL | Age: 40
Discharge: HOME | End: 2025-04-24
Attending: STUDENT IN AN ORGANIZED HEALTH CARE EDUCATION/TRAINING PROGRAM
Payer: MEDICAID

## 2025-04-24 VITALS — HEART RATE: 69 BPM | DIASTOLIC BLOOD PRESSURE: 83 MMHG | SYSTOLIC BLOOD PRESSURE: 119 MMHG

## 2025-04-24 DIAGNOSIS — M54.16 LUMBAR RADICULOPATHY, CHRONIC: ICD-10-CM

## 2025-04-24 DIAGNOSIS — M54.50 THORACOLUMBAR BACK PAIN: ICD-10-CM

## 2025-04-24 DIAGNOSIS — M54.6 THORACOLUMBAR BACK PAIN: ICD-10-CM

## 2025-04-24 DIAGNOSIS — M41.9 SCOLIOSIS, UNSPECIFIED SCOLIOSIS TYPE, UNSPECIFIED SPINAL REGION: Primary | ICD-10-CM

## 2025-04-24 PROCEDURE — 99213 OFFICE O/P EST LOW 20 MIN: CPT | Mod: PBBFAC,25 | Performed by: NEUROLOGICAL SURGERY

## 2025-04-24 PROCEDURE — 3079F DIAST BP 80-89 MM HG: CPT | Mod: CPTII,,, | Performed by: NEUROLOGICAL SURGERY

## 2025-04-24 PROCEDURE — 99999 PR PBB SHADOW E&M-EST. PATIENT-LVL III: CPT | Mod: PBBFAC,,, | Performed by: NEUROLOGICAL SURGERY

## 2025-04-24 PROCEDURE — 99213 OFFICE O/P EST LOW 20 MIN: CPT | Mod: S$PBB,,, | Performed by: NEUROLOGICAL SURGERY

## 2025-04-24 PROCEDURE — 72082 X-RAY EXAM ENTIRE SPI 2/3 VW: CPT | Mod: 26,,, | Performed by: RADIOLOGY

## 2025-04-24 PROCEDURE — 3074F SYST BP LT 130 MM HG: CPT | Mod: CPTII,,, | Performed by: NEUROLOGICAL SURGERY

## 2025-04-24 PROCEDURE — 72082 X-RAY EXAM ENTIRE SPI 2/3 VW: CPT | Mod: TC

## 2025-04-24 NOTE — PROGRESS NOTES
CHIEF COMPLAINT:  Lower back pain & scoliosis    I, Dmitriy Green, attest that this documentation has been prepared under the direction and in the presence of Les Vieyra MD.    Interval Hx, 04/24/2025:  Patient is accompanied by her family member. She endorses radicular pain and numbness in her back and leg everyday citing that there is not a day that she feels good. She mentions that her left leg feels numb which has been happening for a while. She will occasionally have this numbness on her right side too. She has pain near the thoracolumbar area. When she is trying to sit to get in the car, or walking/standing she endorses pain which has been exacerbated in the last couple of years. She has used a cane (in her right) more frequently since her laminectomy in 8/18/2022 which she reports did not alleviate her symptoms. She mentions that the injection she got 4 months ago did not alleviate her symptoms, though previous ones had. She does PT and mentions that it does not directly help her back pain but cites that it increases the strength in her core.    HPI from 10/14/2024:  Kayla Clay is a 39 y.o.  female with known spinal deformity presents with debilitating axial back pain and fatigue with walking. Her symptoms have improved significantly with injections but she is still debilitated. She stays mostly at home and can't walk too far. She uses the cane to offload some of her weight when walking.    Review of patient's allergies indicates:   Allergen Reactions    Acetaminophen Hives    Doxycycline Rash     Rash on face and arms       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    Generalized anxiety disorder     Hypertension     IVDU (intravenous drug user) 08/18/2022    Leukopenia 09/09/2022    Obesity, unspecified     Scoliosis     Substance abuse Age 18    I used drugs off and on  since i was 18 but im sober now and have been since early August 2022     Past Surgical History:   Procedure Laterality Date    LUMBAR LAMINECTOMY WITH SURGICAL REMOVAL OF LESION OF SPINAL CORD BY POSTERIOR APPROACH N/A 08/18/2022    Procedure: LAMINECTOMY, SPINE, LUMBAR, POSTERIOR APPROACH, WITH EXCISION OF NEOPLASM OR LESION OF SPINAL CORD;  Surgeon: Floyd Brewer MD;  Location: [facility];  Service: Neurosurgery;  Laterality: N/A;    SPINE SURGERY  August 16th, 2022    Spinal absess and Laminectomy     Family History   Problem Relation Name Age of Onset    Pancreatic cancer Paternal Grandfather          80's    Stomach cancer Maternal Grandfather          70's - +metastasis    No Known Problems Father      No Known Problems Mother      Spine Surgery Paternal Uncle Reggie Engel         On my dads side of the family a lot of us have spine issues.    Breast cancer Neg Hx      Colon cancer Neg Hx      Ovarian cancer Neg Hx      Cervical cancer Neg Hx      Uterine cancer Neg Hx       Social History[1]     Review of Systems   Constitutional: Negative.    HENT: Negative.     Eyes: Negative.    Respiratory: Negative.     Cardiovascular: Negative.    Gastrointestinal: Negative.    Endocrine: Negative.    Genitourinary: Negative.    Musculoskeletal:  Positive for back pain. Negative for gait problem and neck pain.   Skin: Negative.    Allergic/Immunologic: Negative.    Neurological:  Positive for numbness (LLE > RLE). Negative for weakness, light-headedness and headaches.        Paraesthesia in left leg   Hematological: Negative.    Psychiatric/Behavioral: Negative.         OBJECTIVE:   Vital Signs:  Pulse: 69 (04/24/25 1002)  BP: 119/83 (04/24/25 1002)    Physical Exam:    Vital signs: All nursing notes and vital signs reviewed -- afebrile, vital signs stable.  Constitutional: Patient sitting comfortably in chair. Appears well developed and well nourished.  Skin: Exposed areas are intact without abnormal markings,  rashes or other lesions.  HEENT: Normocephalic. Normal conjunctivae.  Cardiovascular: Normal rate and regular rhythm.  Respiratory: Chest wall rises and falls symmetrically, without signs of respiratory distress.  Abdomen: Soft and non-tender.  Extremities: Warm and without edema. Calves supple, non-tender.  Psych/Behavior: Normal affect.    Neurological:    Mental status: Alert and oriented. Conversational and appropriate.       Cranial Nerves: VFF to confrontation. PERRL. EOMI without nystagmus. Facial STLT normal and symmetric. Strong, symmetric muscles of mastication. Facial strength full and symmetric. Hearing equal bilaterally to finger rub. Palate and uvula rise and fall normally in midline. Shoulder shrug 5/5 strength. Tongue midline.     Motor:    Upper:  Deltoids Triceps Biceps WE WF     R 5/5 5/5 5/5 5/5 5/5 5/5    L 5/5 5/5 5/5 5/5 5/5 5/5      Lower:  HF KE KF DF PF EHL    R 5/5 5/5 5/5 5/5 5/5 5/5    L 5/5 5/5 5/5 5/5 5/5 5/5     Sensory: Intact sensation to light touch in all extremities. Romberg negative.    Reflexes:          DTR: 2+ symmetrically throughout.     Godinez's: Negative.     Babinski's: Negative.     Clonus: Negative.    Cerebellar: Finger-to-nose and rapid alternating movements normal. Gait stable, fluid.    Spine:    Posture: Head well aligned over pelvis in front and side views.  No focal or global spinal deformity visible on inspection. Shoulders and hips even. No obvious leg length discrepancy. No scapula winging.    Bending: Full ROM with forward, back and lateral bending. No rib prominence with forward bend.    Cervical:      ROM: Full with flexion, extension, lateral rotation and ear-to-shoulder bend.      Midline TTP: Negative.     Spurling's test: Negative.     Lhermitte's: Negative.    Thoracic:     Midline TTP: Negative    Lumbar:     Midline TTP: Negative     Straight Leg Test: Negative     Crossed Straight Leg Test: Negative     Sciatic notch tenderness:  Negative.    Other:     SI joint TTP: Negative.     Greater trochanter TTP: Negative.     Tenderness with external/internal hip rotation: Negative.    ASSESSMENT/PLAN:     Kayla Clay has 38 y/o female with history of thoracolumbar scoliosis who presents with significant low back pain who walks with a cane. She Has occasional radicular L5 down her left more than right leg. She has tried physical therapy recently with only mild relief. Injection 4 months ago did not provide relief. Will obtain updated imaging.     The patient understands and agrees with the plan of care. All questions were answered.     1. MRI Thoracic and Lumbar WO Contrast  2. CT Lumbar WO Contrast  3. XR Scoliosis  4. Dexa    Return to clinic to following scans.    I, Dr. Les Vieyra personally performed the services described in this documentation. All medical record entries made by the scribeDmitriy were at my direction and in my presence. I have reviewed the chart and agree that the record reflects my personal performance and is accurate and complete.      Les Vieyra M.D.  Department of Neurosurgery  [facility]         [1]   Social History  Tobacco Use    Smoking status: Never     Passive exposure: Current    Smokeless tobacco: Never    Tobacco comments:     Social smoker off and on since 14 but never regular smoker   Substance Use Topics    Alcohol use: Not Currently    Drug use: Not Currently     Types: Heroin, Methamphetamines

## 2025-04-25 ENCOUNTER — HOSPITAL ENCOUNTER (OUTPATIENT)
Dept: RADIOLOGY | Facility: HOSPITAL | Age: 40
Discharge: HOME | End: 2025-04-25
Attending: STUDENT IN AN ORGANIZED HEALTH CARE EDUCATION/TRAINING PROGRAM
Payer: MEDICAID

## 2025-04-25 ENCOUNTER — TELEPHONE (OUTPATIENT)
Dept: NEUROSURGERY | Facility: CLINIC | Age: 40
End: 2025-04-25
Payer: MEDICAID

## 2025-04-25 DIAGNOSIS — M41.9 SCOLIOSIS, UNSPECIFIED SCOLIOSIS TYPE, UNSPECIFIED SPINAL REGION: ICD-10-CM

## 2025-04-25 PROCEDURE — 77080 DXA BONE DENSITY AXIAL: CPT | Mod: TC

## 2025-04-25 NOTE — TELEPHONE ENCOUNTER
Called patient to schedule an upcoming appointment with Les Vieyra MD and provided next appointment date and time.  Future Appointments   Date Time Provider Department Center   5/7/2025  8:30 AM [facility] MRI1 550 LB LIMIT [facility] MRI [place]   5/7/2025  9:00 AM [facility] MRI1 550 LB LIMIT [facility] MRI [place]   5/7/2025  9:30 AM [facility] CT1 675 LB LIMIT [facility] CTSCN [place]   5/15/2025  9:00 AM Les Vieyra MD [facility] NEUROS8 [facility]   6/10/2025  9:20 AM Alyce Alvarado NP [facility] OBMONTY [facility]        Patient voiced understanding and thanked me.

## 2025-05-07 ENCOUNTER — HOSPITAL ENCOUNTER (OUTPATIENT)
Dept: RADIOLOGY | Facility: HOSPITAL | Age: 40
Discharge: HOME | End: 2025-05-07
Attending: STUDENT IN AN ORGANIZED HEALTH CARE EDUCATION/TRAINING PROGRAM
Payer: MEDICAID

## 2025-05-07 DIAGNOSIS — M54.16 LUMBAR RADICULOPATHY, CHRONIC: ICD-10-CM

## 2025-05-07 PROCEDURE — 72131 CT LUMBAR SPINE W/O DYE: CPT | Mod: TC

## 2025-05-07 PROCEDURE — 72146 MRI CHEST SPINE W/O DYE: CPT | Mod: TC

## 2025-05-07 PROCEDURE — 72148 MRI LUMBAR SPINE W/O DYE: CPT | Mod: TC

## 2025-05-15 ENCOUNTER — OFFICE VISIT (OUTPATIENT)
Dept: NEUROSURGERY | Facility: CLINIC | Age: 40
End: 2025-05-15
Payer: MEDICAID

## 2025-05-15 ENCOUNTER — TELEPHONE (OUTPATIENT)
Dept: NEUROSURGERY | Facility: CLINIC | Age: 40
End: 2025-05-15
Payer: MEDICAID

## 2025-05-15 VITALS — DIASTOLIC BLOOD PRESSURE: 75 MMHG | HEART RATE: 63 BPM | SYSTOLIC BLOOD PRESSURE: 109 MMHG

## 2025-05-15 DIAGNOSIS — M41.55 SCOLIOSIS OF THORACOLUMBAR REGION DUE TO DEGENERATIVE DISEASE OF SPINE IN ADULT: ICD-10-CM

## 2025-05-15 DIAGNOSIS — M54.6 THORACOLUMBAR BACK PAIN: ICD-10-CM

## 2025-05-15 DIAGNOSIS — M41.9 SCOLIOSIS, UNSPECIFIED SCOLIOSIS TYPE, UNSPECIFIED SPINAL REGION: Primary | ICD-10-CM

## 2025-05-15 DIAGNOSIS — R29.898 WEAKNESS OF BOTH LOWER EXTREMITIES: ICD-10-CM

## 2025-05-15 DIAGNOSIS — G06.2 EPIDURAL ABSCESS: Primary | ICD-10-CM

## 2025-05-15 DIAGNOSIS — M43.8X9 SAGITTAL PLANE IMBALANCE: ICD-10-CM

## 2025-05-15 DIAGNOSIS — M51.369 DEGENERATION OF INTERVERTEBRAL DISC OF LUMBAR REGION, UNSPECIFIED WHETHER PAIN PRESENT: ICD-10-CM

## 2025-05-15 DIAGNOSIS — M46.46 SEPTIC DISCITIS OF LUMBAR REGION: ICD-10-CM

## 2025-05-15 DIAGNOSIS — M54.50 THORACOLUMBAR BACK PAIN: ICD-10-CM

## 2025-05-15 DIAGNOSIS — M54.16 LUMBAR RADICULOPATHY, CHRONIC: ICD-10-CM

## 2025-05-15 PROCEDURE — 99999 PR PBB SHADOW E&M-EST. PATIENT-LVL III: CPT | Mod: PBBFAC,,, | Performed by: NURSE PRACTITIONER

## 2025-05-15 PROCEDURE — 99213 OFFICE O/P EST LOW 20 MIN: CPT | Mod: PBBFAC | Performed by: NURSE PRACTITIONER

## 2025-05-15 NOTE — PROGRESS NOTES
Neurosurgery  Established Patient    SUBJECTIVE:     History of Present Illness: Kayla Clay is a 38 y.o. female with a past medical history significant for active IVDU (drug of choice is methamphetamine), MOY, and HTN. The patient is being seen today as a referral from Dr. Brewer to Dr. Vieyra to discuss deformity surgery. She has a surgical hx of L2-4 right sided hemilaminectomy for epidural abscess (Candida albicans found in operative cultures on 08/18/2022). Describes the pain as constant and severe throughout her low back. Rates the pain as a 10/10. Aggravating factors include standing and walking. She utilizes a cane with ambulation due to the weakness in her legs. Denies alleviating factors. Denies b/b dysfunction, saddle anesthesia, or gait instability.  She was previously obtaining PT without significant relief.      Interval Hx 6/27/24: After the last appointment, it was recommended that the patient obtain updated imaging and follow-up with Dr. Vieyra to discuss if additional surgery would be beneficial. She is being seen in clinic today with myself and Dr. Vieyra to discuss the image findings. States that she continues to struggle with low back pain that radiates down her legs affecting her ability to stand and walk. Rates the pain as a 6/10. Reports numbness and tingling and weakness in the legs. States that she has to sit to obtain some relief.     Interval Hx, 04/24/2025:  Patient is accompanied by her family member. She endorses radicular pain and numbness in her back and leg everyday citing that there is not a day that she feels good. She mentions that her left leg feels numb which has been happening for a while. She will occasionally have this numbness on her right side too. She has pain near the thoracolumbar area. When she is trying to sit to get in the car, or walking/standing she endorses pain which has been exacerbated in the last couple of years. She has used a cane (in her right)  more frequently since her laminectomy in 8/18/2022 which she reports did not alleviate her symptoms. She mentions that the injection she got 4 months ago did not alleviate her symptoms, though previous ones had. She does PT and mentions that it does not directly help her back pain but cites that it increases the strength in her core.     Interval Hx 5/15/25: After the last appointment with Dr. Vieyra, he recommended additional testing for preoperative planning for her deformity surgery. She is being seen in clinic today with myself and Dr. Vieyra to discuss surgery in more detail. She is eager to proceed. Denies changes in her symptoms since the last appointment.     Review of patient's allergies indicates:   Allergen Reactions    Acetaminophen Hives    Doxycycline Rash     Rash on face and arms       Current Medications[1]    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    Generalized anxiety disorder     Hypertension     IVDU (intravenous drug user) 08/18/2022    Leukopenia 09/09/2022    Obesity, unspecified     Scoliosis     Substance abuse Age 18    I used drugs off and on since i was 18 but im sober now and have been since early August 2022     Past Surgical History:   Procedure Laterality Date    LUMBAR LAMINECTOMY WITH SURGICAL REMOVAL OF LESION OF SPINAL CORD BY POSTERIOR APPROACH N/A 08/18/2022    Procedure: LAMINECTOMY, SPINE, LUMBAR, POSTERIOR APPROACH, WITH EXCISION OF NEOPLASM OR LESION OF SPINAL CORD;  Surgeon: Floyd Brewer MD;  Location: [facility];  Service: Neurosurgery;  Laterality: N/A;    SPINE SURGERY  August 16th, 2022    Spinal absess and Laminectomy     Family History       Problem Relation (Age of Onset)    No Known Problems Father, Mother    Pancreatic cancer Paternal Grandfather    Spine Surgery Paternal Uncle    Stomach cancer Maternal Grandfather          Social  History     Socioeconomic History    Marital status:    Tobacco Use    Smoking status: Never     Passive exposure: Current    Smokeless tobacco: Never    Tobacco comments:     Social smoker off and on since 14 but never regular smoker   Substance and Sexual Activity    Alcohol use: Not Currently    Drug use: Not Currently     Types: Heroin, Methamphetamines    Sexual activity: Not Currently     Partners: Male     Birth control/protection: Abstinence     Comment: STI: Chlamydia, Gonorrhea, Trich     Social Drivers of Health     Financial Resource Strain: Low Risk  (3/10/2025)    Overall Financial Resource Strain (CARDIA)     Difficulty of Paying Living Expenses: Not very hard   Food Insecurity: No Food Insecurity (3/10/2025)    Hunger Vital Sign     Worried About Running Out of Food in the Last Year: Never true     Ran Out of Food in the Last Year: Never true   Transportation Needs: No Transportation Needs (3/10/2025)    PRAPARE - Transportation     Lack of Transportation (Medical): No     Lack of Transportation (Non-Medical): No   Physical Activity: Unknown (3/10/2025)    Exercise Vital Sign     Days of Exercise per Week: 0 days   Stress: No Stress Concern Present (3/10/2025)    [employer] [place] [employer] - Occupational Stress Questionnaire     Feeling of Stress : Not at all   Housing Stability: Low Risk  (3/10/2025)    Housing Stability Vital Sign     Unable to Pay for Housing in the Last Year: No     Number of Times Moved in the Last Year: 0     Homeless in the Last Year: No       Review of Systems    OBJECTIVE:     Vital Signs  Pulse: 63  BP: 109/75  Pain Score:   7  There is no height or weight on file to calculate BMI.    Neurosurgery Physical Exam  General: well developed, well nourished, no distress.   Head: normocephalic, atraumatic  Neurologic: Alert and oriented. Thought content appropriate.  GCS: Motor: 6/Verbal: 5/Eyes: 4 GCS Total: 15  Mental Status: Awake, Alert, Oriented x  4  Language: No aphasia  Speech: No dysarthria  Cranial nerves: face symmetric, tongue midline, CN II-XII grossly intact.   Eyes: pupils equal, round, reactive to light with accomodation, EOMI.   Pulmonary: normal respirations, no signs of respiratory distress  Abdomen: soft, non-distended  Skin: Skin is warm, dry and intact.  Sensory: intact to light touch throughout  Motor Strength:Moves all extremities spontaneously with good tone.   Strength   Deltoids Triceps Biceps Wrist Extension Wrist Flexion Hand    Upper: R 5/5 5/5 5/5 5/5 5/5 5/5     L 5/5 5/5 5/5 5/5 5/5 5/5       HF KE KF DF PF EHL   Lower: R 5/5 5/5 5/5 5/5 5/5 5/5     L 5/5 5/5 5/5 5/5 5/5 5/5      Reflexes:   Godinez's: Negative.     Cerebellar:   Gait antalgic     Cervical:   ROM: Full with flexion, extension, lateral rotation and ear-to-shoulder bend.   Midline TTP: Negative.     Thoracic:  Midline TTP: Positive.     Lumbar:  Midline TTP: Positive.    Diagnostic Results:  I have personally reviewed the imaging with Dr. Vieyra dated 5/7/25    MRI thoracic sinusoidal curvature of the thoracolumbar spine with the upper thoracic convexity to the right. Multilevel degenerative changes without high grade stenosis. There is mild encroachment into the left neural foramina again identified at T8-9, T9-10, and T10-11 secondary to mild posterior disc bulge and facet arthrosis.     2.   MRI lumbar spine shows levo lumbar scoliosis with slight the left lateral listhesis of L2 on L3 intervertebral body fusion at L3-4 with slight left lateral position of L3 on L4. Multilevel moderate to advanced lumbar spondylosis.      3.  CT lumbar spine shows levo lumbar scoliosis with slight the left lateral position of L3 fused L4 and a slight the left lateral listhesis of L2 on L3.   ASSESSMENT/PLAN:     Kayla Clay is a 40 y.o. female seen in clinic today with myself and Dr. Vieyra to discuss deformity surgery in more detail for her progressive scoliosis  despite conservative treatment. Patient has a surgical hx of L2-4 right sided hemilaminectomy for epidural abscess (Candida albicans found in operative cultures on 08/18/2022). After reviewing the imaging and concerns, Dr. Vieyra recommends a T10-pelvis due to severe stenosis and scoliosis. R/B/A/I/M were reviewed in detail and she wishes to proceed. Will consult ID for additional recs regarding infectious hx.     DWAYNE Hinojosa-NORM  Neurosurgery  [facility]. [place].      Note dictated with voice recognition software, please excuse any grammatical errors.            [1]   Current Outpatient Medications   Medication Sig Dispense Refill    amLODIPine (NORVASC) 5 MG tablet TAKE 1 TABLET BY MOUTH EVERY DAY 30 tablet 1    baclofen (LIORESAL) 10 MG tablet TAKE 1 TABLET BY MOUTH THREE TIMES A DAY 90 tablet 2    EScitalopram oxalate (LEXAPRO) 20 MG tablet TAKE 1 TABLET BY MOUTH EVERY DAY 30 tablet 5    gabapentin (NEURONTIN) 300 MG capsule Take 300 mg by mouth every evening.      loratadine (CLARITIN) 10 mg tablet TAKE 1 TABLET BY MOUTH EVERY DAY 30 tablet 5    sumatriptan (IMITREX) 50 MG tablet Take 1 tablet (50 mg total) by mouth as needed for Migraine. 9 tablet 5    famotidine (PEPCID) 40 MG tablet TAKE 1 TABLET BY MOUTH EVERY DAY 30 tablet 5     No current facility-administered medications for this visit.

## 2025-05-21 DIAGNOSIS — I10 HYPERTENSION, UNSPECIFIED TYPE: ICD-10-CM

## 2025-05-21 RX ORDER — AMLODIPINE BESYLATE 5 MG/1
5 TABLET ORAL
Qty: 30 TABLET | Refills: 1 | Status: SHIPPED | OUTPATIENT
Start: 2025-05-21

## 2025-05-26 DIAGNOSIS — Z01.818 PREOPERATIVE TESTING: Primary | ICD-10-CM

## 2025-05-26 NOTE — ANESTHESIA PAT ROS NOTE
05/26/2025  Kayla Clay is a 40 y.o., female.      Pre-op Assessment          Review of Systems  Anesthesia Hx:  No problems with previous Anesthesia             Denies Family Hx of Anesthesia complications.    Denies Personal Hx of Anesthesia complications.                    Social:  Non-Smoker, No Alcohol Use       Hematology/Oncology:  Hematology Normal   Oncology Normal                                   EENT/Dental:  EENT/Dental Normal           Cardiovascular:            Denies Angina.            Functional Capacity 4 METS, able to climb 2 flights of stairs                   Hypertension  , Recent typical clinic B/P of 109/75       Pulmonary:  Pulmonary Normal      Denies Shortness of breath.  Denies Recent URI.                 Renal/:  Renal/ Normal                 Hepatic/GI:  Hepatic/GI Normal                    Musculoskeletal:   Musculoskeletal General/Symptoms:  Functional capacity is ambulatory with cane.       Spine Disorders:   Scoliosis          Neurological:   Neuro Symptoms of pain OF LOWER BACK AND BLE                            Endocrine:        Metabolic Disorders, Obesity / BMI > 30  Psych:   Anxiety Disorder.  Attention Deficit Disorder. Depression.                  Anesthesia Assessment: Preoperative EQUATION    Planned Procedure: Procedure(s) (LRB):  *AIRO* T10 TO PELVIS POSTERIOR INSTRUMENTED FUSION/AIRO (N/A)  Requested Anesthesia Type:General  Surgeon: Les Vieyra MD  Service: Neurosurgery  Known or anticipated Date of Surgery:6/24/2025    Surgeon notes: reviewed    Electronic QUestionnaire Assessment completed via nurse interview with patient.        Triage considerations:     The patient has no apparent active cardiac condition (No unstable coronary Syndrome such as severe unstable angina or recent [<1 month] myocardial infarction, decompensated CHF, severe  valvular   disease or significant arrhythmia)    Previous anesthesia records:GETA and No problems  8/18/2022   LAMINECTOMY, SPINE, LUMBAR, POSTERIOR APPROACH, WITH EXCISION OF NEOPLASM OR LESION OF SPINAL CORD (Back)   Airway:  Mallampati: II   Mouth Opening: Normal  TM Distance: Normal  Tongue: Normal  Neck ROM: Normal ROM  Airway Placement Date: 08/18/22 Placement Time: 1318 , created via procedure documentation Method of Intubation: Direct laryngoscopy Mask Ventilation: Easy Intubated: Postinduction Blade: Carbajal #3 Airway Device Size: 7.5 Cuff Inflation: Minimal occlusive pressure Placement Verified By: Capnometry Complicating Factors: None Findings Post-Intubation: Bilateral breath sounds;Atraumatic/Condition of teeth unchanged Secured at: Lips Complications: None Removal Date: 08/18/22 Removal Time: 1703       Last PCP note: within 3 months , within [facility]   Subspecialty notes: Neurosurgery, OB/GYN    Other important co-morbidities: HTN, Obesity, and SCOLIOSIS      Tests already available:  Available tests,  within 3 months , within [facility] .   5/7/2025 MRI THORACIC SPINE W/O CONTRAST, MRI LUMBAR SPINE W/O CONTRAST, CT LUMBAR SPINE W/O CONTRAST, 4/24/2025 XRAY SCOLIOSIS COMPLETE          Instructions given. (See in Nurse's note)    Optimization:  Anesthesia Preop Clinic Assessment  Indicated    Medical Opinion Indicated           Plan:    Testing:  BMP, CBC, EKG, PT/INR, PTT, T&C, T&S, and UA   Pre-anesthesia  visit       Visit focus: concerns in complex and/or prolonged anesthesia     Consultation:Patient's PCP for re-evaluation     Patient  has previously scheduled Medical Appointment:6/10 OB/GYN, ID    Navigation: Tests Scheduled. TBD             Consults scheduled.5/28             Results will be tracked by Preop Clinic.  5/26 Patient lives 3 hours  away and will not be coming to [place] until her surgery. Will see anesthesia in am of surgery. Will get T&S and T&C 2 units in am of surgery.  5/29 Labs   and UA resulted and noted by Cee Mccauley NP.  6/2 EKG resulted and noted by Cee Mccauley NP.  6/3 Medical clearance by Cee Mccauley NP on 6/2. ( In media)  Shy Vallecillo RN BSN

## 2025-05-26 NOTE — PRE-PROCEDURE INSTRUCTIONS
Patient stated has not had any problem with anesthesia in the past. Will need medical clearance from your PCP, Cee Mccauley NP. Has appt on 5/28. She has a preop testing form from Dr. Vieyra to bring to her PCP. Will need poc appt but is not coming to [place] until her surgery. Will see anesthesia in am of surgery. I did ROS over the  phone.Will get T&S and T&C in am of surgery.         Preop instructions given. Hold aspirin, aspirin containing products, nsaids( Aleve, Advil, Motrin, Ibuprofen, Naprosyn, Naproxen, Voltaren, Diclofenac, Mobic, Meloxicam, Celebrex, Celecoxib), vitamins and supplements one week prior to surgery.         Medication instructions given:    Disp Refills Start End   amLODIPine (NORVASC) 5 MG tablet 30 tablet 1 5/21/2025 --   Sig: TAKE 1 TABLET BY MOUTH EVERY DAY   Route: Oral   Prior authorization: Closed - Prior Authorization not required for patient/medication       Shy Vallecillo RN 5/26/2025 11:23 AM  TAKE IN AM OF SURGERY.              baclofen (LIORESAL) 10 MG tablet 90 tablet 2 3/7/2025 --   Sig: TAKE 1 TABLET BY MOUTH THREE TIMES A DAY   Route: Oral       Shy Vallecillo RN 5/26/2025 11:23 AM  TAKE THE NIGHT BEFORE SURGERY.              EScitalopram oxalate (LEXAPRO) 20 MG tablet 30 tablet 5 12/10/2024 --   Sig: TAKE 1 TABLET BY MOUTH EVERY DAY   Route: Oral   Prior authorization: Closed - Prior Authorization not required for patient/medication       Shy Vallecillo RN 5/26/2025 11:23 AM  TAKE IN AM OF SURGERY.              gabapentin (NEURONTIN) 300 MG capsule -- --  --   Sig: Take 300 mg by mouth every evening.   Class: Historical Med   Route: Oral       Shy Vallecillo RN 5/26/2025 11:23 AM  TAKE THE NIGHT BEFORE SURGERY.              loratadine (CLARITIN) 10 mg tablet 30 tablet 5 4/23/2024 --   Sig: TAKE 1 TABLET BY MOUTH EVERY DAY   Route: Oral       Anne-Marie Kennedy MA 11/19/2024  9:40 AM  AS NEEDED            sumatriptan (IMITREX) 50 MG tablet 9 tablet 5 2/13/2025 --   Sig: Take 1  tablet (50 mg total) by mouth as needed for Migraine.   Route: Oral       Shy Vallecillo RN 5/26/2025 11:24 AM  TAKE IF NEEDED            Your surgery has been scheduled for:_TUESDAY 6/24/2025_________________________________________    You should report to:  ____[facility], located on the [place] side of the first floor of the           [facility] ([phone])  __x__[facility], located on the [facility] side of the            Second floor of the [facility] ([phone])  ____3rd Floor [facility] located on the [facility] side of the [facility] [phone]  Please Note   Tell your doctor if you take Aspirin, products containing Aspirin, herbal medications  or blood thinners, such as Coumadin, Ticlid, or Plavix.  (Consult your provider regarding holding or stopping before surgery).  Arrange for someone to drive you home following surgery.  You will not be allowed to leave the surgical facility alone or drive yourself home following sedation and anesthesia.  Before Surgery  Stop taking all vitamins/ herbal medications 14days prior to surgery  No Motrin/Advil (Ibuprofen) 7 days before surgery  No Aleve (Naproxen) 7 days before surgery  Stop Taking Asprin, products containing Asprin _7____days before surgery  Stop taking blood thinners_______days before surgery  No Goody's/BC  Powder 7 days before surgery  Refrain from drinking alcoholic beverages for 24hours before and after surgery  Stop or limit smoking _________days before surgery(nonsmoker)  You may take Tylenol for pain  Night before Surgery  Nothing to eat or drink after midnight. May have clear liquids ( water or apple juice) up until 2 hours before your arrival time.  Take a shower or bath (shower is recommended).  Bathe with Hibiclens soap or an antibacterial soap from the neck down.  If not supplied by your surgeon, hibiclens soap will need to be purchased  over the counter in pharmacy.  Rinse soap off thoroughly.  Shampoo your hair with your regular shampoo  The Day of Surgery  NOTHING TO  DRINK 2 hours before arrival time. If you are told to take medication on the morning of surgery, it may be taken with a sip of water.   Take another bath or shower with hibiclens or any antibacterial soap, to reduce the chance of infection.  Take heart and blood pressure medications with a small sip of water, as advised by the perioperative team.  Do not take fluid pills  You may brush your teeth and rinse your mouth, but do not swall any additional water.   Do not apply perfumes, powder, body lotions or deodorant on the day of surgery.  Nail polish should be removed.  Do not wear makeup or moisturizer  Wear comfortable clothes, such as a button front shirt and loose fitting pants.  Leave all jewelry, including body piercings, and valuables at home.    Bring any devices you will neeed after surgery such as crutches or canes.  If you have sleep apnea, please bring your CPAP machine  In the event that your physical condition changes including the onset of a cold or respiratory illness, or if you have to delay or cancel your surgery, please notify your surgeon.    Directions given to the surgery center. Also sent preop and med instructions to [facility] portal. Verbalizes understanding.

## 2025-05-28 ENCOUNTER — CLINICAL SUPPORT (OUTPATIENT)
Dept: RESPIRATORY THERAPY | Facility: HOSPITAL | Age: 40
End: 2025-05-28
Attending: NURSE PRACTITIONER
Payer: MEDICAID

## 2025-05-28 ENCOUNTER — OFFICE VISIT (OUTPATIENT)
Dept: FAMILY MEDICINE | Facility: CLINIC | Age: 40
End: 2025-05-28
Payer: MEDICAID

## 2025-05-28 ENCOUNTER — HOSPITAL ENCOUNTER (OUTPATIENT)
Dept: RADIOLOGY | Facility: HOSPITAL | Age: 40
Discharge: HOME | End: 2025-05-28
Attending: NURSE PRACTITIONER
Payer: MEDICAID

## 2025-05-28 VITALS
TEMPERATURE: 98 F | RESPIRATION RATE: 18 BRPM | SYSTOLIC BLOOD PRESSURE: 116 MMHG | DIASTOLIC BLOOD PRESSURE: 75 MMHG | BODY MASS INDEX: 32.44 KG/M2 | HEART RATE: 76 BPM | WEIGHT: 190 LBS | HEIGHT: 64 IN

## 2025-05-28 DIAGNOSIS — Z01.818 PREOPERATIVE CLEARANCE: ICD-10-CM

## 2025-05-28 DIAGNOSIS — M41.50 DEGENERATIVE SCOLIOSIS IN ADULT PATIENT: ICD-10-CM

## 2025-05-28 PROBLEM — L91.8 SKIN TAG: Status: RESOLVED | Noted: 2023-01-06 | Resolved: 2025-05-28

## 2025-05-28 PROCEDURE — 93005 ELECTROCARDIOGRAM TRACING: CPT

## 2025-05-28 PROCEDURE — 71046 X-RAY EXAM CHEST 2 VIEWS: CPT | Mod: TC

## 2025-05-28 NOTE — PROGRESS NOTES
"Subjective:       Patient ID: Kayla Clay is a 40 y.o. female.    Chief Complaint: Preop clearance        The patient is a 40 year old female who presents for preoperative clearance. She has adult degenerative scoliosis. She is to have a T10 to pelvis posterior instrumented fusion.     Her current problems include lumbar pain and weakness in both lower extremities, anxiety, depression, hypertension, insomnis and obesity.       Review of Aortcpu88 point review of systems conducted, negative except as stated in the history of present illness. See HPI for details.      Objective:      Visit Vitals  /75   Pulse 76   Temp 97.6 °F (36.4 °C) (Temporal)   Resp 18   Ht 5' 4" (1.626 m)   Wt 86.2 kg (190 lb)   BMI 32.61 kg/m²     Physical Exam  Vitals and nursing note reviewed.   Constitutional:       Appearance: She is obese.   HENT:      Head: Normocephalic.      Right Ear: Tympanic membrane normal.      Left Ear: Tympanic membrane normal.      Nose: Nose normal.      Mouth/Throat:      Mouth: Mucous membranes are moist.   Cardiovascular:      Rate and Rhythm: Normal rate and regular rhythm.      Heart sounds: No murmur heard.  Pulmonary:      Effort: Pulmonary effort is normal.      Breath sounds: Normal breath sounds.   Abdominal:      General: Bowel sounds are normal.      Palpations: Abdomen is soft.   Musculoskeletal:         General: Normal range of motion.      Cervical back: Normal range of motion and neck supple.   Skin:     General: Skin is warm and dry.      Capillary Refill: Capillary refill takes less than 2 seconds.   Neurological:      Mental Status: She is alert and oriented to person, place, and time.       Current Outpatient Medications   Medication Instructions    amLODIPine (NORVASC) 5 mg, Oral    baclofen (LIORESAL) 10 mg, Oral, 3 times daily    EScitalopram oxalate (LEXAPRO) 20 mg, Oral    gabapentin (NEURONTIN) 300 mg, Nightly    loratadine (CLARITIN) 10 mg, Oral    sumatriptan (IMITREX) " 50 mg, Oral, As needed (PRN)     is allergic to acetaminophen and doxycycline.       Assessment:         ICD-10-CM ICD-9-CM   1. Degenerative scoliosis in adult patient  M41.50 733.90     737.43   2. Preoperative clearance  Z01.818 V72.84          Plan:       1. Degenerative scoliosis in adult patient  Patient to have surgical intervention June 24th    2. Preoperative clearance  - Comprehensive Metabolic Panel; Future  - CBC Auto Differential; Future  - Protime-INR; Future  - APTT; Future  - Urinalysis; Future  - EKG 12-lead; Future  - X-Ray Chest PA And Lateral; Future        Follow up in about 3 months (around 8/28/2025).     Future Appointments   Date Time Provider Department Center   5/28/2025 10:20 AM LAB, [facility] LAB [facility]   5/28/2025 10:30 AM EKG, [facility] OPRT [facility]   5/28/2025 10:45 AM [facility] XR1 [facility] XRAY [facility]   6/10/2025  2:30 PM [facility]

## 2025-05-31 LAB
OHS QRS DURATION: 88 MS
OHS QTC CALCULATION: 429 MS

## 2025-06-02 DIAGNOSIS — F41.9 ANXIETY: Primary | ICD-10-CM

## 2025-06-02 DIAGNOSIS — R21 RASH: ICD-10-CM

## 2025-06-02 RX ORDER — BACLOFEN 10 MG/1
10 TABLET ORAL 3 TIMES DAILY
Qty: 90 TABLET | Refills: 1 | Status: SHIPPED | OUTPATIENT
Start: 2025-06-02 | End: 2025-06-04

## 2025-06-02 RX ORDER — ESCITALOPRAM OXALATE 20 MG/1
20 TABLET ORAL
Qty: 30 TABLET | Refills: 5 | Status: SHIPPED | OUTPATIENT
Start: 2025-06-02

## 2025-06-02 RX ORDER — FAMOTIDINE 40 MG/1
40 TABLET, FILM COATED ORAL
Qty: 30 TABLET | Refills: 5 | Status: SHIPPED | OUTPATIENT
Start: 2025-06-02

## 2025-06-03 ENCOUNTER — TELEPHONE (OUTPATIENT)
Dept: NEUROSURGERY | Facility: CLINIC | Age: 40
End: 2025-06-03
Payer: MEDICAID

## 2025-06-04 ENCOUNTER — PATIENT MESSAGE (OUTPATIENT)
Dept: FAMILY MEDICINE | Facility: CLINIC | Age: 40
End: 2025-06-04
Payer: MEDICAID

## 2025-06-04 ENCOUNTER — PATIENT MESSAGE (OUTPATIENT)
Dept: OBSTETRICS AND GYNECOLOGY | Facility: CLINIC | Age: 40
End: 2025-06-04
Payer: MEDICAID

## 2025-06-10 ENCOUNTER — OFFICE VISIT (OUTPATIENT)
Dept: INFECTIOUS DISEASES | Facility: CLINIC | Age: 40
End: 2025-06-10
Payer: MEDICAID

## 2025-06-10 VITALS
BODY MASS INDEX: 30.2 KG/M2 | DIASTOLIC BLOOD PRESSURE: 74 MMHG | SYSTOLIC BLOOD PRESSURE: 112 MMHG | HEIGHT: 67 IN | HEART RATE: 68 BPM | WEIGHT: 192.44 LBS

## 2025-06-10 DIAGNOSIS — I10 HYPERTENSION, UNSPECIFIED TYPE: Chronic | ICD-10-CM

## 2025-06-10 DIAGNOSIS — M46.46 LUMBAR DISCITIS: Primary | ICD-10-CM

## 2025-06-10 DIAGNOSIS — G06.2 EPIDURAL ABSCESS: ICD-10-CM

## 2025-06-10 DIAGNOSIS — M46.46 SEPTIC DISCITIS OF LUMBAR REGION: ICD-10-CM

## 2025-06-10 DIAGNOSIS — M41.55 SCOLIOSIS OF THORACOLUMBAR REGION DUE TO DEGENERATIVE DISEASE OF SPINE IN ADULT: ICD-10-CM

## 2025-06-10 PROCEDURE — 3008F BODY MASS INDEX DOCD: CPT | Mod: CPTII,,, | Performed by: STUDENT IN AN ORGANIZED HEALTH CARE EDUCATION/TRAINING PROGRAM

## 2025-06-10 PROCEDURE — 99214 OFFICE O/P EST MOD 30 MIN: CPT | Mod: S$PBB,,, | Performed by: STUDENT IN AN ORGANIZED HEALTH CARE EDUCATION/TRAINING PROGRAM

## 2025-06-10 PROCEDURE — 99213 OFFICE O/P EST LOW 20 MIN: CPT | Mod: PBBFAC | Performed by: STUDENT IN AN ORGANIZED HEALTH CARE EDUCATION/TRAINING PROGRAM

## 2025-06-10 PROCEDURE — 99999 PR PBB SHADOW E&M-EST. PATIENT-LVL III: CPT | Mod: PBBFAC,,, | Performed by: STUDENT IN AN ORGANIZED HEALTH CARE EDUCATION/TRAINING PROGRAM

## 2025-06-10 PROCEDURE — 3074F SYST BP LT 130 MM HG: CPT | Mod: CPTII,,, | Performed by: STUDENT IN AN ORGANIZED HEALTH CARE EDUCATION/TRAINING PROGRAM

## 2025-06-10 PROCEDURE — 1159F MED LIST DOCD IN RCRD: CPT | Mod: CPTII,,, | Performed by: STUDENT IN AN ORGANIZED HEALTH CARE EDUCATION/TRAINING PROGRAM

## 2025-06-10 PROCEDURE — 3078F DIAST BP <80 MM HG: CPT | Mod: CPTII,,, | Performed by: STUDENT IN AN ORGANIZED HEALTH CARE EDUCATION/TRAINING PROGRAM

## 2025-06-10 RX ORDER — FLUCONAZOLE 200 MG/1
400 TABLET ORAL DAILY
Qty: 42 TABLET | Refills: 0 | Status: SHIPPED | OUTPATIENT
Start: 2025-06-10 | End: 2025-07-01

## 2025-06-10 NOTE — PROGRESS NOTES
"Infectious Disease Clinic Note    Patient Name: Kayla Clay  YOB: 1985    PRESENTING HISTORY       History of Present Illness:  Ms. Kayla Clay is a 40 y.o. 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 Per last ID note, "7/5/23: Ms. Miller presents for follow-up today. She continues to have ongoing lower extremity weakness. She uses a cane for assistance with ambulation. She denies any fevers, chills, nausea, vomiting and diarrhea. She had been working with PT, but hasn't been able to follow-up in the past month. She saw a neurosurgeon on 5/26/23 in which he reports her most recent MRI showed resolution of epidural abscess and degenerative disc disease. No planned interventions at this time. Completed 6 weeks of IV antibiotics, empirically since initial cultures were negative using Ceftriaxone and Daptomycin. Completed 3 months of Fluconazole high dose at 400mg PO q24h given Candida albicans positive cultures. Did not tolerate Vancomycin or Doxycycline. MRI with no findings significant for residual infection. Neurosurgery without any interventions planned at this time. MRI with resolution of epidural abscess. Continue to work with PT to work on gait strength. No need for ongoing infectious workup at this time."    NSGY now planning for deformity surgery due to severe stenosis of lumbar spine. T10-pelvis fusion recommended by surgeons. Explained to patient that she completed adequate antimicrobial coverage for prior infection. IVDU is a huge risk factor for recurrence. To ensure sterility of site will prescribe course of PO fluconazole which she can stop one week post op. If evidence of infection seen in OR will ask surgeons to send specimen for cultures. Patient voiced understanding.     Last ID note reviewed: "Hospital consultation 09/30/2022:  29-year-old female patient known to have a past medical " history significant for active IVDU (drug of choice is methamphetamine), complicated by more epidural abscess status post washout and laminectomy who presented for continuation of IV antibiotics, ID is consulted for assistance and antibiotics management.  1.Epidural abscess  2.Candida albicans abscess  3.Surgical site swelling  4.IVDU  5.Rash  6.Leukopenia  -patient's major risk factor is IVDU, Candida albicans found in operative cultures on 08/18/2022, unfortunately this will be a difficult infection to treat, likely requiring prolonged period of anti fungal, it is also likely that this is a polymicrobial infection, the patient had been on at least 3-4 days of IV antibiotics prior to surgery being done, it is therefore likely that other pathogens may have been partly responsible for epidural abscess however masked by use of IV antibiotics    -continue ceftriaxone 2g IV q12h  -Continue Daptomycin 8mg/Kg IV q24h  -Continue fluconazole 400 mg p.o. Q 24 hours   -patient will require at least 6 weeks antibiotics from day of surgery 08/18/2022   -anticipated end date 09/29/2022   -following which would transition the patient to doxycycline 100 mg p.o. Q 12 hours and fluconazole 200 mg p.o. Q 24 hours  -will likely need this p.o. tail of antibiotics for at least 3 additional months depending on her clinical progression     12/28/2022:  Doing well, no residual pain, no fevers, no chills, completed Fluconazole 3 months course 2 weeks ago. Neurosurgery has scheduled a follow up MRI but otherwise she is doing very well.      03/30/2023:  Patient presents today with no new infectious concerns but has requested evaluation due to ongoing lower extremity weakness and reported inability to be seen by her neurosurgeons until May. She is concerned that she is not improving and her lower extremities have been weak to the point where she is unable to perform her daily tasks and she has significantly limited mobility. This has not been  "necessarily worsening but certainly not improving, she does have some neuropathic pain in he r back and down her legs which occurs sporadically and she used to be on gabapentin and baclofen for and she thinks this was helping. She also was using a walker and other assistance devices while in hospital but has not been able to get help with this as outpatient.      She is very overwhelmed and crying int he room, she is accompanied by her mother. Patient reports feelings of doom and feels like she will die in the next few years if this continues. She is on Escitalopram but does not think this is helping. She had an MRI done in early 02/2023 which has no evidence of ongoing infection, no enhancement and no ongoing inflammatory findings, only post operative changes per the report obtained (scanned in media)"    EXAMINATION:  MRI THORACIC SPINE WITHOUT CONTRAST:     CLINICAL HISTORY:  Radiculopathy, lumbar region, Myelopathy, chronic, thoracic spine;evaluation stenosis;     COMPARISON:  06/07/2024     FINDINGS:  Serial axial and sagittal 1.5 Jany images obtained using T1 and T2 weighted techniques without the administration of intravenous contrast. Vertebral body height and alignment are grossly intact. No fracture or subluxation is seen. The thoracic cord is normal in size and caliber. No abnormal intramedullary or intradural mass is identified without the administration of IV contrast. There is again considerable sinusoidal curvature of the thoracolumbar spine with the upper convexity to the right which has a similar appearance to the previous exam.  Disc space narrowing, degenerative disc changes, and osteophyte formation is seen.  There are suspect mild Modic type 3 degenerative plate changes at T12-L1.  No abnormal paraspinal mass is identified without the administration of IV contrast.  There is mild motion artifact.  There is mild heterogeneity again noted to bone marrow signal intensity diffusely throughout.  No " central spinal stenosis is identified.  There is mild encroachment into the left neural foramina again identified at T8-9, T9-10, and T10-11 secondary to mild posterior disc bulge and facet arthrosis.     Impression:     1. Motion artifact  2. Marked sinusoidal curvature of the thoracolumbar spine with the upper thoracic convexity to the right  3. Thoracic spondylosis  4. Mild encroachment into the left neural foramina at the lower thoracic spine again identified as described  5. Findings and other details as above        Electronically signed by:Param Roy  Date:                                            05/07/2025  Time:                                           16:06        Exam Ended: 05/07/25 09:51 CDT Last Resulted: 05/07/25 16:06 CDT     EXAMINATION:  MRI LUMBAR SPINE WITHOUT  CONTRAST:     CLINICAL HISTORY:  Radiculopathy, lumbar region; Lumbar radiculopathy, symptoms persist with conservative treatment;.     COMPARISON:  10/12/2024     FINDINGS:  Serial axial and sagittal 1.5 Jany images were obtained of the lumbar spine using T1 and T2- weighted techniques  without  the administration of IV contrast. Vertebral body height is grossly intact.  There is marked curvature of the lumbar spine with the convexity to the left apex at L2.  There is intervertebral body fusion evident at the L 3 4 are with slight left lateral position of L3 on L4.  There is slight left lateral listhesis of L2 on L3.  Conus medullaris is normal in size and caliber.  No abnormal intramedullary or intradural mass identified without the administration of IV contrast.  Disc space narrowing, degenerative disc changes, degenerative endplate changes, osteophyte formation, and facet arthrosis are identified.  No abnormal paraspinal mass identified without the administration of IV contrast.  There is heterogeneity of bone marrow signal intensity diffusely throughout.     T12-L1: Moderate encroachment into the neural foramina bilaterally  "secondary to posterior disc bulge, posterior osteophyte formation, and facet arthrosis.     L1-2: Moderate encroachment into the neural foramina secondary to posterior disc bulge, posterior osteophyte formation, and facet arthrosis     L2-3: Mild encroachment into the neural foramina and mild encroachment into the central spinal canal secondary to posterior osteophyte formation, posterior disc bulge, facet arthrosis, and ligament hypertrophy     L3-4: Moderate to severe encroachment into the neural foramina bilaterally secondary to posterior osteophyte formation and facet arthrosis     L4-5: Severe encroachment into the left neural foramina and moderate encroachment into the right neural foramina and mild to moderate encroachment into the central spinal canal secondary to posterior eccentric disc bulge to the left, posterior osteophyte formation, facet arthrosis, and ligament hypertrophy     L5-S1: Moderate to severe encroachment into the neural foramina bilaterally with the left side greater than right secondary to posterior disc bulge, posterior osteophyte formation, and facet arthrosis     Impression:     1. Multilevel moderate to severe encroachment into the neural foramina and to a lesser extent the central spinal canal as described  2. Marked levo lumbar scoliosis with slight the left lateral listhesis of L2 on L3 intervertebral body fusion at L3-4 with slight left lateral position of L3 on L4  3. Moderate to advanced lumbar spondylosis  4. Findings and other details as above        Electronically signed by:Param Roy  Date:                                            05/07/2025  Time:                                           15:55        Exam Ended: 05/07/25 09:50 CDT Last Resulted: 05/07/25 15:55 CDT       Last NSGY note reviewed from 5/15/25: "Interval Hx 5/15/25: After the last appointment with Dr. Vieyra, he recommended additional testing for preoperative planning for her deformity surgery. She is " "being seen in clinic today with myself and Dr. Vieyra to discuss surgery in more detail. She is eager to proceed. Denies changes in her symptoms since the last appointment.     Kayla Clay is a 40 y.o. female seen in clinic today with myself and Dr. Vieyra to discuss deformity surgery in more detail for her progressive scoliosis despite conservative treatment. Patient has a surgical hx of L2-4 right sided hemilaminectomy for epidural abscess (Candida albicans found in operative cultures on 08/18/2022). After reviewing the imaging and concerns, Dr. Vieyra recommends a T10-pelvis due to severe stenosis and scoliosis. R/B/A/I/M were reviewed in detail and she wishes to proceed. Will consult ID for additional recs regarding infectious hx."     Component  Ref Range & Units (hover) 2 yr ago   Fungus (Mycology) Culture  Abnormal   GERALDINE ALBICANS  Rare    Resulting Agency [facility]              Narrative  Performed by: [facility]  Epidural fluid collection   Specimen Collected: 08/18/22 15:59 CDT Last Resulted: 09/19/22 07:59 CDT       Review of Systems:  Constitutional: no fever or chills  Eyes: no visual changes  ENT: no nasal congestion or sore throat  Respiratory: occasional dyspnea   Cardiovascular: no chest pain  Gastrointestinal: no nausea or vomiting, no abdominal pain, no constipation, no diarrhea  Genitourinary: no hematuria or dysuria  Musculoskeletal: chronic low back pain, scoliosis   Skin: no rash  Neurological: no headaches, numbness, or paresthesias    The following portions of the patient's history were reviewed and updated as appropriate: allergies, current medications, past family history, past medical history, past social history, past surgical history, and problem list.    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;  Service: Neurosurgery;  Laterality: N/A;    SPINE SURGERY  August 16th, 2022    Spinal absess and Laminectomy       Family History   Problem Relation Name Age of Onset    Pancreatic cancer Paternal Grandfather          80's    Stomach cancer Maternal Grandfather          70's - +metastasis    No Known Problems Father      No Known Problems Mother      Spine Surgery Paternal Uncle Reggie Engel         On my dads side of the family a lot of us have spine issues.    Breast cancer Neg Hx      Colon cancer Neg Hx      Ovarian cancer Neg Hx      Cervical cancer Neg Hx      Uterine cancer Neg Hx         Social History     Socioeconomic History    Marital status:    Tobacco Use    Smoking status: Never     Passive exposure: Current    Smokeless tobacco: Never    Tobacco comments:     Social smoker off and on since 14 but never regular smoker   Substance and Sexual Activity    Alcohol use: Not Currently    Drug use: Not Currently      Types: Heroin, Methamphetamines    Sexual activity: Not Currently     Partners: Male     Birth control/protection: Abstinence     Comment: STI: Chlamydia, Gonorrhea, Trich     Social Drivers of Health     Financial Resource Strain: Low Risk  (3/10/2025)    Overall Financial Resource Strain (CARDIA)     Difficulty of Paying Living Expenses: Not very hard   Food Insecurity: No Food Insecurity (3/10/2025)    Hunger Vital Sign     Worried About Running Out of Food in the Last Year: Never true     Ran Out of Food in the Last Year: Never true   Transportation Needs: No Transportation Needs (3/10/2025)    PRAPARE - Transportation     Lack of Transportation (Medical): No     Lack of Transportation (Non-Medical): No   Physical Activity: Unknown (3/10/2025)    Exercise Vital Sign     Days of Exercise per Week: 0 days   Stress: No Stress Concern Present (3/10/2025)    [employer] - Occupational Stress Questionnaire     Feeling of Stress : Not at all   Housing Stability: Low Risk  (3/10/2025)    Housing Stability Vital Sign     Unable to Pay for Housing in the Last Year: No     Number of Times Moved in the Last Year: 0     Homeless in the Last Year: No       MEDICATIONS & ALLERGIES:     Current Outpatient Medications on File Prior to Visit   Medication Sig    amLODIPine (NORVASC) 5 MG tablet TAKE 1 TABLET BY MOUTH EVERY DAY    baclofen (LIORESAL) 10 MG tablet Take 1 tablet (10 mg total) by mouth nightly.    EScitalopram oxalate (LEXAPRO) 20 MG tablet TAKE 1 TABLET BY MOUTH EVERY DAY    famotidine (PEPCID) 40 MG tablet TAKE 1 TABLET BY MOUTH EVERY DAY    gabapentin (NEURONTIN) 300 MG capsule Take 300 mg by mouth every evening.    loratadine (CLARITIN) 10 mg tablet TAKE 1 TABLET BY MOUTH EVERY DAY    sumatriptan (IMITREX) 50 MG tablet Take 1 tablet (50 mg total) by mouth as needed for Migraine.     No current facility-administered medications on file prior to visit.       Review of patient's  "allergies indicates:   Allergen Reactions    Acetaminophen Hives    Doxycycline Rash     Rash on face and arms       OBJECTIVE:   Vital Signs:  Vitals:    06/10/25 1421   BP: 112/74   Pulse: 68   Weight: 87.3 kg (192 lb 7.4 oz)   Height: 5' 7" (1.702 m)       No results found for this or any previous visit (from the past 24 hours).      Physical Exam:   General:  Well developed, well nourished, no acute distress  HEENT:  Normocephalic, atraumatic  CVS:  RRR, S1 and S2 normal, no murmurs, rubs, gallops  Resp:  Lungs clear to auscultation, no wheezes, rales, rhonchi  MSK:  No muscle atrophy, peripheral edema, full range of motion  Skin:  No rashes, ulcers, erythema  Psych:  Alert and oriented to person, place, and time    ASSESSMENT:     Lumbar epidural abscess/discitis  --This is a prior diagnosis  --Cultures from surgery in 2022 grew Candida albicans   --Treated in 2022 with 6 weeks of IV daptomycin + ceftriaxone + 3 months of PO fluconazole 400 mg daily   --No evidence of new infection on MRI lumbar spine from 5/7/25  --Recommend as precaution treating with PO fluconazole 400 mg daily up until one week post op to ensure sterile site for hardware placement   --Otherwise no contraindication to proceeding with surgery from ID standpoint   --If evidence of infection seen during surgery please sent specimens for bacterial and fungal cultures  --Follow up with me virtually in 5 weeks     HTN  Continue current medications and follow up with PCP        PLAN:     Kayla was seen today for consult.    Diagnoses and all orders for this visit:    Lumbar discitis    Hypertension, unspecified type    Epidural abscess  -     Ambulatory referral/consult to Infectious Disease    Scoliosis of thoracolumbar region due to degenerative disease of spine in adult  -     Ambulatory referral/consult to Infectious Disease    Septic discitis of lumbar region  -     Ambulatory referral/consult to Infectious Disease    Other orders  -     " fluconazole (DIFLUCAN) 200 MG Tab; Take 2 tablets (400 mg total) by mouth once daily. for 21 days          The total time for evaluation and management services performed on 6/10/25 was greater than 40 minutes.        Joby Romero, DO   Infectious Diseases

## 2025-06-13 ENCOUNTER — TELEPHONE (OUTPATIENT)
Dept: NEUROSURGERY | Facility: CLINIC | Age: 40
End: 2025-06-13
Payer: MEDICAID

## 2025-06-13 NOTE — TELEPHONE ENCOUNTER
Patient completed required Spine Class prior to her surgery (*AIRO* T10 TO PELVIS POSTERIOR INSTRUMENTED FUSION/AIRO ) scheduled on 06/24/2025 with Dr Les Vieyra.  All questions answered to patient satisfaction.       Patient completed AUDIO Spine Education. Reviewed pain medication usage - discussed how to use muscle relaxers and pain medication (Patient prefers to use Baclofen which she currently uses instead of Methocarbamol which does not work for her), Lifting restrictions - nothing heavier than a gallon of milk or equivalent of 8 pounds and to be mindful of bending, lifting and twisting.  Mobility - proper use of assistive devices (cane or walker) as necessary to aid with mobility needs, Walk as tolerated or 10 minutes building up to 30 minutes every 3 hours, Limit time in bed using favorite chair/recliner as tolerated, if indicated wear prescribed brace if indicated as prescribed at all times taking it off only to sleep and shower. Incisional care and showering reviewed.     Advised that inpatient Case Management will be involved with discharge plan and will be working with the team and physical therapy to have a safe discharge.     My direct number provided.     QUIQUE Ceja (Dee)  Complex Spine Navigator  [facility]         [phone]

## 2025-06-16 ENCOUNTER — TELEPHONE (OUTPATIENT)
Dept: NEUROSURGERY | Facility: CLINIC | Age: 40
End: 2025-06-16
Payer: MEDICAID

## 2025-06-16 DIAGNOSIS — Z98.1 S/P LUMBAR SPINAL FUSION: Primary | ICD-10-CM

## 2025-06-23 ENCOUNTER — ANESTHESIA EVENT (OUTPATIENT)
Dept: SURGERY | Facility: HOSPITAL | Age: 40
End: 2025-06-23
Payer: MEDICAID

## 2025-06-23 ENCOUNTER — TELEPHONE (OUTPATIENT)
Dept: NEUROSURGERY | Facility: CLINIC | Age: 40
End: 2025-06-23
Payer: MEDICAID

## 2025-06-23 RX ORDER — OXYCODONE HYDROCHLORIDE 5 MG/1
5 TABLET ORAL
Refills: 0 | OUTPATIENT
Start: 2025-06-23

## 2025-06-23 RX ORDER — METHOCARBAMOL 100 MG/ML
500 INJECTION, SOLUTION INTRAMUSCULAR; INTRAVENOUS ONCE AS NEEDED
OUTPATIENT
Start: 2025-06-23 | End: 2025-06-26

## 2025-06-23 RX ORDER — HYDROMORPHONE HYDROCHLORIDE 1 MG/ML
0.2 INJECTION, SOLUTION INTRAMUSCULAR; INTRAVENOUS; SUBCUTANEOUS EVERY 5 MIN PRN
Refills: 0 | OUTPATIENT
Start: 2025-06-23

## 2025-06-23 RX ORDER — SODIUM CHLORIDE 0.9 % (FLUSH) 0.9 %
10 SYRINGE (ML) INJECTION
OUTPATIENT
Start: 2025-06-23

## 2025-06-23 RX ORDER — GLUCAGON 1 MG
1 KIT INJECTION
OUTPATIENT
Start: 2025-06-23

## 2025-06-23 RX ORDER — HALOPERIDOL LACTATE 5 MG/ML
0.5 INJECTION, SOLUTION INTRAMUSCULAR EVERY 10 MIN PRN
OUTPATIENT
Start: 2025-06-23

## 2025-06-23 NOTE — TELEPHONE ENCOUNTER
Phone call to patient with pre op instructions.  Patient instructed to remain NPO after midnight tonight , to bathe in hibicleanse or dial antibacterial soap tonight and tomorrow morning before arrival, and to arrive on 2nd floor [facility]  at 5 am. Pt. Verbalized understanding of above instructions.

## 2025-06-23 NOTE — ANESTHESIA PREPROCEDURE EVALUATION
[facility]  Anesthesia Pre-Operative Evaluation         Patient Name: Kayla Clay  YOB: 1985  MRN: [id number]    SUBJECTIVE:     Pre-operative evaluation for Procedure(s) (LRB):  *AIRO* T10 TO PELVIS POSTERIOR INSTRUMENTED FUSION/AIRO (N/A)    Paper Consent signed by patient and placed in chart     06/23/2025    Kayla Clay is a 40 y.o. female w/ a significant PMHx of HTN, anxiety, IVDU c/b epidural abscess s/p L2-4 R sided hemilaminectomy 8/2022 with +fungal cx, now with severe stenosis and scoliosis of lumbar spine.    Now presenting for the above procedure(s).     2D ECHO:  TTE:  Results for orders placed during the hospital encounter of 08/17/22    Echo    Interpretation Summary  · The left ventricle is normal in size with concentric remodeling and normal systolic function.  · Normal left ventricular diastolic function.  · The estimated PA systolic pressure is 37 mmHg.  · Normal right ventricular size with normal right ventricular systolic function.  · Normal central venous pressure (3 mmHg).  · Mild mitral regurgitation.  · The estimated ejection fraction is 65%.      CONSTANTINO:  No results found for this or any previous visit.    LDA: None documented.  Lines/Drains/Airways       Peripheral Intravenous Line  Duration                  Midline Catheter Insertion/Assessment  - Single Lumen 09/22/22 1440 Left basilic vein (medial side of arm) other (see comments) 1005 days         Midline Catheter Insertion/Assessment  - Single Lumen 09/22/22 1440 Left brachial vein other (see comments) 1005 days                    Prev airway: 8/2022 easy mask grade I view with licea    Problem List[1]    Review of patient's allergies indicates:   Allergen Reactions    Acetaminophen Hives    Doxycycline Rash     Rash on face and arms       Current Medications:  Current Outpatient Medications   Medication Instructions    amLODIPine (NORVASC) 5 mg, Oral    baclofen (LIORESAL) 10 mg,  Oral, Nightly    EScitalopram oxalate (LEXAPRO) 20 mg, Oral    famotidine (PEPCID) 40 mg, Oral    fluconazole (DIFLUCAN) 400 mg, Oral, Daily    gabapentin (NEURONTIN) 300 mg, Nightly    loratadine (CLARITIN) 10 mg, Oral    sumatriptan (IMITREX) 50 mg, Oral, As needed (PRN)       Past Surgical History:   Procedure Laterality Date    LUMBAR LAMINECTOMY WITH SURGICAL REMOVAL OF LESION OF SPINAL CORD BY POSTERIOR APPROACH N/A 08/18/2022    Procedure: LAMINECTOMY, SPINE, LUMBAR, POSTERIOR APPROACH, WITH EXCISION OF NEOPLASM OR LESION OF SPINAL CORD;  Surgeon: lFoyd Brewer MD;  Location: [facility];  Service: Neurosurgery;  Laterality: N/A;    SPINE SURGERY  August 16th, 2022    Spinal absess and Laminectomy         OBJECTIVE:     Vital Signs Range (Last 24H):         Significant Labs:  Lab Results   Component Value Date    WBC 4.00 (L) 05/28/2025    HGB 14.1 05/28/2025    HCT 41.6 05/28/2025     05/28/2025    INR 1.1 05/28/2025       Lab Results   Component Value Date     05/28/2025    K 4.3 05/28/2025     05/28/2025    CREATININE 0.61 05/28/2025    BUN 15.0 05/28/2025    CO2 24 05/28/2025       Lab Results   Component Value Date    TSH 1.729 02/21/2024    HGBA1C 5.0 02/21/2024       EKG:   Results for orders placed or performed in visit on 05/28/25   EKG 12-lead    Collection Time: 05/28/25 10:33 AM   Result Value Ref Range    QRS Duration 88 ms    [facility] QTC Calculation 429 ms    Narrative    Test Reason : Z01.818,    Vent. Rate :  63 BPM     Atrial Rate :  63 BPM     P-R Int : 166 ms          QRS Dur :  88 ms      QT Int : 420 ms       P-R-T Axes :  46  77  52 degrees    QTcB Int : 429 ms    Normal sinus rhythm  Low voltage QRS  Nonspecific ST abnormality  Abnormal ECG  No previous ECGs available  Confirmed by Jude Evangelista ([id number]) on 5/31/2025 2:04:46 AM    Referred By: ELVIRA ASHFORD           Confirmed By: Jude Evangelista       ASSESSMENT/PLAN:        Pre-op Assessment    I have reviewed the Patient Summary  Reports.     I have reviewed the Nursing Notes. I have reviewed the NPO Status.   I have reviewed the Medications.     Review of Systems  Anesthesia Hx:  No problems with previous Anesthesia             Denies Family Hx of Anesthesia complications.    Denies Personal Hx of Anesthesia complications.                    Social:  Non-Smoker, No Alcohol Use       Hematology/Oncology:  Hematology Normal   Oncology Normal                                   EENT/Dental:  EENT/Dental Normal           Cardiovascular:     Hypertension       Denies Angina.            Functional Capacity 4 METS, able to climb 2 flights of stairs                   Hypertension  , Recent typical clinic B/P of 109/75       Pulmonary:  Pulmonary Normal      Denies Shortness of breath.  Denies Recent URI.                 Renal/:  Renal/ Normal                 Hepatic/GI:  Hepatic/GI Normal                    Musculoskeletal:   Musculoskeletal General/Symptoms:  Functional capacity is ambulatory with cane.       Spine Disorders: lumbar Degenerative disease and Disc disease Scoliosis          Neurological:   Neuro Symptoms of pain OF LOWER BACK AND BLE                            Endocrine:        Metabolic Disorders, Obesity / BMI > 30  Psych:  Psychiatric History    Anxiety Disorder.  Attention Deficit Disorder. Depression.             Physical Exam  General: Alert and Oriented    Airway:  Mallampati: I   Mouth Opening: Normal  TM Distance: Normal  Tongue: Normal  Neck ROM: Normal ROM    Dental:  Intact    Chest/Lungs:  Normal Respiratory Rate    Heart:  Rate: Normal        Anesthesia Plan  Type of Anesthesia, risks & benefits discussed:    Anesthesia Type: Gen ETT  Intra-op Monitoring Plan: Standard ASA Monitors and Art Line  Post Op Pain Control Plan: multimodal analgesia and IV/PO Opioids PRN  Induction:  IV  Airway Plan: Direct and Video, Post-Induction  Informed Consent: Informed consent signed with the Patient and all parties understand the  risks and agree with anesthesia plan.  All questions answered.   ASA Score: 3  Day of Surgery Review of History & Physical: H&P Update referred to the surgeon/provider.    Ready For Surgery From Anesthesia Perspective.     .           [1]   Patient Active Problem List  Diagnosis    Hypertension    Anxiety    Depression    Insomnia    Weakness of both lower extremities    Attention deficit disorder    Binge eating    Class 1 obesity due to excess calories with serious comorbidity and body mass index (BMI) of 32.0 to 32.9 in adult    Lumbar pain    Well woman exam with routine gynecological exam    Dysmenorrhea    Numerous moles    Preoperative clearance    Degenerative scoliosis in adult patient

## 2025-06-24 ENCOUNTER — ANESTHESIA (OUTPATIENT)
Dept: SURGERY | Facility: HOSPITAL | Age: 40
End: 2025-06-24
Payer: MEDICAID

## 2025-06-24 ENCOUNTER — HOSPITAL ENCOUNTER (INPATIENT)
Facility: HOSPITAL | Age: 40
LOS: 5 days | Discharge: HOME HEALTH | DRG: 428 | End: 2025-06-29
Attending: NEUROLOGICAL SURGERY | Admitting: NEUROLOGICAL SURGERY
Payer: MEDICAID

## 2025-06-24 VITALS — RESPIRATION RATE: 15 BRPM

## 2025-06-24 DIAGNOSIS — M41.20 IDIOPATHIC SCOLIOSIS: ICD-10-CM

## 2025-06-24 DIAGNOSIS — Z01.818 PREOPERATIVE TESTING: ICD-10-CM

## 2025-06-24 DIAGNOSIS — M41.50 DEGENERATIVE SCOLIOSIS IN ADULT PATIENT: Primary | ICD-10-CM

## 2025-06-24 LAB
ABSOLUTE EOSINOPHIL (OHS): 0 K/UL
ABSOLUTE MONOCYTE (OHS): 0.46 K/UL (ref 0.3–1)
ABSOLUTE NEUTROPHIL COUNT (OHS): 12.24 K/UL (ref 1.8–7.7)
ALBUMIN SERPL BCP-MCNC: 3.4 G/DL (ref 3.5–5.2)
ALP SERPL-CCNC: 35 UNIT/L (ref 40–150)
ALT SERPL W/O P-5'-P-CCNC: 20 UNIT/L (ref 10–44)
ANION GAP (OHS): 7 MMOL/L (ref 8–16)
AST SERPL-CCNC: 29 UNIT/L (ref 11–45)
B-HCG UR QL: NEGATIVE
BASOPHILS # BLD AUTO: 0.03 K/UL
BASOPHILS NFR BLD AUTO: 0.2 %
BILIRUB SERPL-MCNC: 0.3 MG/DL (ref 0.1–1)
BUN SERPL-MCNC: 13 MG/DL (ref 6–20)
CALCIUM SERPL-MCNC: 8.1 MG/DL (ref 8.7–10.5)
CHLORIDE SERPL-SCNC: 112 MMOL/L (ref 95–110)
CO2 SERPL-SCNC: 20 MMOL/L (ref 23–29)
CREAT SERPL-MCNC: 0.5 MG/DL (ref 0.5–1.4)
CTP QC/QA: YES
ERYTHROCYTE [DISTWIDTH] IN BLOOD BY AUTOMATED COUNT: 12.6 % (ref 11.5–14.5)
FIBRINOGEN PPP-MCNC: 288 MG/DL (ref 182–400)
GFR SERPLBLD CREATININE-BSD FMLA CKD-EPI: >60 ML/MIN/1.73/M2
GLUCOSE SERPL-MCNC: 150 MG/DL (ref 70–110)
HCT VFR BLD AUTO: 35.5 % (ref 37–48.5)
HGB BLD-MCNC: 11.8 GM/DL (ref 12–16)
IMM GRANULOCYTES # BLD AUTO: 0.1 K/UL (ref 0–0.04)
IMM GRANULOCYTES NFR BLD AUTO: 0.7 % (ref 0–0.5)
INDIRECT COOMBS: NORMAL
LYMPHOCYTES # BLD AUTO: 1.09 K/UL (ref 1–4.8)
MCH RBC QN AUTO: 29.6 PG (ref 27–31)
MCHC RBC AUTO-ENTMCNC: 33.2 G/DL (ref 32–36)
MCV RBC AUTO: 89 FL (ref 82–98)
NUCLEATED RBC (/100WBC) (OHS): 0 /100 WBC
PLATELET # BLD AUTO: 296 K/UL (ref 150–450)
PMV BLD AUTO: 10.8 FL (ref 9.2–12.9)
POCT GLUCOSE: 131 MG/DL (ref 70–110)
POTASSIUM SERPL-SCNC: 3.9 MMOL/L (ref 3.5–5.1)
PROT SERPL-MCNC: 5.9 GM/DL (ref 6–8.4)
RBC # BLD AUTO: 3.98 M/UL (ref 4–5.4)
RELATIVE EOSINOPHIL (OHS): 0 %
RELATIVE LYMPHOCYTE (OHS): 7.8 % (ref 18–48)
RELATIVE MONOCYTE (OHS): 3.3 % (ref 4–15)
RELATIVE NEUTROPHIL (OHS): 88 % (ref 38–73)
RH BLD: NORMAL
SODIUM SERPL-SCNC: 139 MMOL/L (ref 136–145)
SPECIMEN OUTDATE: NORMAL
WBC # BLD AUTO: 13.92 K/UL (ref 3.9–12.7)

## 2025-06-24 PROCEDURE — 22843 INSERT SPINE FIXATION DEVICE: CPT | Mod: ,,, | Performed by: NEUROLOGICAL SURGERY

## 2025-06-24 PROCEDURE — 15734 MUSCLE-SKIN GRAFT TRUNK: CPT | Mod: 51,,, | Performed by: NEUROLOGICAL SURGERY

## 2025-06-24 PROCEDURE — 25000003 PHARM REV CODE 250: Performed by: NEUROLOGICAL SURGERY

## 2025-06-24 PROCEDURE — 63600175 PHARM REV CODE 636 W HCPCS: Performed by: NEUROLOGICAL SURGERY

## 2025-06-24 PROCEDURE — 84075 ASSAY ALKALINE PHOSPHATASE: CPT | Performed by: STUDENT IN AN ORGANIZED HEALTH CARE EDUCATION/TRAINING PROGRAM

## 2025-06-24 PROCEDURE — 37000008 HC ANESTHESIA 1ST 15 MINUTES: Performed by: NEUROLOGICAL SURGERY

## 2025-06-24 PROCEDURE — 25000003 PHARM REV CODE 250: Performed by: STUDENT IN AN ORGANIZED HEALTH CARE EDUCATION/TRAINING PROGRAM

## 2025-06-24 PROCEDURE — 00NY0ZZ RELEASE LUMBAR SPINAL CORD, OPEN APPROACH: ICD-10-PCS | Performed by: NEUROLOGICAL SURGERY

## 2025-06-24 PROCEDURE — 20936 SP BONE AGRFT LOCAL ADD-ON: CPT | Mod: ,,, | Performed by: NEUROLOGICAL SURGERY

## 2025-06-24 PROCEDURE — 63600175 PHARM REV CODE 636 W HCPCS

## 2025-06-24 PROCEDURE — 0SB40ZZ EXCISION OF LUMBOSACRAL DISC, OPEN APPROACH: ICD-10-PCS | Performed by: NEUROLOGICAL SURGERY

## 2025-06-24 PROCEDURE — C1713 ANCHOR/SCREW BN/BN,TIS/BN: HCPCS | Performed by: NEUROLOGICAL SURGERY

## 2025-06-24 PROCEDURE — 37000009 HC ANESTHESIA EA ADD 15 MINS: Performed by: NEUROLOGICAL SURGERY

## 2025-06-24 PROCEDURE — 27100088 HC CELL SAVER

## 2025-06-24 PROCEDURE — 25000003 PHARM REV CODE 250

## 2025-06-24 PROCEDURE — 63600175 PHARM REV CODE 636 W HCPCS: Performed by: STUDENT IN AN ORGANIZED HEALTH CARE EDUCATION/TRAINING PROGRAM

## 2025-06-24 PROCEDURE — 36000711: Performed by: NEUROLOGICAL SURGERY

## 2025-06-24 PROCEDURE — 27201037 HC PRESSURE MONITORING SET UP

## 2025-06-24 PROCEDURE — 22848 INSERT PELV FIXATION DEVICE: CPT | Mod: ,,, | Performed by: NEUROLOGICAL SURGERY

## 2025-06-24 PROCEDURE — 27201423 OPTIME MED/SURG SUP & DEVICES STERILE SUPPLY: Performed by: NEUROLOGICAL SURGERY

## 2025-06-24 PROCEDURE — 22633 ARTHRD CMBN 1NTRSPC LUMBAR: CPT | Mod: 22,,, | Performed by: NEUROLOGICAL SURGERY

## 2025-06-24 PROCEDURE — 0SG1071 FUSION OF 2 OR MORE LUMBAR VERTEBRAL JOINTS WITH AUTOLOGOUS TISSUE SUBSTITUTE, POSTERIOR APPROACH, POSTERIOR COLUMN, OPEN APPROACH: ICD-10-PCS | Performed by: NEUROLOGICAL SURGERY

## 2025-06-24 PROCEDURE — 0SG30AJ FUSION OF LUMBOSACRAL JOINT WITH INTERBODY FUSION DEVICE, POSTERIOR APPROACH, ANTERIOR COLUMN, OPEN APPROACH: ICD-10-PCS | Performed by: NEUROLOGICAL SURGERY

## 2025-06-24 PROCEDURE — 22216 INCIS ADDL SPINE SEGMENT: CPT | Mod: ,,, | Performed by: NEUROLOGICAL SURGERY

## 2025-06-24 PROCEDURE — 27800903 OPTIME MED/SURG SUP & DEVICES OTHER IMPLANTS: Performed by: NEUROLOGICAL SURGERY

## 2025-06-24 PROCEDURE — 81025 URINE PREGNANCY TEST: CPT | Performed by: NEUROLOGICAL SURGERY

## 2025-06-24 PROCEDURE — 94799 UNLISTED PULMONARY SVC/PX: CPT

## 2025-06-24 PROCEDURE — 86850 RBC ANTIBODY SCREEN: CPT | Performed by: ANESTHESIOLOGY

## 2025-06-24 PROCEDURE — 22634 ARTHRD CMBN 1NTRSPC EA ADDL: CPT | Mod: ,,, | Performed by: NEUROLOGICAL SURGERY

## 2025-06-24 PROCEDURE — 22853 INSJ BIOMECHANICAL DEVICE: CPT | Mod: ,,, | Performed by: NEUROLOGICAL SURGERY

## 2025-06-24 PROCEDURE — 0RGA071 FUSION OF THORACOLUMBAR VERTEBRAL JOINT WITH AUTOLOGOUS TISSUE SUBSTITUTE, POSTERIOR APPROACH, POSTERIOR COLUMN, OPEN APPROACH: ICD-10-PCS | Performed by: NEUROLOGICAL SURGERY

## 2025-06-24 PROCEDURE — 27000221 HC OXYGEN, UP TO 24 HOURS

## 2025-06-24 PROCEDURE — 20000000 HC ICU ROOM

## 2025-06-24 PROCEDURE — 22614 ARTHRD PST TQ 1NTRSPC EA ADD: CPT | Mod: ,,, | Performed by: NEUROLOGICAL SURGERY

## 2025-06-24 PROCEDURE — 0SG3071 FUSION OF LUMBOSACRAL JOINT WITH AUTOLOGOUS TISSUE SUBSTITUTE, POSTERIOR APPROACH, POSTERIOR COLUMN, OPEN APPROACH: ICD-10-PCS | Performed by: NEUROLOGICAL SURGERY

## 2025-06-24 PROCEDURE — 99900035 HC TECH TIME PER 15 MIN (STAT)

## 2025-06-24 PROCEDURE — 0SG10AJ FUSION OF 2 OR MORE LUMBAR VERTEBRAL JOINTS WITH INTERBODY FUSION DEVICE, POSTERIOR APPROACH, ANTERIOR COLUMN, OPEN APPROACH: ICD-10-PCS | Performed by: NEUROLOGICAL SURGERY

## 2025-06-24 PROCEDURE — 85025 COMPLETE CBC W/AUTO DIFF WBC: CPT | Performed by: STUDENT IN AN ORGANIZED HEALTH CARE EDUCATION/TRAINING PROGRAM

## 2025-06-24 PROCEDURE — 01NB0ZZ RELEASE LUMBAR NERVE, OPEN APPROACH: ICD-10-PCS | Performed by: NEUROLOGICAL SURGERY

## 2025-06-24 PROCEDURE — 36000710: Performed by: NEUROLOGICAL SURGERY

## 2025-06-24 PROCEDURE — 0RG7071 FUSION OF 2 TO 7 THORACIC VERTEBRAL JOINTS WITH AUTOLOGOUS TISSUE SUBSTITUTE, POSTERIOR APPROACH, POSTERIOR COLUMN, OPEN APPROACH: ICD-10-PCS | Performed by: NEUROLOGICAL SURGERY

## 2025-06-24 PROCEDURE — 20930 SP BONE ALGRFT MORSEL ADD-ON: CPT | Mod: ,,, | Performed by: NEUROLOGICAL SURGERY

## 2025-06-24 PROCEDURE — 85384 FIBRINOGEN ACTIVITY: CPT | Performed by: STUDENT IN AN ORGANIZED HEALTH CARE EDUCATION/TRAINING PROGRAM

## 2025-06-24 PROCEDURE — 01NR0ZZ RELEASE SACRAL NERVE, OPEN APPROACH: ICD-10-PCS | Performed by: NEUROLOGICAL SURGERY

## 2025-06-24 PROCEDURE — 99291 CRITICAL CARE FIRST HOUR: CPT | Mod: ,,,

## 2025-06-24 PROCEDURE — 94761 N-INVAS EAR/PLS OXIMETRY MLT: CPT

## 2025-06-24 PROCEDURE — 86920 COMPATIBILITY TEST SPIN: CPT | Performed by: ANESTHESIOLOGY

## 2025-06-24 PROCEDURE — 61783 SCAN PROC SPINAL: CPT | Mod: ,,, | Performed by: NEUROLOGICAL SURGERY

## 2025-06-24 PROCEDURE — 22212 INCIS 1 VERTEBRAL SEG THORAC: CPT | Mod: 51,,, | Performed by: NEUROLOGICAL SURGERY

## 2025-06-24 PROCEDURE — C1729 CATH, DRAINAGE: HCPCS | Performed by: NEUROLOGICAL SURGERY

## 2025-06-24 DEVICE — IMPLANTABLE DEVICE: Type: IMPLANTABLE DEVICE | Site: BACK | Status: FUNCTIONAL

## 2025-06-24 DEVICE — SCREW EXPEDIUM FEN STRL 5X30MM: Type: IMPLANTABLE DEVICE | Site: BACK | Status: FUNCTIONAL

## 2025-06-24 DEVICE — CONNECTOR SPINAL SFX A6 5.5MM: Type: IMPLANTABLE DEVICE | Site: BACK | Status: FUNCTIONAL

## 2025-06-24 DEVICE — SCREW EXPEDIUM VERSE PA 8X80MM: Type: IMPLANTABLE DEVICE | Site: BACK | Status: FUNCTIONAL

## 2025-06-24 DEVICE — SCREW BONE SPINAL 5.5 6 X 50MM: Type: IMPLANTABLE DEVICE | Site: BACK | Status: FUNCTIONAL

## 2025-06-24 DEVICE — SCREW EXPEDIUM VERSE PA 6X50MM: Type: IMPLANTABLE DEVICE | Site: BACK | Status: FUNCTIONAL

## 2025-06-24 DEVICE — KIT CONFIDENCE W/O NEEDLES: Type: IMPLANTABLE DEVICE | Site: BACK | Status: FUNCTIONAL

## 2025-06-24 DEVICE — SCREW INNER SINGLE SET TITANIU: Type: IMPLANTABLE DEVICE | Site: BACK | Status: FUNCTIONAL

## 2025-06-24 DEVICE — SET SCREW EXPEDIUM VERSE UNITZ: Type: IMPLANTABLE DEVICE | Site: BACK | Status: FUNCTIONAL

## 2025-06-24 DEVICE — SCREW BONE SPINAL 5.5 5 X 40MM: Type: IMPLANTABLE DEVICE | Site: BACK | Status: FUNCTIONAL

## 2025-06-24 DEVICE — SCREW EXPEDIUM FEN STRL 6X45MM: Type: IMPLANTABLE DEVICE | Site: BACK | Status: FUNCTIONAL

## 2025-06-24 DEVICE — ROD SPINAL PRECUT 5.5 X 480MM: Type: IMPLANTABLE DEVICE | Site: BACK | Status: FUNCTIONAL

## 2025-06-24 DEVICE — CONNECTOR SPINAL SFX A4 TI: Type: IMPLANTABLE DEVICE | Site: BACK | Status: FUNCTIONAL

## 2025-06-24 DEVICE — SCREW BONE SPINAL 5.5 6 X 40MM: Type: IMPLANTABLE DEVICE | Site: BACK | Status: FUNCTIONAL

## 2025-06-24 DEVICE — SCREW BONE SPINAL 5.5 6 X 45MM: Type: IMPLANTABLE DEVICE | Site: BACK | Status: FUNCTIONAL

## 2025-06-24 DEVICE — MATRIX FIBERGRAFT BG LG 12.5CC: Type: IMPLANTABLE DEVICE | Site: BACK | Status: FUNCTIONAL

## 2025-06-24 DEVICE — ROD SPINE MTRX STR 400X80MM: Type: IMPLANTABLE DEVICE | Site: BACK | Status: FUNCTIONAL

## 2025-06-24 RX ORDER — MIDAZOLAM HYDROCHLORIDE 1 MG/ML
INJECTION INTRAMUSCULAR; INTRAVENOUS
Status: DISCONTINUED | OUTPATIENT
Start: 2025-06-24 | End: 2025-06-24

## 2025-06-24 RX ORDER — MUPIROCIN 20 MG/G
OINTMENT TOPICAL
Status: DISCONTINUED | OUTPATIENT
Start: 2025-06-24 | End: 2025-06-24 | Stop reason: HOSPADM

## 2025-06-24 RX ORDER — CEFAZOLIN 2 G/1
2 INJECTION, POWDER, FOR SOLUTION INTRAMUSCULAR; INTRAVENOUS
Status: DISCONTINUED | OUTPATIENT
Start: 2025-06-24 | End: 2025-06-24 | Stop reason: HOSPADM

## 2025-06-24 RX ORDER — BACLOFEN 10 MG/1
10 TABLET ORAL NIGHTLY
Status: DISCONTINUED | OUTPATIENT
Start: 2025-06-24 | End: 2025-06-29 | Stop reason: HOSPADM

## 2025-06-24 RX ORDER — TALC
6 POWDER (GRAM) TOPICAL NIGHTLY PRN
Status: DISCONTINUED | OUTPATIENT
Start: 2025-06-24 | End: 2025-06-27

## 2025-06-24 RX ORDER — LIDOCAINE HYDROCHLORIDE AND EPINEPHRINE 10; 10 UG/ML; MG/ML
INJECTION, SOLUTION INFILTRATION; PERINEURAL
Status: DISCONTINUED | OUTPATIENT
Start: 2025-06-24 | End: 2025-06-24 | Stop reason: HOSPADM

## 2025-06-24 RX ORDER — PROCHLORPERAZINE EDISYLATE 5 MG/ML
5 INJECTION INTRAMUSCULAR; INTRAVENOUS EVERY 6 HOURS PRN
Status: DISCONTINUED | OUTPATIENT
Start: 2025-06-24 | End: 2025-06-29 | Stop reason: HOSPADM

## 2025-06-24 RX ORDER — FLUCONAZOLE 200 MG/1
400 TABLET ORAL DAILY
Status: DISCONTINUED | OUTPATIENT
Start: 2025-06-24 | End: 2025-06-29 | Stop reason: HOSPADM

## 2025-06-24 RX ORDER — SUCCINYLCHOLINE CHLORIDE 20 MG/ML
INJECTION INTRAMUSCULAR; INTRAVENOUS
Status: DISCONTINUED | OUTPATIENT
Start: 2025-06-24 | End: 2025-06-24

## 2025-06-24 RX ORDER — PHENYLEPHRINE HCL IN 0.9% NACL 1 MG/10 ML
SYRINGE (ML) INTRAVENOUS
Status: DISCONTINUED | OUTPATIENT
Start: 2025-06-24 | End: 2025-06-24

## 2025-06-24 RX ORDER — PHENYLEPHRINE HYDROCHLORIDE 10 MG/ML
INJECTION INTRAVENOUS
Status: DISCONTINUED | OUTPATIENT
Start: 2025-06-24 | End: 2025-06-24

## 2025-06-24 RX ORDER — LIDOCAINE HYDROCHLORIDE 20 MG/ML
INJECTION, SOLUTION EPIDURAL; INFILTRATION; INTRACAUDAL; PERINEURAL
Status: DISCONTINUED | OUTPATIENT
Start: 2025-06-24 | End: 2025-06-24

## 2025-06-24 RX ORDER — VANCOMYCIN 1.75 GRAM/500 ML IN 0.9 % SODIUM CHLORIDE INTRAVENOUS
20 ONCE
Status: COMPLETED | OUTPATIENT
Start: 2025-06-24 | End: 2025-06-24

## 2025-06-24 RX ORDER — AMOXICILLIN 250 MG
2 CAPSULE ORAL 2 TIMES DAILY
Status: DISCONTINUED | OUTPATIENT
Start: 2025-06-24 | End: 2025-06-29 | Stop reason: HOSPADM

## 2025-06-24 RX ORDER — FENTANYL CITRATE 50 UG/ML
INJECTION, SOLUTION INTRAMUSCULAR; INTRAVENOUS
Status: DISCONTINUED | OUTPATIENT
Start: 2025-06-24 | End: 2025-06-24

## 2025-06-24 RX ORDER — METHOCARBAMOL 750 MG/1
750 TABLET, FILM COATED ORAL 4 TIMES DAILY
Status: DISCONTINUED | OUTPATIENT
Start: 2025-06-24 | End: 2025-06-27

## 2025-06-24 RX ORDER — SODIUM CHLORIDE 9 MG/ML
INJECTION, SOLUTION INTRAVENOUS CONTINUOUS
Status: DISCONTINUED | OUTPATIENT
Start: 2025-06-24 | End: 2025-06-25

## 2025-06-24 RX ORDER — TRANEXAMIC ACID 1 G/10ML
INJECTION, SOLUTION INTRAVENOUS
Status: DISCONTINUED | OUTPATIENT
Start: 2025-06-24 | End: 2025-06-24

## 2025-06-24 RX ORDER — ROCURONIUM BROMIDE 10 MG/ML
INJECTION, SOLUTION INTRAVENOUS
Status: DISCONTINUED | OUTPATIENT
Start: 2025-06-24 | End: 2025-06-24

## 2025-06-24 RX ORDER — SUMATRIPTAN SUCCINATE 50 MG/1
50 TABLET ORAL
Status: DISCONTINUED | OUTPATIENT
Start: 2025-06-24 | End: 2025-06-29 | Stop reason: HOSPADM

## 2025-06-24 RX ORDER — ACETAMINOPHEN 500 MG
1000 TABLET ORAL EVERY 8 HOURS
Status: DISCONTINUED | OUTPATIENT
Start: 2025-06-24 | End: 2025-06-24

## 2025-06-24 RX ORDER — ESCITALOPRAM OXALATE 20 MG/1
20 TABLET ORAL DAILY
Status: DISCONTINUED | OUTPATIENT
Start: 2025-06-25 | End: 2025-06-25

## 2025-06-24 RX ORDER — HYDROMORPHONE HYDROCHLORIDE 1 MG/ML
1 INJECTION, SOLUTION INTRAMUSCULAR; INTRAVENOUS; SUBCUTANEOUS EVERY 4 HOURS PRN
Refills: 0 | Status: DISCONTINUED | OUTPATIENT
Start: 2025-06-24 | End: 2025-06-29 | Stop reason: HOSPADM

## 2025-06-24 RX ORDER — KETAMINE HCL IN 0.9 % NACL 50 MG/5 ML
SYRINGE (ML) INTRAVENOUS
Status: DISCONTINUED | OUTPATIENT
Start: 2025-06-24 | End: 2025-06-24

## 2025-06-24 RX ORDER — DEXAMETHASONE SODIUM PHOSPHATE 4 MG/ML
INJECTION, SOLUTION INTRA-ARTICULAR; INTRALESIONAL; INTRAMUSCULAR; INTRAVENOUS; SOFT TISSUE
Status: DISCONTINUED | OUTPATIENT
Start: 2025-06-24 | End: 2025-06-24

## 2025-06-24 RX ORDER — GABAPENTIN 300 MG/1
300 CAPSULE ORAL NIGHTLY
Status: DISCONTINUED | OUTPATIENT
Start: 2025-06-24 | End: 2025-06-26

## 2025-06-24 RX ORDER — AMLODIPINE BESYLATE 5 MG/1
5 TABLET ORAL DAILY
Status: DISCONTINUED | OUTPATIENT
Start: 2025-06-25 | End: 2025-06-24

## 2025-06-24 RX ORDER — ALUMINUM HYDROXIDE, MAGNESIUM HYDROXIDE, AND SIMETHICONE 1200; 120; 1200 MG/30ML; MG/30ML; MG/30ML
30 SUSPENSION ORAL EVERY 4 HOURS PRN
Status: DISCONTINUED | OUTPATIENT
Start: 2025-06-24 | End: 2025-06-29 | Stop reason: HOSPADM

## 2025-06-24 RX ORDER — ONDANSETRON HYDROCHLORIDE 2 MG/ML
INJECTION, SOLUTION INTRAVENOUS
Status: DISCONTINUED | OUTPATIENT
Start: 2025-06-24 | End: 2025-06-24

## 2025-06-24 RX ORDER — DIAZEPAM 5 MG/1
5 TABLET ORAL 2 TIMES DAILY PRN
Status: DISPENSED | OUTPATIENT
Start: 2025-06-24 | End: 2025-06-27

## 2025-06-24 RX ORDER — PROPOFOL 10 MG/ML
VIAL (ML) INTRAVENOUS
Status: DISCONTINUED | OUTPATIENT
Start: 2025-06-24 | End: 2025-06-24

## 2025-06-24 RX ORDER — CALCIUM CARBONATE 200(500)MG
500 TABLET,CHEWABLE ORAL 2 TIMES DAILY
Status: DISCONTINUED | OUTPATIENT
Start: 2025-06-24 | End: 2025-06-29 | Stop reason: HOSPADM

## 2025-06-24 RX ORDER — TRANEXAMIC ACID 100 MG/ML
INJECTION, SOLUTION INTRAVENOUS CONTINUOUS PRN
Status: DISCONTINUED | OUTPATIENT
Start: 2025-06-24 | End: 2025-06-24

## 2025-06-24 RX ORDER — OXYCODONE HYDROCHLORIDE 10 MG/1
10 TABLET ORAL EVERY 4 HOURS PRN
Refills: 0 | Status: DISCONTINUED | OUTPATIENT
Start: 2025-06-24 | End: 2025-06-29 | Stop reason: HOSPADM

## 2025-06-24 RX ORDER — CEFAZOLIN SODIUM 1 G/3ML
INJECTION, POWDER, FOR SOLUTION INTRAMUSCULAR; INTRAVENOUS
Status: DISCONTINUED | OUTPATIENT
Start: 2025-06-24 | End: 2025-06-24

## 2025-06-24 RX ORDER — MUPIROCIN 20 MG/G
1 OINTMENT TOPICAL 2 TIMES DAILY
Status: DISCONTINUED | OUTPATIENT
Start: 2025-06-24 | End: 2025-06-24 | Stop reason: HOSPADM

## 2025-06-24 RX ORDER — ONDANSETRON 8 MG/1
8 TABLET, ORALLY DISINTEGRATING ORAL EVERY 6 HOURS PRN
Status: DISCONTINUED | OUTPATIENT
Start: 2025-06-24 | End: 2025-06-24

## 2025-06-24 RX ORDER — MUPIROCIN 20 MG/G
OINTMENT TOPICAL 2 TIMES DAILY
Status: COMPLETED | OUTPATIENT
Start: 2025-06-24 | End: 2025-06-26

## 2025-06-24 RX ORDER — ONDANSETRON HYDROCHLORIDE 2 MG/ML
8 INJECTION, SOLUTION INTRAVENOUS EVERY 6 HOURS PRN
Status: DISCONTINUED | OUTPATIENT
Start: 2025-06-24 | End: 2025-06-29 | Stop reason: HOSPADM

## 2025-06-24 RX ORDER — VANCOMYCIN HYDROCHLORIDE 1 G/20ML
INJECTION, POWDER, LYOPHILIZED, FOR SOLUTION INTRAVENOUS
Status: DISCONTINUED | OUTPATIENT
Start: 2025-06-24 | End: 2025-06-24 | Stop reason: HOSPADM

## 2025-06-24 RX ORDER — FAMOTIDINE 20 MG/1
40 TABLET, FILM COATED ORAL DAILY
Status: DISCONTINUED | OUTPATIENT
Start: 2025-06-25 | End: 2025-06-29 | Stop reason: HOSPADM

## 2025-06-24 RX ADMIN — Medication 10 MG: at 08:06

## 2025-06-24 RX ADMIN — OXYCODONE 15 MG: 5 TABLET ORAL at 03:06

## 2025-06-24 RX ADMIN — PROPOFOL 100 MCG/KG/MIN: 10 INJECTION, EMULSION INTRAVENOUS at 07:06

## 2025-06-24 RX ADMIN — GABAPENTIN 300 MG: 300 CAPSULE ORAL at 08:06

## 2025-06-24 RX ADMIN — Medication 10 MG: at 11:06

## 2025-06-24 RX ADMIN — PROPOFOL 160 MG: 10 INJECTION, EMULSION INTRAVENOUS at 07:06

## 2025-06-24 RX ADMIN — ROCURONIUM BROMIDE 50 MG: 10 INJECTION, SOLUTION INTRAVENOUS at 08:06

## 2025-06-24 RX ADMIN — HYDROMORPHONE HYDROCHLORIDE 1 MG: 1 INJECTION, SOLUTION INTRAMUSCULAR; INTRAVENOUS; SUBCUTANEOUS at 06:06

## 2025-06-24 RX ADMIN — Medication 50 MCG: at 08:06

## 2025-06-24 RX ADMIN — FLUCONAZOLE 400 MG: 200 TABLET ORAL at 03:06

## 2025-06-24 RX ADMIN — ACETAMINOPHEN 1000 MG: 500 TABLET ORAL at 03:06

## 2025-06-24 RX ADMIN — SODIUM CHLORIDE 0.2 MCG/KG/MIN: 9 INJECTION, SOLUTION INTRAVENOUS at 07:06

## 2025-06-24 RX ADMIN — SODIUM CHLORIDE 1750 MG: 9 INJECTION, SOLUTION INTRAVENOUS at 05:06

## 2025-06-24 RX ADMIN — OXYCODONE 15 MG: 5 TABLET ORAL at 07:06

## 2025-06-24 RX ADMIN — TRANEXAMIC ACID 900 MG: 100 INJECTION, SOLUTION INTRAVENOUS at 08:06

## 2025-06-24 RX ADMIN — MUPIROCIN: 20 OINTMENT TOPICAL at 05:06

## 2025-06-24 RX ADMIN — SODIUM CHLORIDE: 9 INJECTION, SOLUTION INTRAVENOUS at 03:06

## 2025-06-24 RX ADMIN — SODIUM CHLORIDE, SODIUM GLUCONATE, SODIUM ACETATE, POTASSIUM CHLORIDE, MAGNESIUM CHLORIDE, SODIUM PHOSPHATE, DIBASIC, AND POTASSIUM PHOSPHATE: .53; .5; .37; .037; .03; .012; .00082 INJECTION, SOLUTION INTRAVENOUS at 08:06

## 2025-06-24 RX ADMIN — Medication 50 MCG: at 12:06

## 2025-06-24 RX ADMIN — HYDROMORPHONE HYDROCHLORIDE 1 MG: 1 INJECTION, SOLUTION INTRAMUSCULAR; INTRAVENOUS; SUBCUTANEOUS at 10:06

## 2025-06-24 RX ADMIN — LIDOCAINE HYDROCHLORIDE 80 MG: 20 INJECTION, SOLUTION EPIDURAL; INFILTRATION; INTRACAUDAL; PERINEURAL at 07:06

## 2025-06-24 RX ADMIN — MUPIROCIN: 20 OINTMENT TOPICAL at 08:06

## 2025-06-24 RX ADMIN — SODIUM CHLORIDE: 0.9 INJECTION, SOLUTION INTRAVENOUS at 07:06

## 2025-06-24 RX ADMIN — METHOCARBAMOL 750 MG: 750 TABLET ORAL at 08:06

## 2025-06-24 RX ADMIN — Medication 10 MG: at 10:06

## 2025-06-24 RX ADMIN — MIDAZOLAM HYDROCHLORIDE 2 MG: 2 INJECTION, SOLUTION INTRAMUSCULAR; INTRAVENOUS at 06:06

## 2025-06-24 RX ADMIN — SENNOSIDES AND DOCUSATE SODIUM 2 TABLET: 50; 8.6 TABLET ORAL at 08:06

## 2025-06-24 RX ADMIN — Medication 6 MG: at 08:06

## 2025-06-24 RX ADMIN — ROCURONIUM BROMIDE 20 MG: 10 INJECTION, SOLUTION INTRAVENOUS at 09:06

## 2025-06-24 RX ADMIN — CEFAZOLIN 2 G: 330 INJECTION, POWDER, FOR SOLUTION INTRAMUSCULAR; INTRAVENOUS at 07:06

## 2025-06-24 RX ADMIN — BACLOFEN 10 MG: 10 TABLET ORAL at 08:06

## 2025-06-24 RX ADMIN — SODIUM CHLORIDE: 0.9 INJECTION, SOLUTION INTRAVENOUS at 12:06

## 2025-06-24 RX ADMIN — ONDANSETRON 8 MG: 8 TABLET, ORALLY DISINTEGRATING ORAL at 03:06

## 2025-06-24 RX ADMIN — DEXAMETHASONE SODIUM PHOSPHATE 4 MG: 4 INJECTION, SOLUTION INTRAMUSCULAR; INTRAVENOUS at 07:06

## 2025-06-24 RX ADMIN — ONDANSETRON 8 MG: 2 INJECTION INTRAMUSCULAR; INTRAVENOUS at 05:06

## 2025-06-24 RX ADMIN — PHENYLEPHRINE HYDROCHLORIDE 0.2 MCG/KG/MIN: 10 INJECTION INTRAVENOUS at 07:06

## 2025-06-24 RX ADMIN — SUCCINYLCHOLINE 60 MG: 20 INJECTION, SOLUTION INTRAMUSCULAR; INTRAVENOUS at 07:06

## 2025-06-24 RX ADMIN — TRANEXAMIC ACID 1 MG/KG/HR: 100 INJECTION, SOLUTION INTRAVENOUS at 08:06

## 2025-06-24 RX ADMIN — CEFAZOLIN 2 G: 330 INJECTION, POWDER, FOR SOLUTION INTRAMUSCULAR; INTRAVENOUS at 11:06

## 2025-06-24 RX ADMIN — DEXAMETHASONE SODIUM PHOSPHATE 4 MG: 4 INJECTION, SOLUTION INTRAMUSCULAR; INTRAVENOUS at 09:06

## 2025-06-24 RX ADMIN — Medication 20 MG: at 07:06

## 2025-06-24 RX ADMIN — ONDANSETRON 4 MG: 2 INJECTION INTRAMUSCULAR; INTRAVENOUS at 02:06

## 2025-06-24 RX ADMIN — FENTANYL CITRATE 100 MCG: 50 INJECTION, SOLUTION INTRAMUSCULAR; INTRAVENOUS at 07:06

## 2025-06-24 RX ADMIN — CALCIUM CARBONATE (ANTACID) CHEW TAB 500 MG 500 MG: 500 CHEW TAB at 08:06

## 2025-06-24 RX ADMIN — SUGAMMADEX 200 MG: 100 INJECTION, SOLUTION INTRAVENOUS at 02:06

## 2025-06-24 RX ADMIN — METHADONE HYDROCHLORIDE 8.73 MG: 10 INJECTION, SOLUTION INTRAMUSCULAR; INTRAVENOUS; SUBCUTANEOUS at 02:06

## 2025-06-24 NOTE — ASSESSMENT & PLAN NOTE
Ms. Kayla Clay is a 40 y.o. 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 Completed 6 weeks of IV antibiotics, empirically since initial cultures were negative using Ceftriaxone and Daptomycin. Completed 3 months of Fluconazole high dose at 400mg PO q24h given Candida albicans positive cultures. Did not tolerate Vancomycin or Doxycycline.    Patient has completed adequate antimicrobial coverage for prior infection. IVDU is a huge risk factor for recurrence. To ensure sterility of site she will need PO fluconazole which she can stop one week post op.    Plan for T-10-pelvis fusion.

## 2025-06-24 NOTE — SUBJECTIVE & OBJECTIVE
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    Generalized anxiety disorder     Hypertension     IVDU (intravenous drug user) 08/18/2022    Leukopenia 09/09/2022    Obesity, unspecified     Scoliosis     Substance abuse Age 18    I used drugs off and on since i was 18 but im sober now and have been since early August 2022     Past Surgical History:   Procedure Laterality Date    LUMBAR LAMINECTOMY WITH SURGICAL REMOVAL OF LESION OF SPINAL CORD BY POSTERIOR APPROACH N/A 08/18/2022    Procedure: LAMINECTOMY, SPINE, LUMBAR, POSTERIOR APPROACH, WITH EXCISION OF NEOPLASM OR LESION OF SPINAL CORD;  Surgeon: Floyd Brewer MD;  Location: [facility] OR;  Service: Neurosurgery;  Laterality: N/A;    SPINE SURGERY  August 16th, 2022    Spinal absess and Laminectomy      Medications Ordered Prior to Encounter[1]   Allergies: Acetaminophen and Doxycycline  Family History   Problem Relation Name Age of Onset    Pancreatic cancer Paternal Grandfather          80's    Stomach cancer Maternal Grandfather          70's - +metastasis    No Known Problems Father      No Known Problems Mother      Spine Surgery Paternal Uncle Reggie Engel         On my dads side of the family a lot of us have spine issues.    Breast cancer Neg Hx      Colon cancer Neg Hx      Ovarian cancer Neg Hx      Cervical cancer Neg Hx      Uterine cancer Neg Hx       Social History[2]  Review of Systems   Gastrointestinal:  Positive for nausea.   Musculoskeletal:  Positive for back pain.     Objective:     Vitals:    Temp: 97.7 °F (36.5 °C)  Pulse: 78  Rhythm: normal sinus rhythm  BP: (!) 98/59  MAP (mmHg): 73  Resp: (!) 21  SpO2: 99 %    Temp  Min: 97.7 °F (36.5 °C)  Max: 98 °F (36.7 °C)  Pulse  Min: 67  Max: 79  BP  Min: 91/56  Max: 118/68  MAP (mmHg)  Min: 68  Max: 83  Resp  Min: 15  Max: 21  SpO2  Min: 94 %  Max: 99 %    No  intake/output data recorded.            Physical Exam  HENT:      Head: Normocephalic and atraumatic.   Cardiovascular:      Rate and Rhythm: Normal rate and regular rhythm.   Pulmonary:      Effort: Pulmonary effort is normal. No respiratory distress.   Abdominal:      Palpations: Abdomen is soft.   Neurological:      Comments: E3V5M6  Lethargic post operatively but awake and oriented x 4  PERRL, EOMI  3/5 throughout, though exam limited to pain  Denies sensory deficits throughout              Today I personally reviewed pertinent medications, lines/drains/airways, imaging, cardiology results, laboratory results, microbiology results, notably:    -Pre op MRI         [1]   No current facility-administered medications on file prior to encounter.     Current Outpatient Medications on File Prior to Encounter   Medication Sig Dispense Refill    gabapentin (NEURONTIN) 300 MG capsule Take 300 mg by mouth every evening.      loratadine (CLARITIN) 10 mg tablet TAKE 1 TABLET BY MOUTH EVERY DAY 30 tablet 5    sumatriptan (IMITREX) 50 MG tablet Take 1 tablet (50 mg total) by mouth as needed for Migraine. 9 tablet 5   [2]   Social History  Tobacco Use    Smoking status: Never     Passive exposure: Current    Smokeless tobacco: Never    Tobacco comments:     Social smoker off and on since 14 but never regular smoker   Substance Use Topics    Alcohol use: Not Currently    Drug use: Not Currently     Types: Heroin, Methamphetamines

## 2025-06-24 NOTE — ANESTHESIA PROCEDURE NOTES
Left Radial Arterial Line    Diagnosis: Lumbar Spondylosis    Patient location during procedure: done in OR    Staffing  Authorizing Provider: Harsha Hernandez MD  Performing Provider: Flako Garcia MD    Staffing  Performed by: Flako Garcia MD  Authorized by: Harsha Hernandez MD    Anesthesiologist was present at the time of the procedure.    Preanesthetic Checklist  Completed: patient identified, IV checked, site marked, risks and benefits discussed, surgical consent, monitors and equipment checked, pre-op evaluation, timeout performed and anesthesia consent givenLeft Radial Arterial Line  Skin Prep: chlorhexidine gluconate  Local Infiltration: none  Orientation: left  Location: radial    Catheter Size: 20 G  Catheter placement by Ultrasound guidance. Heme positive aspiration all ports.   Vessel Caliber: small, patent, compressibility normal  Needle advanced into vessel with real time Ultrasound guidance.Insertion Attempts: 1  Assessment  Dressing: secured with tape and tegaderm  Patient: Tolerated well

## 2025-06-24 NOTE — PLAN OF CARE
"[facility] Care Plan  POC reviewed with Kayla Vides Clay and family at 1400. {patientfamily:74991} verbalized understanding. Questions and concerns addressed. No acute events today. Pt progressing toward goals. Will continue to monitor. See below and flowsheets for full assessment and VS info.             Is this a stroke patient? {STROKE PATIENT?:58965}    Neuro:  [place] Coma Scale  Best Eye Response: 3-->(E3) to speech  Best Motor Response: 6-->(M6) obeys commands  Best Verbal Response: 5-->(V5) oriented  Aria Coma Scale Score: 14  Pupil PERRLA: yes     24 hr Temp:  [97.7 °F (36.5 °C)-98 °F (36.7 °C)]     CV:   Rhythm: normal sinus rhythm  BP goals:   SBP < {SBP <:71930}  MAP > {MAPS:22586}    Resp:           Plan: {resp plan:94934}    GI/:     Diet/Nutrition Received: regular  Last Bowel Movement: 06/23/25       Intake/Output Summary (Last 24 hours) at 6/24/2025 1704  Last data filed at 6/24/2025 1420  Gross per 24 hour   Intake 3504.37 ml   Output 1385 ml   Net 2119.37 ml          Labs/Accuchecks:  Recent Labs   Lab 06/24/25  1510   WBC 13.92*   RBC 3.98*   HGB 11.8*   HCT 35.5*         Recent Labs   Lab 06/24/25  1510      K 3.9   CO2 20*   *   BUN 13   CREATININE 0.5   ALKPHOS 35*   ALT 20   AST 29   BILITOT 0.3    No results for input(s): "PROTIME", "INR", "APTT", "HEPANTIXA" in the last 168 hours. No results for input(s): "CPK", "CPKMB", "TROPONINI", "MB" in the last 168 hours.    Electrolytes: {electrolyte replacement:72552}  Accuchecks: {accuchecks:93153}    Gtts:   0.9% NaCl   Intravenous Continuous 100 mL/hr at 06/24/25 1553 New Bag at 06/24/25 1553       LDA/Wounds:    Petey Risk Assessment  Sensory Perception: 4-->no impairment  Moisture: 3-->occasionally moist  Activity: 1-->bedfast  Mobility: 2-->very limited  Nutrition: 2-->probably inadequate  Friction and Shear: 2-->potential problem  Petey Score: 14    Is your petey score 12 or less? " {PPyesno:39376}            Restraints:        WCTM

## 2025-06-24 NOTE — EICU
"Virtual ICU Admission    Admit Date: 2025  LOS: 0  Code Status: Prior   : 1985  Bed: 9079/9079 A:     Diagnosis: Degenerative scoliosis in adult patient    Patient  has a past medical history of Abscess, Depression, Dysmenorrhea, Epidural abscess, Fungal osteomyelitis, Generalized anxiety disorder, Hypertension, IVDU (intravenous drug user), Leukopenia, Obesity, unspecified, Scoliosis, and Substance abuse.    Last VS: BP (!) 91/56 (BP Location: Right arm, Patient Position: Lying)   Pulse 79   Temp 97.7 °F (36.5 °C) (Axillary)   Resp 18   Ht 5' 7" (1.702 m)   Wt 87.3 kg (192 lb 7.4 oz)   SpO2 99%   Breastfeeding No   BMI 30.14 kg/m²       VICU Review    VICU nurse assessment :  [place] completed, LDA documentation reconciliation completed, and VTE prophylaxis review        Nursing orders placed : IP TRISTAN Peripheral IV Access         "

## 2025-06-24 NOTE — NURSING
Patient arrived to [facility] from OR via bed     Report received from: MD Mariza    Type of stroke/diagnosis:  T10-pelvis fusion    Current symptoms: hypotensive,     Skin Assessment done: Y  Wounds noted: spinal incision + wound vac,     *If wounds noted, was Wound Care consulted? Y/N    *If wounds noted, LDA placed? Y/N    Skin Assessment Verified by: SAADIA Hightower Completed? pending    Patient Belongings on Admit: none    NCC notified: TOMASA Simpson

## 2025-06-24 NOTE — HPI
"Ms. Kayla Clay is a 40 y.o. 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 Per last ID note, "7/5/23: Ms. Miller presents for follow-up today. She continues to have ongoing lower extremity weakness. She uses a cane for assistance with ambulation. She denies any fevers, chills, nausea, vomiting and diarrhea. She had been working with PT, but hasn't been able to follow-up in the past month. She saw a neurosurgeon on 5/26/23 in which he reports her most recent MRI showed resolution of epidural abscess and degenerative disc disease. No planned interventions at this time. Completed 6 weeks of IV antibiotics, empirically since initial cultures were negative using Ceftriaxone and Daptomycin. Completed 3 months of Fluconazole high dose at 400mg PO q24h given Candida albicans positive cultures. Did not tolerate Vancomycin or Doxycycline. MRI with no findings significant for residual infection. Neurosurgery without any interventions planned at this time. MRI with resolution of epidural abscess. Continue to work with PT to work on gait strength. No need for ongoing infectious workup at this time."     NSGY now planning for deformity surgery due to severe stenosis of lumbar spine. T10-pelvis fusion recommended by surgeons. Explained to patient that she completed adequate antimicrobial coverage for prior infection. IVDU is a huge risk factor for recurrence. To ensure sterility of site will prescribe course of PO fluconazole which she can stop one week post op. If evidence of infection seen in OR will ask surgeons to send specimen for cultures. Patient voiced understanding.   "

## 2025-06-24 NOTE — H&P
[facility] - Neuro Critical Care  Neurocritical Care  History & Physical    Admit Date: 6/24/2025  Service Date: 06/24/2025  Length of Stay: 0    Subjective:     Chief Complaint: Degenerative scoliosis in adult patient    History of Present Illness: Ms. Kayla Clay is a 40 y.o. female w/ significant PMHx of IVDU, HTN, and fungal discitis who presents to [facility] s/p T10-pelvis fusion. She initially presented to NSGY on an outpatient basis after longtime follow up with ID. She continued to have ongoing lower extremity weakness, using a cane for assistance with ambulation. She followed with NSGY on 5/26/23, where MRI showed resolution of epidural abscess and degenerative disc disease. She completed 6 weeks of IV antibiotics, empirically since initial cultures were negative using Ceftriaxone and Daptomycin. She also completed 3 months of Fluconazole high dose at 400mg PO q24h given Candida albicans positive cultures. She notably did not tolerate Vancomycin or Doxycycline. MRI with no findings significant for residual infection, though she is s/p T10-pelvis fusion to address severe stenosis of lumbar spine.     She is admitted to [facility] for hourly neuromonitoring post operatively.      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    Generalized anxiety disorder     Hypertension     IVDU (intravenous drug user) 08/18/2022    Leukopenia 09/09/2022    Obesity, unspecified     Scoliosis     Substance abuse Age 18    I used drugs off and on since i was 18 but im sober now and have been since early August 2022     Past Surgical History:   Procedure Laterality Date    LUMBAR LAMINECTOMY WITH SURGICAL REMOVAL OF LESION OF SPINAL CORD BY POSTERIOR APPROACH N/A 08/18/2022    Procedure: LAMINECTOMY, SPINE, LUMBAR, POSTERIOR APPROACH, WITH EXCISION OF NEOPLASM OR LESION OF SPINAL CORD;  Surgeon: Floyd BUTCHER  MD Osman;  Location: [facility];  Service: Neurosurgery;  Laterality: N/A;    SPINE SURGERY  August 16th, 2022    Spinal absess and Laminectomy      Medications Ordered Prior to Encounter[1]   Allergies: Acetaminophen and Doxycycline  Family History   Problem Relation Name Age of Onset    Pancreatic cancer Paternal Grandfather          80's    Stomach cancer Maternal Grandfather          70's - +metastasis    No Known Problems Father      No Known Problems Mother      Spine Surgery Paternal Uncle Reggie Engel         On my dads side of the family a lot of us have spine issues.    Breast cancer Neg Hx      Colon cancer Neg Hx      Ovarian cancer Neg Hx      Cervical cancer Neg Hx      Uterine cancer Neg Hx       Social History[2]  Review of Systems   Gastrointestinal:  Positive for nausea.   Musculoskeletal:  Positive for back pain.     Objective:     Vitals:    Temp: 97.7 °F (36.5 °C)  Pulse: 78  Rhythm: normal sinus rhythm  BP: (!) 98/59  MAP (mmHg): 73  Resp: (!) 21  SpO2: 99 %    Temp  Min: 97.7 °F (36.5 °C)  Max: 98 °F (36.7 °C)  Pulse  Min: 67  Max: 79  BP  Min: 91/56  Max: 118/68  MAP (mmHg)  Min: 68  Max: 83  Resp  Min: 15  Max: 21  SpO2  Min: 94 %  Max: 99 %    No intake/output data recorded.            Physical Exam  HENT:      Head: Normocephalic and atraumatic.   Cardiovascular:      Rate and Rhythm: Normal rate and regular rhythm.   Pulmonary:      Effort: Pulmonary effort is normal. No respiratory distress.   Abdominal:      Palpations: Abdomen is soft.   Neurological:      Comments: [id number]  Lethargic post operatively but awake and oriented x 4  PERRL, EOMI  3/5 throughout, though exam limited to pain  Denies sensory deficits throughout              Today I personally reviewed pertinent medications, lines/drains/airways, imaging, cardiology results, laboratory results, microbiology results, notably:    -Pre op MRI    Assessment/Plan:     Neuro  * Degenerative scoliosis in adult patient  40 y.o. female  w/ significant PMHx of IVDU, HTN, and fungal discitis who completed 6 weeks of IV antibiotics, empirically since initial cultures were negative using Ceftriaxone and Daptomycin. She also completed 3 months of Fluconazole high dose at 400mg PO q24h given Candida albicans positive cultures.  She is s/p T-10-pelvis fusion for degenerative scoliosis 2/2 discitis history.    -Admit to NCC, NSGY following  -q1h neuro checks, vital checks  -EKG, ECHO, CXR  -A1c, TSH, lipid panel, coag panel  -Daily CBC, CMP, mag, phos  -SBP < 160, prn labetalol and hydralazine  -SCDs, hold DVT chemoppx in acute post op setting  -PT/OT/SLP as appropriate  -Post op imaging per NSGY  -TLSO brace when OOB  -Vanc while drains in place per NSGY  -Fluconazole x 7 d per ID       Cardiac/Vascular  Hypertension  SBP < 160  Hold home norvasc in setting of borderline hypotension post op            The patient is being Prophylaxed for:  Venous Thromboembolism with: Mechanical  Stress Ulcer with: None  Ventilator Pneumonia with: none    Activity Orders            Ambulate starting at 06/25 0000    Up in chair With meals starting at 06/24 1700    Diet Adult Regular: Regular starting at 06/24 1437    Progressive Mobility Protocol (mobilize patient to their highest level of functioning at least twice daily) starting at 06/24 1436    Elevate HOB 30 starting at 06/24 1436    Ambulate With Assistance, Post Op Day 0 If the patient arrives from PACU by 4PM, walk at least once on day of operation if alert and safe to do so. starting at 06/24 1435          Prior     35 minutes of Critical care time was spent personally by me on the following activities: development of treatment plan with patient or surrogate and bedside caregivers, discussions with consultants, evaluation of patient's response to treatment, examination of patient, ordering and performing treatments and interventions, ordering and review of laboratory studies, ordering and review of radiographic  studies, pulse oximetry, antibiotic titration if applicable, vasopressor titration if applicable, re-evaluation of patient's condition. This critical care time did not overlap with that of any other provider or involve time for any procedures. There is high probability for acute neurological change leading to clinical and possibly life-threatening deterioration requiring highest level of provider preparedness for urgent intervention.     Cecilia Nelson PA-C  Neurocritical Care  [facility] - Neuro Critical Care       [1]   No current facility-administered medications on file prior to encounter.     Current Outpatient Medications on File Prior to Encounter   Medication Sig Dispense Refill    gabapentin (NEURONTIN) 300 MG capsule Take 300 mg by mouth every evening.      loratadine (CLARITIN) 10 mg tablet TAKE 1 TABLET BY MOUTH EVERY DAY 30 tablet 5    sumatriptan (IMITREX) 50 MG tablet Take 1 tablet (50 mg total) by mouth as needed for Migraine. 9 tablet 5   [2]   Social History  Tobacco Use    Smoking status: Never     Passive exposure: Current    Smokeless tobacco: Never    Tobacco comments:     Social smoker off and on since 14 but never regular smoker   Substance Use Topics    Alcohol use: Not Currently    Drug use: Not Currently     Types: Heroin, Methamphetamines

## 2025-06-24 NOTE — ASSESSMENT & PLAN NOTE
40 y.o. female w/ significant PMHx of IVDU, HTN, and fungal discitis who completed 6 weeks of IV antibiotics, empirically since initial cultures were negative using Ceftriaxone and Daptomycin. She also completed 3 months of Fluconazole high dose at 400mg PO q24h given Candida albicans positive cultures.  She is s/p T-10-pelvis fusion for degenerative scoliosis 2/2 discitis history.    -Admit to NCC, NSGY following  -q1h neuro checks, vital checks  -EKG, ECHO, CXR  -A1c, TSH, lipid panel, coag panel  -Daily CBC, CMP, mag, phos  -SBP < 160, prn labetalol and hydralazine  -SCDs, hold DVT chemoppx in acute post op setting  -PT/OT/SLP as appropriate  -Post op imaging per NSGY  -TLSO brace when OOB  -Vanc while drains in place per NSGY  -Fluconazole x 7 d per ID

## 2025-06-24 NOTE — PROGRESS NOTES
"Pharmacokinetic Initial Assessment: IV Vancomycin    Assessment/Plan:    Initiate intravenous vancomycin with loading dose of 1750 mg once followed by a maintenance dose of vancomycin 1250 mg IV every 12 hours. Desired empiric serum trough concentration is 10 to 20 mcg/mL, per clinical pharmacy team. Draw vancomycin trough level 60 min prior to fourth dose on 6/26 at approximately 0430.Pharmacy will continue to follow and monitor vancomycin.      Please contact pharmacy at extension [phone] with any questions regarding this assessment.     Thank you for the consult,   Tayla De Oliveira, RubensD       Patient brief summary:  Kayla Clay is a 40 y.o. female initiated on antimicrobial therapy with IV Vancomycin consult for treatment of suspected surgical prophylaxis post spine surgery.    Drug Allergies:   Review of patient's allergies indicates:   Allergen Reactions    Acetaminophen Hives    Doxycycline Rash     Rash on face and arms       Actual Body Weight:   87.3 kg    Renal Function:   Estimated Creatinine Clearance: 168.1 mL/min (based on SCr of 0.5 mg/dL).,     Dialysis Method (if applicable):  N/A    CBC (last 72 hours):  Recent Labs   Lab Result Units 06/24/25  1510   WBC K/uL 13.92*   HGB gm/dL 11.8*   HCT % 35.5*   Platelet Count K/uL 296   Lymph % % 7.8*   Mono % % 3.3*   Eos % % 0.0   Basophil % % 0.2       Metabolic Panel (last 72 hours):  Recent Labs   Lab Result Units 06/24/25  1510   Sodium mmol/L 139   Potassium mmol/L 3.9   Chloride mmol/L 112*   CO2 mmol/L 20*   Glucose mg/dL 150*   BUN mg/dL 13   Creatinine mg/dL 0.5   Albumin g/dL 3.4*   Bilirubin Total mg/dL 0.3   ALP unit/L 35*   AST unit/L 29   ALT unit/L 20       Drug levels (last 3 results):  No results for input(s): "VANCOMYCINRA", "VANCORANDOM", "VANCOMYCINPE", "VANCOPEAK", "VANCOMYCINTR", "VANCOTROUGH" in the last 72 hours.    Microbiologic Results:  Microbiology Results (last 7 days)       ** No results found for the last 168 hours. ** "

## 2025-06-24 NOTE — PLAN OF CARE
"[facility] Care Plan  POC reviewed with Kayla Clay and family at 1400. Patient verbalized understanding. Questions and concerns addressed. No acute events today. Pt progressing toward goals. Will continue to monitor. See below and flowsheets for full assessment and VS info.     -SBP <160  -MAP >65  -hemovac drains x3  -wound vac CDI  -PRN zofran administered for n/v  -PRN pain med administered         Is this a stroke patient? no    Neuro:  Aria Coma Scale  Best Eye Response: 3-->(E3) to speech  Best Motor Response: 6-->(M6) obeys commands  Best Verbal Response: 5-->(V5) oriented  [place] Coma Scale Score: 14  Pupil PERRLA: yes     24 hr Temp:  [97.7 °F (36.5 °C)-98 °F (36.7 °C)]     CV:   Rhythm: normal sinus rhythm  BP goals:   SBP < 160  MAP > 65    Resp:           Plan: N/A    GI/:     Diet/Nutrition Received: regular  Last Bowel Movement: 06/23/25       Intake/Output Summary (Last 24 hours) at 6/24/2025 1748  Last data filed at 6/24/2025 1420  Gross per 24 hour   Intake 3504.37 ml   Output 1385 ml   Net 2119.37 ml          Labs/Accuchecks:  Recent Labs   Lab 06/24/25  1510   WBC 13.92*   RBC 3.98*   HGB 11.8*   HCT 35.5*         Recent Labs   Lab 06/24/25  1510      K 3.9   CO2 20*   *   BUN 13   CREATININE 0.5   ALKPHOS 35*   ALT 20   AST 29   BILITOT 0.3    No results for input(s): "PROTIME", "INR", "APTT", "HEPANTIXA" in the last 168 hours. No results for input(s): "CPK", "CPKMB", "TROPONINI", "MB" in the last 168 hours.    Electrolytes: N/A - electrolytes WDL  Accuchecks: none    Gtts:   0.9% NaCl   Intravenous Continuous 100 mL/hr at 06/24/25 1553 New Bag at 06/24/25 1553       LDA/Wounds:    Petey Risk Assessment  Sensory Perception: 4-->no impairment  Moisture: 3-->occasionally moist  Activity: 1-->bedfast  Mobility: 2-->very limited  Nutrition: 2-->probably inadequate  Friction and Shear: 2-->potential problem  Petey Score: 14    Is your petey score 12 or less? no         "     WCTM       Problem: Adult Inpatient Plan of Care  Goal: Plan of Care Review  6/24/2025 1748 by Lindsay Fermin RN  Outcome: Progressing  6/24/2025 1704 by Lindsay Fermin RN  Outcome: Progressing  Goal: Patient-Specific Goal (Individualized)  6/24/2025 1748 by Lindsay Fermin RN  Outcome: Progressing  6/24/2025 1704 by Lindsay Fermin RN  Outcome: Progressing  Goal: Absence of Hospital-Acquired Illness or Injury  6/24/2025 1748 by Lindsay Fermin RN  Outcome: Progressing  6/24/2025 1704 by Lindsay Fermin RN  Outcome: Progressing  Goal: Optimal Comfort and Wellbeing  6/24/2025 1748 by Lindsay Fermin RN  Outcome: Progressing  6/24/2025 1704 by Lindsay Fermin RN  Outcome: Progressing  Goal: Readiness for Transition of Care  6/24/2025 1748 by Lindsay Fermin RN  Outcome: Progressing  6/24/2025 1704 by Lindsay Fermin RN  Outcome: Progressing     Problem: Infection  Goal: Absence of Infection Signs and Symptoms  6/24/2025 1748 by Lindsay Fermin RN  Outcome: Progressing  6/24/2025 1704 by Lindsay Fermin RN  Outcome: Progressing     Problem: Fall Injury Risk  Goal: Absence of Fall and Fall-Related Injury  6/24/2025 1748 by Lindsay Fermin RN  Outcome: Progressing  6/24/2025 1704 by Lindsay Fermin RN  Outcome: Progressing     Problem: Skin Injury Risk Increased  Goal: Skin Health and Integrity  6/24/2025 1748 by Lindsay Fermin RN  Outcome: Progressing  6/24/2025 1704 by Lindsay Fermin RN  Outcome: Progressing

## 2025-06-24 NOTE — H&P
"[facility] - Surgery ([facility])  Neuorsurgery  History and Physical     Patient Name: Kayla Clay  MRN: [id number]  Admission Date: 6/24/2025  Attending Physician: Les Vieyra MD   Primary Care Physician: Cee Mccauley NP    Patient information was obtained from patient and ER records.     Subjective:     Chief Complaint: No chief complaint on file.       HPI:  Ms. Kayla Clay is a 40 y.o. 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 Per last ID note, "7/5/23: Ms. Miller presents for follow-up today. She continues to have ongoing lower extremity weakness. She uses a cane for assistance with ambulation. She denies any fevers, chills, nausea, vomiting and diarrhea. She had been working with PT, but hasn't been able to follow-up in the past month. She saw a neurosurgeon on 5/26/23 in which he reports her most recent MRI showed resolution of epidural abscess and degenerative disc disease. No planned interventions at this time. Completed 6 weeks of IV antibiotics, empirically since initial cultures were negative using Ceftriaxone and Daptomycin. Completed 3 months of Fluconazole high dose at 400mg PO q24h given Candida albicans positive cultures. Did not tolerate Vancomycin or Doxycycline. MRI with no findings significant for residual infection. Neurosurgery without any interventions planned at this time. MRI with resolution of epidural abscess. Continue to work with PT to work on gait strength. No need for ongoing infectious workup at this time."     NSGY now planning for deformity surgery due to severe stenosis of lumbar spine. T10-pelvis fusion recommended by surgeons. Explained to patient that she completed adequate antimicrobial coverage for prior infection. IVDU is a huge risk factor for recurrence. To ensure sterility of site will prescribe course of PO fluconazole which she can stop one week post op. If evidence of " infection seen in OR will ask surgeons to send specimen for cultures. Patient voiced understanding.     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    Generalized anxiety disorder     Hypertension     IVDU (intravenous drug user) 08/18/2022    Leukopenia 09/09/2022    Obesity, unspecified     Scoliosis     Substance abuse Age 18    I used drugs off and on since i was 18 but im sober now and have been since early August 2022     Past Surgical History:   Procedure Laterality Date    LUMBAR LAMINECTOMY WITH SURGICAL REMOVAL OF LESION OF SPINAL CORD BY POSTERIOR APPROACH N/A 08/18/2022    Procedure: LAMINECTOMY, SPINE, LUMBAR, POSTERIOR APPROACH, WITH EXCISION OF NEOPLASM OR LESION OF SPINAL CORD;  Surgeon: Floyd Brewer MD;  Location: [facility];  Service: Neurosurgery;  Laterality: N/A;    SPINE SURGERY  August 16th, 2022    Spinal absess and Laminectomy     Family History       Problem Relation (Age of Onset)    No Known Problems Father, Mother    Pancreatic cancer Paternal Grandfather    Spine Surgery Paternal Uncle    Stomach cancer Maternal Grandfather          Tobacco Use    Smoking status: Never     Passive exposure: Current    Smokeless tobacco: Never    Tobacco comments:     Social smoker off and on since 14 but never regular smoker   Substance and Sexual Activity    Alcohol use: Not Currently    Drug use: Not Currently     Types: Heroin, Methamphetamines    Sexual activity: Not Currently     Partners: Male     Birth control/protection: Abstinence     Comment: STI: Chlamydia, Gonorrhea, Trich     Review of Systems  Objective:        There is no height or weight on file to calculate BMI.  Vital Signs (Most  "Recent):    Vital Signs (24h Range):                                    Physical Exam  Constitutional:       Appearance: She is well-developed and well-nourished.   Eyes:      Extraocular Movements: EOM normal.      Conjunctiva/sclera: Conjunctivae normal.      Pupils: Pupils are equal, round, and reactive to light.   Cardiovascular:      Pulses: Normal pulses.   Abdominal:      Palpations: Abdomen is soft.   Neurological:      Mental Status: She is alert and oriented to person, place, and time.      Comments: AAOx4  PERRL  Cranial nerves intact  BUE 5/5  BLE 5/5  SILT     Psychiatric:         Mood and Affect: Mood and affect normal.              Physical Exam:    Constitutional: She appears well-developed and well-nourished.     Eyes: Pupils are equal, round, and reactive to light. Conjunctivae and EOM are normal.     Cardiovascular: Normal pulses.     Abdominal: Soft.     Psych/Behavior: She is alert. She is oriented to person, place, and time. She has a normal mood and affect.     Neurological:   AAOx4  PERRL  Cranial nerves intact  BUE 5/5  BLE 5/5  SILT                 Significant Labs:  No results for input(s): "GLU", "NA", "K", "CL", "CO2", "BUN", "CREATININE", "CALCIUM", "MG" in the last 48 hours.  No results for input(s): "WBC", "HGB", "HCT", "PLT" in the last 48 hours.  No results for input(s): "LABPT", "INR", "APTT" in the last 48 hours.  Microbiology Results (last 7 days)       ** No results found for the last 168 hours. **          All pertinent labs from the last 24 hours have been reviewed.    Significant Diagnostics:  I have reviewed all pertinent imaging results/findings within the past 24 hours.    Assessment and Plan:     * Degenerative scoliosis in adult patient  Ms. Kayla Clay is a 40 y.o. 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 Completed 6 weeks of IV antibiotics, empirically since initial cultures were " negative using Ceftriaxone and Daptomycin. Completed 3 months of Fluconazole high dose at 400mg PO q24h given Candida albicans positive cultures. Did not tolerate Vancomycin or Doxycycline.    Patient has completed adequate antimicrobial coverage for prior infection. IVDU is a huge risk factor for recurrence. To ensure sterility of site she will need PO fluconazole which she can stop one week post op.    Plan for T-10-pelvis fusion.         Mariza Chatman MD  Neurosurgery  [facility] - Surgery (2nd Fl)           [1]   Medications Prior to Admission   Medication Sig Dispense Refill Last Dose/Taking    amLODIPine (NORVASC) 5 MG tablet TAKE 1 TABLET BY MOUTH EVERY DAY 30 tablet 1     baclofen (LIORESAL) 10 MG tablet Take 1 tablet (10 mg total) by mouth nightly. 30 tablet 11     EScitalopram oxalate (LEXAPRO) 20 MG tablet TAKE 1 TABLET BY MOUTH EVERY DAY 30 tablet 5     famotidine (PEPCID) 40 MG tablet TAKE 1 TABLET BY MOUTH EVERY DAY 30 tablet 5     fluconazole (DIFLUCAN) 200 MG Tab Take 2 tablets (400 mg total) by mouth once daily. for 21 days 42 tablet 0     gabapentin (NEURONTIN) 300 MG capsule Take 300 mg by mouth every evening.       loratadine (CLARITIN) 10 mg tablet TAKE 1 TABLET BY MOUTH EVERY DAY 30 tablet 5     sumatriptan (IMITREX) 50 MG tablet Take 1 tablet (50 mg total) by mouth as needed for Migraine. 9 tablet 5

## 2025-06-24 NOTE — HPI
Ms. Kayla Clay is a 40 y.o. female w/ significant PMHx of IVDU, HTN, and fungal discitis who presents to [facility] s/p T10-pelvis fusion. She initially presented to NSGY on an outpatient basis after longtime follow up with ID. She continued to have ongoing lower extremity weakness, using a cane for assistance with ambulation. She followed with NSGY on 5/26/23, where MRI showed resolution of epidural abscess and degenerative disc disease. She completed 6 weeks of IV antibiotics, empirically since initial cultures were negative using Ceftriaxone and Daptomycin. She also completed 3 months of Fluconazole high dose at 400mg PO q24h given Candida albicans positive cultures. She notably did not tolerate Vancomycin or Doxycycline. MRI with no findings significant for residual infection, though she is s/p T10-pelvis fusion to address severe stenosis of lumbar spine.     She is admitted to [facility] for hourly neuromonitoring post operatively.   Yes

## 2025-06-24 NOTE — SUBJECTIVE & OBJECTIVE
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    Generalized anxiety disorder     Hypertension     IVDU (intravenous drug user) 08/18/2022    Leukopenia 09/09/2022    Obesity, unspecified     Scoliosis     Substance abuse Age 18    I used drugs off and on since i was 18 but im sober now and have been since early August 2022     Past Surgical History:   Procedure Laterality Date    LUMBAR LAMINECTOMY WITH SURGICAL REMOVAL OF LESION OF SPINAL CORD BY POSTERIOR APPROACH N/A 08/18/2022    Procedure: LAMINECTOMY, SPINE, LUMBAR, POSTERIOR APPROACH, WITH EXCISION OF NEOPLASM OR LESION OF SPINAL CORD;  Surgeon: Floyd Brewer MD;  Location: [facility];  Service: Neurosurgery;  Laterality: N/A;    SPINE SURGERY  August 16th, 2022    Spinal absess and Laminectomy     Family History       Problem Relation (Age of Onset)    No Known Problems Father, Mother    Pancreatic cancer Paternal Grandfather    Spine Surgery Paternal Uncle    Stomach cancer Maternal Grandfather          Tobacco Use    Smoking status: Never     Passive exposure: Current    Smokeless tobacco: Never    Tobacco comments:     Social smoker off and on since 14 but never regular smoker   Substance and Sexual Activity    Alcohol use: Not Currently    Drug use: Not Currently     Types: Heroin, Methamphetamines    Sexual activity: Not Currently     Partners: Male     Birth control/protection: Abstinence     Comment: STI: Chlamydia, Gonorrhea, Trich     Review of Systems  Objective:        There is no height or weight on file to calculate BMI.  Vital Signs (Most Recent):    Vital Signs (24h Range):                                    Physical Exam  Constitutional:        "Appearance: She is well-developed and well-nourished.   Eyes:      Extraocular Movements: EOM normal.      Conjunctiva/sclera: Conjunctivae normal.      Pupils: Pupils are equal, round, and reactive to light.   Cardiovascular:      Pulses: Normal pulses.   Abdominal:      Palpations: Abdomen is soft.   Neurological:      Mental Status: She is alert and oriented to person, place, and time.      Comments: AAOx4  PERRL  Cranial nerves intact  BUE 5/5  BLE 5/5  SILT     Psychiatric:         Mood and Affect: Mood and affect normal.              Physical Exam:    Constitutional: She appears well-developed and well-nourished.     Eyes: Pupils are equal, round, and reactive to light. Conjunctivae and EOM are normal.     Cardiovascular: Normal pulses.     Abdominal: Soft.     Psych/Behavior: She is alert. She is oriented to person, place, and time. She has a normal mood and affect.     Neurological:   AAOx4  PERRL  Cranial nerves intact  BUE 5/5  BLE 5/5  SILT                 Significant Labs:  No results for input(s): "GLU", "NA", "K", "CL", "CO2", "BUN", "CREATININE", "CALCIUM", "MG" in the last 48 hours.  No results for input(s): "WBC", "HGB", "HCT", "PLT" in the last 48 hours.  No results for input(s): "LABPT", "INR", "APTT" in the last 48 hours.  Microbiology Results (last 7 days)       ** No results found for the last 168 hours. **          All pertinent labs from the last 24 hours have been reviewed.    Significant Diagnostics:  I have reviewed all pertinent imaging results/findings within the past 24 hours.       [1]   Medications Prior to Admission   Medication Sig Dispense Refill Last Dose/Taking    amLODIPine (NORVASC) 5 MG tablet TAKE 1 TABLET BY MOUTH EVERY DAY 30 tablet 1     baclofen (LIORESAL) 10 MG tablet Take 1 tablet (10 mg total) by mouth nightly. 30 tablet 11     EScitalopram oxalate (LEXAPRO) 20 MG tablet TAKE 1 TABLET BY MOUTH EVERY DAY 30 tablet 5     famotidine (PEPCID) 40 MG tablet TAKE 1 TABLET BY " MOUTH EVERY DAY 30 tablet 5     fluconazole (DIFLUCAN) 200 MG Tab Take 2 tablets (400 mg total) by mouth once daily. for 21 days 42 tablet 0     gabapentin (NEURONTIN) 300 MG capsule Take 300 mg by mouth every evening.       loratadine (CLARITIN) 10 mg tablet TAKE 1 TABLET BY MOUTH EVERY DAY 30 tablet 5     sumatriptan (IMITREX) 50 MG tablet Take 1 tablet (50 mg total) by mouth as needed for Migraine. 9 tablet 5

## 2025-06-24 NOTE — ANESTHESIA PROCEDURE NOTES
Intubation    Date/Time: 6/24/2025 7:10 AM    Performed by: Flako Garcia MD  Authorized by: Harsha Hernandez MD    Intubation:     Induction:  Intravenous    Intubated:  Postinduction    Mask Ventilation:  Easy with oral airway    Attempts:  1    Attempted By:  Resident anesthesiologist    Method of Intubation:  Direct    Blade:  Wally 3    Laryngeal View Grade: Grade I - full view of cords      Difficult Airway Encountered?: No      Complications:  None    Airway Device:  Oral endotracheal tube    Airway Device Size:  7.0    Style/Cuff Inflation:  Cuffed (inflated to minimal occlusive pressure)    Tube secured:  22    Secured at:  The lips    Placement Verified By:  Capnometry and Revisualization with laryngoscopy    Complicating Factors:  None    Findings Post-Intubation:  BS equal bilateral and atraumatic/condition of teeth unchanged

## 2025-06-24 NOTE — OP NOTE
DATE OF SURGERY: 6/24/25     PREOPERATIVE DIAGNOSIS:  1. Kyphoscoliosis with sagittal imbalance  2. History of lumbar laminectomy for candida a. abscess, and IV drug abuse, Class I obesity     POSTOPERATIVE DIAGNOSIS:  Same.     PROCEDURE PERFORMED:  1. Posterior spinal fusion, T10 to pelvis   2. Posterior segmental spinal fixation, T10 to S1 (DePuy Synthes)  3. Pelvic fixation with S2AI screws (Depuy Synthes)  4. Posterior lumbar interbody fusion, L5-S1, L2-3 and L1-2  5.  Application of titanium interbody spacer, L5-S1, L2-3 and L1-2 (DePuy Synthes)  6.  Bernice osteotomy, T12, L1, L2, L5  7.  Use of intraoperative fluoroscopy  8.  Use of intraoperative neuro-monitoring with MEPs  9. Use of intraoperative CT neuronavigation  10. FIbergraft and local bone grafting  12. 15cm complex wound revision by advancement of myofascial paraspinal flaps     SURGEON: Les Vieyra M.D.     ASSISTANT: Mariza Chatman M.D.     ANESTHESIA: GETA     ESTIMATED BLOOD LOSS: 500mL     COMPLICATIONS: None     DRAINS: Two deep Hemovacs and a Prevena incisional woundvac     SPECIMENS SENT: None     FINDINGS: None     INDICATIONS:     40F with kyphoscoliosis causing severe debilitating axial low back pain, stooped posture and radicular leg pain that failed conservative measures. I recommended the above procedure. R/B/A/I/M were reviewed in detail and the patient wished to proceed. All questions were answered and no guarantees were made.  .  PROCEDURE:     The patient was brought into the operating room where she was intubated and placed under general anesthesia without difficulty.  All lines were placed. She was repositioned prone onto the operating room table with appropriate padding all pressure points. The region of interest was localized with AP and lateral x-ray.  The skin was marked and the area was prepped and draped in the usual sterile fashion.  A timeout was performed prior to procedure.  20 mL's of lidocaine with epinephrine  were injected in the skin.     A midline linear incision was made with a 10 blade from approximately T10 to sacrum, incorporating her previous midline scar .  Supra and subfascial midline dissection was carried out with Bovie electrocautery.  Subperiosteal dissection was carried out with Bovie electrocautery and Maxwell elevators to expose the posterior elements from T10 to sacrum. Medial facetectomies were performed bilaterally at T10-11 through L5-S1 with the osteotome.       The CT neuro navigation array was docked onto the right PSIS throught a separate stab incision and a CT spin was acquired.  The intraoperative CT confirmed levels..  Using neuro-navigation pedicle screws were placed bilaterally from T10 to S1. All lumbar screws were stimulated with the neuromonitoring probe and found to stimulate within safe range. Bilateral pelvic fixation was achieved with S2AI screws, using navigation. Spinal xrays confirmed good hardware position. We augmented bilateral T10, T11 and L2 screws with cement under flouroscopy, and we confirmed no significant cement extravasation.     Attention was turned to performance of a posterior lumbar interbody fusion at L5-S1 from a right-sided approach. First an L5-S1 laminectomy was performed to access the thecal sac, nerve roots and disc space. It was performed in standard fashion with the high speed drill and rongeurs. Adequate decompression of the thecal sac and nerve roots were confirmed with [place] palpation.  Next, an L5 Bernice osteotomy was performed in standard fashion with the osteotome and ronguers to reduce the deformity. Next, a nerve root retractor was used to retract the thecal sac medially and expose the L5-S1 disc space.  Epidural veins were cauterized and divided.  An annulotomy was performed with a 15 blade.  A pituitary rongeur was used to remove disc material.  The endplates were prepared with disc lui, rasps, and curettes. A titanium cage was countersunk into  the L5-S1 disc space and had snug fit. The L5-S1 disc space was pre and post packed with local bone for interbody arthrodesis. Xrays showed good cage position.     An L1-2 and L2-3 TLIF was performed in identical fashion. Xrays showed good cage position at each level. T12, L1 and L2 Bernice osteotomies were also performed in standard fashion to reduce her deformity.    Bilateral titanium rods were sized, contoured and reduced into the tulip heads.  Set screws were tightened. Sequential compression was performed bilaterally across the T12, L1, L2 and L5 Bernice osteotomies to promote lumbar lordosis. Final xrays showed excellent reduction of the deformity and excellent position of all hardware.  The wound was copiously irrigated with sterile normal saline and a dilute Betadine solution. Exposed bony surfaces from T10 to sacrum were decorticated bilaterally with a high-speed drill.  Local bone and Fibergraft were placed posteriorly for arthrodesis from T10 to sacrum. Two deep Hemovacs were placed. A 15cm complex wound revision was performed by advancing paraspinal myofascial flaps, based on lateral arterial feeders, to the midline to obliterate the fascial defect.   A watertight fascial closure was achieved with interrupted 0 Vicryl sutures and a running Stratafix suture.  The soft tissues were closed in layers. Staples and vertical mattress nylon sutures were placed at the skin and a Prevena dressing was applied.     The patient appeared to tolerate the procedure well from a hemodynamic and neuromonitoring standpoint. MEPs were present and stable in all extremities throughout the case. I was present for all critical portions of the case, and at the end of the case all counts were correct. The patient was repositioned supine onto the hospital bed where she was extubated and allowed to emerge from anesthesia. She was sent to the ICU in stable condition for recovery.    JUSTIFICATION OF MODIFIER 22: This was a complex  spinal deformity operation for kyphoscoliosis in a patient with prior lumbar surgery, and a history of IV drug abuse, spinal abscess and Candida Albicans infection. Her inherent risks were elevated over similar surgeries. It required significantly increased preop planning, mental effort, technical skill and time to perform it safely.

## 2025-06-24 NOTE — TRANSFER OF CARE
"Anesthesia Transfer of Care Note    Patient: Kayla Clay    Procedure(s) Performed: Procedure(s) (LRB):  *AIRO* T10 TO PELVIS POSTERIOR INSTRUMENTED FUSION/AIRO (N/A)    Patient location: ICU    Anesthesia Type: general    Transport from OR: Transported from OR on 6-10 L/min O2 by face mask with adequate spontaneous ventilation    Post pain: adequate analgesia    Post assessment: no apparent anesthetic complications    Post vital signs: stable    Level of consciousness: awake    Complications: none    Transfer of care protocol was followed      Last vitals: Visit Vitals  BP (!) 91/56 (BP Location: Right arm, Patient Position: Lying)   Pulse 79   Temp 36.5 °C (97.7 °F) (Axillary)   Resp 18   Ht 5' 7" (1.702 m)   Wt 87.3 kg (192 lb 7.4 oz)   SpO2 99%   Breastfeeding No   BMI 30.14 kg/m²     "

## 2025-06-24 NOTE — BRIEF OP NOTE
[facility] - Surgery ([facility])  Brief Operative Note    SUMMARY     Surgery Date: 6/24/2025     Surgeons and Role:     * Les Vieyra MD - Primary     * Mariza Chatman MD - Resident - Assisting     * Carl Oliva MD - Resident - Assisting        Pre-op Diagnosis:  Scoliosis, unspecified scoliosis type, unspecified spinal region [M41.9]  Sagittal plane imbalance [M43.8X9]    Post-op Diagnosis:  Post-Op Diagnosis Codes:     * Scoliosis, unspecified scoliosis type, unspecified spinal region [M41.9]     * Sagittal plane imbalance [M43.8X9]    Procedure(s) (LRB):  *AIRO* T10 TO PELVIS POSTERIOR INSTRUMENTED FUSION/AIRO (N/A)    Anesthesia: General    Implants:  Implant Name Type Inv. Item Serial No.  Lot No. LRB No. Used Action   KIT CONFIDENCE W/O NEEDLES - [id number]  KIT CONFIDENCE W/O NEEDLES  [employer] [id number] N/A 1 Implanted   SCREW EXPEDIUM FEN STRL 5X30MM - [id number]  SCREW EXPEDIUM FEN STRL 5X30MM  [employer] [id number] N/A 1 Implanted   SCREW EXPEDIUM FEN STRL 5X30MM - [id number]  SCREW EXPEDIUM FEN STRL 5X30MM  [employer] [id number] N/A 1 Implanted   SCREW EXPEDIUM FEN STRL 5X30MM - [id number]  SCREW EXPEDIUM FEN STRL 5X30MM  [employer] [id number] N/A 1 Implanted   SCREW EXPEDIUM FEN STRL 5X30MM - [id number]  SCREW EXPEDIUM FEN STRL 5X30MM  [employer] [id number] N/A 1 Implanted   SCREW EXPEDIUM FEN STRL 6X45MM - [id number]  SCREW EXPEDIUM FEN STRL 6X45MM  [employer] [id number] N/A 1 Implanted   SCREW EXPEDIUM FEN STRL 6X45MM - [id number]  SCREW EXPEDIUM FEN STRL 6X45MM  [employer] [id number] N/A 1 Implanted   EIT TLIF, H 12 MM, 12 DEGREE, 32/12    [employer] [id number] N/A 1 Implanted   CAGE EIT PLIF 8D 94A4Z4RE - [id number]  CAGE EIT PLIF 8D [id number]  [employer] [id number] N/A 1 Implanted   CAGE EIT PLIF 8D 03W0V3LM - [id number]  CAGE EIT PLIF 8D 85W5Y8YZ  [employer] [id number] N/A 1 Implanted   SCREW EXPEDIUM VERSE PA 6X50MM - [id number]  SCREW EXPEDIUM VERSE PA 6X50MM  [employer]  N/A 2 Implanted   SCREW EXPEDIUM VERSE PA 8X80MM -  [id number]  SCREW EXPEDIUM VERSE PA 8X80MM  [employer]  N/A 2 Implanted   SET SCREW EXPEDIUM VERSE UNITZ - [id number]  SET SCREW EXPEDIUM VERSE UNITZ  [employer]  N/A 8 Implanted   SCREW BONE SPINAL 5.5 5 X 40MM - [id number]  SCREW BONE SPINAL 5.5 5 X 40MM  [employer]  N/A 1 Implanted   SCREW BONE SPINAL 5.5 6 X 50MM - [id number]  SCREW BONE SPINAL 5.5 6 X 50MM  [employer]  N/A 2 Implanted   SCREW BONE SPINAL 5.5 5 X 40MM - [id number]  SCREW BONE SPINAL 5.5 5 X 40MM  [employer]  N/A 2 Implanted   SCREW BONE SPINAL 5.5 6 X 45MM - [id number]  SCREW BONE SPINAL 5.5 6 X 45MM  [employer]  N/A 2 Implanted   5 X 40 UNI    [employer]  N/A 1 Implanted   6 X 45 UNI    [employer]  N/A 1 Implanted   SCREW 50MM 6MM SPNE UNIPLANAR - [id number]  SCREW 50MM 6MM SPNE UNIPLANAR  [employer]  N/A 1 Implanted   SCREW INNER SINGLE SET TITANIU - [id number]  SCREW INNER SINGLE SET TITANIU  [employer]  N/A 11 Implanted   MATRIX FIBERGRAFT BG LG 12.5CC - [id number]  MATRIX FIBERGRAFT BG LG 12.5CC  [employer] [id number] N/A 1 Implanted   MATRIX FIBERGRAFT BG LG 12.5CC - [id number]  MATRIX FIBERGRAFT BG LG 12.5CC  [employer] [id number] N/A 1 Implanted   LUIS DANIEL SPINE MTRX STR [id number] - [id number]  LUIS DANIEL SPINE MTRX STR [id number]  [employer]  N/A 2 Implanted   LUIS DANIEL SPINAL PRECUT 5.5 X 480MM - [id number]  LUIS DANIEL SPINAL PRECUT 5.5 X 480MM  [employer]  N/A 1 Implanted   CONNECTOR SPINAL SFX A6 5.5MM - [id number]  CONNECTOR SPINAL SFX A6 5.5MM  [employer]  N/A 1 Implanted   CONNECTOR SPINAL SFX A4 TI - [id number]  CONNECTOR SPINAL SFX A4 TI  [employer]  N/A 1 Implanted       Operative Findings: T10 to pelvis fusion    Estimated Blood Loss: * No values recorded between 6/24/2025  7:00 AM and 6/24/2025  2:35 PM *    Estimated Blood Loss has been documented.         Specimens:   Specimen (24h ago, onward)      None          * No specimens in log *    [id number]

## 2025-06-25 LAB
ABSOLUTE EOSINOPHIL (OHS): 0 K/UL
ABSOLUTE MONOCYTE (OHS): 0.86 K/UL (ref 0.3–1)
ABSOLUTE NEUTROPHIL COUNT (OHS): 9.78 K/UL (ref 1.8–7.7)
ALBUMIN SERPL BCP-MCNC: 3.5 G/DL (ref 3.5–5.2)
ALP SERPL-CCNC: 35 UNIT/L (ref 40–150)
ALT SERPL W/O P-5'-P-CCNC: 19 UNIT/L (ref 10–44)
ANION GAP (OHS): 8 MMOL/L (ref 8–16)
AST SERPL-CCNC: 27 UNIT/L (ref 11–45)
BASOPHILS # BLD AUTO: 0.02 K/UL
BASOPHILS NFR BLD AUTO: 0.2 %
BILIRUB SERPL-MCNC: 0.4 MG/DL (ref 0.1–1)
BUN SERPL-MCNC: 13 MG/DL (ref 6–20)
CALCIUM SERPL-MCNC: 7.9 MG/DL (ref 8.7–10.5)
CHLORIDE SERPL-SCNC: 108 MMOL/L (ref 95–110)
CO2 SERPL-SCNC: 21 MMOL/L (ref 23–29)
CREAT SERPL-MCNC: 0.6 MG/DL (ref 0.5–1.4)
ERYTHROCYTE [DISTWIDTH] IN BLOOD BY AUTOMATED COUNT: 12.7 % (ref 11.5–14.5)
GFR SERPLBLD CREATININE-BSD FMLA CKD-EPI: >60 ML/MIN/1.73/M2
GLUCOSE SERPL-MCNC: 147 MG/DL (ref 70–110)
HCT VFR BLD AUTO: 32.9 % (ref 37–48.5)
HGB BLD-MCNC: 10.7 GM/DL (ref 12–16)
IMM GRANULOCYTES # BLD AUTO: 0.05 K/UL (ref 0–0.04)
IMM GRANULOCYTES NFR BLD AUTO: 0.4 % (ref 0–0.5)
LYMPHOCYTES # BLD AUTO: 0.66 K/UL (ref 1–4.8)
MAGNESIUM SERPL-MCNC: 1.9 MG/DL (ref 1.6–2.6)
MCH RBC QN AUTO: 29.4 PG (ref 27–31)
MCHC RBC AUTO-ENTMCNC: 32.5 G/DL (ref 32–36)
MCV RBC AUTO: 90 FL (ref 82–98)
NUCLEATED RBC (/100WBC) (OHS): 0 /100 WBC
PHOSPHATE SERPL-MCNC: 3.7 MG/DL (ref 2.7–4.5)
PLATELET # BLD AUTO: 235 K/UL (ref 150–450)
PMV BLD AUTO: 10.5 FL (ref 9.2–12.9)
POTASSIUM SERPL-SCNC: 4.6 MMOL/L (ref 3.5–5.1)
PROT SERPL-MCNC: 5.9 GM/DL (ref 6–8.4)
RBC # BLD AUTO: 3.64 M/UL (ref 4–5.4)
RELATIVE EOSINOPHIL (OHS): 0 %
RELATIVE LYMPHOCYTE (OHS): 5.8 % (ref 18–48)
RELATIVE MONOCYTE (OHS): 7.6 % (ref 4–15)
RELATIVE NEUTROPHIL (OHS): 86 % (ref 38–73)
SODIUM SERPL-SCNC: 137 MMOL/L (ref 136–145)
WBC # BLD AUTO: 11.37 K/UL (ref 3.9–12.7)

## 2025-06-25 PROCEDURE — 94761 N-INVAS EAR/PLS OXIMETRY MLT: CPT

## 2025-06-25 PROCEDURE — 25000003 PHARM REV CODE 250: Performed by: STUDENT IN AN ORGANIZED HEALTH CARE EDUCATION/TRAINING PROGRAM

## 2025-06-25 PROCEDURE — 97162 PT EVAL MOD COMPLEX 30 MIN: CPT

## 2025-06-25 PROCEDURE — 84100 ASSAY OF PHOSPHORUS: CPT

## 2025-06-25 PROCEDURE — 11000001 HC ACUTE MED/SURG PRIVATE ROOM

## 2025-06-25 PROCEDURE — 63600175 PHARM REV CODE 636 W HCPCS

## 2025-06-25 PROCEDURE — 99233 SBSQ HOSP IP/OBS HIGH 50: CPT | Mod: ,,, | Performed by: PSYCHIATRY & NEUROLOGY

## 2025-06-25 PROCEDURE — 85025 COMPLETE CBC W/AUTO DIFF WBC: CPT

## 2025-06-25 PROCEDURE — 63600175 PHARM REV CODE 636 W HCPCS: Performed by: NEUROLOGICAL SURGERY

## 2025-06-25 PROCEDURE — 80053 COMPREHEN METABOLIC PANEL: CPT

## 2025-06-25 PROCEDURE — 27000221 HC OXYGEN, UP TO 24 HOURS

## 2025-06-25 PROCEDURE — 97166 OT EVAL MOD COMPLEX 45 MIN: CPT

## 2025-06-25 PROCEDURE — 97112 NEUROMUSCULAR REEDUCATION: CPT

## 2025-06-25 PROCEDURE — 63600175 PHARM REV CODE 636 W HCPCS: Performed by: STUDENT IN AN ORGANIZED HEALTH CARE EDUCATION/TRAINING PROGRAM

## 2025-06-25 PROCEDURE — 25000003 PHARM REV CODE 250: Performed by: PSYCHIATRY & NEUROLOGY

## 2025-06-25 PROCEDURE — 97530 THERAPEUTIC ACTIVITIES: CPT

## 2025-06-25 PROCEDURE — 25000003 PHARM REV CODE 250: Performed by: NEUROLOGICAL SURGERY

## 2025-06-25 PROCEDURE — 99900035 HC TECH TIME PER 15 MIN (STAT)

## 2025-06-25 PROCEDURE — 97116 GAIT TRAINING THERAPY: CPT

## 2025-06-25 PROCEDURE — 83735 ASSAY OF MAGNESIUM: CPT

## 2025-06-25 RX ORDER — ESCITALOPRAM OXALATE 20 MG/1
20 TABLET ORAL NIGHTLY
Status: DISCONTINUED | OUTPATIENT
Start: 2025-06-25 | End: 2025-06-29 | Stop reason: HOSPADM

## 2025-06-25 RX ORDER — GABAPENTIN 300 MG/1
300 CAPSULE ORAL 3 TIMES DAILY
Status: CANCELLED | OUTPATIENT
Start: 2025-06-25

## 2025-06-25 RX ORDER — CEFAZOLIN SODIUM 1 G/3ML
1 INJECTION, POWDER, FOR SOLUTION INTRAMUSCULAR; INTRAVENOUS
Status: DISCONTINUED | OUTPATIENT
Start: 2025-06-25 | End: 2025-06-29 | Stop reason: HOSPADM

## 2025-06-25 RX ADMIN — OXYCODONE 15 MG: 5 TABLET ORAL at 09:06

## 2025-06-25 RX ADMIN — ESCITALOPRAM OXALATE 20 MG: 20 TABLET ORAL at 08:06

## 2025-06-25 RX ADMIN — OXYCODONE HYDROCHLORIDE 10 MG: 10 TABLET ORAL at 03:06

## 2025-06-25 RX ADMIN — Medication 6 MG: at 08:06

## 2025-06-25 RX ADMIN — CEFAZOLIN 1 G: 330 INJECTION, POWDER, FOR SOLUTION INTRAMUSCULAR; INTRAVENOUS at 04:06

## 2025-06-25 RX ADMIN — HYDROMORPHONE HYDROCHLORIDE 1 MG: 1 INJECTION, SOLUTION INTRAMUSCULAR; INTRAVENOUS; SUBCUTANEOUS at 02:06

## 2025-06-25 RX ADMIN — CEFAZOLIN 1 G: 330 INJECTION, POWDER, FOR SOLUTION INTRAMUSCULAR; INTRAVENOUS at 11:06

## 2025-06-25 RX ADMIN — CALCIUM CARBONATE (ANTACID) CHEW TAB 500 MG 500 MG: 500 CHEW TAB at 08:06

## 2025-06-25 RX ADMIN — FLUCONAZOLE 400 MG: 200 TABLET ORAL at 08:06

## 2025-06-25 RX ADMIN — SENNOSIDES AND DOCUSATE SODIUM 2 TABLET: 50; 8.6 TABLET ORAL at 08:06

## 2025-06-25 RX ADMIN — HYDROMORPHONE HYDROCHLORIDE 1 MG: 1 INJECTION, SOLUTION INTRAMUSCULAR; INTRAVENOUS; SUBCUTANEOUS at 06:06

## 2025-06-25 RX ADMIN — METHOCARBAMOL 750 MG: 750 TABLET ORAL at 01:06

## 2025-06-25 RX ADMIN — DIAZEPAM 5 MG: 5 TABLET ORAL at 09:06

## 2025-06-25 RX ADMIN — OXYCODONE 15 MG: 5 TABLET ORAL at 07:06

## 2025-06-25 RX ADMIN — HYDROMORPHONE HYDROCHLORIDE 1 MG: 1 INJECTION, SOLUTION INTRAMUSCULAR; INTRAVENOUS; SUBCUTANEOUS at 07:06

## 2025-06-25 RX ADMIN — OXYCODONE 15 MG: 5 TABLET ORAL at 11:06

## 2025-06-25 RX ADMIN — HYDROMORPHONE HYDROCHLORIDE 1 MG: 1 INJECTION, SOLUTION INTRAMUSCULAR; INTRAVENOUS; SUBCUTANEOUS at 10:06

## 2025-06-25 RX ADMIN — ONDANSETRON 8 MG: 2 INJECTION INTRAMUSCULAR; INTRAVENOUS at 07:06

## 2025-06-25 RX ADMIN — OXYCODONE 15 MG: 5 TABLET ORAL at 05:06

## 2025-06-25 RX ADMIN — HYDROMORPHONE HYDROCHLORIDE 1 MG: 1 INJECTION, SOLUTION INTRAMUSCULAR; INTRAVENOUS; SUBCUTANEOUS at 01:06

## 2025-06-25 RX ADMIN — MUPIROCIN: 20 OINTMENT TOPICAL at 08:06

## 2025-06-25 RX ADMIN — OXYCODONE 15 MG: 5 TABLET ORAL at 01:06

## 2025-06-25 RX ADMIN — BACLOFEN 10 MG: 10 TABLET ORAL at 08:06

## 2025-06-25 RX ADMIN — SODIUM CHLORIDE: 9 INJECTION, SOLUTION INTRAVENOUS at 04:06

## 2025-06-25 RX ADMIN — VANCOMYCIN HYDROCHLORIDE 1250 MG: 1.25 INJECTION, POWDER, LYOPHILIZED, FOR SOLUTION INTRAVENOUS at 04:06

## 2025-06-25 RX ADMIN — METHOCARBAMOL 750 MG: 750 TABLET ORAL at 08:06

## 2025-06-25 RX ADMIN — FAMOTIDINE 40 MG: 20 TABLET, FILM COATED ORAL at 08:06

## 2025-06-25 RX ADMIN — GABAPENTIN 300 MG: 300 CAPSULE ORAL at 08:06

## 2025-06-25 RX ADMIN — METHOCARBAMOL 750 MG: 750 TABLET ORAL at 04:06

## 2025-06-25 NOTE — PROGRESS NOTES
[facility] - Neuro Critical Care  Neurocritical Care  Progress Note    Admit Date: 6/24/2025  Service Date: 06/25/2025  Length of Stay: 1    Subjective:     Chief Complaint: Degenerative scoliosis in adult patient    History of Present Illness: Ms. Kayla Clay is a 40 y.o. female w/ significant PMHx of IVDU, HTN, and fungal discitis who presents to [facility] s/p T10-pelvis fusion. She initially presented to NSGY on an outpatient basis after longtime follow up with ID. She continued to have ongoing lower extremity weakness, using a cane for assistance with ambulation. She followed with NSGY on 5/26/23, where MRI showed resolution of epidural abscess and degenerative disc disease. She completed 6 weeks of IV antibiotics, empirically since initial cultures were negative using Ceftriaxone and Daptomycin. She also completed 3 months of Fluconazole high dose at 400mg PO q24h given Candida albicans positive cultures. She notably did not tolerate Vancomycin or Doxycycline. MRI with no findings significant for residual infection, though she is s/p T10-pelvis fusion to address severe stenosis of lumbar spine.     She is admitted to [facility] for hourly neuromonitoring post operatively.    Hospital Course: 06/25/2025  Resting comfortably in bed. Remains on fluconazole. Vancomycin changed to cefazolin.      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    Generalized anxiety disorder     Hypertension     IVDU (intravenous drug user) 08/18/2022    Leukopenia 09/09/2022    Obesity, unspecified     Scoliosis     Substance abuse Age 18    I used drugs off and on since i was 18 but im sober now and have been since early August 2022     Past Surgical History:   Procedure Laterality Date    LUMBAR FUSION N/A 6/24/2025    Procedure: *AIRO* T10 TO PELVIS POSTERIOR INSTRUMENTED FUSION/AIRO;  Surgeon: Les Vieyra MD;   [facility];  Service: Neurosurgery;  Laterality: N/A;  T10 TO PELVIS POSTERIOR INSTRUMENTED FUSION/ AIRO    LUMBAR LAMINECTOMY WITH SURGICAL REMOVAL OF LESION OF SPINAL CORD BY POSTERIOR APPROACH N/A 08/18/2022    Procedure: LAMINECTOMY, SPINE, LUMBAR, POSTERIOR APPROACH, WITH EXCISION OF NEOPLASM OR LESION OF SPINAL CORD;  Surgeon: Floyd Brewer MD;  Location: [facility] OR;  Service: Neurosurgery;  Laterality: N/A;    SPINE SURGERY  August 16th, 2022    Spinal absess and Laminectomy      Medications Ordered Prior to Encounter[1]   Allergies: Acetaminophen and Doxycycline  Family History   Problem Relation Name Age of Onset    Pancreatic cancer Paternal Grandfather          80's    Stomach cancer Maternal Grandfather          70's - +metastasis    No Known Problems Father      No Known Problems Mother      Spine Surgery Paternal Uncle Reggie Enegl         On my dads side of the family a lot of us have spine issues.    Breast cancer Neg Hx      Colon cancer Neg Hx      Ovarian cancer Neg Hx      Cervical cancer Neg Hx      Uterine cancer Neg Hx       Social History[2]  Review of Systems   Gastrointestinal:  Positive for nausea.   Musculoskeletal:  Positive for back pain.     Objective:     Vitals:    Temp: 98.2 °F (36.8 °C)  Pulse: 93  Rhythm: normal sinus rhythm  BP: (!) 122/58  MAP (mmHg): 83  Resp: (!) 28  SpO2: 98 %    Temp  Min: 97.7 °F (36.5 °C)  Max: 98.2 °F (36.8 °C)  Pulse  Min: 61  Max: 110  BP  Min: 103/68  Max: 125/76  MAP (mmHg)  Min: 74  Max: 96  Resp  Min: 9  Max: 28  SpO2  Min: 89 %  Max: 98 %    06/24 0701 - 06/25 0700  In: 5445 [I.V.:1208.3]  Out: 2788 [Urine:2175; Drains:613]            Physical Exam  HENT:      Head: Normocephalic and atraumatic.   Cardiovascular:      Rate and Rhythm: Normal rate and regular rhythm.   Pulmonary:      Effort: Pulmonary effort is normal. No respiratory distress.   Abdominal:      Palpations: Abdomen is soft.   Neurological:      Comments:  E4V5M6  awake and oriented x 4  PERRL, EOMI  4+/5 throughout, though exam limited 2/2 pain  Denies sensory deficits throughout             Assessment/Plan:     Neuro  * Degenerative scoliosis in adult patient  40 y.o. female w/ significant PMHx of IVDU, HTN, and fungal discitis who completed 6 weeks of IV antibiotics, empirically since initial cultures were negative using Ceftriaxone and Daptomycin. She also completed 3 months of Fluconazole high dose at 400mg PO q24h given Candida albicans positive cultures.  She is s/p T-10-pelvis fusion for degenerative scoliosis 2/2 discitis history.    -Admitted to [facility], NSGY following - stable for TTF from [facility] perspective  -q1h neuro checks, vital checks can expand to q4  -Daily CBC, CMP, mag, phos  -SBP < 160, prn labetalol and hydralazine  -SCDs, hold DVT chemoppx until cleared by NSGY  -PT/OT/SLP as appropriate  -Post op imaging per NSGY  -TLSO brace when OOB  -Cefazolin while drains in place per NSGY  -Fluconazole x 7 d per ID       Cardiac/Vascular  Hypertension  SBP < 160  Resume home amlodipine if needed            The patient is being Prophylaxed for:  Venous Thromboembolism with: Mechanical  Stress Ulcer with: H2B  Ventilator Pneumonia with: not applicable    Activity Orders            Turn patient every 2 hours starting at 06/25 0200    Ambulate starting at 06/25 0000    Up in chair With meals starting at 06/24 1700    Diet Adult Regular: Regular starting at 06/24 1437    Progressive Mobility Protocol (mobilize patient to their highest level of functioning at least twice daily) starting at 06/24 1436    Elevate HOB 30 starting at 06/24 1436    Ambulate With Assistance, Post Op Day 0 If the patient arrives from PACU by 4PM, walk at least once on day of operation if alert and safe to do so. starting at 06/24 1435          Full Code    Alfie Soliz MD  Neurocritical Care  [facility] - Neuro Critical Care    Stable for TTF pending NSGY approval    Level 3 of [facility]  care time was spent personally by me on the following activities: development of treatment plan with patient or surrogate and bedside caregivers, discussions with consultants, evaluation of patient's response to treatment, examination of patient, ordering and performing treatments and interventions, ordering and review of laboratory studies, ordering and review of radiographic studies, pulse oximetry, antibiotic titration if applicable, vasopressor titration if applicable, re-evaluation of patient's condition.            [1]   No current facility-administered medications on file prior to encounter.     Current Outpatient Medications on File Prior to Encounter   Medication Sig Dispense Refill    gabapentin (NEURONTIN) 300 MG capsule Take 300 mg by mouth every evening.      loratadine (CLARITIN) 10 mg tablet TAKE 1 TABLET BY MOUTH EVERY DAY 30 tablet 5    sumatriptan (IMITREX) 50 MG tablet Take 1 tablet (50 mg total) by mouth as needed for Migraine. 9 tablet 5   [2]   Social History  Tobacco Use    Smoking status: Never     Passive exposure: Current    Smokeless tobacco: Never    Tobacco comments:     Social smoker off and on since 14 but never regular smoker   Substance Use Topics    Alcohol use: Not Currently    Drug use: Not Currently     Types: Heroin, Methamphetamines

## 2025-06-25 NOTE — ANESTHESIA POSTPROCEDURE EVALUATION
Anesthesia Post Evaluation    Patient: Kayla Clay    Procedure(s) Performed: Procedure(s) (LRB):  *AIRO* T10 TO PELVIS POSTERIOR INSTRUMENTED FUSION/AIRO (N/A)    Final Anesthesia Type: general      Patient location during evaluation: ICU  Patient participation: Yes- Able to Participate  Level of consciousness: awake  Post-procedure vital signs: reviewed and stable  Pain management: adequate  Airway patency: patent    PONV status at discharge: No PONV  Anesthetic complications: no      Cardiovascular status: hemodynamically stable  Respiratory status: unassisted, spontaneous ventilation and room air  Hydration status: euvolemic  Follow-up not needed.              Vitals Value Taken Time   /58 06/25/25 12:01   Temp 36.5 °C (97.7 °F) 06/25/25 11:01   Pulse 90 06/25/25 12:42   Resp 18 06/25/25 12:42   SpO2 98 % 06/25/25 12:42   Vitals shown include unfiled device data.      No case tracking events are documented in the log.      Pain/Jarrod Score: Pain Rating Prior to Med Admin: 7 (6/25/2025  9:08 AM)  Pain Rating Post Med Admin: 3 (6/25/2025  6:03 AM)

## 2025-06-25 NOTE — PLAN OF CARE
06/25/25 1126   Rounds   Attendance Provider;Physical therapist;;Charge nurse   Discharge Plan A Rehab   Why the patient remains in the hospital Requires continued medical care   Transition of Care Barriers None     Allyssa Carpenter MSW, LCSW  [facility]  Case Management Dept.

## 2025-06-25 NOTE — PT/OT/SLP EVAL
Physical Therapy Co-Evaluation and Treatment    Patient Name: Kayla Clay   MRN: [id number]  Recent Surgery: Procedure(s) (LRB):  *AIRO* T10 TO PELVIS POSTERIOR INSTRUMENTED FUSION/AIRO (N/A) 1 Day Post-Op    Recommendations:     Discharge Recommendations: Low Intensity Therapy    Discharge Equipment Recommendations: walker, rolling   Barriers to discharge: Increased level of assist  Nursing Mobilization: Patient clear to ambulate to/from bathroom with RN/PCT, assist x1 w/ RW.    Assessment:     Kayla Clay is a 40 y.o. female admitted with a medical diagnosis of Degenerative scoliosis in adult patient. She presents with the following impairments/functional limitations: weakness, impaired sensation, impaired self care skills, impaired functional mobility, gait instability, impaired balance, decreased upper extremity function, decreased lower extremity function, decreased safety awareness, pain. Pt w/ good strength and mobility, reports ambulating today better than baseline 2/2 inability to stand up straight the last 3 yrs. Patient currently demonstrates a need for low intensity therapy on a scheduled basis secondary to a decline in functional status due to surgical procedure.     Rehab Prognosis: Good; patient would benefit from acute skilled PT services to address these deficits and reach maximum level of function.  Recent Surgery: Procedure(s) (LRB):  *AIRO* T10 TO PELVIS POSTERIOR INSTRUMENTED FUSION/AIRO (N/A) 1 Day Post-Op    Plan:     During this hospitalization, patient to be seen 4 x/week to address the identified rehab impairments via gait training, therapeutic activities, therapeutic exercises, neuromuscular re-education and progress toward the following goals:    Plan of Care Expires: 07/04/25    Subjective     Chief Complaint: back pain  Patient/Family Comments/Goals: to progress to PLOF  Pain/Comfort:  Pain Rating 1: 8/10  Location - Side 1: Bilateral  Location - Orientation 1:  lower  Location 1: back  Pain Addressed 1: Pre-medicate for activity, Reposition, Distraction, Nurse notified  Pain Rating Post-Intervention 1: 6/10    Patients cultural, spiritual, Scientologist conflicts given the current situation: no    Social History:  Living Environment: Patient lives w/ her mom and 18yo son in a single story home, number of outside stairs: 3 BHR, tub-shower combo.  Prior Level of Function: Prior to admission, patient was independent with ADLs, using cane for ambulation. Uses QC in house and SPC in community 2/2 inc convenience.   Equipment Used at Home: cane, straight, cane, quad    DME owned (not currently used): none  Assistance Upon Discharge: family    Objective:     Communicated with RN prior to session. Patient found right sidelying with HOB elevated with arterial line, blood pressure cuff, pulse ox (continuous), telemetry, fuller catheter, bed alarm, peripheral IV upon PT entry to room. Co-evaluation w/ OT 2/2 suspected pt complexity and requirement of assistance from 2 skilled therapists to maximize treatment potential and maintain pt safety     General Precautions: Standard, fall   Orthopedic Precautions:spinal precautions   Braces: TLSO  Respiratory Status: Nasal cannula, flow 2 L/min    Exams:  Cognitive Exam: Patient is oriented to Person, Place, Time, Situation, follows commands 100% of the time  RLE ROM: WFL  RLE Strength: WFL  LLE ROM: WFL  LLE Strength: 3+/5 knee flex/ext; 4/5 ankle DF/PF  Sensation: -       Impaired  light/touch B thighs and calves (anterior and lateral calves) as well as medial L ankle; reports numb and tingling    Functional Mobility:  Bed Mobility:  Supine to Sit: moderate assistance for LE management, trunk management, and log rolling technique  Transfers:  Sit to Stand: stand by assistance with rolling walker with cues for hand placement and foot placement  Bed to Chair: stand by assistance with rolling walker with cues for hand placement and foot placement  using Step Transfer  Gait:   Patient ambulated 12' +6' with rolling walker and stand by assistance. Patient demonstrates steady gait, decreased step length, decreased weight shift, decreased foot clearance, ambulates outside GINA of RW, flexed posture, and decreased fely. All lines remained intact throughout ambulation trial, portable Supplemental O2 2L utilized.  Balance:   Static Sitting: stand by assistance at EOB  Dynamic Sitting: contact guard assistance at EOB  Static Standing: stand by assistance with no AD  Dynamic Standing: stand by assistance with rolling walker    Therapeutic Activities and Exercises:  Patient educated on role of acute care PT and PT POC, safety while in hospital including calling nurse for mobility, and call light usage  Educated on spinal precautions including avoidance of bending, lifting, and twisting. Patient expresses understanding. Educated on log roll technique, performed to left  Patient educated about importance of OOB mobility  Pt educated on use of TLSO for OOB mobility and assisted w/ donning/doffing brace w/ step-by-step cueing provided w/ goal to progress to pt being able to independently don/doff brace w/ family assist as needed. Pt informed they are required to wear this brace until told otherwise by MD. Pt verbalized understanding.  Gait training w/ verbal, visual, and tactile cueing for proper use of AD, step length, step height, postural control, safety awareness, obstacle avoidance, width of GINA, and proximity to AD     AM-PAC 6 CLICK MOBILITY  Turning over in bed (including adjusting bedclothes, sheets and blankets)?: 3  Sitting down on and standing up from a chair with arms (e.g., wheelchair, bedside commode, etc.): 3  Moving from lying on back to sitting on the side of the bed?: 3  Moving to and from a bed to a chair (including a wheelchair)?: 3  Need to walk in hospital room?: 3  Climbing 3-5 steps with a railing?: 3  Basic Mobility Total Score: 18     Patient  left up in chair with all lines intact, call button in reach, RN notified, chair alarm on, and family present.    GOALS:   Multidisciplinary Problems       Physical Therapy Goals          Problem: Physical Therapy    Goal Priority Disciplines Outcome Interventions   Physical Therapy Goal     PT, PT/OT Progressing    Description: Goals to be completed by: 7/4/25    Pt will perform sup<>sit transfers w/ stand by assistance  Pt will ambulate 100 feet w/ stand by assistance using LRAD  Pt will be independent w/ HEP therex on BLE w/ good form and ROM   Pt will ascend/descend 3 stairs w/ B railing with stand by assistance  Pt will be independent w/ donning/doffing brace and recalling 3/3 spinal precautions                       History:     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    Generalized anxiety disorder     Hypertension     IVDU (intravenous drug user) 08/18/2022    Leukopenia 09/09/2022    Obesity, unspecified     Scoliosis     Substance abuse Age 18    I used drugs off and on since i was 18 but im sober now and have been since early August 2022       Past Surgical History:   Procedure Laterality Date    LUMBAR FUSION N/A 6/24/2025    Procedure: *AIRO* T10 TO PELVIS POSTERIOR INSTRUMENTED FUSION/AIRO;  Surgeon: Les Vieyra MD;  Location: [address];  Service: Neurosurgery;  Laterality: N/A;  T10 TO PELVIS POSTERIOR INSTRUMENTED FUSION/ AIRO    LUMBAR LAMINECTOMY WITH SURGICAL REMOVAL OF LESION OF SPINAL CORD BY POSTERIOR APPROACH N/A 08/18/2022    Procedure: LAMINECTOMY, SPINE, LUMBAR, POSTERIOR APPROACH, WITH EXCISION OF NEOPLASM OR LESION OF SPINAL CORD;  Surgeon: Floyd Brewer MD;  Location: [facility];  Service: Neurosurgery;  Laterality: N/A;    SPINE SURGERY  August 16th, 2022    Spinal absess and Laminectomy       Time Tracking:     PT Received On: 06/25/25  PT Start  Time: 0939  PT Stop Time: 1017  PT Total Time (min): 38 min     Billable Minutes: Evaluation 8, Gait Training 15, and Neuromuscular Re-education 15    06/25/2025

## 2025-06-25 NOTE — PROGRESS NOTES
"[facility]  Neurosurgery  Progress Note    Subjective:     History of Present Illness: Ms. Kayla Clay is a 40 y.o. 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 Per last ID note, "7/5/23: Ms. Miller presents for follow-up today. She continues to have ongoing lower extremity weakness. She uses a cane for assistance with ambulation. She denies any fevers, chills, nausea, vomiting and diarrhea. She had been working with PT, but hasn't been able to follow-up in the past month. She saw a neurosurgeon on 5/26/23 in which he reports her most recent MRI showed resolution of epidural abscess and degenerative disc disease. No planned interventions at this time. Completed 6 weeks of IV antibiotics, empirically since initial cultures were negative using Ceftriaxone and Daptomycin. Completed 3 months of Fluconazole high dose at 400mg PO q24h given Candida albicans positive cultures. Did not tolerate Vancomycin or Doxycycline. MRI with no findings significant for residual infection. Neurosurgery without any interventions planned at this time. MRI with resolution of epidural abscess. Continue to work with PT to work on gait strength. No need for ongoing infectious workup at this time."     NSGY now planning for deformity surgery due to severe stenosis of lumbar spine. T10-pelvis fusion recommended by surgeons. Explained to patient that she completed adequate antimicrobial coverage for prior infection. IVDU is a huge risk factor for recurrence. To ensure sterility of site will prescribe course of PO fluconazole which she can stop one week post op. If evidence of infection seen in OR will ask surgeons to send specimen for cultures. Patient voiced understanding.     Post-Op Info:  Procedure(s) (LRB):  *AIRO* T10 TO PELVIS POSTERIOR INSTRUMENTED FUSION/AIRO (N/A)   1 Day Post-Op   Interval History: POD1 s/p T10-pelvis. NAEON. " AFVSS. Exam stable. Drain output (R/L/L) 83/525/5cc. Will keep drains today.    Medications:  Continuous Infusions:  Scheduled Meds:   baclofen  10 mg Oral Nightly    calcium carbonate  500 mg Oral BID    ceFAZolin (Ancef) IV (PEDS and ADULTS)  1 g Intravenous Q8H    EScitalopram oxalate  20 mg Oral QHS    famotidine  40 mg Oral Daily    fluconazole  400 mg Oral Daily    gabapentin  300 mg Oral QHS    methocarbamoL  750 mg Oral QID    mupirocin   Nasal BID    senna-docusate  2 tablet Oral BID     PRN Meds:  Current Facility-Administered Medications:     aluminum-magnesium hydroxide-simethicone, 30 mL, Oral, Q4H PRN    diazePAM, 5 mg, Oral, BID PRN    HYDROmorphone, 1 mg, Intravenous, Q4H PRN    melatonin, 6 mg, Oral, Nightly PRN    ondansetron, 8 mg, Intravenous, Q6H PRN    oxyCODONE, 15 mg, Oral, Q4H PRN    oxyCODONE, 10 mg, Oral, Q4H PRN    prochlorperazine, 5 mg, Intravenous, Q6H PRN    sumatriptan, 50 mg, Oral, PRN     Review of Systems  Objective:     Weight: 87.3 kg (192 lb 7.4 oz)  Body mass index is 30.14 kg/m².  Vital Signs (Most Recent):  Temp: 98.2 °F (36.8 °C) (06/25/25 1501)  Pulse: 93 (06/25/25 1501)  Resp: (!) 28 (06/25/25 1541)  BP: (!) 122/58 (06/25/25 1501)  SpO2: 98 % (06/25/25 1501) Vital Signs (24h Range):  Temp:  [97.7 °F (36.5 °C)-98.2 °F (36.8 °C)] 98.2 °F (36.8 °C)  Pulse:  [] 93  Resp:  [9-28] 28  SpO2:  [89 %-98 %] 98 %  BP: (103-125)/(55-79) 122/58  Arterial Line BP: ()/() 112/83                              Closed/Suction Drain 06/24/25 1308 Tube - 1 Right Back Accordion 10 Fr. (Active)   Dressing Type Transparent (Tegaderm) 06/25/25 1501   Dressing Status Clean;Dry;Intact 06/25/25 1501   Dressing Intervention Integrity maintained 06/25/25 1501   Drainage None 06/25/25 1501   Output (mL) 3 mL 06/25/25 0601            Closed/Suction Drain 06/24/25 1308 Tube - 2 Left Back Accordion 10 Fr. (Active)   Dressing Type Transparent (Tegaderm) 06/25/25 1501   Dressing Status  "Clean;Dry;Intact 06/25/25 1501   Dressing Intervention Integrity maintained 06/25/25 1501   Drainage None 06/25/25 1501   Output (mL) 80 mL 06/25/25 0601            Closed/Suction Drain 06/24/25 1309 Tube - 3 Left Back Accordion 10 Fr. (Active)   Dressing Type Transparent (Tegaderm) 06/25/25 1501   Dressing Status Clean;Dry;Intact 06/25/25 1501   Dressing Intervention Integrity maintained 06/25/25 1501   Drainage None 06/25/25 1501   Output (mL) 5 mL 06/25/25 0601            Urethral Catheter 06/24/25 0745 Coude;Non-latex;Silicone 16 Fr. (Active)   Site Assessment Clean;Intact;Dry 06/25/25 1501   Collection Container Urimeter 06/25/25 1501   Securement Method secured to top of thigh w/ adhesive device 06/25/25 1501   Catheter Care Performed yes 06/25/25 1501   Reason for Continuing Urinary Catheterization Post operative 06/25/25 1501   CAUTI Prevention Bundle Securement Device in place with 1" slack;Intact seal between catheter & drainage tubing;Drainage bag/urimeter off the floor;Sheeting clip in use;No dependent loops or kinks;Drainage bag/urimeter not overfilled (<2/3 full);Drainage bag/urimeter below bladder 06/25/25 1101   Output (mL) 45 mL 06/25/25 0601          Physical Exam         Neurosurgery Physical Exam  AAOx4  PERRL  Cranial nerves intact  BUE 5/5  BLE 5/5  SILT    Significant Labs:  Recent Labs   Lab 06/24/25  1510 06/25/25  0229   * 147*    137   K 3.9 4.6   * 108   CO2 20* 21*   BUN 13 13   CREATININE 0.5 0.6   CALCIUM 8.1* 7.9*   MG  --  1.9     Recent Labs   Lab 06/24/25  1510 06/25/25  0229   WBC 13.92* 11.37   HGB 11.8* 10.7*   HCT 35.5* 32.9*    235     No results for input(s): "LABPT", "INR", "APTT" in the last 48 hours.  Microbiology Results (last 7 days)       ** No results found for the last 168 hours. **          All pertinent labs from the last 24 hours have been reviewed.    Significant Diagnostics:  CT: No results found in the last 24 hours.  MRI: No results " found in the last 24 hours.  I have reviewed all pertinent imaging results/findings within the past 24 hours.  I have reviewed and interpreted all pertinent imaging results/findings within the past 24 hours.  Assessment/Plan:     * Degenerative scoliosis in adult patient  Ms. Kayla Clay is a 40 y.o. 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 Completed 6 weeks of IV antibiotics, empirically since initial cultures were negative using Ceftriaxone and Daptomycin. Completed 3 months of Fluconazole high dose at 400mg PO q24h given Candida albicans positive cultures. Did not tolerate Vancomycin or Doxycycline.    Patient has completed adequate antimicrobial coverage for prior infection. IVDU is a huge risk factor for recurrence. To ensure sterility of site she will need PO fluconazole which she can stop one week post op.    S/p T-10-pelvis fusion 6/24    Plan:  Patient admitted to [facility] on telemetry. Ok to stepdown to NSGY per NCC  q1h neurochecks in ICU, q2h neurochecks in stepdown, q4h neurochecks on floor  All labs and diagnostics reviewed  Follow-up post-op XRs  MAP goals >65 at this time  Discontinue A-line per NCC  Discontinue fuller per NCC  Maintain TLSO brace when OOB  HV x2 deep, HV x1 superficial Drains to remain at this time; please record hourly outputs, provena wound vac in place  Antibiotics while drains in place  PT/OT/OOB  ADAT  Multi-Modal Pain Control  TEDs/SCDs/SQH  Continue to monitor clinically, notify NSGY immediately with any changes in neuro status          Campos Erwin MD  Neurosurgery  [facility]

## 2025-06-25 NOTE — ASSESSMENT & PLAN NOTE
Ms. Kayla Clay is a 40 y.o. 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 Completed 6 weeks of IV antibiotics, empirically since initial cultures were negative using Ceftriaxone and Daptomycin. Completed 3 months of Fluconazole high dose at 400mg PO q24h given Candida albicans positive cultures. Did not tolerate Vancomycin or Doxycycline.    Patient has completed adequate antimicrobial coverage for prior infection. IVDU is a huge risk factor for recurrence. To ensure sterility of site she will need PO fluconazole which she can stop one week post op.    S/p T-10-pelvis fusion 6/24    Plan:  Patient admitted to [facility] on telemetry. Ok to stepdown to NSGY per NCC  q1h neurochecks in ICU, q2h neurochecks in stepdown, q4h neurochecks on floor  All labs and diagnostics reviewed  Follow-up post-op XRs  MAP goals >65 at this time  Discontinue A-line per NCC  Discontinue fuller per NCC  Maintain TLSO brace when OOB  HV x2 deep, HV x1 superficial Drains to remain at this time; please record hourly outputs, provena wound vac in place  Antibiotics while drains in place  PT/OT/OOB  ADAT  Multi-Modal Pain Control  TEDs/SCDs/SQH  Continue to monitor clinically, notify NSGY immediately with any changes in neuro status

## 2025-06-25 NOTE — HOSPITAL COURSE
06/25/2025: POD1 s/p T10-pelvis. NAEON. AFVSS. Exam stable. Drain output (R/L/L) 83/525/5cc. Will keep drains today.  6/26: Keep drains until pod 5, pod 2 today. Neuro stable, full strength, working with therapies, pending step down  6/27: POD 3. Neuro stable, full strength. Ambulated down owen. Pending step down. Voiding spont.   6/28: POD 4. Neuro stable. BM finally. Voiding. Still pending step down

## 2025-06-25 NOTE — EICU
Virtual ICU Quality Rounds    Admit Date: 6/24/2025  Hospital Day: 1    ICU Day: 18h    24H Vital Sign Range:  Temp:  [97.7 °F (36.5 °C)-98.2 °F (36.8 °C)]   Pulse:  [61-87]   Resp:  [11-24]   BP: ()/(56-79)   SpO2:  [89 %-99 %]   Arterial Line BP: ()/(43-81)     VICU Surveillance Screening  Daily review of  line necessity(optional): Urinary catheter in place  Fuller Indications : Critically ill in the intensive care unit requiring hourly urine output monitoring   LDA reconciliation : Yes  Fuller best practices : Nurse driven fuller removal protocol ordered

## 2025-06-25 NOTE — ASSESSMENT & PLAN NOTE
40 y.o. female w/ significant PMHx of IVDU, HTN, and fungal discitis who completed 6 weeks of IV antibiotics, empirically since initial cultures were negative using Ceftriaxone and Daptomycin. She also completed 3 months of Fluconazole high dose at 400mg PO q24h given Candida albicans positive cultures.  She is s/p T-10-pelvis fusion for degenerative scoliosis 2/2 discitis history.    -Admitted to [facility], NSGY following - stable for TTF from NCC perspective  -q1h neuro checks, vital checks can expand to q4  -Daily CBC, CMP, mag, phos  -SBP < 160, prn labetalol and hydralazine  -SCDs, hold DVT chemoppx until cleared by NSGY  -PT/OT/SLP as appropriate  -Post op imaging per NSGY  -TLSO brace when OOB  -Cefazolin while drains in place per NSGY  -Fluconazole x 7 d per ID

## 2025-06-25 NOTE — PLAN OF CARE
"[facility] Care Plan    POC reviewed with Kayla Garcian and family at 0300. Patient and Family verbalized understanding. Questions and concerns addressed. No acute events today. Pt progressing toward goals. See below and flowsheets for full assessment and VS info.     -Admitted to [facility] for post operative monitoring. Hourly neuro checks while in the ICU.  -3x drains in place:  R. drain 1 - 13cc of sanguinous colored output.  L. drain  2 - 385cc of sanguinous colored output.  L. drain  3 - 5cc of sanguinous colored output.  -Post op pain well controlled with PRN regimen.  -Wound vac remains in place for incisional management set @ 125mmhg    Is this a stroke patient? no    Neuro:  Aria Coma Scale  Best Eye Response: 4-->(E4) spontaneous  Best Motor Response: 6-->(M6) obeys commands  Best Verbal Response: 5-->(V5) oriented  Aria Coma Scale Score: 15  Assessment Qualifiers: Patient not sedated/intubated, No eye obstruction present  Pupil PERRLA: yes     24 hr Temp:  [97.7 °F (36.5 °C)-98.2 °F (36.8 °C)]     CV:   Rhythm: normal sinus rhythm  BP goals:   SBP < 160  MAP > 65    Resp:           Plan: N/A    GI/:     Diet/Nutrition Received: regular  Last Bowel Movement: 06/23/25  Voiding Characteristics: urethral catheter (bladder)    Intake/Output Summary (Last 24 hours) at 6/25/2025 0425  Last data filed at 6/25/2025 0420  Gross per 24 hour   Intake 5012.7 ml   Output 2630 ml   Net 2382.7 ml          Labs/Accuchecks:  Recent Labs   Lab 06/25/25 0229   WBC 11.37   RBC 3.64*   HGB 10.7*   HCT 32.9*         Recent Labs   Lab 06/25/25 0229      K 4.6   CO2 21*      BUN 13   CREATININE 0.6   ALKPHOS 35*   ALT 19   AST 27   BILITOT 0.4    No results for input(s): "PROTIME", "INR", "APTT", "HEPANTIXA" in the last 168 hours. No results for input(s): "CPK", "CPKMB", "TROPONINI", "MB" in the last 168 hours.    Electrolytes: No replacement orders  Accuchecks: none    Gtts:   0.9% NaCl   Intravenous " Continuous 100 mL/hr at 06/25/25 0416 New Bag at 06/25/25 0416       LDA/Wounds:    Petey Risk Assessment  Sensory Perception: 4-->no impairment  Moisture: 4-->rarely moist  Activity: 1-->bedfast  Mobility: 2-->very limited  Nutrition: 2-->probably inadequate  Friction and Shear: 2-->potential problem  Petey Score: 15  Is your petey score 12 or less? no      Problem: Infection  Goal: Absence of Infection Signs and Symptoms  Outcome: Progressing  Intervention: Prevent or Manage Infection  Flowsheets (Taken 6/25/2025 0424)  Fever Reduction/Comfort Measures: lightweight clothing  Infection Management: aseptic technique maintained  Isolation Precautions: precautions maintained     Problem: Fall Injury Risk  Goal: Absence of Fall and Fall-Related Injury  Outcome: Progressing  Intervention: Promote Injury-Free Environment  Flowsheets (Taken 6/25/2025 0424)  Safety Promotion/Fall Prevention:   side rails raised x 3   instructed to call staff for mobility   Fall Risk reviewed with patient/family   family expresses understanding of fall risk and prevention   family to remain at bedside     Problem: Skin Injury Risk Increased  Goal: Skin Health and Integrity  Outcome: Progressing  Intervention: Optimize Skin Protection  Flowsheets (Taken 6/25/2025 0424)  Pressure Reduction Techniques:   weight shift assistance provided   pressure points protected  Pressure Reduction Devices:   positioning supports utilized   foam padding utilized

## 2025-06-25 NOTE — EICU
VICU Night Rounds Checklist  24H Vital Sign Range:  Temp:  [97.7 °F (36.5 °C)-98.2 °F (36.8 °C)]   Pulse:  [61-87]   Resp:  [11-24]   BP: ()/(56-79)   SpO2:  [89 %-99 %]   Arterial Line BP: (84-91)/(43-66)     Video rounds and LDA reconciliation

## 2025-06-25 NOTE — PLAN OF CARE
Met with patient in room 9079 to review discharge spine education.  All questions answered to patient satisfaction.     Spoke with patient and mother (Shaista) to review spine specific discharge education. Reviewed pain medication usage - discussed how to use muscle relaxers and pain medication, Lifting restrictions - nothing heavier than a gallon of milk or equivalent of 8 pounds, Mobility - proper use of assistive devices (cane or walker) as necessary to aid with mobility needs if indicated, Walk as tolerated or 10 minutes building up to 30 minutes every 2-3 hours, Limit time in bed using favorite chair/recliner as tolerated, wear prescribed brace if indicated and prescribed at all times taking it off only to sleep, shower and eat. Reinforced being mindful regarding bending, lifting and twisting. Discussed wound care and when to call clinic.      Advised that inpatient Case Management will be involved with discharge plan and will be working with the team and physical therapy to have a safe discharge. My direct number provided.    I spoke with , Allyssa Babcock , advised that patient is amendable to IP Rehab if recommended - if indicated would prefer to go to [facility] as it is closer to her home.  She has 3 steps to get into home and there are no stairs in the home. She has cane, Shower Chair, grab rails  - will get toilet seat riser. Home medications to be delivered to bedside prior to discharge. Post surgical hospital discharge appointments scheduled     My direct number provided. Will complete post hospital discharge call on 7/7/25.         QUIQUE Ceja (Dee)  Complex Spine Navigator  [facility]         [phone]

## 2025-06-25 NOTE — SUBJECTIVE & OBJECTIVE
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    Generalized anxiety disorder     Hypertension     IVDU (intravenous drug user) 08/18/2022    Leukopenia 09/09/2022    Obesity, unspecified     Scoliosis     Substance abuse Age 18    I used drugs off and on since i was 18 but im sober now and have been since early August 2022     Past Surgical History:   Procedure Laterality Date    LUMBAR FUSION N/A 6/24/2025    Procedure: *AIRO* T10 TO PELVIS POSTERIOR INSTRUMENTED FUSION/AIRO;  Surgeon: Les Vieyra MD;  Location: [facility] [address] [place] [address];  Service: Neurosurgery;  Laterality: N/A;  T10 TO PELVIS POSTERIOR INSTRUMENTED FUSION/ AIRO    LUMBAR LAMINECTOMY WITH SURGICAL REMOVAL OF LESION OF SPINAL CORD BY POSTERIOR APPROACH N/A 08/18/2022    Procedure: LAMINECTOMY, SPINE, LUMBAR, POSTERIOR APPROACH, WITH EXCISION OF NEOPLASM OR LESION OF SPINAL CORD;  Surgeon: Floyd Brewer MD;  Location: [facility];  Service: Neurosurgery;  Laterality: N/A;    SPINE SURGERY  August 16th, 2022    Spinal absess and Laminectomy      Medications Ordered Prior to Encounter[1]   Allergies: Acetaminophen and Doxycycline  Family History   Problem Relation Name Age of Onset    Pancreatic cancer Paternal Grandfather          80's    Stomach cancer Maternal Grandfather          70's - +metastasis    No Known Problems Father      No Known Problems Mother      Spine Surgery Paternal Uncle Reggie Engel         On my dads side of the family a lot of us have spine issues.    Breast cancer Neg Hx      Colon cancer Neg Hx      Ovarian cancer Neg Hx      Cervical cancer Neg Hx      Uterine cancer Neg Hx       Social History[2]  Review of Systems   Gastrointestinal:  Positive for nausea.   Musculoskeletal:  Positive for back pain.     Objective:     Vitals:    Temp: 98.2 °F (36.8 °C)  Pulse: 93  Rhythm: normal sinus rhythm  BP: (!)  122/58  MAP (mmHg): 83  Resp: (!) 28  SpO2: 98 %    Temp  Min: 97.7 °F (36.5 °C)  Max: 98.2 °F (36.8 °C)  Pulse  Min: 61  Max: 110  BP  Min: 103/68  Max: 125/76  MAP (mmHg)  Min: 74  Max: 96  Resp  Min: 9  Max: 28  SpO2  Min: 89 %  Max: 98 %    06/24 0701 - 06/25 0700  In: 5445 [I.V.:1208.3]  Out: 2788 [Urine:2175; Drains:613]            Physical Exam  HENT:      Head: Normocephalic and atraumatic.   Cardiovascular:      Rate and Rhythm: Normal rate and regular rhythm.   Pulmonary:      Effort: Pulmonary effort is normal. No respiratory distress.   Abdominal:      Palpations: Abdomen is soft.   Neurological:      Comments: E4V5M6  awake and oriented x 4  PERRL, EOMI  4+/5 throughout, though exam limited 2/2 pain  Denies sensory deficits throughout                  [1]   No current facility-administered medications on file prior to encounter.     Current Outpatient Medications on File Prior to Encounter   Medication Sig Dispense Refill    gabapentin (NEURONTIN) 300 MG capsule Take 300 mg by mouth every evening.      loratadine (CLARITIN) 10 mg tablet TAKE 1 TABLET BY MOUTH EVERY DAY 30 tablet 5    sumatriptan (IMITREX) 50 MG tablet Take 1 tablet (50 mg total) by mouth as needed for Migraine. 9 tablet 5   [2]   Social History  Tobacco Use    Smoking status: Never     Passive exposure: Current    Smokeless tobacco: Never    Tobacco comments:     Social smoker off and on since 14 but never regular smoker   Substance Use Topics    Alcohol use: Not Currently    Drug use: Not Currently     Types: Heroin, Methamphetamines

## 2025-06-25 NOTE — SUBJECTIVE & OBJECTIVE
Interval History: POD1 s/p T10-pelvis. NAEON. AFVSS. Exam stable. Drain output (R/L/L) 83/525/5cc. Will keep drains today.    Medications:  Continuous Infusions:  Scheduled Meds:   baclofen  10 mg Oral Nightly    calcium carbonate  500 mg Oral BID    ceFAZolin (Ancef) IV (PEDS and ADULTS)  1 g Intravenous Q8H    EScitalopram oxalate  20 mg Oral QHS    famotidine  40 mg Oral Daily    fluconazole  400 mg Oral Daily    gabapentin  300 mg Oral QHS    methocarbamoL  750 mg Oral QID    mupirocin   Nasal BID    senna-docusate  2 tablet Oral BID     PRN Meds:  Current Facility-Administered Medications:     aluminum-magnesium hydroxide-simethicone, 30 mL, Oral, Q4H PRN    diazePAM, 5 mg, Oral, BID PRN    HYDROmorphone, 1 mg, Intravenous, Q4H PRN    melatonin, 6 mg, Oral, Nightly PRN    ondansetron, 8 mg, Intravenous, Q6H PRN    oxyCODONE, 15 mg, Oral, Q4H PRN    oxyCODONE, 10 mg, Oral, Q4H PRN    prochlorperazine, 5 mg, Intravenous, Q6H PRN    sumatriptan, 50 mg, Oral, PRN     Review of Systems  Objective:     Weight: 87.3 kg (192 lb 7.4 oz)  Body mass index is 30.14 kg/m².  Vital Signs (Most Recent):  Temp: 98.2 °F (36.8 °C) (06/25/25 1501)  Pulse: 93 (06/25/25 1501)  Resp: (!) 28 (06/25/25 1541)  BP: (!) 122/58 (06/25/25 1501)  SpO2: 98 % (06/25/25 1501) Vital Signs (24h Range):  Temp:  [97.7 °F (36.5 °C)-98.2 °F (36.8 °C)] 98.2 °F (36.8 °C)  Pulse:  [] 93  Resp:  [9-28] 28  SpO2:  [89 %-98 %] 98 %  BP: (103-125)/(55-79) 122/58  Arterial Line BP: ()/() 112/83                              Closed/Suction Drain 06/24/25 1308 Tube - 1 Right Back Accordion 10 Fr. (Active)   Dressing Type Transparent (Tegaderm) 06/25/25 1501   Dressing Status Clean;Dry;Intact 06/25/25 1501   Dressing Intervention Integrity maintained 06/25/25 1501   Drainage None 06/25/25 1501   Output (mL) 3 mL 06/25/25 0601            Closed/Suction Drain 06/24/25 1308 Tube - 2 Left Back Accordion 10 Fr. (Active)   Dressing Type Transparent  "(Tegaderm) 06/25/25 1501   Dressing Status Clean;Dry;Intact 06/25/25 1501   Dressing Intervention Integrity maintained 06/25/25 1501   Drainage None 06/25/25 1501   Output (mL) 80 mL 06/25/25 0601            Closed/Suction Drain 06/24/25 1309 Tube - 3 Left Back Accordion 10 Fr. (Active)   Dressing Type Transparent (Tegaderm) 06/25/25 1501   Dressing Status Clean;Dry;Intact 06/25/25 1501   Dressing Intervention Integrity maintained 06/25/25 1501   Drainage None 06/25/25 1501   Output (mL) 5 mL 06/25/25 0601            Urethral Catheter 06/24/25 0745 Coude;Non-latex;Silicone 16 Fr. (Active)   Site Assessment Clean;Intact;Dry 06/25/25 1501   Collection Container Urimeter 06/25/25 1501   Securement Method secured to top of thigh w/ adhesive device 06/25/25 1501   Catheter Care Performed yes 06/25/25 1501   Reason for Continuing Urinary Catheterization Post operative 06/25/25 1501   CAUTI Prevention Bundle Securement Device in place with 1" slack;Intact seal between catheter & drainage tubing;Drainage bag/urimeter off the floor;Sheeting clip in use;No dependent loops or kinks;Drainage bag/urimeter not overfilled (<2/3 full);Drainage bag/urimeter below bladder 06/25/25 1101   Output (mL) 45 mL 06/25/25 0601          Physical Exam         Neurosurgery Physical Exam  AAOx4  PERRL  Cranial nerves intact  BUE 5/5  BLE 5/5  SILT    Significant Labs:  Recent Labs   Lab 06/24/25  1510 06/25/25  0229   * 147*    137   K 3.9 4.6   * 108   CO2 20* 21*   BUN 13 13   CREATININE 0.5 0.6   CALCIUM 8.1* 7.9*   MG  --  1.9     Recent Labs   Lab 06/24/25  1510 06/25/25  0229   WBC 13.92* 11.37   HGB 11.8* 10.7*   HCT 35.5* 32.9*    235     No results for input(s): "LABPT", "INR", "APTT" in the last 48 hours.  Microbiology Results (last 7 days)       ** No results found for the last 168 hours. **          All pertinent labs from the last 24 hours have been reviewed.    Significant Diagnostics:  CT: No results " found in the last 24 hours.  MRI: No results found in the last 24 hours.  I have reviewed all pertinent imaging results/findings within the past 24 hours.  I have reviewed and interpreted all pertinent imaging results/findings within the past 24 hours.

## 2025-06-25 NOTE — CONSULTS
VANCOMYCIN DOSING BY PHARMACY DISCONTINUATION NOTE    Kayla Clay is a 40 y.o. female who had been consulted for vancomycin dosing.    The pharmacy consult for vancomycin dosing has been discontinued.     Vancomycin Dosing by Pharmacy Consult will sign-off. Please reconsult if necessary. Thank you for allowing us to participate in this patient's care.     Violeta Israel, PharmD   [id number]

## 2025-06-25 NOTE — HOSPITAL COURSE
06/25/2025  Resting comfortably in bed. Remains on fluconazole. Vancomycin changed to cefazolin.  06/26/2025  Drain output 500 cc over last 24h, will remain in place and hold chemical VTE ppx. Optimize pain regimen, increase gabapentin to BID   06/27/2025  Minimal drain output  06/28/2025   Wound vac not draining. NSGY made aware. More pain overnight- robaxin and gabapentin increased. Given enema overnight and had BM. Remains pending stepdown.

## 2025-06-25 NOTE — PT/OT/SLP EVAL
Occupational Therapy  Co- Evaluation and Treatment     Name: Kayla Clay  MRN: [id number]  Admitting Diagnosis: Degenerative scoliosis in adult patient  Recent Surgery: Procedure(s) (LRB):  *AIRO* T10 TO PELVIS POSTERIOR INSTRUMENTED FUSION/AIRO (N/A) 1 Day Post-Op    Recommendations:     Discharge Recommendations: Low Intensity Therapy  Discharge Equipment Recommendations:  walker, rolling  Barriers to discharge:  None    Assessment:     Kayla Clay is a 40 y.o. female with a medical diagnosis of Degenerative scoliosis in adult patient.  She presents with decrease functional status secondary to medical diagnosis. Performance deficits affecting function: impaired functional mobility, weakness, impaired endurance, gait instability, impaired balance, impaired self care skills, pain.  Patient with good tolerance to OT session limited by pain but verbalizing increased mobility compared to prior to Sx. Patient able to ambulate short distance with RW but remains limited by generalized weakness and activity tolerance. Patient is therefore appropriate for acute OT services to increase patient self care performance and functional mobility. Following DC from [facility] patient should continue with a low intensity therapy to ensure patient safety and promote return to independence.     Rehab Prognosis: Good; patient would benefit from acute skilled OT services to address these deficits and reach maximum level of function.       Plan:     Patient to be seen 4 x/week to address the above listed problems via self-care/home management, therapeutic exercises, therapeutic activities, neuromuscular re-education  Plan of Care Expires: 07/25/25  Plan of Care Reviewed with: patient    Subjective     Chief Complaint: none   Patient/Family Comments/goals: DC     Occupational Profile:  Living Environment: patient lives with mother and son in house with 3 KIRBY and BHR.   Previous level of function: Modified Independent uses QC for  home use and 1 pt cane for community  Roles and Routines: unknown   Equipment Used at Home: cane, quad  Assistance upon Discharge: mother and son    Pain/Comfort:  Pain Rating 1: 0/10    Patients cultural, spiritual, Druze conflicts given the current situation: no    Objective:     Communicated with: RN prior to session.  Patient found supine with arterial line, blood pressure cuff, pulse ox (continuous), telemetry, fuller catheter, bed alarm, peripheral IV upon OT entry to room.    General Precautions: Standard, fall  Orthopedic Precautions: spinal precautions  Braces: TLSO  Respiratory Status: Room air    Occupational Performance:    Bed Mobility:    Patient completed Rolling/Turning to Left with  moderate assistance  Patient completed Scooting/Bridging with moderate assistance  Patient completed Supine to Sit with moderate assistance and 2 persons  Patient sat EOB with standby assistance     Functional Mobility/Transfers:  Patient completed Sit <> Stand Transfer with stand by assistance  with  rolling walker   Patient completed Bed <> Chair Transfer using Step Transfer technique with stand by assistance with rolling walker  Functional Mobility: Patient able to ambulate with RW and standby assistance     Activities of Daily Living:  Upper Body Dressing: maximal assistance to don TLSO seated EOB   Lower Body Dressing: maximal assistance to don socks supine in bed     Cognitive/Visual Perceptual:  Cognitive/Psychosocial Skills:     -       Oriented to: Person, Place, Time, and Situation   -       Follows Commands/attention:Follows multistep  commands  -       Communication: clear/fluent  -       Safety awareness/insight to disability: intact   Visual/Perceptual:      -Intact      Physical Exam:  Sensation:    -       Intact  Upper Extremity Range of Motion:     -       Right Upper Extremity: WFL  -       Left Upper Extremity: WFL  Upper Extremity Strength:    -       Right Upper Extremity: WFL  -       Left  Upper Extremity: WFL   Strength:    -       Right Upper Extremity: WFL  -       Left Upper Extremity: WFL  Fine Motor Coordination:    -       Intact    AMPAC 6 Click ADL:  AMPAC Total Score: 16    Treatment & Education:  Provided education regarding role of OT, POC, & discharge recommendations.  Pt with no further questions/concerns at this time. OT provided education on home recommendations and fall prevention in preparation for D/C.     Patient left supine with all lines intact and call button in reach    GOALS:   Multidisciplinary Problems       Occupational Therapy Goals          Problem: Occupational Therapy    Goal Priority Disciplines Outcome Interventions   Occupational Therapy Goal     OT, PT/OT Progressing    Description: Goals to be met by: 7/25/25     Patient will increase functional independence with ADLs by performing:    UE Dressing with [place].  LE Dressing with [place].  Grooming while standing with [place].  Toileting from toilet with [place] for hygiene and clothing management.   Supine to sit with [place].  Stand pivot transfers with [place].  Step transfer with [place]y  Toilet transfer to toilet with [place]y.                         DME Justifications:   Kayla's mobility limitation cannot be sufficiently resolved by the use of a cane. Her functional mobility deficit can be sufficiently resolved with the use of a Rolling Walker. Patient's mobility limitation significantly impairs their ability to participate in one of more activities of daily living.  The use of a RW will significantly improve the patient's ability to participate in MRADLS and the patient will use it on regular basis in the home.    History:     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    Generalized anxiety disorder     Hypertension     IVDU (intravenous  drug user) 08/18/2022    Leukopenia 09/09/2022    Obesity, unspecified     Scoliosis     Substance abuse Age 18    I used drugs off and on since i was 18 but im sober now and have been since early August 2022         Past Surgical History:   Procedure Laterality Date    LUMBAR FUSION N/A 6/24/2025    Procedure: *AIRO* T10 TO PELVIS POSTERIOR INSTRUMENTED FUSION/AIRO;  Surgeon: Les Vieyra MD;  Location: [address];  Service: Neurosurgery;  Laterality: N/A;  T10 TO PELVIS POSTERIOR INSTRUMENTED FUSION/ AIRO    LUMBAR LAMINECTOMY WITH SURGICAL REMOVAL OF LESION OF SPINAL CORD BY POSTERIOR APPROACH N/A 08/18/2022    Procedure: LAMINECTOMY, SPINE, LUMBAR, POSTERIOR APPROACH, WITH EXCISION OF NEOPLASM OR LESION OF SPINAL CORD;  Surgeon: Floyd Brewer MD;  Location: [facility];  Service: Neurosurgery;  Laterality: N/A;    SPINE SURGERY  August 16th, 2022    Spinal absess and Laminectomy       Time Tracking:     OT Date of Treatment: 06/25/25  OT Start Time: 0939  OT Stop Time: 1016  OT Total Time (min): 37 min    Billable Minutes:Evaluation 10  Therapeutic Activity 27    6/25/2025   stretcher

## 2025-06-25 NOTE — PLAN OF CARE
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 No    Discharge Plan A: Home with family  Discharge Plan B: Home      [id number] - [place] - [address]  [address] [place] [address]  Phone: [phone] Fax: [phone]      Initial Assessment (most recent)       Adult Discharge Assessment - 06/25/25 1126          Discharge Assessment    Assessment Type Discharge Planning Assessment     Confirmed/corrected address, phone number and insurance Yes     Confirmed Demographics Correct on Facesheet     Source of Information patient     Communicated KIM with patient/caregiver No     People in Home parent(s);child(michael), dependent     Name(s) of People in Home Shaista Engel  [phone]     Do you expect to return to your current living situation? No     Do you have help at home or someone to help you manage your care at home? No     Prior to hospitilization cognitive status: Alert/Oriented     Walking or Climbing Stairs Difficulty no     Dressing/Bathing Difficulty no     Home Layout Able to live on 1st floor     Equipment Currently Used at Home cane, quad     Readmission within 30 days? No     Patient currently being followed by outpatient case management? No     Do you currently have service(s) that help you manage your care at home? No     Do you take prescription medications? Yes     Do you  have prescription coverage? Yes     Do you have any problems affording any of your prescribed medications? No     Is the patient taking medications as prescribed? yes     Who is going to help you get home at discharge? Shaista Engel      How do you get to doctors appointments? family or friend will provide     Are you on dialysis? No     Do you take coumadin? No     Discharge Plan A Home with family     Discharge Plan B Home     DME Needed Upon Discharge  none     Discharge Plan discussed with: Patient     Transition of Care Barriers None        Physical Activity    On average, how many days per week do you engage in moderate to strenuous exercise (like a brisk walk)? 0 days     On average, how many minutes do you engage in exercise at this level? 0 min        Financial Resource Strain    How hard is it for you to pay for the very basics like food, housing, medical care, and heating? Not hard at all        Housing Stability    In the last 12 months, was there a time when you were not able to pay the mortgage or rent on time? No     At any time in the past 12 months, were you homeless or living in a shelter (including now)? No        Transportation Needs    In the past 12 months, has lack of transportation kept you from medical appointments or from getting medications? No     In the past 12 months, has lack of transportation kept you from meetings, work, or from getting things needed for daily living? No        Food Insecurity    Within the past 12 months, you worried that your food would run out before you got the money to buy more. Never true     Within the past 12 months, the food you bought just didn't last and you didn't have money to get more. Never true        Stress    Do you feel stress - tense, restless, nervous, or anxious, or unable to sleep at night because your mind is troubled all the time - these days? Not at all        Social Isolation    How often do you feel lonely or isolated from  those around you?  Never        Alcohol Use    Q1: How often do you have a drink containing alcohol? Never     Q2: How many drinks containing alcohol do you have on a typical day when you are drinking? Patient does not drink     Q3: How often do you have six or more drinks on one occasion? Never        Utilities    In the past 12 months has the electric, gas, oil, or water company threatened to shut off services in your home? No        Health Literacy    How often do you need to have someone help you when you read instructions, pamphlets, or other written material from your doctor or pharmacy? Never                      The SW met with the patient at bedside. The SW placed name and contact information on the blackboard in the patient's room. Use preferred pharmacy / bedside delivery for any necessary medications at the time of discharge. The patient is independent with all ADLs. The patient is not on Dialysis or Coumadin. The Patient does not use DME or home oxygen, The patient's mother will provide assistance to the patient upon discharge. The patient's mother will provide transportation upon discharge. SW will continue to follow for course of hospitalization. A discharge planning booklet was left at bedside.        Discharge Plan A and Plan B have been determined by review of patient's clinical status, future medical and therapeutic needs, and coverage/benefits for post-acute care in coordination with multidisciplinary team members.    Allyssa Carpenter MSW, FLORIDALMAW  [facility]  Case Management Dept.

## 2025-06-25 NOTE — PLAN OF CARE
Problem: Occupational Therapy  Goal: Occupational Therapy Goal  Description: Goals to be met by: 7/25/25     Patient will increase functional independence with ADLs by performing:    UE Dressing with [place].  LE Dressing with [place].  Grooming while standing with [place].  Toileting from toilet with [place] for hygiene and clothing management.   Supine to sit with [place].  Stand pivot transfers with [place].  Step transfer with [place]  Toilet transfer to toilet with [place].    Outcome: Progressing

## 2025-06-25 NOTE — PLAN OF CARE
"[facility] Care Plan  POC reviewed with Kayla Clay and family at 1400. Patient and Family verbalized understanding. Questions and concerns addressed. No acute events today. Pt progressing toward goals. Will continue to monitor. See below and flowsheets for full assessment and VS info.       -SBP <160  -MAP >65  -hemovac drains x3  -wound vac CDI      Is this a stroke patient? no    Neuro:  [place] Coma Scale  Best Eye Response: 4-->(E4) spontaneous  Best Motor Response: 6-->(M6) obeys commands  Best Verbal Response: 5-->(V5) oriented  Aria Coma Scale Score: 15  Assessment Qualifiers: Patient not sedated/intubated, No eye obstruction present  Pupil PERRLA: yes     24 hr Temp:  [97.7 °F (36.5 °C)-98.2 °F (36.8 °C)]     CV:   Rhythm: normal sinus rhythm  BP goals:   SBP < 160  MAP > 65    Resp:           Plan: N/A    GI/:     Diet/Nutrition Received: regular  Last Bowel Movement: 06/23/25  Voiding Characteristics: ureteral catheter    Intake/Output Summary (Last 24 hours) at 6/25/2025 1746  Last data filed at 6/25/2025 0601  Gross per 24 hour   Intake 1940.63 ml   Output 1403 ml   Net 537.63 ml          Labs/Accuchecks:  Recent Labs   Lab 06/25/25 0229   WBC 11.37   RBC 3.64*   HGB 10.7*   HCT 32.9*         Recent Labs   Lab 06/25/25 0229      K 4.6   CO2 21*      BUN 13   CREATININE 0.6   ALKPHOS 35*   ALT 19   AST 27   BILITOT 0.4    No results for input(s): "PROTIME", "INR", "APTT", "HEPANTIXA" in the last 168 hours. No results for input(s): "CPK", "CPKMB", "TROPONINI", "MB" in the last 168 hours.    Electrolytes: N/A - electrolytes WDL  Accuchecks: none    Gtts:      LDA/Wounds:    Petey Risk Assessment  Sensory Perception: 4-->no impairment  Moisture: 4-->rarely moist  Activity: 1-->bedfast  Mobility: 2-->very limited  Nutrition: 3-->adequate  Friction and Shear: 2-->potential problem  Petey Score: 16    Is your petey score 12 or less? no          WC         Problem: Adult " Inpatient Plan of Care  Goal: Plan of Care Review  Outcome: Progressing  Goal: Patient-Specific Goal (Individualized)  Outcome: Progressing  Goal: Absence of Hospital-Acquired Illness or Injury  Outcome: Progressing  Goal: Optimal Comfort and Wellbeing  Outcome: Progressing  Goal: Readiness for Transition of Care  Outcome: Progressing     Problem: Infection  Goal: Absence of Infection Signs and Symptoms  Outcome: Progressing     Problem: Fall Injury Risk  Goal: Absence of Fall and Fall-Related Injury  Outcome: Progressing     Problem: Skin Injury Risk Increased  Goal: Skin Health and Integrity  Outcome: Progressing

## 2025-06-26 LAB
ABSOLUTE EOSINOPHIL (OHS): 0.07 K/UL
ABSOLUTE MONOCYTE (OHS): 0.78 K/UL (ref 0.3–1)
ABSOLUTE NEUTROPHIL COUNT (OHS): 6.28 K/UL (ref 1.8–7.7)
ALBUMIN SERPL BCP-MCNC: 3.1 G/DL (ref 3.5–5.2)
ALP SERPL-CCNC: 36 UNIT/L (ref 40–150)
ALT SERPL W/O P-5'-P-CCNC: 20 UNIT/L (ref 10–44)
ANION GAP (OHS): 8 MMOL/L (ref 8–16)
AST SERPL-CCNC: 26 UNIT/L (ref 11–45)
BASOPHILS # BLD AUTO: 0.06 K/UL
BASOPHILS NFR BLD AUTO: 0.7 %
BILIRUB SERPL-MCNC: 0.4 MG/DL (ref 0.1–1)
BUN SERPL-MCNC: 9 MG/DL (ref 6–20)
CALCIUM SERPL-MCNC: 8 MG/DL (ref 8.7–10.5)
CHLORIDE SERPL-SCNC: 104 MMOL/L (ref 95–110)
CO2 SERPL-SCNC: 24 MMOL/L (ref 23–29)
CREAT SERPL-MCNC: 0.6 MG/DL (ref 0.5–1.4)
ERYTHROCYTE [DISTWIDTH] IN BLOOD BY AUTOMATED COUNT: 12.9 % (ref 11.5–14.5)
GFR SERPLBLD CREATININE-BSD FMLA CKD-EPI: >60 ML/MIN/1.73/M2
GLUCOSE SERPL-MCNC: 107 MG/DL (ref 70–110)
HCT VFR BLD AUTO: 28.9 % (ref 37–48.5)
HGB BLD-MCNC: 9.8 GM/DL (ref 12–16)
IMM GRANULOCYTES # BLD AUTO: 0.03 K/UL (ref 0–0.04)
IMM GRANULOCYTES NFR BLD AUTO: 0.4 % (ref 0–0.5)
LYMPHOCYTES # BLD AUTO: 1.07 K/UL (ref 1–4.8)
MAGNESIUM SERPL-MCNC: 1.6 MG/DL (ref 1.6–2.6)
MCH RBC QN AUTO: 30.4 PG (ref 27–31)
MCHC RBC AUTO-ENTMCNC: 33.9 G/DL (ref 32–36)
MCV RBC AUTO: 90 FL (ref 82–98)
NUCLEATED RBC (/100WBC) (OHS): 0 /100 WBC
PHOSPHATE SERPL-MCNC: 2.4 MG/DL (ref 2.7–4.5)
PLATELET # BLD AUTO: 187 K/UL (ref 150–450)
PMV BLD AUTO: 10.6 FL (ref 9.2–12.9)
POTASSIUM SERPL-SCNC: 3.9 MMOL/L (ref 3.5–5.1)
PROT SERPL-MCNC: 5.7 GM/DL (ref 6–8.4)
RBC # BLD AUTO: 3.22 M/UL (ref 4–5.4)
RELATIVE EOSINOPHIL (OHS): 0.8 %
RELATIVE LYMPHOCYTE (OHS): 12.9 % (ref 18–48)
RELATIVE MONOCYTE (OHS): 9.4 % (ref 4–15)
RELATIVE NEUTROPHIL (OHS): 75.8 % (ref 38–73)
SODIUM SERPL-SCNC: 136 MMOL/L (ref 136–145)
WBC # BLD AUTO: 8.29 K/UL (ref 3.9–12.7)

## 2025-06-26 PROCEDURE — 94761 N-INVAS EAR/PLS OXIMETRY MLT: CPT

## 2025-06-26 PROCEDURE — 85025 COMPLETE CBC W/AUTO DIFF WBC: CPT

## 2025-06-26 PROCEDURE — 25000003 PHARM REV CODE 250: Performed by: STUDENT IN AN ORGANIZED HEALTH CARE EDUCATION/TRAINING PROGRAM

## 2025-06-26 PROCEDURE — 84100 ASSAY OF PHOSPHORUS: CPT

## 2025-06-26 PROCEDURE — 63600175 PHARM REV CODE 636 W HCPCS: Performed by: STUDENT IN AN ORGANIZED HEALTH CARE EDUCATION/TRAINING PROGRAM

## 2025-06-26 PROCEDURE — 99499 UNLISTED E&M SERVICE: CPT | Mod: ,,, | Performed by: PHYSICIAN ASSISTANT

## 2025-06-26 PROCEDURE — 27000221 HC OXYGEN, UP TO 24 HOURS

## 2025-06-26 PROCEDURE — 25000003 PHARM REV CODE 250: Performed by: PHYSICIAN ASSISTANT

## 2025-06-26 PROCEDURE — 83735 ASSAY OF MAGNESIUM: CPT

## 2025-06-26 PROCEDURE — 99233 SBSQ HOSP IP/OBS HIGH 50: CPT | Mod: ,,, | Performed by: PSYCHIATRY & NEUROLOGY

## 2025-06-26 PROCEDURE — 51701 INSERT BLADDER CATHETER: CPT

## 2025-06-26 PROCEDURE — 25000003 PHARM REV CODE 250: Performed by: PSYCHIATRY & NEUROLOGY

## 2025-06-26 PROCEDURE — 63600175 PHARM REV CODE 636 W HCPCS

## 2025-06-26 PROCEDURE — 99900035 HC TECH TIME PER 15 MIN (STAT)

## 2025-06-26 PROCEDURE — 25000003 PHARM REV CODE 250

## 2025-06-26 PROCEDURE — 97530 THERAPEUTIC ACTIVITIES: CPT

## 2025-06-26 PROCEDURE — 80053 COMPREHEN METABOLIC PANEL: CPT

## 2025-06-26 PROCEDURE — 51798 US URINE CAPACITY MEASURE: CPT

## 2025-06-26 PROCEDURE — 11000001 HC ACUTE MED/SURG PRIVATE ROOM

## 2025-06-26 PROCEDURE — 97116 GAIT TRAINING THERAPY: CPT

## 2025-06-26 RX ORDER — GABAPENTIN 300 MG/1
300 CAPSULE ORAL 2 TIMES DAILY
Status: DISCONTINUED | OUTPATIENT
Start: 2025-06-26 | End: 2025-06-27

## 2025-06-26 RX ORDER — TAMSULOSIN HYDROCHLORIDE 0.4 MG/1
0.4 CAPSULE ORAL DAILY
Status: DISCONTINUED | OUTPATIENT
Start: 2025-06-26 | End: 2025-06-27

## 2025-06-26 RX ORDER — LANOLIN ALCOHOL/MO/W.PET/CERES
800 CREAM (GRAM) TOPICAL
Status: DISCONTINUED | OUTPATIENT
Start: 2025-06-26 | End: 2025-06-29 | Stop reason: HOSPADM

## 2025-06-26 RX ORDER — POLYETHYLENE GLYCOL 3350 17 G/17G
17 POWDER, FOR SOLUTION ORAL 2 TIMES DAILY
Status: DISCONTINUED | OUTPATIENT
Start: 2025-06-26 | End: 2025-06-29 | Stop reason: HOSPADM

## 2025-06-26 RX ORDER — SODIUM,POTASSIUM PHOSPHATES 280-250MG
2 POWDER IN PACKET (EA) ORAL
Status: DISCONTINUED | OUTPATIENT
Start: 2025-06-26 | End: 2025-06-29 | Stop reason: HOSPADM

## 2025-06-26 RX ORDER — HYDROXYZINE HYDROCHLORIDE 25 MG/1
25 TABLET, FILM COATED ORAL EVERY 4 HOURS PRN
Status: DISCONTINUED | OUTPATIENT
Start: 2025-06-26 | End: 2025-06-27

## 2025-06-26 RX ORDER — MAGNESIUM SULFATE HEPTAHYDRATE 40 MG/ML
2 INJECTION, SOLUTION INTRAVENOUS ONCE
Status: COMPLETED | OUTPATIENT
Start: 2025-06-26 | End: 2025-06-26

## 2025-06-26 RX ADMIN — CEFAZOLIN 1 G: 330 INJECTION, POWDER, FOR SOLUTION INTRAMUSCULAR; INTRAVENOUS at 08:06

## 2025-06-26 RX ADMIN — CALCIUM CARBONATE (ANTACID) CHEW TAB 500 MG 500 MG: 500 CHEW TAB at 08:06

## 2025-06-26 RX ADMIN — ESCITALOPRAM OXALATE 20 MG: 20 TABLET ORAL at 08:06

## 2025-06-26 RX ADMIN — METHOCARBAMOL 750 MG: 750 TABLET ORAL at 12:06

## 2025-06-26 RX ADMIN — POTASSIUM & SODIUM PHOSPHATES POWDER PACK 280-160-250 MG 2 PACKET: 280-160-250 PACK at 09:06

## 2025-06-26 RX ADMIN — FAMOTIDINE 40 MG: 20 TABLET, FILM COATED ORAL at 08:06

## 2025-06-26 RX ADMIN — METHOCARBAMOL 750 MG: 750 TABLET ORAL at 08:06

## 2025-06-26 RX ADMIN — POLYETHYLENE GLYCOL 3350 17 G: 17 POWDER, FOR SOLUTION ORAL at 08:06

## 2025-06-26 RX ADMIN — OXYCODONE 15 MG: 5 TABLET ORAL at 05:06

## 2025-06-26 RX ADMIN — GABAPENTIN 300 MG: 300 CAPSULE ORAL at 11:06

## 2025-06-26 RX ADMIN — HYDROXYZINE HYDROCHLORIDE 25 MG: 25 TABLET, FILM COATED ORAL at 02:06

## 2025-06-26 RX ADMIN — DIAZEPAM 5 MG: 5 TABLET ORAL at 02:06

## 2025-06-26 RX ADMIN — POTASSIUM & SODIUM PHOSPHATES POWDER PACK 280-160-250 MG 2 PACKET: 280-160-250 PACK at 06:06

## 2025-06-26 RX ADMIN — HYDROMORPHONE HYDROCHLORIDE 1 MG: 1 INJECTION, SOLUTION INTRAMUSCULAR; INTRAVENOUS; SUBCUTANEOUS at 06:06

## 2025-06-26 RX ADMIN — OXYCODONE HYDROCHLORIDE 10 MG: 10 TABLET ORAL at 04:06

## 2025-06-26 RX ADMIN — HYDROMORPHONE HYDROCHLORIDE 1 MG: 1 INJECTION, SOLUTION INTRAMUSCULAR; INTRAVENOUS; SUBCUTANEOUS at 07:06

## 2025-06-26 RX ADMIN — BACLOFEN 10 MG: 10 TABLET ORAL at 08:06

## 2025-06-26 RX ADMIN — TAMSULOSIN HYDROCHLORIDE 0.4 MG: 0.4 CAPSULE ORAL at 12:06

## 2025-06-26 RX ADMIN — GABAPENTIN 300 MG: 300 CAPSULE ORAL at 08:06

## 2025-06-26 RX ADMIN — HYDROMORPHONE HYDROCHLORIDE 1 MG: 1 INJECTION, SOLUTION INTRAMUSCULAR; INTRAVENOUS; SUBCUTANEOUS at 11:06

## 2025-06-26 RX ADMIN — SENNOSIDES AND DOCUSATE SODIUM 2 TABLET: 50; 8.6 TABLET ORAL at 08:06

## 2025-06-26 RX ADMIN — OXYCODONE HYDROCHLORIDE 10 MG: 10 TABLET ORAL at 09:06

## 2025-06-26 RX ADMIN — OXYCODONE 15 MG: 5 TABLET ORAL at 09:06

## 2025-06-26 RX ADMIN — METHOCARBAMOL 750 MG: 750 TABLET ORAL at 05:06

## 2025-06-26 RX ADMIN — MUPIROCIN: 20 OINTMENT TOPICAL at 08:06

## 2025-06-26 RX ADMIN — MAGNESIUM SULFATE HEPTAHYDRATE 2 G: 40 INJECTION, SOLUTION INTRAVENOUS at 03:06

## 2025-06-26 RX ADMIN — Medication 6 MG: at 08:06

## 2025-06-26 RX ADMIN — CEFAZOLIN 1 G: 330 INJECTION, POWDER, FOR SOLUTION INTRAMUSCULAR; INTRAVENOUS at 11:06

## 2025-06-26 RX ADMIN — DIAZEPAM 5 MG: 5 TABLET ORAL at 09:06

## 2025-06-26 RX ADMIN — CEFAZOLIN 1 G: 330 INJECTION, POWDER, FOR SOLUTION INTRAMUSCULAR; INTRAVENOUS at 04:06

## 2025-06-26 RX ADMIN — FLUCONAZOLE 400 MG: 200 TABLET ORAL at 08:06

## 2025-06-26 RX ADMIN — OXYCODONE 15 MG: 5 TABLET ORAL at 12:06

## 2025-06-26 RX ADMIN — HYDROMORPHONE HYDROCHLORIDE 1 MG: 1 INJECTION, SOLUTION INTRAMUSCULAR; INTRAVENOUS; SUBCUTANEOUS at 03:06

## 2025-06-26 NOTE — PT/OT/SLP PROGRESS
Occupational Therapy   Treatment    Name: Kayla Clay  MRN: [id number]  Admitting Diagnosis:  Degenerative scoliosis in adult patient  2 Days Post-Op    Recommendations:     Discharge Recommendations: Low Intensity Therapy  Discharge Equipment Recommendations:  walker, rolling  Barriers to discharge:  None    Assessment:     Kayla Clay is a 40 y.o. female with a medical diagnosis of Degenerative scoliosis in adult patient.  She presents with decrease functional status secondary to medical diagnosis. Performance deficits affecting function are impaired functional mobility, weakness, impaired endurance, gait instability, impaired balance, impaired self care skills, pain. Patient with limited tolerance to OT session 2/2 increased pain with slight anxiety regarding current health status. Patient able to ambulate with CGA agreeable to sitting in chair post OT session. Patient able to recall 2/3 spinal precautions, OT provided re-education. Patient remains appropriate candidate for acute OT services.     Rehab Prognosis:  Good; patient would benefit from acute skilled OT services to address these deficits and reach maximum level of function.       Plan:     Patient to be seen 4 x/week to address the above listed problems via self-care/home management, therapeutic activities, therapeutic exercises, neuromuscular re-education  Plan of Care Expires: 07/25/25  Plan of Care Reviewed with: patient    Subjective     Chief Complaint: Pain   Patient/Family Comments/goals: DC   Pain/Comfort:  Pain Rating 1: 9/10  Location - Side 1: Bilateral  Location - Orientation 1: lower  Location 1: back  Pain Addressed 1: Distraction, Cessation of Activity, Nurse notified, Reposition, Pre-medicate for activity  Pain Rating Post-Intervention 1: 10/10    Objective:     Communicated with: RN prior to session.  Patient found supine with blood pressure cuff, pulse ox (continuous), telemetry, fuller catheter, bed alarm, peripheral  IV, wound vac upon OT entry to room.    General Precautions: Standard, fall    Orthopedic Precautions:spinal precautions  Braces: TLSO  Respiratory Status: Room air     Occupational Performance:     Bed Mobility:  Patient re-educated on log roll technique   Patient completed Rolling/Turning to Left with  minimum assistance  Patient completed Scooting/Bridging with minimum assistance  Patient completed Supine to Sit with minimum assistance    Functional Mobility/Transfers:  Patient completed Sit <> Stand Transfer with contact guard assistance  with  rolling walker x2 from bed x1 from chair   Functional Mobility: Patient ambulated ~30 feet with CGA and RW; patient limited by increased pain and medical lines     Activities of Daily Living:  Upper Body Dressing: maximal assistance to don TLSO seated EOB  Lower Body Dressing: maximal assistance to don socks seated EOB    AMPAC 6 Click ADL: 18    Treatment & Education:  Provided education regarding role of OT, POC, & discharge recommendations.  Pt with no further questions/concerns at this time. OT provided education on home recommendations and fall prevention in preparation for D/C.     Patient left supine with all lines intact and call button in reach    GOALS:   Multidisciplinary Problems       Occupational Therapy Goals          Problem: Occupational Therapy    Goal Priority Disciplines Outcome Interventions   Occupational Therapy Goal     OT, PT/OT Progressing    Description: Goals to be met by: 7/25/25     Patient will increase functional independence with ADLs by performing:    UE Dressing with [place].  LE Dressing with [place].  Grooming while standing with [place].  Toileting from toilet with [place] for hygiene and clothing management.   Supine to sit with [place].  Stand pivot transfers with [place].  Step transfer with [place]  Toilet transfer to toilet with [place].                         DME Justifications:    Kayla's mobility limitation cannot be sufficiently resolved by the use of a cane. Her functional mobility deficit can be sufficiently resolved with the use of a Rolling Walker. Patient's mobility limitation significantly impairs their ability to participate in one of more activities of daily living.  The use of a RW will significantly improve the patient's ability to participate in MRADLS and the patient will use it on regular basis in the home.    Time Tracking:     OT Date of Treatment: 06/26/25  OT Start Time: 1307  OT Stop Time: 1333  OT Total Time (min): 26 min    Billable Minutes:Therapeutic Activity 26    OT/STELLA: OT          6/26/2025

## 2025-06-26 NOTE — ASSESSMENT & PLAN NOTE
Ms. Kayla Clay is a 40 y.o. 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 Completed 6 weeks of IV antibiotics, empirically since initial cultures were negative using Ceftriaxone and Daptomycin. Completed 3 months of Fluconazole high dose at 400mg PO q24h given Candida albicans positive cultures. Did not tolerate Vancomycin or Doxycycline.    Patient has completed adequate antimicrobial coverage for prior infection. IVDU is a huge risk factor for recurrence. To ensure sterility of site she will need PO fluconazole which she can stop one week post op.    S/p T-10-pelvis fusion 6/24    Plan:  Patient admitted to [facility] on telemetry. Ok to stepdown to NSGY per NCC  q1h neurochecks in ICU, q2h neurochecks in stepdown, q4h neurochecks on floor  All labs and diagnostics reviewed  MAP goals >65 at this time  Discontinue A-line per NCC  Discontinue fuller per NCC  Maintain TLSO brace when OOB  HV x2 deep, HV x1 superficial Drains to remain at this time; please record hourly outputs, provena wound vac in place  Antibiotics while drains in place  PT/OT/OOB  ADAT  Multi-Modal Pain Control  TEDs/SCDs/SQH  Continue to monitor clinically, notify NSGY immediately with any changes in neuro status

## 2025-06-26 NOTE — PLAN OF CARE
06/26/25 1505   Rounds   Attendance Provider;   Discharge Plan A Home with family   Why the patient remains in the hospital Requires continued medical care   Transition of Care Barriers None      Allyssa Carpenter MSW, LCSW  [facility]  Case Management Dept.

## 2025-06-26 NOTE — PLAN OF CARE
"[facility] Care Plan  POC reviewed with Kayla Clay and family at 1700. Patient and Family verbalized understanding. Questions and concerns addressed. No acute events today. Pt progressing toward goals. Will continue to monitor. See below and flowsheets for full assessment and VS info.     - Pt worked with PT/OT. Up in chair, ambulated to the bathroom twice, pt able to spontaneously urinate on toilet.  - Neurosurgery ordered for all 3 drains to be to gravity. All drains now to gravity.  - Pt still on 2L NC, intensive spirometer encouraged.    Is this a stroke patient? no    Neuro:  [place] Coma Scale  Best Eye Response: 4-->(E4) spontaneous  Best Motor Response: 6-->(M6) obeys commands  Best Verbal Response: 5-->(V5) oriented  [place] Coma Scale Score: 15  Assessment Qualifiers: Patient not sedated/intubated  Pupil PERRLA: yes     24 hr Temp:  [98 °F (36.7 °C)-98.7 °F (37.1 °C)]     CV:   Rhythm: normal sinus rhythm  BP goals:   SBP < 160  MAP > 65    Resp:      Oxygen Concentration (%): 28    Plan: 2L NC    GI/:     Diet/Nutrition Received: regular  Last Bowel Movement: 06/23/25  Voiding Characteristics: external catheter    Intake/Output Summary (Last 24 hours) at 6/26/2025 1811  Last data filed at 6/26/2025 1705  Gross per 24 hour   Intake 2134.74 ml   Output 1617 ml   Net 517.74 ml     Unmeasured Output  Unmeasured Urine Occurrence: 1  Unmeasured Stool Occurrence: 0    Labs/Accuchecks:  Recent Labs   Lab 06/26/25  0012   WBC 8.29   RBC 3.22*   HGB 9.8*   HCT 28.9*         Recent Labs   Lab 06/26/25  0012      K 3.9   CO2 24      BUN 9   CREATININE 0.6   ALKPHOS 36*   ALT 20   AST 26   BILITOT 0.4    No results for input(s): "PROTIME", "INR", "APTT", "HEPANTIXA" in the last 168 hours. No results for input(s): "CPK", "CPKMB", "TROPONINI", "MB" in the last 168 hours.    Electrolytes: Electrolytes replaced  Accuchecks: none    Gtts:      LDA/Wounds:    Petey Risk Assessment  Sensory " Perception: 4-->no impairment  Moisture: 4-->rarely moist  Activity: 1-->bedfast  Mobility: 2-->very limited  Nutrition: 3-->adequate  Friction and Shear: 2-->potential problem  Petey Score: 16    Is your petey score 12 or less? no    WCTM

## 2025-06-26 NOTE — EICU
Virtual ICU Quality Rounds    Admit Date: 6/24/2025  Hospital Day: 2    ICU Day: 1d 17h    24H Vital Sign Range:  Temp:  [97.7 °F (36.5 °C)-98.7 °F (37.1 °C)]   Pulse:  []   Resp:  [9-48]   BP: ()/(53-66)   SpO2:  [93 %-100 %]   Arterial Line BP: ()/()     VICU Surveillance Screening  LDA reconciliation : Yes

## 2025-06-26 NOTE — PLAN OF CARE
"[facility] Care Plan    POC reviewed with Kayla Clay at 0415. Patient verbalized understanding. Questions and concerns addressed. No acute events today. Pt progressing toward goals. Will continue to monitor. See below and flowsheets for full assessment and VS info.       Drain outputs overnight:     Right HV #1- 200 mL.     Left HV #2-- 27 mL.     Left HV #3-- 15 mL.     PRN's for back pain/muscle spasms/anxiety:   dilaudid x2, oxy15 x2, oxy10 x1, valium x2, hydroxyzine x1.  PRN zofran x1 for nausea.    Straight cath x2.  Mg (1.6) replaced w/ 2 g IVPB.  Replacing phos (2.4).       Is this a stroke patient? no    Neuro:  [place] Coma Scale  Best Eye Response: 3-->(E3) to speech  Best Motor Response: 6-->(M6) obeys commands  Best Verbal Response: 5-->(V5) oriented  [place] Coma Scale Score: 14  Assessment Qualifiers: Patient not sedated/intubated, No eye obstruction present  Pupil PERRLA: yes     24 hr Temp:  [97.7 °F (36.5 °C)-98.7 °F (37.1 °C)]     CV:   Rhythm: normal sinus rhythm  BP goals:   SBP < 160  MAP > 65    Resp:           Plan: N/A    GI/:     Diet/Nutrition Received: regular  Last Bowel Movement: 06/23/25  Voiding Characteristics: external catheter    Intake/Output Summary (Last 24 hours) at 6/26/2025 0442  Last data filed at 6/26/2025 0402  Gross per 24 hour   Intake 2097.04 ml   Output 1157 ml   Net 940.04 ml     Unmeasured Output  Unmeasured Stool Occurrence: 0    Labs/Accuchecks:  Recent Labs   Lab 06/26/25  0012   WBC 8.29   RBC 3.22*   HGB 9.8*   HCT 28.9*         Recent Labs   Lab 06/26/25  0012      K 3.9   CO2 24      BUN 9   CREATININE 0.6   ALKPHOS 36*   ALT 20   AST 26   BILITOT 0.4    No results for input(s): "PROTIME", "INR", "APTT", "HEPANTIXA" in the last 168 hours. No results for input(s): "CPK", "CPKMB", "TROPONINI", "MB" in the last 168 hours.    Electrolytes: Electrolytes replaced  Accuchecks: none    Gtts:      LDA/Wounds:    Petey Risk Assessment  Sensory " Perception: 4-->no impairment  Moisture: 4-->rarely moist  Activity: 1-->bedfast  Mobility: 2-->very limited  Nutrition: 3-->adequate  Friction and Shear: 2-->potential problem  Petey Score: 16  Is your petey score 12 or less? no          Restraints:        WCTM       Problem: Adult Inpatient Plan of Care  Goal: Plan of Care Review  Outcome: Progressing  Goal: Optimal Comfort and Wellbeing  Outcome: Progressing     Problem: Infection  Goal: Absence of Infection Signs and Symptoms  Outcome: Progressing

## 2025-06-26 NOTE — PROGRESS NOTES
"[facility] - Neuro Critical Care  Neurosurgery  Progress Note    Subjective:     History of Present Illness: Ms. Kayla Clay is a 40 y.o. 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 Per last ID note, "7/5/23: Ms. Miller presents for follow-up today. She continues to have ongoing lower extremity weakness. She uses a cane for assistance with ambulation. She denies any fevers, chills, nausea, vomiting and diarrhea. She had been working with PT, but hasn't been able to follow-up in the past month. She saw a neurosurgeon on 5/26/23 in which he reports her most recent MRI showed resolution of epidural abscess and degenerative disc disease. No planned interventions at this time. Completed 6 weeks of IV antibiotics, empirically since initial cultures were negative using Ceftriaxone and Daptomycin. Completed 3 months of Fluconazole high dose at 400mg PO q24h given Candida albicans positive cultures. Did not tolerate Vancomycin or Doxycycline. MRI with no findings significant for residual infection. Neurosurgery without any interventions planned at this time. MRI with resolution of epidural abscess. Continue to work with PT to work on gait strength. No need for ongoing infectious workup at this time."     NSGY now planning for deformity surgery due to severe stenosis of lumbar spine. T10-pelvis fusion recommended by surgeons. Explained to patient that she completed adequate antimicrobial coverage for prior infection. IVDU is a huge risk factor for recurrence. To ensure sterility of site will prescribe course of PO fluconazole which she can stop one week post op. If evidence of infection seen in OR will ask surgeons to send specimen for cultures. Patient voiced understanding.     Post-Op Info:  Procedure(s) (LRB):  *AIRO* T10 TO PELVIS POSTERIOR INSTRUMENTED FUSION/AIRO (N/A)   2 Days Post-Op   Interval History: P6/26: Keep drains until pod " 5, pod 2 today. Neuro stable, full strength, working with therapies, pending step down    Medications:  Continuous Infusions:  Scheduled Meds:   baclofen  10 mg Oral Nightly    calcium carbonate  500 mg Oral BID    ceFAZolin (Ancef) IV (PEDS and ADULTS)  1 g Intravenous Q8H    EScitalopram oxalate  20 mg Oral QHS    famotidine  40 mg Oral Daily    fluconazole  400 mg Oral Daily    gabapentin  300 mg Oral BID    methocarbamoL  750 mg Oral QID    mupirocin   Nasal BID    polyethylene glycol  17 g Oral BID    senna-docusate  2 tablet Oral BID    tamsulosin  0.4 mg Oral Daily     PRN Meds:  Current Facility-Administered Medications:     aluminum-magnesium hydroxide-simethicone, 30 mL, Oral, Q4H PRN    diazePAM, 5 mg, Oral, BID PRN    HYDROmorphone, 1 mg, Intravenous, Q4H PRN    hydrOXYzine HCL, 25 mg, Oral, Q4H PRN    magnesium oxide, 800 mg, Oral, PRN    magnesium oxide, 800 mg, Oral, PRN    melatonin, 6 mg, Oral, Nightly PRN    ondansetron, 8 mg, Intravenous, Q6H PRN    oxyCODONE, 15 mg, Oral, Q4H PRN    oxyCODONE, 10 mg, Oral, Q4H PRN    potassium bicarbonate, 35 mEq, Oral, PRN    potassium bicarbonate, 50 mEq, Oral, PRN    potassium bicarbonate, 60 mEq, Oral, PRN    potassium, sodium phosphates, 2 packet, Oral, PRN    potassium, sodium phosphates, 2 packet, Oral, PRN    potassium, sodium phosphates, 2 packet, Oral, PRN    prochlorperazine, 5 mg, Intravenous, Q6H PRN    sumatriptan, 50 mg, Oral, PRN     Review of Systems  Objective:     Weight: 87.3 kg (192 lb 7.4 oz)  Body mass index is 30.14 kg/m².  Vital Signs (Most Recent):  Temp: 98.5 °F (36.9 °C) (06/26/25 1505)  Pulse: 94 (06/26/25 1715)  Resp: 16 (06/26/25 1718)  BP: 111/66 (06/26/25 1715)  SpO2: 97 % (06/26/25 1715) Vital Signs (24h Range):  Temp:  [98 °F (36.7 °C)-98.7 °F (37.1 °C)] 98.5 °F (36.9 °C)  Pulse:  [] 94  Resp:  [10-48] 16  SpO2:  [93 %-100 %] 97 %  BP: ()/(50-66) 111/66  Arterial Line BP: (83-89)/(70-73) 83/70     Date 06/26/25 0700  "- 06/27/25 0659   Shift 5284-8913 7835-6594 6405-8105 24 Hour Total   INTAKE   P.O. 270 200  470   Shift Total(mL/kg) 270(3.1) 200(2.3)  470(5.4)   OUTPUT   Urine(mL/kg/hr) 350(0.5)   350   Shift Total(mL/kg) 350(4)   350(4)   Weight (kg) 87.3 87.3 87.3 87.3              Oxygen Concentration (%):  [28] 28             Closed/Suction Drain 06/24/25 1308 Tube - 1 Right Back Accordion 10 Fr. (Active)   Dressing Type Transparent (Tegaderm) 06/25/25 1501   Dressing Status Clean;Dry;Intact 06/25/25 1501   Dressing Intervention Integrity maintained 06/25/25 1501   Drainage None 06/25/25 1501   Output (mL) 3 mL 06/25/25 0601            Closed/Suction Drain 06/24/25 1308 Tube - 2 Left Back Accordion 10 Fr. (Active)   Dressing Type Transparent (Tegaderm) 06/25/25 1501   Dressing Status Clean;Dry;Intact 06/25/25 1501   Dressing Intervention Integrity maintained 06/25/25 1501   Drainage None 06/25/25 1501   Output (mL) 80 mL 06/25/25 0601            Closed/Suction Drain 06/24/25 1309 Tube - 3 Left Back Accordion 10 Fr. (Active)   Dressing Type Transparent (Tegaderm) 06/25/25 1501   Dressing Status Clean;Dry;Intact 06/25/25 1501   Dressing Intervention Integrity maintained 06/25/25 1501   Drainage None 06/25/25 1501   Output (mL) 5 mL 06/25/25 0601            Urethral Catheter 06/24/25 0745 Coude;Non-latex;Silicone 16 Fr. (Active)   Site Assessment Clean;Intact;Dry 06/25/25 1501   Collection Container Urimeter 06/25/25 1501   Securement Method secured to top of thigh w/ adhesive device 06/25/25 1501   Catheter Care Performed yes 06/25/25 1501   Reason for Continuing Urinary Catheterization Post operative 06/25/25 1501   CAUTI Prevention Bundle Securement Device in place with 1" slack;Intact seal between catheter & drainage tubing;Drainage bag/urimeter off the floor;Sheeting clip in use;No dependent loops or kinks;Drainage bag/urimeter not overfilled (<2/3 full);Drainage bag/urimeter below bladder 06/25/25 1101   Output (mL) 45 mL " "06/25/25 0601          Physical Exam         Neurosurgery Physical Exam  AAOx4  PERRL  Cranial nerves intact  BUE 5/5  BLE 5/5  SILT    Significant Labs:  Recent Labs   Lab 06/25/25 0229 06/26/25  0012   * 107    136   K 4.6 3.9    104   CO2 21* 24   BUN 13 9   CREATININE 0.6 0.6   CALCIUM 7.9* 8.0*   MG 1.9 1.6     Recent Labs   Lab 06/25/25 0229 06/26/25  0012   WBC 11.37 8.29   HGB 10.7* 9.8*   HCT 32.9* 28.9*    187     No results for input(s): "LABPT", "INR", "APTT" in the last 48 hours.  Microbiology Results (last 7 days)       ** No results found for the last 168 hours. **          All pertinent labs from the last 24 hours have been reviewed.    Significant Diagnostics:  CT: No results found in the last 24 hours.  MRI: No results found in the last 24 hours.  I have reviewed all pertinent imaging results/findings within the past 24 hours.  I have reviewed and interpreted all pertinent imaging results/findings within the past 24 hours.  Assessment/Plan:     * Degenerative scoliosis in adult patient  Ms. Kayla Clay is a 40 y.o. 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 Completed 6 weeks of IV antibiotics, empirically since initial cultures were negative using Ceftriaxone and Daptomycin. Completed 3 months of Fluconazole high dose at 400mg PO q24h given Candida albicans positive cultures. Did not tolerate Vancomycin or Doxycycline.    Patient has completed adequate antimicrobial coverage for prior infection. IVDU is a huge risk factor for recurrence. To ensure sterility of site she will need PO fluconazole which she can stop one week post op.    S/p T-10-pelvis fusion 6/24    Plan:  Patient admitted to [facility] on telemetry. Ok to stepdown to NSGY per NCC  q1h neurochecks in ICU, q2h neurochecks in stepdown, q4h neurochecks on floor  All labs and diagnostics reviewed  MAP goals >65 at this " time  Discontinue A-line per NCC  Discontinue fuller per NCC  Maintain TLSO brace when OOB  HV x2 deep, HV x1 superficial Drains to remain at this time; please record hourly outputs, provena wound vac in place  Antibiotics while drains in place  PT/OT/OOB  ADAT  Multi-Modal Pain Control  TEDs/SCDs/SQH  Continue to monitor clinically, notify NSGY immediately with any changes in neuro status          Carl Oliva MD  Neurosurgery  [facility] - Neuro Critical Care

## 2025-06-26 NOTE — PT/OT/SLP PROGRESS
"Physical Therapy Treatment    Patient Name:  Kayla Clay   MRN:  [id number]    Recommendations:     Discharge Recommendations: Low Intensity Therapy  Discharge Equipment Recommendations: walker, rolling  Barriers to discharge: Inaccessible home    Assessment:     Kayla Clay is a 40 y.o. female admitted with a medical diagnosis of Degenerative scoliosis in adult patient.  She presents with the following impairments/functional limitations: weakness, impaired endurance, impaired functional mobility, gait instability, decreased lower extremity function, decreased upper extremity function.    Pleasant and motivated. Recalled 3/3 spinal precautions, compliant throughout. Pt demonstrated improved activity tolerance and amb distance of 45' + 10' RW with 1 person for safety and toilet transfer in between bouts. Pt successfully voided in restroom, RN notified. Pt continues to benefit from skilled PT services while in house in order to address the aforementioned deficits.      Rehab Prognosis: Good; patient would benefit from acute skilled PT services to address these deficits and reach maximum level of function.    Recent Surgery: Procedure(s) (LRB):  *AIRO* T10 TO PELVIS POSTERIOR INSTRUMENTED FUSION/AIRO (N/A) 2 Days Post-Op    Plan:     During this hospitalization, patient to be seen 4 x/week to address the identified rehab impairments via gait training, therapeutic exercises, therapeutic activities, neuromuscular re-education and progress toward the following goals:    Plan of Care Expires:  07/04/25    Subjective     "I started feeling sleepy"    Pain/Comfort:  Pain Rating 1: 0/10      Objective:     Communicated with RN prior to session.  Patient found up in chair with telemetry, pulse ox (continuous), hemovac, wound vac, blood pressure cuff upon PT entry to room.     General Precautions: Standard, fall  Orthopedic Precautions: spinal precautions  Braces: TLSO  Respiratory Status: Nasal cannula, flow 2 " L/min     Functional Mobility:  Bed Mobility:     Rolling Right: stand by assistance  Sit to Supine: minimum assistance with B LE management  Transfers:     Sit to Stand:  stand by assistance with rolling walker  Toilet Transfer: stand by assistance with  rolling walker and grab bars  using  Step Transfer  Gait: pt amb 45' + 10' RW SBA with toilet transfer in between bouts. Pt presented with fair fely, upright posture, reciprocal gait  Balance:   Good sitting balance  Good standing balance      AM-PAC 6 CLICK MOBILITY  Turning over in bed (including adjusting bedclothes, sheets and blankets)?: 3  Sitting down on and standing up from a chair with arms (e.g., wheelchair, bedside commode, etc.): 3  Moving from lying on back to sitting on the side of the bed?: 3  Moving to and from a bed to a chair (including a wheelchair)?: 3  Need to walk in hospital room?: 3  Climbing 3-5 steps with a railing?: 3  Basic Mobility Total Score: 18       Treatment & Education:  Discussed spinal precautions and TLSO compliance  Doffed/donned gown  Doffed TLSO totA  Pericare totA  Discussed RW management, fall prevention, and safety  Discussed amb to restroom with RW and RN/staff outside of therapy services  Discussed sitting up EOB and/or UIC with RW and RN/staff outside of therapy services      Educated pt on PT role/POC  Educated pt on importance of OOB activity and daily ambulation   Pt educated on proper body mechanics, safety techniques, and energy conservation with PT facilitation and cueing throughout session   Pt verbalized understanding      Patient left HOB elevated with all lines intact, call button in reach, RN notified    GOALS:   Multidisciplinary Problems       Physical Therapy Goals          Problem: Physical Therapy    Goal Priority Disciplines Outcome Interventions   Physical Therapy Goal     PT, PT/OT Progressing    Description: Goals to be completed by: 7/4/25    Pt will perform sup<>sit transfers w/ stand by  assistance  Pt will ambulate 100 feet w/ stand by assistance using LRAD  Pt will be independent w/ HEP therex on BLE w/ good form and ROM   Pt will ascend/descend 3 stairs w/ B railing with stand by assistance  Pt will be independent w/ donning/doffing brace and recalling 3/3 spinal precautions                       DME Justifications:  The mobility limitation cannot be sufficiently resolved by the use of a cane.   Patient's functional mobility deficit can be sufficiently resolved with the use of a (Rolling Walker or Walker).  Patient's mobility limitation significantly impairs their ability to participate in one of more activities of daily living.  The use of a (RW or Walker) will significantly improve the patients ability to participate in MRADLS and the patient will use it on regular basis in the home.        Time Tracking:     PT Received On: 06/26/25  PT Start Time: 1500     PT Stop Time: 1535  PT Total Time (min): 35 min     Billable Minutes: Gait Training 13 and Therapeutic Activity 12    Treatment Type: Treatment  PT/PTA: PT     Number of PTA visits since last PT visit: 0     06/26/2025

## 2025-06-26 NOTE — ASSESSMENT & PLAN NOTE
40 y.o. female w/ significant PMHx of IVDU, HTN, and fungal discitis who completed 6 weeks of IV antibiotics, empirically since initial cultures were negative using Ceftriaxone and Daptomycin. She also completed 3 months of Fluconazole high dose at 400mg PO q24h given Candida albicans positive cultures.  She is s/p T-10-pelvis fusion for degenerative scoliosis 2/2 discitis history.    -Admitted to [facility], NSGY following - stable for TTF from NCC perspective  -q1h neuro checks, vital checks can expand to q4  -Daily CBC, CMP, mag, phos  -SBP < 160, prn labetalol and hydralazine  -SCDs, hold DVT chemoppx given high drain output over last 24h (500cc)   -PT/OT/SLP consulted, currently recommending low intensity therapy  -Multimodal pain regimen, OK   -Post op imaging per NSGY  -TLSO brace when OOB  -Cefazolin while drains in place per NSGY  -Fluconazole x 7 d per ID

## 2025-06-26 NOTE — SUBJECTIVE & OBJECTIVE
Interval History: P6/26: Keep drains until pod 5, pod 2 today. Neuro stable, full strength, working with therapies, pending step down    Medications:  Continuous Infusions:  Scheduled Meds:   baclofen  10 mg Oral Nightly    calcium carbonate  500 mg Oral BID    ceFAZolin (Ancef) IV (PEDS and ADULTS)  1 g Intravenous Q8H    EScitalopram oxalate  20 mg Oral QHS    famotidine  40 mg Oral Daily    fluconazole  400 mg Oral Daily    gabapentin  300 mg Oral BID    methocarbamoL  750 mg Oral QID    mupirocin   Nasal BID    polyethylene glycol  17 g Oral BID    senna-docusate  2 tablet Oral BID    tamsulosin  0.4 mg Oral Daily     PRN Meds:  Current Facility-Administered Medications:     aluminum-magnesium hydroxide-simethicone, 30 mL, Oral, Q4H PRN    diazePAM, 5 mg, Oral, BID PRN    HYDROmorphone, 1 mg, Intravenous, Q4H PRN    hydrOXYzine HCL, 25 mg, Oral, Q4H PRN    magnesium oxide, 800 mg, Oral, PRN    magnesium oxide, 800 mg, Oral, PRN    melatonin, 6 mg, Oral, Nightly PRN    ondansetron, 8 mg, Intravenous, Q6H PRN    oxyCODONE, 15 mg, Oral, Q4H PRN    oxyCODONE, 10 mg, Oral, Q4H PRN    potassium bicarbonate, 35 mEq, Oral, PRN    potassium bicarbonate, 50 mEq, Oral, PRN    potassium bicarbonate, 60 mEq, Oral, PRN    potassium, sodium phosphates, 2 packet, Oral, PRN    potassium, sodium phosphates, 2 packet, Oral, PRN    potassium, sodium phosphates, 2 packet, Oral, PRN    prochlorperazine, 5 mg, Intravenous, Q6H PRN    sumatriptan, 50 mg, Oral, PRN     Review of Systems  Objective:     Weight: 87.3 kg (192 lb 7.4 oz)  Body mass index is 30.14 kg/m².  Vital Signs (Most Recent):  Temp: 98.5 °F (36.9 °C) (06/26/25 1505)  Pulse: 94 (06/26/25 1715)  Resp: 16 (06/26/25 1718)  BP: 111/66 (06/26/25 1715)  SpO2: 97 % (06/26/25 1715) Vital Signs (24h Range):  Temp:  [98 °F (36.7 °C)-98.7 °F (37.1 °C)] 98.5 °F (36.9 °C)  Pulse:  [] 94  Resp:  [10-48] 16  SpO2:  [93 %-100 %] 97 %  BP: ()/(50-66) 111/66  Arterial Line  "BP: (83-89)/(70-73) 83/70     Date 06/26/25 0700 - 06/27/25 0659   Shift 7401-7134 2451-0393 7026-0112 24 Hour Total   INTAKE   P.O. 270 200  470   Shift Total(mL/kg) 270(3.1) 200(2.3)  470(5.4)   OUTPUT   Urine(mL/kg/hr) 350(0.5)   350   Shift Total(mL/kg) 350(4)   350(4)   Weight (kg) 87.3 87.3 87.3 87.3              Oxygen Concentration (%):  [28] 28             Closed/Suction Drain 06/24/25 1308 Tube - 1 Right Back Accordion 10 Fr. (Active)   Dressing Type Transparent (Tegaderm) 06/25/25 1501   Dressing Status Clean;Dry;Intact 06/25/25 1501   Dressing Intervention Integrity maintained 06/25/25 1501   Drainage None 06/25/25 1501   Output (mL) 3 mL 06/25/25 0601            Closed/Suction Drain 06/24/25 1308 Tube - 2 Left Back Accordion 10 Fr. (Active)   Dressing Type Transparent (Tegaderm) 06/25/25 1501   Dressing Status Clean;Dry;Intact 06/25/25 1501   Dressing Intervention Integrity maintained 06/25/25 1501   Drainage None 06/25/25 1501   Output (mL) 80 mL 06/25/25 0601            Closed/Suction Drain 06/24/25 1309 Tube - 3 Left Back Accordion 10 Fr. (Active)   Dressing Type Transparent (Tegaderm) 06/25/25 1501   Dressing Status Clean;Dry;Intact 06/25/25 1501   Dressing Intervention Integrity maintained 06/25/25 1501   Drainage None 06/25/25 1501   Output (mL) 5 mL 06/25/25 0601            Urethral Catheter 06/24/25 0745 Coude;Non-latex;Silicone 16 Fr. (Active)   Site Assessment Clean;Intact;Dry 06/25/25 1501   Collection Container Urimeter 06/25/25 1501   Securement Method secured to top of thigh w/ adhesive device 06/25/25 1501   Catheter Care Performed yes 06/25/25 1501   Reason for Continuing Urinary Catheterization Post operative 06/25/25 1501   CAUTI Prevention Bundle Securement Device in place with 1" slack;Intact seal between catheter & drainage tubing;Drainage bag/urimeter off the floor;Sheeting clip in use;No dependent loops or kinks;Drainage bag/urimeter not overfilled (<2/3 full);Drainage " "bag/urimeter below bladder 06/25/25 1101   Output (mL) 45 mL 06/25/25 0601          Physical Exam         Neurosurgery Physical Exam  AAOx4  PERRL  Cranial nerves intact  BUE 5/5  BLE 5/5  SILT    Significant Labs:  Recent Labs   Lab 06/25/25 0229 06/26/25  0012   * 107    136   K 4.6 3.9    104   CO2 21* 24   BUN 13 9   CREATININE 0.6 0.6   CALCIUM 7.9* 8.0*   MG 1.9 1.6     Recent Labs   Lab 06/25/25 0229 06/26/25  0012   WBC 11.37 8.29   HGB 10.7* 9.8*   HCT 32.9* 28.9*    187     No results for input(s): "LABPT", "INR", "APTT" in the last 48 hours.  Microbiology Results (last 7 days)       ** No results found for the last 168 hours. **          All pertinent labs from the last 24 hours have been reviewed.    Significant Diagnostics:  CT: No results found in the last 24 hours.  MRI: No results found in the last 24 hours.  I have reviewed all pertinent imaging results/findings within the past 24 hours.  I have reviewed and interpreted all pertinent imaging results/findings within the past 24 hours.  "

## 2025-06-27 LAB
ABSOLUTE EOSINOPHIL (OHS): 0.09 K/UL
ABSOLUTE MONOCYTE (OHS): 0.62 K/UL (ref 0.3–1)
ABSOLUTE NEUTROPHIL COUNT (OHS): 4.57 K/UL (ref 1.8–7.7)
ALBUMIN SERPL BCP-MCNC: 2.8 G/DL (ref 3.5–5.2)
ALP SERPL-CCNC: 31 UNIT/L (ref 40–150)
ALT SERPL W/O P-5'-P-CCNC: 19 UNIT/L (ref 10–44)
ANION GAP (OHS): 8 MMOL/L (ref 8–16)
AST SERPL-CCNC: 31 UNIT/L (ref 11–45)
BASOPHILS # BLD AUTO: 0.01 K/UL
BASOPHILS NFR BLD AUTO: 0.2 %
BILIRUB SERPL-MCNC: 0.4 MG/DL (ref 0.1–1)
BUN SERPL-MCNC: 6 MG/DL (ref 6–20)
CALCIUM SERPL-MCNC: 8 MG/DL (ref 8.7–10.5)
CHLORIDE SERPL-SCNC: 101 MMOL/L (ref 95–110)
CO2 SERPL-SCNC: 27 MMOL/L (ref 23–29)
CREAT SERPL-MCNC: 0.5 MG/DL (ref 0.5–1.4)
ERYTHROCYTE [DISTWIDTH] IN BLOOD BY AUTOMATED COUNT: 12.6 % (ref 11.5–14.5)
GFR SERPLBLD CREATININE-BSD FMLA CKD-EPI: >60 ML/MIN/1.73/M2
GLUCOSE SERPL-MCNC: 109 MG/DL (ref 70–110)
HCT VFR BLD AUTO: 25.5 % (ref 37–48.5)
HGB BLD-MCNC: 8.7 GM/DL (ref 12–16)
IMM GRANULOCYTES # BLD AUTO: 0.01 K/UL (ref 0–0.04)
IMM GRANULOCYTES NFR BLD AUTO: 0.2 % (ref 0–0.5)
LYMPHOCYTES # BLD AUTO: 0.84 K/UL (ref 1–4.8)
MAGNESIUM SERPL-MCNC: 1.5 MG/DL (ref 1.6–2.6)
MCH RBC QN AUTO: 30.2 PG (ref 27–31)
MCHC RBC AUTO-ENTMCNC: 34.1 G/DL (ref 32–36)
MCV RBC AUTO: 89 FL (ref 82–98)
NUCLEATED RBC (/100WBC) (OHS): 0 /100 WBC
PHOSPHATE SERPL-MCNC: 3.2 MG/DL (ref 2.7–4.5)
PLATELET # BLD AUTO: 157 K/UL (ref 150–450)
PMV BLD AUTO: 10.6 FL (ref 9.2–12.9)
POTASSIUM SERPL-SCNC: 3.6 MMOL/L (ref 3.5–5.1)
PROT SERPL-MCNC: 5.3 GM/DL (ref 6–8.4)
RBC # BLD AUTO: 2.88 M/UL (ref 4–5.4)
RELATIVE EOSINOPHIL (OHS): 1.5 %
RELATIVE LYMPHOCYTE (OHS): 13.7 % (ref 18–48)
RELATIVE MONOCYTE (OHS): 10.1 % (ref 4–15)
RELATIVE NEUTROPHIL (OHS): 74.3 % (ref 38–73)
SODIUM SERPL-SCNC: 136 MMOL/L (ref 136–145)
WBC # BLD AUTO: 6.14 K/UL (ref 3.9–12.7)

## 2025-06-27 PROCEDURE — 99233 SBSQ HOSP IP/OBS HIGH 50: CPT | Mod: ,,, | Performed by: PSYCHIATRY & NEUROLOGY

## 2025-06-27 PROCEDURE — 83735 ASSAY OF MAGNESIUM: CPT

## 2025-06-27 PROCEDURE — 11000001 HC ACUTE MED/SURG PRIVATE ROOM

## 2025-06-27 PROCEDURE — 27000221 HC OXYGEN, UP TO 24 HOURS

## 2025-06-27 PROCEDURE — 80053 COMPREHEN METABOLIC PANEL: CPT

## 2025-06-27 PROCEDURE — 63600175 PHARM REV CODE 636 W HCPCS: Performed by: STUDENT IN AN ORGANIZED HEALTH CARE EDUCATION/TRAINING PROGRAM

## 2025-06-27 PROCEDURE — 63600175 PHARM REV CODE 636 W HCPCS

## 2025-06-27 PROCEDURE — 51701 INSERT BLADDER CATHETER: CPT

## 2025-06-27 PROCEDURE — 94761 N-INVAS EAR/PLS OXIMETRY MLT: CPT

## 2025-06-27 PROCEDURE — 84100 ASSAY OF PHOSPHORUS: CPT

## 2025-06-27 PROCEDURE — 25000003 PHARM REV CODE 250: Performed by: STUDENT IN AN ORGANIZED HEALTH CARE EDUCATION/TRAINING PROGRAM

## 2025-06-27 PROCEDURE — 25000003 PHARM REV CODE 250: Performed by: PSYCHIATRY & NEUROLOGY

## 2025-06-27 PROCEDURE — 99900035 HC TECH TIME PER 15 MIN (STAT)

## 2025-06-27 PROCEDURE — 25000003 PHARM REV CODE 250

## 2025-06-27 PROCEDURE — 51798 US URINE CAPACITY MEASURE: CPT

## 2025-06-27 PROCEDURE — 85025 COMPLETE CBC W/AUTO DIFF WBC: CPT

## 2025-06-27 PROCEDURE — 25000003 PHARM REV CODE 250: Performed by: PHYSICIAN ASSISTANT

## 2025-06-27 RX ORDER — BISACODYL 10 MG/1
10 SUPPOSITORY RECTAL DAILY
Status: DISCONTINUED | OUTPATIENT
Start: 2025-06-27 | End: 2025-06-29 | Stop reason: HOSPADM

## 2025-06-27 RX ORDER — HYDROXYZINE HYDROCHLORIDE 25 MG/1
25 TABLET, FILM COATED ORAL EVERY 4 HOURS PRN
Status: DISCONTINUED | OUTPATIENT
Start: 2025-06-27 | End: 2025-06-29 | Stop reason: HOSPADM

## 2025-06-27 RX ORDER — TALC
9 POWDER (GRAM) TOPICAL NIGHTLY PRN
Status: DISCONTINUED | OUTPATIENT
Start: 2025-06-27 | End: 2025-06-29 | Stop reason: HOSPADM

## 2025-06-27 RX ORDER — GABAPENTIN 300 MG/1
300 CAPSULE ORAL ONCE
Status: COMPLETED | OUTPATIENT
Start: 2025-06-27 | End: 2025-06-27

## 2025-06-27 RX ORDER — MAGNESIUM SULFATE HEPTAHYDRATE 40 MG/ML
2 INJECTION, SOLUTION INTRAVENOUS ONCE
Status: COMPLETED | OUTPATIENT
Start: 2025-06-27 | End: 2025-06-27

## 2025-06-27 RX ORDER — ENOXAPARIN SODIUM 100 MG/ML
40 INJECTION SUBCUTANEOUS EVERY 24 HOURS
Status: DISCONTINUED | OUTPATIENT
Start: 2025-06-27 | End: 2025-06-29 | Stop reason: HOSPADM

## 2025-06-27 RX ORDER — METHOCARBAMOL 500 MG/1
500 TABLET, FILM COATED ORAL ONCE
Status: COMPLETED | OUTPATIENT
Start: 2025-06-27 | End: 2025-06-27

## 2025-06-27 RX ORDER — LIDOCAINE 50 MG/G
1 PATCH TOPICAL
Status: DISCONTINUED | OUTPATIENT
Start: 2025-06-27 | End: 2025-06-29 | Stop reason: HOSPADM

## 2025-06-27 RX ORDER — TAMSULOSIN HYDROCHLORIDE 0.4 MG/1
0.8 CAPSULE ORAL DAILY
Status: DISCONTINUED | OUTPATIENT
Start: 2025-06-28 | End: 2025-06-29 | Stop reason: HOSPADM

## 2025-06-27 RX ORDER — GABAPENTIN 300 MG/1
600 CAPSULE ORAL EVERY 8 HOURS
Status: DISCONTINUED | OUTPATIENT
Start: 2025-06-28 | End: 2025-06-29 | Stop reason: HOSPADM

## 2025-06-27 RX ORDER — METHOCARBAMOL 500 MG/1
1000 TABLET, FILM COATED ORAL EVERY 6 HOURS
Status: DISCONTINUED | OUTPATIENT
Start: 2025-06-28 | End: 2025-06-29 | Stop reason: HOSPADM

## 2025-06-27 RX ADMIN — HYDROMORPHONE HYDROCHLORIDE 1 MG: 1 INJECTION, SOLUTION INTRAMUSCULAR; INTRAVENOUS; SUBCUTANEOUS at 08:06

## 2025-06-27 RX ADMIN — TAMSULOSIN HYDROCHLORIDE 0.4 MG: 0.4 CAPSULE ORAL at 08:06

## 2025-06-27 RX ADMIN — CEFAZOLIN 1 G: 330 INJECTION, POWDER, FOR SOLUTION INTRAMUSCULAR; INTRAVENOUS at 11:06

## 2025-06-27 RX ADMIN — OXYCODONE 15 MG: 5 TABLET ORAL at 09:06

## 2025-06-27 RX ADMIN — BISACODYL 10 MG: 10 SUPPOSITORY RECTAL at 10:06

## 2025-06-27 RX ADMIN — LIDOCAINE 1 PATCH: 50 PATCH CUTANEOUS at 10:06

## 2025-06-27 RX ADMIN — GABAPENTIN 300 MG: 300 CAPSULE ORAL at 08:06

## 2025-06-27 RX ADMIN — POLYETHYLENE GLYCOL 3350 17 G: 17 POWDER, FOR SOLUTION ORAL at 08:06

## 2025-06-27 RX ADMIN — FAMOTIDINE 40 MG: 20 TABLET, FILM COATED ORAL at 08:06

## 2025-06-27 RX ADMIN — METHOCARBAMOL 750 MG: 750 TABLET ORAL at 08:06

## 2025-06-27 RX ADMIN — BACLOFEN 10 MG: 10 TABLET ORAL at 08:06

## 2025-06-27 RX ADMIN — METHOCARBAMOL 750 MG: 750 TABLET ORAL at 04:06

## 2025-06-27 RX ADMIN — Medication 6 MG: at 09:06

## 2025-06-27 RX ADMIN — MAGNESIUM SULFATE HEPTAHYDRATE 2 G: 40 INJECTION, SOLUTION INTRAVENOUS at 05:06

## 2025-06-27 RX ADMIN — DIAZEPAM 5 MG: 5 TABLET ORAL at 02:06

## 2025-06-27 RX ADMIN — HYDROMORPHONE HYDROCHLORIDE 1 MG: 1 INJECTION, SOLUTION INTRAMUSCULAR; INTRAVENOUS; SUBCUTANEOUS at 04:06

## 2025-06-27 RX ADMIN — OXYCODONE 15 MG: 5 TABLET ORAL at 06:06

## 2025-06-27 RX ADMIN — SENNOSIDES AND DOCUSATE SODIUM 2 TABLET: 50; 8.6 TABLET ORAL at 08:06

## 2025-06-27 RX ADMIN — FLUCONAZOLE 400 MG: 200 TABLET ORAL at 08:06

## 2025-06-27 RX ADMIN — HYDROXYZINE HYDROCHLORIDE 25 MG: 25 TABLET, FILM COATED ORAL at 02:06

## 2025-06-27 RX ADMIN — ENOXAPARIN SODIUM 40 MG: 40 INJECTION SUBCUTANEOUS at 04:06

## 2025-06-27 RX ADMIN — Medication 800 MG: at 04:06

## 2025-06-27 RX ADMIN — CEFAZOLIN 1 G: 330 INJECTION, POWDER, FOR SOLUTION INTRAMUSCULAR; INTRAVENOUS at 04:06

## 2025-06-27 RX ADMIN — ESCITALOPRAM OXALATE 20 MG: 20 TABLET ORAL at 08:06

## 2025-06-27 RX ADMIN — OXYCODONE 15 MG: 5 TABLET ORAL at 02:06

## 2025-06-27 RX ADMIN — CEFAZOLIN 1 G: 330 INJECTION, POWDER, FOR SOLUTION INTRAMUSCULAR; INTRAVENOUS at 08:06

## 2025-06-27 RX ADMIN — CALCIUM CARBONATE (ANTACID) CHEW TAB 500 MG 500 MG: 500 CHEW TAB at 08:06

## 2025-06-27 RX ADMIN — METHOCARBAMOL 750 MG: 750 TABLET ORAL at 12:06

## 2025-06-27 RX ADMIN — METHOCARBAMOL 500 MG: 500 TABLET ORAL at 11:06

## 2025-06-27 RX ADMIN — POTASSIUM BICARBONATE 50 MEQ: 978 TABLET, EFFERVESCENT ORAL at 01:06

## 2025-06-27 RX ADMIN — Medication 800 MG: at 01:06

## 2025-06-27 RX ADMIN — GABAPENTIN 300 MG: 300 CAPSULE ORAL at 11:06

## 2025-06-27 NOTE — PLAN OF CARE
06/27/25 1353   Rounds   Attendance Provider;   Discharge Plan A Home   Why the patient remains in the hospital Requires continued medical care   Transition of Care Barriers None     Allyssa Carpenter MSW, LCSW  [facility]  Case Management Dept.

## 2025-06-27 NOTE — PROGRESS NOTES
"[facility]  Neurosurgery  Progress Note    Subjective:     History of Present Illness: Ms. Kayla Clay is a 40 y.o. 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 Per last ID note, "7/5/23: Ms. Miller presents for follow-up today. She continues to have ongoing lower extremity weakness. She uses a cane for assistance with ambulation. She denies any fevers, chills, nausea, vomiting and diarrhea. She had been working with PT, but hasn't been able to follow-up in the past month. She saw a neurosurgeon on 5/26/23 in which he reports her most recent MRI showed resolution of epidural abscess and degenerative disc disease. No planned interventions at this time. Completed 6 weeks of IV antibiotics, empirically since initial cultures were negative using Ceftriaxone and Daptomycin. Completed 3 months of Fluconazole high dose at 400mg PO q24h given Candida albicans positive cultures. Did not tolerate Vancomycin or Doxycycline. MRI with no findings significant for residual infection. Neurosurgery without any interventions planned at this time. MRI with resolution of epidural abscess. Continue to work with PT to work on gait strength. No need for ongoing infectious workup at this time."     NSGY now planning for deformity surgery due to severe stenosis of lumbar spine. T10-pelvis fusion recommended by surgeons. Explained to patient that she completed adequate antimicrobial coverage for prior infection. IVDU is a huge risk factor for recurrence. To ensure sterility of site will prescribe course of PO fluconazole which she can stop one week post op. If evidence of infection seen in OR will ask surgeons to send specimen for cultures. Patient voiced understanding.     Post-Op Info:  Procedure(s) (LRB):  *AIRO* T10 TO PELVIS POSTERIOR INSTRUMENTED FUSION/AIRO (N/A)   3 Days Post-Op   Interval History: 6/27: POD 3. Neuro stable, " full strength. Ambulated down owen. Pending step down. Voiding spont.     Medications:  Continuous Infusions:  Scheduled Meds:   baclofen  10 mg Oral Nightly    calcium carbonate  500 mg Oral BID    ceFAZolin (Ancef) IV (PEDS and ADULTS)  1 g Intravenous Q8H    EScitalopram oxalate  20 mg Oral QHS    famotidine  40 mg Oral Daily    fluconazole  400 mg Oral Daily    gabapentin  300 mg Oral BID    methocarbamoL  750 mg Oral QID    polyethylene glycol  17 g Oral BID    senna-docusate  2 tablet Oral BID    tamsulosin  0.4 mg Oral Daily     PRN Meds:  Current Facility-Administered Medications:     aluminum-magnesium hydroxide-simethicone, 30 mL, Oral, Q4H PRN    diazePAM, 5 mg, Oral, BID PRN    HYDROmorphone, 1 mg, Intravenous, Q4H PRN    hydrOXYzine HCL, 25 mg, Oral, Q4H PRN    magnesium oxide, 800 mg, Oral, PRN    magnesium oxide, 800 mg, Oral, PRN    melatonin, 6 mg, Oral, Nightly PRN    ondansetron, 8 mg, Intravenous, Q6H PRN    oxyCODONE, 15 mg, Oral, Q4H PRN    oxyCODONE, 10 mg, Oral, Q4H PRN    potassium bicarbonate, 35 mEq, Oral, PRN    potassium bicarbonate, 50 mEq, Oral, PRN    potassium bicarbonate, 60 mEq, Oral, PRN    potassium, sodium phosphates, 2 packet, Oral, PRN    potassium, sodium phosphates, 2 packet, Oral, PRN    potassium, sodium phosphates, 2 packet, Oral, PRN    prochlorperazine, 5 mg, Intravenous, Q6H PRN    sumatriptan, 50 mg, Oral, PRN     Review of Systems  Objective:     Weight: 87.3 kg (192 lb 7.4 oz)  Body mass index is 30.14 kg/m².  Vital Signs (Most Recent):  Temp: 98.7 °F (37.1 °C) (06/27/25 0302)  Pulse: 88 (06/27/25 0602)  Resp: (!) 23 (06/27/25 0602)  BP: (!) 100/53 (06/27/25 0602)  SpO2: 97 % (06/27/25 0602) Vital Signs (24h Range):  Temp:  [98.3 °F (36.8 °C)-98.7 °F (37.1 °C)] 98.7 °F (37.1 °C)  Pulse:  [] 88  Resp:  [10-43] 23  SpO2:  [90 %-99 %] 97 %  BP: ()/(50-66) 100/53                  Oxygen Concentration (%):  [28] 28             Closed/Suction Drain 06/24/25  "1308 Tube - 1 Right Back Accordion 10 Fr. (Active)   Dressing Type Transparent (Tegaderm) 06/25/25 1501   Dressing Status Clean;Dry;Intact 06/25/25 1501   Dressing Intervention Integrity maintained 06/25/25 1501   Drainage None 06/25/25 1501   Output (mL) 3 mL 06/25/25 0601            Closed/Suction Drain 06/24/25 1308 Tube - 2 Left Back Accordion 10 Fr. (Active)   Dressing Type Transparent (Tegaderm) 06/25/25 1501   Dressing Status Clean;Dry;Intact 06/25/25 1501   Dressing Intervention Integrity maintained 06/25/25 1501   Drainage None 06/25/25 1501   Output (mL) 80 mL 06/25/25 0601            Closed/Suction Drain 06/24/25 1309 Tube - 3 Left Back Accordion 10 Fr. (Active)   Dressing Type Transparent (Tegaderm) 06/25/25 1501   Dressing Status Clean;Dry;Intact 06/25/25 1501   Dressing Intervention Integrity maintained 06/25/25 1501   Drainage None 06/25/25 1501   Output (mL) 5 mL 06/25/25 0601            Urethral Catheter 06/24/25 0745 Coude;Non-latex;Silicone 16 Fr. (Active)   Site Assessment Clean;Intact;Dry 06/25/25 1501   Collection Container Urimeter 06/25/25 1501   Securement Method secured to top of thigh w/ adhesive device 06/25/25 1501   Catheter Care Performed yes 06/25/25 1501   Reason for Continuing Urinary Catheterization Post operative 06/25/25 1501   CAUTI Prevention Bundle Securement Device in place with 1" slack;Intact seal between catheter & drainage tubing;Drainage bag/urimeter off the floor;Sheeting clip in use;No dependent loops or kinks;Drainage bag/urimeter not overfilled (<2/3 full);Drainage bag/urimeter below bladder 06/25/25 1101   Output (mL) 45 mL 06/25/25 0601          Physical Exam         Neurosurgery Physical Exam  AAOx4  PERRL  Cranial nerves intact  BUE 5/5  BLE 5/5  SILT    Significant Labs:  Recent Labs   Lab 06/26/25  0012 06/27/25  0015    109    136   K 3.9 3.6    101   CO2 24 27   BUN 9 6   CREATININE 0.6 0.5   CALCIUM 8.0* 8.0*   MG 1.6 1.5*     Recent Labs " "  Lab 06/26/25  0012 06/27/25  0015   WBC 8.29 6.14   HGB 9.8* 8.7*   HCT 28.9* 25.5*    157     No results for input(s): "LABPT", "INR", "APTT" in the last 48 hours.  Microbiology Results (last 7 days)       ** No results found for the last 168 hours. **          All pertinent labs from the last 24 hours have been reviewed.    Significant Diagnostics:  CT: No results found in the last 24 hours.  MRI: No results found in the last 24 hours.  I have reviewed all pertinent imaging results/findings within the past 24 hours.  I have reviewed and interpreted all pertinent imaging results/findings within the past 24 hours.  Assessment/Plan:     * Degenerative scoliosis in adult patient  Ms. Kayla Clay is a 40 y.o. 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 Completed 6 weeks of IV antibiotics, empirically since initial cultures were negative using Ceftriaxone and Daptomycin. Completed 3 months of Fluconazole high dose at 400mg PO q24h given Candida albicans positive cultures. Did not tolerate Vancomycin or Doxycycline.    Patient has completed adequate antimicrobial coverage for prior infection. IVDU is a huge risk factor for recurrence. To ensure sterility of site she will need PO fluconazole which she can stop one week post op.    S/p T-10-pelvis fusion 6/24  XR post op obtained, satisfactory    Plan:  Patient admitted to [facility] on telemetry. Ok to stepdown to NSGY per [facility]  q1h neurochecks in ICU, q2h neurochecks in stepdown, q4h neurochecks on floor  All labs and diagnostics reviewed  MAP goals >65 at this time  Maintain TLSO brace when OOB  HV x2 deep, HV x1 superficial Drains to remain at this time; please record hourly outputs, provena wound vac in place  Antibiotics while drains in place  PT/OT/OOB  ADAT  Multi-Modal Pain Control  TEDs/SCDs/SQH  Continue to monitor clinically, notify NSGY immediately with any changes in neuro " status          Carl Oliva MD  Neurosurgery  [facility] - [facility]

## 2025-06-27 NOTE — EICU
Virtual ICU Quality Rounds    Admit Date: 6/24/2025  Hospital Day: 3    ICU Day: 2d 17h    24H Vital Sign Range:  Temp:  [98.4 °F (36.9 °C)-99 °F (37.2 °C)]   Pulse:  []   Resp:  [10-43]   BP: ()/(50-66)   SpO2:  [90 %-99 %]     VICU Surveillance Screening  LDA reconciliation : Yes

## 2025-06-27 NOTE — PLAN OF CARE
"[facility] Care Plan    POC reviewed with Kayla Clay at 0330. Patient verbalized understanding. Questions and concerns addressed. No acute events today. Pt progressing toward goals. Will continue to monitor. See below and flowsheets for full assessment and VS info.     Drain output overnight:   R HV #1--- 7 mL.  L HV #2--- 1 mL.  L HV #3----2 mL.  PRN dilaudid x2, valium x2, oxy15 x3.   K+ (3.6) replaced.  Mg (1.5) replaced w/ 2 doses PO & 2 g IVPB.         Is this a stroke patient? no    Neuro:  [place] Coma Scale  Best Eye Response: 4-->(E4) spontaneous  Best Motor Response: 6-->(M6) obeys commands  Best Verbal Response: 5-->(V5) oriented  [place] Coma Scale Score: 15  Assessment Qualifiers: Patient not sedated/intubated, No eye obstruction present  Pupil PERRLA: yes     24 hr Temp:  [98.3 °F (36.8 °C)-98.7 °F (37.1 °C)]     CV:   Rhythm: normal sinus rhythm  BP goals:   SBP < 160  MAP > 65    Resp:      Oxygen Concentration (%): 28    Plan: N/A    GI/:     Diet/Nutrition Received: regular  Last Bowel Movement: 06/23/25  Voiding Characteristics: voids spontaneously without difficulty    Intake/Output Summary (Last 24 hours) at 6/27/2025 0427  Last data filed at 6/27/2025 0422  Gross per 24 hour   Intake 1130 ml   Output 1240 ml   Net -110 ml     Unmeasured Output  Unmeasured Urine Occurrence: 1  Unmeasured Stool Occurrence: 0    Labs/Accuchecks:  Recent Labs   Lab 06/27/25  0015   WBC 6.14   RBC 2.88*   HGB 8.7*   HCT 25.5*         Recent Labs   Lab 06/27/25  0015      K 3.6   CO2 27      BUN 6   CREATININE 0.5   ALKPHOS 31*   ALT 19   AST 31   BILITOT 0.4    No results for input(s): "PROTIME", "INR", "APTT", "HEPANTIXA" in the last 168 hours. No results for input(s): "CPK", "CPKMB", "TROPONINI", "MB" in the last 168 hours.    Electrolytes: Electrolytes replaced  Accuchecks: none    Gtts:      LDA/Wounds:    Petey Risk Assessment  Sensory Perception: 4-->no impairment  Moisture: " 4-->rarely moist  Activity: 3-->walks occasionally  Mobility: 3-->slightly limited  Nutrition: 3-->adequate  Friction and Shear: 3-->no apparent problem  Petey Score: 20  Is your petey score 12 or less? no          Restraints:        WCTM         Problem: Adult Inpatient Plan of Care  Goal: Plan of Care Review  Outcome: Progressing  Goal: Optimal Comfort and Wellbeing  Outcome: Progressing     Problem: Fall Injury Risk  Goal: Absence of Fall and Fall-Related Injury  Outcome: Progressing

## 2025-06-27 NOTE — PROGRESS NOTES
[facility] - Neuro Critical Care  Neurocritical Care  Progress Note    Admit Date: 6/24/2025  Service Date: 06/27/2025  Length of Stay: 3    Subjective:     Chief Complaint: Degenerative scoliosis in adult patient    History of Present Illness: Ms. Kayla Clay is a 40 y.o. female w/ significant PMHx of IVDU, HTN, and fungal discitis who presents to [facility] s/p T10-pelvis fusion. She initially presented to NSGY on an outpatient basis after longtime follow up with ID. She continued to have ongoing lower extremity weakness, using a cane for assistance with ambulation. She followed with NSGY on 5/26/23, where MRI showed resolution of epidural abscess and degenerative disc disease. She completed 6 weeks of IV antibiotics, empirically since initial cultures were negative using Ceftriaxone and Daptomycin. She also completed 3 months of Fluconazole high dose at 400mg PO q24h given Candida albicans positive cultures. She notably did not tolerate Vancomycin or Doxycycline. MRI with no findings significant for residual infection, though she is s/p T10-pelvis fusion to address severe stenosis of lumbar spine.     She is admitted to [facility] for hourly neuromonitoring post operatively.    Hospital Course: 06/25/2025  Resting comfortably in bed. Remains on fluconazole. Vancomycin changed to cefazolin.  06/26/2025  Drain output 500 cc over last 24h, will remain in place and hold chemical VTE ppx. Optimize pain regimen, increase gabapentin to BID   06/27/2025  Minimal drain output      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    Generalized anxiety disorder     Hypertension     IVDU (intravenous drug user) 08/18/2022    Leukopenia 09/09/2022    Obesity, unspecified     Scoliosis     Substance abuse Age 18    I used drugs off and on since i was 18 but im sober now and have been since early August 2022      Past Surgical History:   Procedure Laterality Date    LUMBAR FUSION N/A 6/24/2025    Procedure: *AIRO* T10 TO PELVIS POSTERIOR INSTRUMENTED FUSION/AIRO;  Surgeon: Les Vieyra MD;  Location: [address];  Service: Neurosurgery;  Laterality: N/A;  T10 TO PELVIS POSTERIOR INSTRUMENTED FUSION/ AIRO    LUMBAR LAMINECTOMY WITH SURGICAL REMOVAL OF LESION OF SPINAL CORD BY POSTERIOR APPROACH N/A 08/18/2022    Procedure: LAMINECTOMY, SPINE, LUMBAR, POSTERIOR APPROACH, WITH EXCISION OF NEOPLASM OR LESION OF SPINAL CORD;  Surgeon: Floyd Brewer MD;  Location: [facility];  Service: Neurosurgery;  Laterality: N/A;    SPINE SURGERY  August 16th, 2022    Spinal absess and Laminectomy      Medications Ordered Prior to Encounter[1]   Allergies: Acetaminophen and Doxycycline  Family History   Problem Relation Name Age of Onset    Pancreatic cancer Paternal Grandfather          80's    Stomach cancer Maternal Grandfather          70's - +metastasis    No Known Problems Father      No Known Problems Mother      Spine Surgery Paternal Uncle Reggie Engel         On my dads side of the family a lot of us have spine issues.    Breast cancer Neg Hx      Colon cancer Neg Hx      Ovarian cancer Neg Hx      Cervical cancer Neg Hx      Uterine cancer Neg Hx       Social History[2]  Review of Systems   Gastrointestinal:  Positive for nausea.   Musculoskeletal:  Positive for back pain.     Objective:     Vitals:    Temp: 96.7 °F (35.9 °C)  Pulse: 97  Rhythm: sinus tachycardia  BP: 109/62  MAP (mmHg): 78  Resp: (!) 23  SpO2: 100 %  Oxygen Concentration (%): 28    Temp  Min: 96.7 °F (35.9 °C)  Max: 99 °F (37.2 °C)  Pulse  Min: 86  Max: 118  BP  Min: 91/56  Max: 112/74  MAP (mmHg)  Min: 64  Max: 87  Resp  Min: 10  Max: 43  SpO2  Min: 90 %  Max: 100 %  Oxygen Concentration (%)  Min: 28  Max: 28    06/26 0701 - 06/27 0700  In: 1339 [P.O.:1220; I.V.:119]  Out: 733 [Urine:650; Drains:83]   Unmeasured Output  Unmeasured Urine Occurrence:  1  Unmeasured Stool Occurrence: 0        Physical Exam  HENT:      Head: Normocephalic and atraumatic.   Cardiovascular:      Rate and Rhythm: Normal rate and regular rhythm.   Pulmonary:      Effort: Pulmonary effort is normal. No respiratory distress.   Abdominal:      Palpations: Abdomen is soft.   Neurological:      Comments: E4V5M6  awake and oriented x 4  PERRL, EOMI  4+/5 throughout, though exam limited 2/2 pain  Denies sensory deficits throughout               Assessment/Plan:     Neuro  * Degenerative scoliosis in adult patient  40 y.o. female w/ significant PMHx of IVDU, HTN, and fungal discitis who completed 6 weeks of IV antibiotics, empirically since initial cultures were negative using Ceftriaxone and Daptomycin. She also completed 3 months of Fluconazole high dose at 400mg PO q24h given Candida albicans positive cultures.  She is s/p T-10-pelvis fusion for degenerative scoliosis 2/2 discitis history.    -Admitted to [facility], NSGY following - stable for TTF from [facility] perspective, awaiting room  -q1h neuro checks, vital checks can expand to q4  -Daily CBC, CMP, mag, phos  -SBP < 160, prn labetalol and hydralazine  -SCDs, lovenox   -PT/OT/SLP consulted, currently recommending low intensity therapy  -Multimodal pain regimen, OK   -Post op imaging per NSGY  -TLSO brace when OOB  -Cefazolin while drains in place per NSGY  -Fluconazole x 7 d per ID       Cardiac/Vascular  Hypertension  SBP < 160  Holding home amlodipine in setting of low pressures, restart as needed             The patient is being Prophylaxed for:  Venous Thromboembolism with: Mechanical or Chemical  Stress Ulcer with: Not Applicable   Ventilator Pneumonia with: not applicable    Activity Orders            Turn patient starting at 06/26 1217    Ambulate starting at 06/25 0000    Up in chair With meals starting at 06/24 1700    Diet Adult Regular: Regular starting at 06/24 1437    Progressive Mobility Protocol (mobilize patient to their highest  level of functioning at least twice daily) starting at 06/24 1436    Elevate HOB 30 starting at 06/24 1436    Ambulate With Assistance, Post Op Day 0 If the patient arrives from PACU by 4PM, walk at least once on day of operation if alert and safe to do so. starting at 06/24 1435          Full Code    Alfie Soliz MD  Neurocritical Care  [facility] - Neuro Critical Care    Level 3 of hospital care time was spent personally by me on the following activities: development of treatment plan with patient or surrogate and bedside caregivers, discussions with consultants, evaluation of patient's response to treatment, examination of patient, ordering and performing treatments and interventions, ordering and review of laboratory studies, ordering and review of radiographic studies, pulse oximetry, antibiotic titration if applicable, vasopressor titration if applicable, re-evaluation of patient's condition.              [1]   No current facility-administered medications on file prior to encounter.     Current Outpatient Medications on File Prior to Encounter   Medication Sig Dispense Refill    gabapentin (NEURONTIN) 300 MG capsule Take 300 mg by mouth every evening.      loratadine (CLARITIN) 10 mg tablet TAKE 1 TABLET BY MOUTH EVERY DAY 30 tablet 5    sumatriptan (IMITREX) 50 MG tablet Take 1 tablet (50 mg total) by mouth as needed for Migraine. 9 tablet 5   [2]   Social History  Tobacco Use    Smoking status: Never     Passive exposure: Current    Smokeless tobacco: Never    Tobacco comments:     Social smoker off and on since 14 but never regular smoker   Substance Use Topics    Alcohol use: Not Currently    Drug use: Not Currently     Types: Heroin, Methamphetamines

## 2025-06-27 NOTE — SUBJECTIVE & OBJECTIVE
Interval History: 6/27: POD 3. Neuro stable, full strength. Ambulated down owen. Pending step down. Voiding spont.     Medications:  Continuous Infusions:  Scheduled Meds:   baclofen  10 mg Oral Nightly    calcium carbonate  500 mg Oral BID    ceFAZolin (Ancef) IV (PEDS and ADULTS)  1 g Intravenous Q8H    EScitalopram oxalate  20 mg Oral QHS    famotidine  40 mg Oral Daily    fluconazole  400 mg Oral Daily    gabapentin  300 mg Oral BID    methocarbamoL  750 mg Oral QID    polyethylene glycol  17 g Oral BID    senna-docusate  2 tablet Oral BID    tamsulosin  0.4 mg Oral Daily     PRN Meds:  Current Facility-Administered Medications:     aluminum-magnesium hydroxide-simethicone, 30 mL, Oral, Q4H PRN    diazePAM, 5 mg, Oral, BID PRN    HYDROmorphone, 1 mg, Intravenous, Q4H PRN    hydrOXYzine HCL, 25 mg, Oral, Q4H PRN    magnesium oxide, 800 mg, Oral, PRN    magnesium oxide, 800 mg, Oral, PRN    melatonin, 6 mg, Oral, Nightly PRN    ondansetron, 8 mg, Intravenous, Q6H PRN    oxyCODONE, 15 mg, Oral, Q4H PRN    oxyCODONE, 10 mg, Oral, Q4H PRN    potassium bicarbonate, 35 mEq, Oral, PRN    potassium bicarbonate, 50 mEq, Oral, PRN    potassium bicarbonate, 60 mEq, Oral, PRN    potassium, sodium phosphates, 2 packet, Oral, PRN    potassium, sodium phosphates, 2 packet, Oral, PRN    potassium, sodium phosphates, 2 packet, Oral, PRN    prochlorperazine, 5 mg, Intravenous, Q6H PRN    sumatriptan, 50 mg, Oral, PRN     Review of Systems  Objective:     Weight: 87.3 kg (192 lb 7.4 oz)  Body mass index is 30.14 kg/m².  Vital Signs (Most Recent):  Temp: 98.7 °F (37.1 °C) (06/27/25 0302)  Pulse: 88 (06/27/25 0602)  Resp: (!) 23 (06/27/25 0602)  BP: (!) 100/53 (06/27/25 0602)  SpO2: 97 % (06/27/25 0602) Vital Signs (24h Range):  Temp:  [98.3 °F (36.8 °C)-98.7 °F (37.1 °C)] 98.7 °F (37.1 °C)  Pulse:  [] 88  Resp:  [10-43] 23  SpO2:  [90 %-99 %] 97 %  BP: ()/(50-66) 100/53                  Oxygen Concentration (%):  [28]  "28             Closed/Suction Drain 06/24/25 1308 Tube - 1 Right Back Accordion 10 Fr. (Active)   Dressing Type Transparent (Tegaderm) 06/25/25 1501   Dressing Status Clean;Dry;Intact 06/25/25 1501   Dressing Intervention Integrity maintained 06/25/25 1501   Drainage None 06/25/25 1501   Output (mL) 3 mL 06/25/25 0601            Closed/Suction Drain 06/24/25 1308 Tube - 2 Left Back Accordion 10 Fr. (Active)   Dressing Type Transparent (Tegaderm) 06/25/25 1501   Dressing Status Clean;Dry;Intact 06/25/25 1501   Dressing Intervention Integrity maintained 06/25/25 1501   Drainage None 06/25/25 1501   Output (mL) 80 mL 06/25/25 0601            Closed/Suction Drain 06/24/25 1309 Tube - 3 Left Back Accordion 10 Fr. (Active)   Dressing Type Transparent (Tegaderm) 06/25/25 1501   Dressing Status Clean;Dry;Intact 06/25/25 1501   Dressing Intervention Integrity maintained 06/25/25 1501   Drainage None 06/25/25 1501   Output (mL) 5 mL 06/25/25 0601            Urethral Catheter 06/24/25 0745 Coude;Non-latex;Silicone 16 Fr. (Active)   Site Assessment Clean;Intact;Dry 06/25/25 1501   Collection Container Urimeter 06/25/25 1501   Securement Method secured to top of thigh w/ adhesive device 06/25/25 1501   Catheter Care Performed yes 06/25/25 1501   Reason for Continuing Urinary Catheterization Post operative 06/25/25 1501   CAUTI Prevention Bundle Securement Device in place with 1" slack;Intact seal between catheter & drainage tubing;Drainage bag/urimeter off the floor;Sheeting clip in use;No dependent loops or kinks;Drainage bag/urimeter not overfilled (<2/3 full);Drainage bag/urimeter below bladder 06/25/25 1101   Output (mL) 45 mL 06/25/25 0601          Physical Exam         Neurosurgery Physical Exam  AAOx4  PERRL  Cranial nerves intact  BUE 5/5  BLE 5/5  SILT    Significant Labs:  Recent Labs   Lab 06/26/25  0012 06/27/25  0015    109    136   K 3.9 3.6    101   CO2 24 27   BUN 9 6   CREATININE 0.6 0.5 " "  CALCIUM 8.0* 8.0*   MG 1.6 1.5*     Recent Labs   Lab 06/26/25  0012 06/27/25  0015   WBC 8.29 6.14   HGB 9.8* 8.7*   HCT 28.9* 25.5*    157     No results for input(s): "LABPT", "INR", "APTT" in the last 48 hours.  Microbiology Results (last 7 days)       ** No results found for the last 168 hours. **          All pertinent labs from the last 24 hours have been reviewed.    Significant Diagnostics:  CT: No results found in the last 24 hours.  MRI: No results found in the last 24 hours.  I have reviewed all pertinent imaging results/findings within the past 24 hours.  I have reviewed and interpreted all pertinent imaging results/findings within the past 24 hours.  "

## 2025-06-27 NOTE — ASSESSMENT & PLAN NOTE
Ms. Kayla Clay is a 40 y.o. 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 Completed 6 weeks of IV antibiotics, empirically since initial cultures were negative using Ceftriaxone and Daptomycin. Completed 3 months of Fluconazole high dose at 400mg PO q24h given Candida albicans positive cultures. Did not tolerate Vancomycin or Doxycycline.    Patient has completed adequate antimicrobial coverage for prior infection. IVDU is a huge risk factor for recurrence. To ensure sterility of site she will need PO fluconazole which she can stop one week post op.    S/p T-10-pelvis fusion 6/24  XR post op obtained, satisfactory    Plan:  Patient admitted to [facility] on telemetry. Ok to stepdown to NSGY per NCC  q1h neurochecks in ICU, q2h neurochecks in stepdown, q4h neurochecks on floor  All labs and diagnostics reviewed  MAP goals >65 at this time  Maintain TLSO brace when OOB  HV x2 deep, HV x1 superficial Drains to remain at this time; please record hourly outputs, provena wound vac in place  Antibiotics while drains in place  PT/OT/OOB  ADAT  Multi-Modal Pain Control  TEDs/SCDs/SQH  Continue to monitor clinically, notify NSGY immediately with any changes in neuro status

## 2025-06-27 NOTE — ASSESSMENT & PLAN NOTE
40 y.o. female w/ significant PMHx of IVDU, HTN, and fungal discitis who completed 6 weeks of IV antibiotics, empirically since initial cultures were negative using Ceftriaxone and Daptomycin. She also completed 3 months of Fluconazole high dose at 400mg PO q24h given Candida albicans positive cultures.  She is s/p T-10-pelvis fusion for degenerative scoliosis 2/2 discitis history.    -Admitted to [facility], NSGY following - stable for TTF from NCC perspective, awaiting room  -q1h neuro checks, vital checks can expand to q4  -Daily CBC, CMP, mag, phos  -SBP < 160, prn labetalol and hydralazine  -SCDs, lovenox   -PT/OT/SLP consulted, currently recommending low intensity therapy  -Multimodal pain regimen, OK   -Post op imaging per NSGY  -TLSO brace when OOB  -Cefazolin while drains in place per NSGY  -Fluconazole x 7 d per ID

## 2025-06-27 NOTE — PLAN OF CARE
"[facility] Care Plan  POC reviewed with Kayla Clay and family at 1400. Patient verbalized understanding. Questions and concerns addressed. No acute events today. Pt progressing toward goals. Will continue to monitor. See below and flowsheets for full assessment and VS info.     -Pt up to to chair and bathroom throughout shift with stand by assist. Ambulated in owen with walker and RN.   -TLSO brace when out of bed.  -Pain control; PRN and scheduled pain med admin.   -Incentive spirometer encouraged.   -CHG bath completed. Electrode site changed.   -Transfer orders in place; awaiting on room availability.          Is this a stroke patient? no    Neuro:  Palms Coma Scale  Best Eye Response: 4-->(E4) spontaneous  Best Motor Response: 6-->(M6) obeys commands  Best Verbal Response: 5-->(V5) oriented  Aria Coma Scale Score: 15  Assessment Qualifiers: No eye obstruction present, Patient not sedated/intubated  Pupil PERRLA: yes     24 hr Temp:  [96.6 °F (35.9 °C)-99 °F (37.2 °C)]     CV:   Rhythm: sinus tachycardia  BP goals:   SBP < 160  MAP > 65    Resp:      Oxygen Concentration (%): 28    Plan: N/A    GI/:     Diet/Nutrition Received: regular  Last Bowel Movement: 06/23/25  Voiding Characteristics: voids spontaneously without difficulty    Intake/Output Summary (Last 24 hours) at 6/27/2025 1824  Last data filed at 6/27/2025 1501  Gross per 24 hour   Intake 869.01 ml   Output 311 ml   Net 558.01 ml     Unmeasured Output  Unmeasured Urine Occurrence: 1  Unmeasured Stool Occurrence: 0    Labs/Accuchecks:  Recent Labs   Lab 06/27/25  0015   WBC 6.14   RBC 2.88*   HGB 8.7*   HCT 25.5*         Recent Labs   Lab 06/27/25  0015      K 3.6   CO2 27      BUN 6   CREATININE 0.5   ALKPHOS 31*   ALT 19   AST 31   BILITOT 0.4    No results for input(s): "PROTIME", "INR", "APTT", "HEPANTIXA" in the last 168 hours. No results for input(s): "CPK", "CPKMB", "TROPONINI", "MB" in the last 168 " hours.    Electrolytes: Electrolytes replaced  Accuchecks: none    Gtts:      LDA/Wounds:    Petey Risk Assessment  Sensory Perception: 4-->no impairment  Moisture: 4-->rarely moist  Activity: 2-->chairfast  Mobility: 3-->slightly limited  Nutrition: 3-->adequate  Friction and Shear: 3-->no apparent problem  Petey Score: 19    Is your petey score 12 or less? no            Restraints:        WCTM

## 2025-06-27 NOTE — SUBJECTIVE & OBJECTIVE
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    Generalized anxiety disorder     Hypertension     IVDU (intravenous drug user) 08/18/2022    Leukopenia 09/09/2022    Obesity, unspecified     Scoliosis     Substance abuse Age 18    I used drugs off and on since i was 18 but im sober now and have been since early August 2022     Past Surgical History:   Procedure Laterality Date    LUMBAR FUSION N/A 6/24/2025    Procedure: *AIRO* T10 TO PELVIS POSTERIOR INSTRUMENTED FUSION/AIRO;  Surgeon: Les Vieyra MD;  Location: [facility] OR [address] [place] [address];  Service: Neurosurgery;  Laterality: N/A;  T10 TO PELVIS POSTERIOR INSTRUMENTED FUSION/ AIRO    LUMBAR LAMINECTOMY WITH SURGICAL REMOVAL OF LESION OF SPINAL CORD BY POSTERIOR APPROACH N/A 08/18/2022    Procedure: LAMINECTOMY, SPINE, LUMBAR, POSTERIOR APPROACH, WITH EXCISION OF NEOPLASM OR LESION OF SPINAL CORD;  Surgeon: Floyd Brewer MD;  Location: [facility];  Service: Neurosurgery;  Laterality: N/A;    SPINE SURGERY  August 16th, 2022    Spinal absess and Laminectomy      Medications Ordered Prior to Encounter[1]   Allergies: Acetaminophen and Doxycycline  Family History   Problem Relation Name Age of Onset    Pancreatic cancer Paternal Grandfather          80's    Stomach cancer Maternal Grandfather          70's - +metastasis    No Known Problems Father      No Known Problems Mother      Spine Surgery Paternal Uncle Reggie Engel         On my dads side of the family a lot of us have spine issues.    Breast cancer Neg Hx      Colon cancer Neg Hx      Ovarian cancer Neg Hx      Cervical cancer Neg Hx      Uterine cancer Neg Hx       Social History[2]  Review of Systems   Gastrointestinal:  Positive for nausea.   Musculoskeletal:  Positive for back pain.     Objective:     Vitals:    Temp: 96.7 °F (35.9 °C)  Pulse: 97  Rhythm: sinus tachycardia  BP:  109/62  MAP (mmHg): 78  Resp: (!) 23  SpO2: 100 %  Oxygen Concentration (%): 28    Temp  Min: 96.7 °F (35.9 °C)  Max: 99 °F (37.2 °C)  Pulse  Min: 86  Max: 118  BP  Min: 91/56  Max: 112/74  MAP (mmHg)  Min: 64  Max: 87  Resp  Min: 10  Max: 43  SpO2  Min: 90 %  Max: 100 %  Oxygen Concentration (%)  Min: 28  Max: 28    06/26 0701 - 06/27 0700  In: 1339 [P.O.:1220; I.V.:119]  Out: 733 [Urine:650; Drains:83]   Unmeasured Output  Unmeasured Urine Occurrence: 1  Unmeasured Stool Occurrence: 0        Physical Exam  HENT:      Head: Normocephalic and atraumatic.   Cardiovascular:      Rate and Rhythm: Normal rate and regular rhythm.   Pulmonary:      Effort: Pulmonary effort is normal. No respiratory distress.   Abdominal:      Palpations: Abdomen is soft.   Neurological:      Comments: E4V5M6  awake and oriented x 4  PERRL, EOMI  4+/5 throughout, though exam limited 2/2 pain  Denies sensory deficits throughout                    [1]   No current facility-administered medications on file prior to encounter.     Current Outpatient Medications on File Prior to Encounter   Medication Sig Dispense Refill    gabapentin (NEURONTIN) 300 MG capsule Take 300 mg by mouth every evening.      loratadine (CLARITIN) 10 mg tablet TAKE 1 TABLET BY MOUTH EVERY DAY 30 tablet 5    sumatriptan (IMITREX) 50 MG tablet Take 1 tablet (50 mg total) by mouth as needed for Migraine. 9 tablet 5   [2]   Social History  Tobacco Use    Smoking status: Never     Passive exposure: Current    Smokeless tobacco: Never    Tobacco comments:     Social smoker off and on since 14 but never regular smoker   Substance Use Topics    Alcohol use: Not Currently    Drug use: Not Currently     Types: Heroin, Methamphetamines

## 2025-06-28 LAB
ABO + RH BLD: NORMAL
ABO + RH BLD: NORMAL
ABSOLUTE EOSINOPHIL (OHS): 0.06 K/UL
ABSOLUTE MONOCYTE (OHS): 0.61 K/UL (ref 0.3–1)
ABSOLUTE NEUTROPHIL COUNT (OHS): 4.41 K/UL (ref 1.8–7.7)
ALBUMIN SERPL BCP-MCNC: 2.9 G/DL (ref 3.5–5.2)
ALP SERPL-CCNC: 40 UNIT/L (ref 40–150)
ALT SERPL W/O P-5'-P-CCNC: 17 UNIT/L (ref 10–44)
ANION GAP (OHS): 9 MMOL/L (ref 8–16)
AST SERPL-CCNC: 25 UNIT/L (ref 11–45)
BASOPHILS # BLD AUTO: 0.01 K/UL
BASOPHILS NFR BLD AUTO: 0.2 %
BILIRUB SERPL-MCNC: 0.4 MG/DL (ref 0.1–1)
BLD PROD TYP BPU: NORMAL
BLD PROD TYP BPU: NORMAL
BLOOD UNIT EXPIRATION DATE: NORMAL
BLOOD UNIT EXPIRATION DATE: NORMAL
BLOOD UNIT TYPE CODE: 600
BLOOD UNIT TYPE CODE: 600
BUN SERPL-MCNC: 5 MG/DL (ref 6–20)
CALCIUM SERPL-MCNC: 8.4 MG/DL (ref 8.7–10.5)
CHLORIDE SERPL-SCNC: 98 MMOL/L (ref 95–110)
CO2 SERPL-SCNC: 29 MMOL/L (ref 23–29)
CREAT SERPL-MCNC: 0.5 MG/DL (ref 0.5–1.4)
CROSSMATCH INTERPRETATION: NORMAL
CROSSMATCH INTERPRETATION: NORMAL
DISPENSE STATUS: NORMAL
DISPENSE STATUS: NORMAL
ERYTHROCYTE [DISTWIDTH] IN BLOOD BY AUTOMATED COUNT: 12.3 % (ref 11.5–14.5)
GFR SERPLBLD CREATININE-BSD FMLA CKD-EPI: >60 ML/MIN/1.73/M2
GLUCOSE SERPL-MCNC: 142 MG/DL (ref 70–110)
HCT VFR BLD AUTO: 25.9 % (ref 37–48.5)
HGB BLD-MCNC: 8.7 GM/DL (ref 12–16)
IMM GRANULOCYTES # BLD AUTO: 0.02 K/UL (ref 0–0.04)
IMM GRANULOCYTES NFR BLD AUTO: 0.4 % (ref 0–0.5)
LYMPHOCYTES # BLD AUTO: 0.47 K/UL (ref 1–4.8)
MAGNESIUM SERPL-MCNC: 2.2 MG/DL (ref 1.6–2.6)
MCH RBC QN AUTO: 29.7 PG (ref 27–31)
MCHC RBC AUTO-ENTMCNC: 33.6 G/DL (ref 32–36)
MCV RBC AUTO: 88 FL (ref 82–98)
NUCLEATED RBC (/100WBC) (OHS): 0 /100 WBC
PHOSPHATE SERPL-MCNC: 3.1 MG/DL (ref 2.7–4.5)
PLATELET # BLD AUTO: 187 K/UL (ref 150–450)
PMV BLD AUTO: 10.2 FL (ref 9.2–12.9)
POTASSIUM SERPL-SCNC: 3.9 MMOL/L (ref 3.5–5.1)
PROT SERPL-MCNC: 5.9 GM/DL (ref 6–8.4)
RBC # BLD AUTO: 2.93 M/UL (ref 4–5.4)
RELATIVE EOSINOPHIL (OHS): 1.1 %
RELATIVE LYMPHOCYTE (OHS): 8.4 % (ref 18–48)
RELATIVE MONOCYTE (OHS): 10.9 % (ref 4–15)
RELATIVE NEUTROPHIL (OHS): 79 % (ref 38–73)
SODIUM SERPL-SCNC: 136 MMOL/L (ref 136–145)
UNIT NUMBER: NORMAL
UNIT NUMBER: NORMAL
WBC # BLD AUTO: 5.58 K/UL (ref 3.9–12.7)

## 2025-06-28 PROCEDURE — 82040 ASSAY OF SERUM ALBUMIN: CPT

## 2025-06-28 PROCEDURE — 51701 INSERT BLADDER CATHETER: CPT

## 2025-06-28 PROCEDURE — 99900035 HC TECH TIME PER 15 MIN (STAT)

## 2025-06-28 PROCEDURE — 63600175 PHARM REV CODE 636 W HCPCS

## 2025-06-28 PROCEDURE — 27000221 HC OXYGEN, UP TO 24 HOURS

## 2025-06-28 PROCEDURE — 25000003 PHARM REV CODE 250: Performed by: STUDENT IN AN ORGANIZED HEALTH CARE EDUCATION/TRAINING PROGRAM

## 2025-06-28 PROCEDURE — 99233 SBSQ HOSP IP/OBS HIGH 50: CPT | Mod: ,,, | Performed by: PSYCHIATRY & NEUROLOGY

## 2025-06-28 PROCEDURE — 25000003 PHARM REV CODE 250

## 2025-06-28 PROCEDURE — 51798 US URINE CAPACITY MEASURE: CPT

## 2025-06-28 PROCEDURE — 63600175 PHARM REV CODE 636 W HCPCS: Performed by: STUDENT IN AN ORGANIZED HEALTH CARE EDUCATION/TRAINING PROGRAM

## 2025-06-28 PROCEDURE — 11000001 HC ACUTE MED/SURG PRIVATE ROOM

## 2025-06-28 PROCEDURE — 85025 COMPLETE CBC W/AUTO DIFF WBC: CPT

## 2025-06-28 PROCEDURE — 94761 N-INVAS EAR/PLS OXIMETRY MLT: CPT

## 2025-06-28 PROCEDURE — 83735 ASSAY OF MAGNESIUM: CPT

## 2025-06-28 PROCEDURE — 25000003 PHARM REV CODE 250: Performed by: PSYCHIATRY & NEUROLOGY

## 2025-06-28 PROCEDURE — 84100 ASSAY OF PHOSPHORUS: CPT

## 2025-06-28 RX ORDER — SYRING-NEEDL,DISP,INSUL,0.3 ML 29 G X1/2"
296 SYRINGE, EMPTY DISPOSABLE MISCELLANEOUS
Status: COMPLETED | OUTPATIENT
Start: 2025-06-28 | End: 2025-06-28

## 2025-06-28 RX ORDER — PSEUDOEPHEDRINE/ACETAMINOPHEN 30MG-500MG
100 TABLET ORAL
Status: COMPLETED | OUTPATIENT
Start: 2025-06-28 | End: 2025-06-28

## 2025-06-28 RX ORDER — SODIUM CHLORIDE 0.9 G/100ML
500 IRRIGANT IRRIGATION
Status: COMPLETED | OUTPATIENT
Start: 2025-06-28 | End: 2025-06-28

## 2025-06-28 RX ADMIN — BISACODYL 10 MG: 10 SUPPOSITORY RECTAL at 09:06

## 2025-06-28 RX ADMIN — ENOXAPARIN SODIUM 40 MG: 40 INJECTION SUBCUTANEOUS at 05:06

## 2025-06-28 RX ADMIN — Medication 100 ML: at 02:06

## 2025-06-28 RX ADMIN — CEFAZOLIN 1 G: 330 INJECTION, POWDER, FOR SOLUTION INTRAMUSCULAR; INTRAVENOUS at 04:06

## 2025-06-28 RX ADMIN — OXYCODONE HYDROCHLORIDE 10 MG: 10 TABLET ORAL at 07:06

## 2025-06-28 RX ADMIN — HYDROXYZINE HYDROCHLORIDE 25 MG: 25 TABLET, FILM COATED ORAL at 07:06

## 2025-06-28 RX ADMIN — CALCIUM CARBONATE (ANTACID) CHEW TAB 500 MG 500 MG: 500 CHEW TAB at 09:06

## 2025-06-28 RX ADMIN — METHOCARBAMOL 1000 MG: 500 TABLET ORAL at 05:06

## 2025-06-28 RX ADMIN — SUMATRIPTAN SUCCINATE 50 MG: 50 TABLET ORAL at 12:06

## 2025-06-28 RX ADMIN — TAMSULOSIN HYDROCHLORIDE 0.8 MG: 0.4 CAPSULE ORAL at 09:06

## 2025-06-28 RX ADMIN — GABAPENTIN 600 MG: 300 CAPSULE ORAL at 02:06

## 2025-06-28 RX ADMIN — SODIUM CHLORIDE 500 ML: 900 IRRIGANT IRRIGATION at 02:06

## 2025-06-28 RX ADMIN — OXYCODONE 15 MG: 5 TABLET ORAL at 10:06

## 2025-06-28 RX ADMIN — FAMOTIDINE 40 MG: 20 TABLET, FILM COATED ORAL at 09:06

## 2025-06-28 RX ADMIN — MAGNESIUM CITRATE 296 ML: 1.75 LIQUID ORAL at 02:06

## 2025-06-28 RX ADMIN — POLYETHYLENE GLYCOL 3350 17 G: 17 POWDER, FOR SOLUTION ORAL at 10:06

## 2025-06-28 RX ADMIN — ESCITALOPRAM OXALATE 20 MG: 20 TABLET ORAL at 10:06

## 2025-06-28 RX ADMIN — GABAPENTIN 600 MG: 300 CAPSULE ORAL at 10:06

## 2025-06-28 RX ADMIN — FLUCONAZOLE 400 MG: 200 TABLET ORAL at 09:06

## 2025-06-28 RX ADMIN — GABAPENTIN 600 MG: 300 CAPSULE ORAL at 05:06

## 2025-06-28 RX ADMIN — BACLOFEN 10 MG: 10 TABLET ORAL at 10:06

## 2025-06-28 RX ADMIN — METHOCARBAMOL 1000 MG: 500 TABLET ORAL at 12:06

## 2025-06-28 RX ADMIN — OXYCODONE 15 MG: 5 TABLET ORAL at 03:06

## 2025-06-28 RX ADMIN — SENNOSIDES AND DOCUSATE SODIUM 2 TABLET: 50; 8.6 TABLET ORAL at 10:06

## 2025-06-28 RX ADMIN — CALCIUM CARBONATE (ANTACID) CHEW TAB 500 MG 500 MG: 500 CHEW TAB at 10:06

## 2025-06-28 RX ADMIN — HYDROMORPHONE HYDROCHLORIDE 1 MG: 1 INJECTION, SOLUTION INTRAMUSCULAR; INTRAVENOUS; SUBCUTANEOUS at 02:06

## 2025-06-28 RX ADMIN — CEFAZOLIN 1 G: 330 INJECTION, POWDER, FOR SOLUTION INTRAMUSCULAR; INTRAVENOUS at 07:06

## 2025-06-28 RX ADMIN — POLYETHYLENE GLYCOL 3350 17 G: 17 POWDER, FOR SOLUTION ORAL at 09:06

## 2025-06-28 RX ADMIN — SENNOSIDES AND DOCUSATE SODIUM 2 TABLET: 50; 8.6 TABLET ORAL at 09:06

## 2025-06-28 RX ADMIN — LIDOCAINE 1 PATCH: 50 PATCH CUTANEOUS at 10:06

## 2025-06-28 NOTE — EICU
Virtual ICU Quality Rounds    Admit Date: 6/24/2025  Hospital Day: 4    ICU Day: 3d 22h    24H Vital Sign Range:  Temp:  [96.6 °F (35.9 °C)-98.9 °F (37.2 °C)]   Pulse:  []   Resp:  [14-31]   BP: ()/(48-97)   SpO2:  [92 %-100 %]     VICU Surveillance Screening    LDA reconciliation : Yes

## 2025-06-28 NOTE — NURSING TRANSFER
Nursing Transfer Note      6/28/2025   3:18 PM    Nurse giving handoff: SAADIA Rivera  Nurse receiving handoff: SAADIA Schulz    Reason patient is being transferred: Per MD order    Transfer To: 557 from [facility] room 9079    Transfer via wheelchair    Transfer with cardiac monitoring      Telemetry: Rhythm NS  Order for Tele Monitor? Yes    Additional Lines: Oxygen    Medicines sent: yes    Any special needs or follow-up needed: Drains; bladder scan; fall risk    Patient belongings transferred with patient: Yes    Chart send with patient: Yes    Notified: daughter will notify family     Patient reassessed at:the bedside Time 1450 Date 06/28/2025  1  Upon arrival to floor: cardiac monitor applied, patient oriented to room, call bell in reach, and bed in lowest position

## 2025-06-28 NOTE — PLAN OF CARE
Problem: Adult Inpatient Plan of Care  Goal: Plan of Care Review  Outcome: Progressing  Goal: Patient-Specific Goal (Individualized)  Outcome: Progressing  Goal: Absence of Hospital-Acquired Illness or Injury  Outcome: Progressing  Goal: Optimal Comfort and Wellbeing  Outcome: Progressing  Goal: Readiness for Transition of Care  Outcome: Progressing     Problem: Infection  Goal: Absence of Infection Signs and Symptoms  Outcome: Progressing     Problem: Fall Injury Risk  Goal: Absence of Fall and Fall-Related Injury  Outcome: Progressing     Problem: Skin Injury Risk Increased  Goal: Skin Health and Integrity  Outcome: Progressing   Pt is currently resting comfortably in recliner, with the wheels locked, legs elevated. VS are stable. Pt is on continuous cardiac monitoring. Pain has been controlled with PRN medications. Personal items and call light within reach. Pt instructed to call staff for mobility. No pain or distress noted at this time.

## 2025-06-28 NOTE — ASSESSMENT & PLAN NOTE
Ms. Kayla Clay is a 40 y.o. 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 Completed 6 weeks of IV antibiotics, empirically since initial cultures were negative using Ceftriaxone and Daptomycin. Completed 3 months of Fluconazole high dose at 400mg PO q24h given Candida albicans positive cultures. Did not tolerate Vancomycin or Doxycycline.    Patient has completed adequate antimicrobial coverage for prior infection. IVDU is a huge risk factor for recurrence. To ensure sterility of site she will need PO fluconazole which she can stop one week post op.    S/p T-10-pelvis fusion 6/24  XR post op obtained, satisfactory    Plan:  Patient admitted to [facility] on telemetry. Ok to stepdown to NSGY per NCC  q1h neurochecks in ICU, q2h neurochecks in stepdown, q4h neurochecks on floor  All labs and diagnostics reviewed  MAP goals >65 at this time  Maintain TLSO brace when OOB  HV x2 deep, HV x1 superficial Drains to remain at this time; please record hourly outputs, provena wound vac in place  Antibiotics while drains in place  PT/OT/OOB  ADAT  Multi-Modal Pain Control  TEDs/SCDs/SQH  Continue to monitor clinically, notify NSGY immediately with any changes in neuro status

## 2025-06-28 NOTE — SUBJECTIVE & OBJECTIVE
Interval History:  see hospital course    Review of Systems   Gastrointestinal:  Positive for nausea.   Musculoskeletal:  Positive for back pain.       Objective:     Vitals:  Temp: 98.2 °F (36.8 °C)  Pulse: 92  Rhythm: sinus tachycardia  BP: (!) 90/59  MAP (mmHg): 71  Resp: 18  SpO2: 100 %    Temp  Min: 96.6 °F (35.9 °C)  Max: 98.9 °F (37.2 °C)  Pulse  Min: 78  Max: 127  BP  Min: 90/59  Max: 133/97  MAP (mmHg)  Min: 65  Max: 111  Resp  Min: 14  Max: 31  SpO2  Min: 92 %  Max: 100 %    06/27 0701 - 06/28 0700  In: 1630 [P.O.:1550; I.V.:80]  Out: 2000 [Urine:1975; Drains:25]   Unmeasured Output  Unmeasured Urine Occurrence: 1  Unmeasured Stool Occurrence: 0        Physical Exam  HENT:      Head: Normocephalic and atraumatic.   Cardiovascular:      Rate and Rhythm: Normal rate and regular rhythm.   Pulmonary:      Effort: Pulmonary effort is normal. No respiratory distress.   Abdominal:      Palpations: Abdomen is soft.   Neurological:      Comments: E4V5M6  awake and oriented x 4  PERRL, EOMI  4+/5 throughout, though exam limited 2/2 pain  Denies sensory deficits throughout          Medications:  Continuous Scheduledbaclofen, 10 mg, Nightly  bisacodyL, 10 mg, Daily  calcium carbonate, 500 mg, BID  ceFAZolin (Ancef) IV (PEDS and ADULTS), 1 g, Q8H  enoxparin, 40 mg, Q24H (prophylaxis, 1700)  EScitalopram oxalate, 20 mg, QHS  famotidine, 40 mg, Daily  fluconazole, 400 mg, Daily  gabapentin, 600 mg, Q8H  LIDOcaine, 1 patch, Q24H  methocarbamoL, 1,000 mg, Q6H  polyethylene glycol, 17 g, BID  senna-docusate, 2 tablet, BID  tamsulosin, 0.8 mg, Daily    PRNaluminum-magnesium hydroxide-simethicone, 30 mL, Q4H PRN  HYDROmorphone, 1 mg, Q4H PRN  hydrOXYzine HCL, 25 mg, Q4H PRN  magnesium oxide, 800 mg, PRN  magnesium oxide, 800 mg, PRN  melatonin, 9 mg, Nightly PRN  ondansetron, 8 mg, Q6H PRN  oxyCODONE, 15 mg, Q4H PRN  oxyCODONE, 10 mg, Q4H PRN  potassium bicarbonate, 35 mEq, PRN  potassium bicarbonate, 50 mEq, PRN  potassium  bicarbonate, 60 mEq, PRN  potassium, sodium phosphates, 2 packet, PRN  potassium, sodium phosphates, 2 packet, PRN  potassium, sodium phosphates, 2 packet, PRN  prochlorperazine, 5 mg, Q6H PRN  sumatriptan, 50 mg, PRN      Today I personally reviewed pertinent medications, lines/drains/airways, imaging, laboratory results, notably:    Diet  Diet Adult Regular  Diet Adult Regular

## 2025-06-28 NOTE — PROGRESS NOTES
"[facility] - [facility]  Neurosurgery  Progress Note    Subjective:     History of Present Illness: Ms. Kayla Clay is a 40 y.o. 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 Per last ID note, "7/5/23: Ms. Miller presents for follow-up today. She continues to have ongoing lower extremity weakness. She uses a cane for assistance with ambulation. She denies any fevers, chills, nausea, vomiting and diarrhea. She had been working with PT, but hasn't been able to follow-up in the past month. She saw a neurosurgeon on 5/26/23 in which he reports her most recent MRI showed resolution of epidural abscess and degenerative disc disease. No planned interventions at this time. Completed 6 weeks of IV antibiotics, empirically since initial cultures were negative using Ceftriaxone and Daptomycin. Completed 3 months of Fluconazole high dose at 400mg PO q24h given Candida albicans positive cultures. Did not tolerate Vancomycin or Doxycycline. MRI with no findings significant for residual infection. Neurosurgery without any interventions planned at this time. MRI with resolution of epidural abscess. Continue to work with PT to work on gait strength. No need for ongoing infectious workup at this time."     NSGY now planning for deformity surgery due to severe stenosis of lumbar spine. T10-pelvis fusion recommended by surgeons. Explained to patient that she completed adequate antimicrobial coverage for prior infection. IVDU is a huge risk factor for recurrence. To ensure sterility of site will prescribe course of PO fluconazole which she can stop one week post op. If evidence of infection seen in OR will ask surgeons to send specimen for cultures. Patient voiced understanding.     Post-Op Info:  Procedure(s) (LRB):  *AIRO* T10 TO PELVIS POSTERIOR INSTRUMENTED FUSION/AIRO (N/A)   4 Days Post-Op   Interval History: 6/28: POD 4. Neuro stable. BM " finally. Voiding. Still pending step down    Medications:  Continuous Infusions:  Scheduled Meds:   baclofen  10 mg Oral Nightly    bisacodyL  10 mg Rectal Daily    calcium carbonate  500 mg Oral BID    ceFAZolin (Ancef) IV (PEDS and ADULTS)  1 g Intravenous Q8H    enoxparin  40 mg Subcutaneous Q24H (prophylaxis, 1700)    EScitalopram oxalate  20 mg Oral QHS    famotidine  40 mg Oral Daily    fluconazole  400 mg Oral Daily    gabapentin  600 mg Oral Q8H    LIDOcaine  1 patch Transdermal Q24H    methocarbamoL  1,000 mg Oral Q6H    polyethylene glycol  17 g Oral BID    senna-docusate  2 tablet Oral BID    tamsulosin  0.8 mg Oral Daily     PRN Meds:  Current Facility-Administered Medications:     aluminum-magnesium hydroxide-simethicone, 30 mL, Oral, Q4H PRN    HYDROmorphone, 1 mg, Intravenous, Q4H PRN    hydrOXYzine HCL, 25 mg, Oral, Q4H PRN    magnesium oxide, 800 mg, Oral, PRN    magnesium oxide, 800 mg, Oral, PRN    melatonin, 9 mg, Oral, Nightly PRN    ondansetron, 8 mg, Intravenous, Q6H PRN    oxyCODONE, 15 mg, Oral, Q4H PRN    oxyCODONE, 10 mg, Oral, Q4H PRN    potassium bicarbonate, 35 mEq, Oral, PRN    potassium bicarbonate, 50 mEq, Oral, PRN    potassium bicarbonate, 60 mEq, Oral, PRN    potassium, sodium phosphates, 2 packet, Oral, PRN    potassium, sodium phosphates, 2 packet, Oral, PRN    potassium, sodium phosphates, 2 packet, Oral, PRN    prochlorperazine, 5 mg, Intravenous, Q6H PRN    sumatriptan, 50 mg, Oral, PRN     Review of Systems  Objective:     Weight: 87.3 kg (192 lb 7.4 oz)  Body mass index is 30.14 kg/m².  Vital Signs (Most Recent):  Temp: 98.3 °F (36.8 °C) (06/27/25 2302)  Pulse: 97 (06/28/25 0202)  Resp: (!) 25 (06/28/25 0237)  BP: (!) 116/55 (06/28/25 0202)  SpO2: 98 % (06/28/25 0202) Vital Signs (24h Range):  Temp:  [96.6 °F (35.9 °C)-99 °F (37.2 °C)] 98.3 °F (36.8 °C)  Pulse:  [] 97  Resp:  [12-31] 25  SpO2:  [90 %-100 %] 98 %  BP: ()/(50-97) 116/55                  Oxygen  "Concentration (%):  [28] 28  Resp Rate Total:  [11 br/min-22 br/min] 15 br/min             Closed/Suction Drain 06/24/25 1308 Tube - 1 Right Back Accordion 10 Fr. (Active)   Dressing Type Transparent (Tegaderm) 06/25/25 1501   Dressing Status Clean;Dry;Intact 06/25/25 1501   Dressing Intervention Integrity maintained 06/25/25 1501   Drainage None 06/25/25 1501   Output (mL) 3 mL 06/25/25 0601            Closed/Suction Drain 06/24/25 1308 Tube - 2 Left Back Accordion 10 Fr. (Active)   Dressing Type Transparent (Tegaderm) 06/25/25 1501   Dressing Status Clean;Dry;Intact 06/25/25 1501   Dressing Intervention Integrity maintained 06/25/25 1501   Drainage None 06/25/25 1501   Output (mL) 80 mL 06/25/25 0601            Closed/Suction Drain 06/24/25 1309 Tube - 3 Left Back Accordion 10 Fr. (Active)   Dressing Type Transparent (Tegaderm) 06/25/25 1501   Dressing Status Clean;Dry;Intact 06/25/25 1501   Dressing Intervention Integrity maintained 06/25/25 1501   Drainage None 06/25/25 1501   Output (mL) 5 mL 06/25/25 0601            Urethral Catheter 06/24/25 0745 Coude;Non-latex;Silicone 16 Fr. (Active)   Site Assessment Clean;Intact;Dry 06/25/25 1501   Collection Container Urimeter 06/25/25 1501   Securement Method secured to top of thigh w/ adhesive device 06/25/25 1501   Catheter Care Performed yes 06/25/25 1501   Reason for Continuing Urinary Catheterization Post operative 06/25/25 1501   CAUTI Prevention Bundle Securement Device in place with 1" slack;Intact seal between catheter & drainage tubing;Drainage bag/urimeter off the floor;Sheeting clip in use;No dependent loops or kinks;Drainage bag/urimeter not overfilled (<2/3 full);Drainage bag/urimeter below bladder 06/25/25 1101   Output (mL) 45 mL 06/25/25 0601          Physical Exam         Neurosurgery Physical Exam  AAOx4  PERRL  Cranial nerves intact  BUE 5/5  BLE 5/5  SILT    Significant Labs:  Recent Labs   Lab 06/27/25  0015 06/28/25  0314    142*    " "136   K 3.6 3.9    98   CO2 27 29   BUN 6 5*   CREATININE 0.5 0.5   CALCIUM 8.0* 8.4*   MG 1.5* 2.2     Recent Labs   Lab 06/27/25  0015 06/28/25  0314   WBC 6.14 5.58   HGB 8.7* 8.7*   HCT 25.5* 25.9*    187     No results for input(s): "LABPT", "INR", "APTT" in the last 48 hours.  Microbiology Results (last 7 days)       ** No results found for the last 168 hours. **          All pertinent labs from the last 24 hours have been reviewed.    Significant Diagnostics:  CT: No results found in the last 24 hours.  MRI: No results found in the last 24 hours.  I have reviewed all pertinent imaging results/findings within the past 24 hours.  I have reviewed and interpreted all pertinent imaging results/findings within the past 24 hours.  Assessment/Plan:     * Degenerative scoliosis in adult patient  Ms. Kayla Clay is a 40 y.o. 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 Completed 6 weeks of IV antibiotics, empirically since initial cultures were negative using Ceftriaxone and Daptomycin. Completed 3 months of Fluconazole high dose at 400mg PO q24h given Candida albicans positive cultures. Did not tolerate Vancomycin or Doxycycline.    Patient has completed adequate antimicrobial coverage for prior infection. IVDU is a huge risk factor for recurrence. To ensure sterility of site she will need PO fluconazole which she can stop one week post op.    S/p T-10-pelvis fusion 6/24  XR post op obtained, satisfactory    Plan:  Patient admitted to [facility] on telemetry. Ok to stepdown to NSGY per NCC  q1h neurochecks in ICU, q2h neurochecks in stepdown, q4h neurochecks on floor  All labs and diagnostics reviewed  MAP goals >65 at this time  Maintain TLSO brace when OOB  HV x2 deep, HV x1 superficial Drains to remain at this time; please record hourly outputs, provena wound vac in place  Antibiotics while drains in " place  PT/OT/OOB  ADAT  Multi-Modal Pain Control  TEDs/SCDs/SQH  Continue to monitor clinically, notify NSGY immediately with any changes in neuro status          Carl Oliva MD  Neurosurgery  [facility] - Neuro Critical [facility]

## 2025-06-28 NOTE — PROGRESS NOTES
[facility] Critical [facility]  Neurocritical Care  Progress Note    Admit Date: 6/24/2025  Service Date: 06/28/2025  Length of Stay: 4    Subjective:     Chief Complaint: Degenerative scoliosis in adult patient    History of Present Illness: Ms. Kayla Clay is a 40 y.o. female w/ significant PMHx of IVDU, HTN, and fungal discitis who presents to [facility] s/p T10-pelvis fusion. She initially presented to NSGY on an outpatient basis after longtime follow up with ID. She continued to have ongoing lower extremity weakness, using a cane for assistance with ambulation. She followed with NSGY on 5/26/23, where MRI showed resolution of epidural abscess and degenerative disc disease. She completed 6 weeks of IV antibiotics, empirically since initial cultures were negative using Ceftriaxone and Daptomycin. She also completed 3 months of Fluconazole high dose at 400mg PO q24h given Candida albicans positive cultures. She notably did not tolerate Vancomycin or Doxycycline. MRI with no findings significant for residual infection, though she is s/p T10-pelvis fusion to address severe stenosis of lumbar spine.     She is admitted to [facility] for hourly neuromonitoring post operatively.    Hospital Course: 06/25/2025  Resting comfortably in bed. Remains on fluconazole. Vancomycin changed to cefazolin.  06/26/2025  Drain output 500 cc over last 24h, will remain in place and hold chemical VTE ppx. Optimize pain regimen, increase gabapentin to BID   06/27/2025  Minimal drain output  06/28/2025   Wound vac not draining. NSGY made aware. More pain overnight- robaxin and gabapentin increased. Given enema overnight and had BM. Remains pending stepdown.    Interval History:  see hospital course    Review of Systems   Gastrointestinal:  Positive for nausea.   Musculoskeletal:  Positive for back pain.       Objective:     Vitals:  Temp: 98.2 °F (36.8 °C)  Pulse: 92  Rhythm: sinus tachycardia  BP: (!) 90/59  MAP (mmHg): 71  Resp: 18  SpO2:  100 %    Temp  Min: 96.6 °F (35.9 °C)  Max: 98.9 °F (37.2 °C)  Pulse  Min: 78  Max: 127  BP  Min: 90/59  Max: 133/97  MAP (mmHg)  Min: 65  Max: 111  Resp  Min: 14  Max: 31  SpO2  Min: 92 %  Max: 100 %    06/27 0701 - 06/28 0700  In: 1630 [P.O.:1550; I.V.:80]  Out: 2000 [Urine:1975; Drains:25]   Unmeasured Output  Unmeasured Urine Occurrence: 1  Unmeasured Stool Occurrence: 0        Physical Exam  HENT:      Head: Normocephalic and atraumatic.   Cardiovascular:      Rate and Rhythm: Normal rate and regular rhythm.   Pulmonary:      Effort: Pulmonary effort is normal. No respiratory distress.   Abdominal:      Palpations: Abdomen is soft.   Neurological:      Comments: E4V5M6  awake and oriented x 4  PERRL, EOMI  4+/5 throughout, though exam limited 2/2 pain  Denies sensory deficits throughout          Medications:  Continuous Scheduledbaclofen, 10 mg, Nightly  bisacodyL, 10 mg, Daily  calcium carbonate, 500 mg, BID  ceFAZolin (Ancef) IV (PEDS and ADULTS), 1 g, Q8H  enoxparin, 40 mg, Q24H (prophylaxis, 1700)  EScitalopram oxalate, 20 mg, QHS  famotidine, 40 mg, Daily  fluconazole, 400 mg, Daily  gabapentin, 600 mg, Q8H  LIDOcaine, 1 patch, Q24H  methocarbamoL, 1,000 mg, Q6H  polyethylene glycol, 17 g, BID  senna-docusate, 2 tablet, BID  tamsulosin, 0.8 mg, Daily    PRNaluminum-magnesium hydroxide-simethicone, 30 mL, Q4H PRN  HYDROmorphone, 1 mg, Q4H PRN  hydrOXYzine HCL, 25 mg, Q4H PRN  magnesium oxide, 800 mg, PRN  magnesium oxide, 800 mg, PRN  melatonin, 9 mg, Nightly PRN  ondansetron, 8 mg, Q6H PRN  oxyCODONE, 15 mg, Q4H PRN  oxyCODONE, 10 mg, Q4H PRN  potassium bicarbonate, 35 mEq, PRN  potassium bicarbonate, 50 mEq, PRN  potassium bicarbonate, 60 mEq, PRN  potassium, sodium phosphates, 2 packet, PRN  potassium, sodium phosphates, 2 packet, PRN  potassium, sodium phosphates, 2 packet, PRN  prochlorperazine, 5 mg, Q6H PRN  sumatriptan, 50 mg, PRN      Today I personally reviewed pertinent medications,  lines/drains/airways, imaging, laboratory results, notably:    Diet  Diet Adult Regular  Diet Adult Regular      Assessment/Plan:     Neuro  * Degenerative scoliosis in adult patient  40 y.o. female w/ significant PMHx of IVDU, HTN, and fungal discitis who completed 6 weeks of IV antibiotics, empirically since initial cultures were negative using Ceftriaxone and Daptomycin. She also completed 3 months of Fluconazole high dose at 400mg PO q24h given Candida albicans positive cultures.  She is s/p T-10-pelvis fusion for degenerative scoliosis 2/2 discitis history.    -Admitted to [facility], NSGY following - stable for TTF from [facility] perspective, awaiting room  -Neuro checks/vitals q4h  -Multi-modal pain regimen  -Daily CBC, CMP, mag, phos  -SBP < 160, prn labetalol and hydralazine  -SCDs, lovenox   -PT/OT/SLP consulted, currently recommending low intensity therapy  -Multimodal pain regimen  -Post op imaging per NSGY  -TLSO brace when OOB  -Cefazolin while drains in place per NSGY  -Fluconazole x 7 d per ID    Cardiac/Vascular  Hypertension  SBP < 160  Holding home amlodipine in setting of low pressures, restart as needed             The patient is being Prophylaxed for:  Venous Thromboembolism with: Mechanical or Chemical  Stress Ulcer with: Not Applicable   Ventilator Pneumonia with: not applicable    Activity Orders            Straight Cath starting at 06/27 2156    Turn patient starting at 06/26 1217    Ambulate starting at 06/25 0000    Up in chair With meals starting at 06/24 1700    Diet Adult Regular: Regular starting at 06/24 1437    Progressive Mobility Protocol (mobilize patient to their highest level of functioning at least twice daily) starting at 06/24 1436    Elevate HOB 30 starting at 06/24 1436    Ambulate With Assistance, Post Op Day 0 If the patient arrives from PACU by 4PM, walk at least once on day of operation if alert and safe to do so. starting at 06/24 1435          Full Code    Les Martell ,  MD  Neurocritical Care  [facility] - Neuro Critical Care

## 2025-06-28 NOTE — PLAN OF CARE
[facility] Care Plan    POC reviewed with Kayla Clay at 0505. Patient verbalized understanding. Questions and concerns addressed. No acute events today. Pt progressing toward goals. Will continue to monitor. See below and flowsheets for full assessment and VS info.     Pt w/o BM x4 days. KUB  obtained. Suppository and enema administered. Pt w/ large BM following enema administration.   Flomax increased for cont. Urinary retention. Straight cath x2 following initial in/out at shift change for bladder scan reading >900 mL.   Pain reg adjusted. Gabapentin increased to 600 mg. Robaxin dose increased 1g/frequency increased to q6h. Lidocaine patch ordered and applied.   PRN dilaudid x2, oxy15 x1.   R HV-- 10 cc/shift.  L HV #2-- 3 cc/shift.   L HV #3--- 1 cc.shift.    Is this a stroke patient? no    Neuro:  [place] Coma Scale  Best Eye Response: 4-->(E4) spontaneous  Best Motor Response: 6-->(M6) obeys commands  Best Verbal Response: 5-->(V5) oriented  Aria Coma Scale Score: 15  Assessment Qualifiers: Patient not sedated/intubated, No eye obstruction present  Pupil PERRLA: yes     24 hr Temp:  [96.6 °F (35.9 °C)-98.9 °F (37.2 °C)]     CV:   Rhythm: sinus tachycardia  BP goals:   SBP < 160  MAP > 65    Resp:      Oxygen Concentration (%): 28    Plan: N/A    GI/:     Diet/Nutrition Received: regular  Last Bowel Movement: 06/28/25  Voiding Characteristics: voids spontaneously without difficulty    Intake/Output Summary (Last 24 hours) at 6/28/2025 0709  Last data filed at 6/28/2025 0602  Gross per 24 hour   Intake 1630 ml   Output 2000 ml   Net -370 ml     Unmeasured Output  Unmeasured Urine Occurrence: 1  Unmeasured Stool Occurrence: 0    Labs/Accuchecks:  Recent Labs   Lab 06/28/25 0314   WBC 5.58   RBC 2.93*   HGB 8.7*   HCT 25.9*         Recent Labs   Lab 06/28/25 0314      K 3.9   CO2 29   CL 98   BUN 5*   CREATININE 0.5   ALKPHOS 40   ALT 17   AST 25   BILITOT 0.4    No results for input(s):  ""PROTIME", "INR", "APTT", "HEPANTIXA" in the last 168 hours. No results for input(s): "CPK", "CPKMB", "TROPONINI", "MB" in the last 168 hours.    Electrolytes: N/A - electrolytes WDL  Accuchecks: none    Gtts:      LDA/Wounds:    Petey Risk Assessment  Sensory Perception: 4-->no impairment  Moisture: 4-->rarely moist  Activity: 2-->chairfast  Mobility: 3-->slightly limited  Nutrition: 3-->adequate  Friction and Shear: 3-->no apparent problem  Petey Score: 19  Is your petey score 12 or less? no          Restraints:        WCTM       Problem: Adult Inpatient Plan of Care  Goal: Plan of Care Review  Outcome: Progressing  Goal: Optimal Comfort and Wellbeing  Outcome: Progressing     Problem: Fall Injury Risk  Goal: Absence of Fall and Fall-Related Injury  Outcome: Progressing     "

## 2025-06-28 NOTE — ASSESSMENT & PLAN NOTE
40 y.o. female w/ significant PMHx of IVDU, HTN, and fungal discitis who completed 6 weeks of IV antibiotics, empirically since initial cultures were negative using Ceftriaxone and Daptomycin. She also completed 3 months of Fluconazole high dose at 400mg PO q24h given Candida albicans positive cultures.  She is s/p T-10-pelvis fusion for degenerative scoliosis 2/2 discitis history.    -Admitted to [facility], NSGY following - stable for TTF from NCC perspective, awaiting room  -Neuro checks/vitals q4h  -Multi-modal pain regimen  -Daily CBC, CMP, mag, phos  -SBP < 160, prn labetalol and hydralazine  -SCDs, lovenox   -PT/OT/SLP consulted, currently recommending low intensity therapy  -Multimodal pain regimen  -Post op imaging per NSGY  -TLSO brace when OOB  -Cefazolin while drains in place per NSGY  -Fluconazole x 7 d per ID

## 2025-06-28 NOTE — SUBJECTIVE & OBJECTIVE
Interval History: 6/28: POD 4. Neuro stable. BM finally. Voiding. Still pending step down    Medications:  Continuous Infusions:  Scheduled Meds:   baclofen  10 mg Oral Nightly    bisacodyL  10 mg Rectal Daily    calcium carbonate  500 mg Oral BID    ceFAZolin (Ancef) IV (PEDS and ADULTS)  1 g Intravenous Q8H    enoxparin  40 mg Subcutaneous Q24H (prophylaxis, 1700)    EScitalopram oxalate  20 mg Oral QHS    famotidine  40 mg Oral Daily    fluconazole  400 mg Oral Daily    gabapentin  600 mg Oral Q8H    LIDOcaine  1 patch Transdermal Q24H    methocarbamoL  1,000 mg Oral Q6H    polyethylene glycol  17 g Oral BID    senna-docusate  2 tablet Oral BID    tamsulosin  0.8 mg Oral Daily     PRN Meds:  Current Facility-Administered Medications:     aluminum-magnesium hydroxide-simethicone, 30 mL, Oral, Q4H PRN    HYDROmorphone, 1 mg, Intravenous, Q4H PRN    hydrOXYzine HCL, 25 mg, Oral, Q4H PRN    magnesium oxide, 800 mg, Oral, PRN    magnesium oxide, 800 mg, Oral, PRN    melatonin, 9 mg, Oral, Nightly PRN    ondansetron, 8 mg, Intravenous, Q6H PRN    oxyCODONE, 15 mg, Oral, Q4H PRN    oxyCODONE, 10 mg, Oral, Q4H PRN    potassium bicarbonate, 35 mEq, Oral, PRN    potassium bicarbonate, 50 mEq, Oral, PRN    potassium bicarbonate, 60 mEq, Oral, PRN    potassium, sodium phosphates, 2 packet, Oral, PRN    potassium, sodium phosphates, 2 packet, Oral, PRN    potassium, sodium phosphates, 2 packet, Oral, PRN    prochlorperazine, 5 mg, Intravenous, Q6H PRN    sumatriptan, 50 mg, Oral, PRN     Review of Systems  Objective:     Weight: 87.3 kg (192 lb 7.4 oz)  Body mass index is 30.14 kg/m².  Vital Signs (Most Recent):  Temp: 98.3 °F (36.8 °C) (06/27/25 2302)  Pulse: 97 (06/28/25 0202)  Resp: (!) 25 (06/28/25 0237)  BP: (!) 116/55 (06/28/25 0202)  SpO2: 98 % (06/28/25 0202) Vital Signs (24h Range):  Temp:  [96.6 °F (35.9 °C)-99 °F (37.2 °C)] 98.3 °F (36.8 °C)  Pulse:  [] 97  Resp:  [12-31] 25  SpO2:  [90 %-100 %] 98 %  BP:  "()/(50-97) 116/55                  Oxygen Concentration (%):  [28] 28  Resp Rate Total:  [11 br/min-22 br/min] 15 br/min             Closed/Suction Drain 06/24/25 1308 Tube - 1 Right Back Accordion 10 Fr. (Active)   Dressing Type Transparent (Tegaderm) 06/25/25 1501   Dressing Status Clean;Dry;Intact 06/25/25 1501   Dressing Intervention Integrity maintained 06/25/25 1501   Drainage None 06/25/25 1501   Output (mL) 3 mL 06/25/25 0601            Closed/Suction Drain 06/24/25 1308 Tube - 2 Left Back Accordion 10 Fr. (Active)   Dressing Type Transparent (Tegaderm) 06/25/25 1501   Dressing Status Clean;Dry;Intact 06/25/25 1501   Dressing Intervention Integrity maintained 06/25/25 1501   Drainage None 06/25/25 1501   Output (mL) 80 mL 06/25/25 0601            Closed/Suction Drain 06/24/25 1309 Tube - 3 Left Back Accordion 10 Fr. (Active)   Dressing Type Transparent (Tegaderm) 06/25/25 1501   Dressing Status Clean;Dry;Intact 06/25/25 1501   Dressing Intervention Integrity maintained 06/25/25 1501   Drainage None 06/25/25 1501   Output (mL) 5 mL 06/25/25 0601            Urethral Catheter 06/24/25 0745 Coude;Non-latex;Silicone 16 Fr. (Active)   Site Assessment Clean;Intact;Dry 06/25/25 1501   Collection Container Urimeter 06/25/25 1501   Securement Method secured to top of thigh w/ adhesive device 06/25/25 1501   Catheter Care Performed yes 06/25/25 1501   Reason for Continuing Urinary Catheterization Post operative 06/25/25 1501   CAUTI Prevention Bundle Securement Device in place with 1" slack;Intact seal between catheter & drainage tubing;Drainage bag/urimeter off the floor;Sheeting clip in use;No dependent loops or kinks;Drainage bag/urimeter not overfilled (<2/3 full);Drainage bag/urimeter below bladder 06/25/25 1101   Output (mL) 45 mL 06/25/25 0601          Physical Exam         Neurosurgery Physical Exam  AAOx4  PERRL  Cranial nerves intact  BUE 5/5  BLE 5/5  SILT    Significant Labs:  Recent Labs   Lab " "06/27/25  0015 06/28/25  0314    142*    136   K 3.6 3.9    98   CO2 27 29   BUN 6 5*   CREATININE 0.5 0.5   CALCIUM 8.0* 8.4*   MG 1.5* 2.2     Recent Labs   Lab 06/27/25  0015 06/28/25  0314   WBC 6.14 5.58   HGB 8.7* 8.7*   HCT 25.5* 25.9*    187     No results for input(s): "LABPT", "INR", "APTT" in the last 48 hours.  Microbiology Results (last 7 days)       ** No results found for the last 168 hours. **          All pertinent labs from the last 24 hours have been reviewed.    Significant Diagnostics:  CT: No results found in the last 24 hours.  MRI: No results found in the last 24 hours.  I have reviewed all pertinent imaging results/findings within the past 24 hours.  I have reviewed and interpreted all pertinent imaging results/findings within the past 24 hours.  "

## 2025-06-29 ENCOUNTER — PATIENT MESSAGE (OUTPATIENT)
Dept: NEUROSURGERY | Facility: CLINIC | Age: 40
End: 2025-06-29
Payer: MEDICAID

## 2025-06-29 VITALS
HEIGHT: 67 IN | SYSTOLIC BLOOD PRESSURE: 121 MMHG | OXYGEN SATURATION: 93 % | RESPIRATION RATE: 18 BRPM | WEIGHT: 192.44 LBS | TEMPERATURE: 98 F | BODY MASS INDEX: 30.2 KG/M2 | DIASTOLIC BLOOD PRESSURE: 66 MMHG | HEART RATE: 95 BPM

## 2025-06-29 LAB
ALBUMIN SERPL BCP-MCNC: 2.7 G/DL (ref 3.5–5.2)
ALP SERPL-CCNC: 38 UNIT/L (ref 40–150)
ALT SERPL W/O P-5'-P-CCNC: 15 UNIT/L (ref 10–44)
ANION GAP (OHS): 9 MMOL/L (ref 8–16)
AST SERPL-CCNC: 20 UNIT/L (ref 11–45)
BILIRUB SERPL-MCNC: 0.4 MG/DL (ref 0.1–1)
BUN SERPL-MCNC: 5 MG/DL (ref 6–20)
CALCIUM SERPL-MCNC: 8.1 MG/DL (ref 8.7–10.5)
CHLORIDE SERPL-SCNC: 97 MMOL/L (ref 95–110)
CO2 SERPL-SCNC: 29 MMOL/L (ref 23–29)
CREAT SERPL-MCNC: 0.5 MG/DL (ref 0.5–1.4)
GFR SERPLBLD CREATININE-BSD FMLA CKD-EPI: >60 ML/MIN/1.73/M2
GLUCOSE SERPL-MCNC: 95 MG/DL (ref 70–110)
MAGNESIUM SERPL-MCNC: 1.5 MG/DL (ref 1.6–2.6)
PHOSPHATE SERPL-MCNC: 3 MG/DL (ref 2.7–4.5)
POTASSIUM SERPL-SCNC: 3.5 MMOL/L (ref 3.5–5.1)
PROT SERPL-MCNC: 5.6 GM/DL (ref 6–8.4)
SODIUM SERPL-SCNC: 135 MMOL/L (ref 136–145)

## 2025-06-29 PROCEDURE — 94761 N-INVAS EAR/PLS OXIMETRY MLT: CPT

## 2025-06-29 PROCEDURE — 25000003 PHARM REV CODE 250: Performed by: STUDENT IN AN ORGANIZED HEALTH CARE EDUCATION/TRAINING PROGRAM

## 2025-06-29 PROCEDURE — 83735 ASSAY OF MAGNESIUM: CPT

## 2025-06-29 PROCEDURE — 36415 COLL VENOUS BLD VENIPUNCTURE: CPT

## 2025-06-29 PROCEDURE — 80053 COMPREHEN METABOLIC PANEL: CPT

## 2025-06-29 PROCEDURE — 84100 ASSAY OF PHOSPHORUS: CPT

## 2025-06-29 PROCEDURE — 63600175 PHARM REV CODE 636 W HCPCS

## 2025-06-29 PROCEDURE — 25000003 PHARM REV CODE 250

## 2025-06-29 RX ORDER — METHOCARBAMOL 500 MG/1
1000 TABLET, FILM COATED ORAL EVERY 6 HOURS
Qty: 80 TABLET | Refills: 0 | Status: SHIPPED | OUTPATIENT
Start: 2025-06-29 | End: 2025-07-03 | Stop reason: SDUPTHER

## 2025-06-29 RX ORDER — ONDANSETRON 4 MG/1
4 TABLET, ORALLY DISINTEGRATING ORAL EVERY 6 HOURS PRN
Qty: 30 TABLET | Refills: 0 | Status: SHIPPED | OUTPATIENT
Start: 2025-06-29

## 2025-06-29 RX ORDER — LIDOCAINE 50 MG/G
1 PATCH TOPICAL DAILY
Qty: 30 PATCH | Refills: 0 | Status: SHIPPED | OUTPATIENT
Start: 2025-06-29

## 2025-06-29 RX ORDER — TALC
9 POWDER (GRAM) TOPICAL NIGHTLY PRN
Qty: 30 TABLET | Refills: 0 | Status: SHIPPED | OUTPATIENT
Start: 2025-06-29

## 2025-06-29 RX ORDER — POLYETHYLENE GLYCOL 3350 17 G/17G
17 POWDER, FOR SOLUTION ORAL 2 TIMES DAILY
Qty: 510 G | Refills: 0 | Status: SHIPPED | OUTPATIENT
Start: 2025-06-29

## 2025-06-29 RX ORDER — OXYCODONE HYDROCHLORIDE 10 MG/1
10 TABLET ORAL EVERY 4 HOURS PRN
Qty: 30 TABLET | Refills: 0 | Status: SHIPPED | OUTPATIENT
Start: 2025-06-29 | End: 2025-07-03 | Stop reason: SDUPTHER

## 2025-06-29 RX ORDER — TAMSULOSIN HYDROCHLORIDE 0.4 MG/1
0.8 CAPSULE ORAL DAILY
Qty: 60 CAPSULE | Refills: 11 | Status: SHIPPED | OUTPATIENT
Start: 2025-06-30 | End: 2026-06-30

## 2025-06-29 RX ADMIN — OXYCODONE 15 MG: 5 TABLET ORAL at 07:06

## 2025-06-29 RX ADMIN — TAMSULOSIN HYDROCHLORIDE 0.8 MG: 0.4 CAPSULE ORAL at 08:06

## 2025-06-29 RX ADMIN — CEFAZOLIN 1 G: 330 INJECTION, POWDER, FOR SOLUTION INTRAMUSCULAR; INTRAVENOUS at 08:06

## 2025-06-29 RX ADMIN — OXYCODONE 15 MG: 5 TABLET ORAL at 11:06

## 2025-06-29 RX ADMIN — METHOCARBAMOL 1000 MG: 500 TABLET ORAL at 11:06

## 2025-06-29 RX ADMIN — FAMOTIDINE 40 MG: 20 TABLET, FILM COATED ORAL at 08:06

## 2025-06-29 RX ADMIN — METHOCARBAMOL 1000 MG: 500 TABLET ORAL at 05:06

## 2025-06-29 RX ADMIN — GABAPENTIN 600 MG: 300 CAPSULE ORAL at 05:06

## 2025-06-29 RX ADMIN — OXYCODONE 15 MG: 5 TABLET ORAL at 02:06

## 2025-06-29 RX ADMIN — CALCIUM CARBONATE (ANTACID) CHEW TAB 500 MG 500 MG: 500 CHEW TAB at 08:06

## 2025-06-29 RX ADMIN — METHOCARBAMOL 1000 MG: 500 TABLET ORAL at 12:06

## 2025-06-29 RX ADMIN — POLYETHYLENE GLYCOL 3350 17 G: 17 POWDER, FOR SOLUTION ORAL at 08:06

## 2025-06-29 RX ADMIN — FLUCONAZOLE 400 MG: 200 TABLET ORAL at 08:06

## 2025-06-29 RX ADMIN — CEFAZOLIN 1 G: 330 INJECTION, POWDER, FOR SOLUTION INTRAMUSCULAR; INTRAVENOUS at 12:06

## 2025-06-29 RX ADMIN — SENNOSIDES AND DOCUSATE SODIUM 2 TABLET: 50; 8.6 TABLET ORAL at 08:06

## 2025-06-29 NOTE — DISCHARGE INSTRUCTIONS
--Patient stable for discharge to home with home health    --Please take prescriptions as detailed in medication list    --All questions/concerns addressed and answered    --Please followup with neurosurgery clinic in 2 weeks to be arranged by Neurosurgery Clinic     --Please call immediately for any new onset nausea/vomiting/fever/chills, wound breakdown, numbness/tingling/weakness    Wound Care Instructions:  -If you have staples, do not remove, as they will be removed at clinic follow up.  -You may shower daily but do not soak or submerge wound in water. Pat incision dry, do not rub.  -Keep all incisions clean, dry, and open to air.  -Do not apply creams or ointments to the wound.  -No driving while on narcotic pain medications  --No excessive bending, twisting or lifting >10 lbs until clinic follow up  -If you were given a brace for spine surgery, please remove to shower, may remove while in bed, no driving, and must be worn for 6 weeks when out of bed.  -Call Neurosurgery if the wound opens, drains, or becomes red.

## 2025-06-29 NOTE — PLAN OF CARE
[facility]      HOME HEALTH ORDERS  FACE TO FACE ENCOUNTER    Patient Name: Kayla Clay  YOB: 1985    PCP: Cee Mccauley NP   PCP Address: [address] [place] [address] / [place] [id number]  PCP Phone Number: [phone]  PCP Fax: [phone]    Encounter Date: 5/15/25    Admit to Home Health    Diagnoses:  Active Hospital Problems    Diagnosis  POA    *Degenerative scoliosis in adult patient [M41.50]  Yes    Hypertension [I10]  Yes     Chronic      Resolved Hospital Problems   No resolved problems to display.       Follow Up Appointments:  Future Appointments   Date Time Provider Department Center   7/8/2025 11:15 AM Nohemi Payan RN [facility] NEUROS8 [facility]   7/16/2025  3:30 PM Bowen Romero DO [facility] INFEC [facility]   8/5/2025 10:45 AM [facility]   8/5/2025 11:30 AM Elizabeth Rivera NP [facility] NEUROS8 [facility]   8/25/2025  9:00 AM Cee Mccauley NP [facility]       Allergies:  Review of patient's allergies indicates:   Allergen Reactions    Acetaminophen Hives    Adhesive Dermatitis    Doxycycline Rash     Rash on face and arms       Medications: Review discharge medications with patient and family and provide education.    Current Medications[1]     Medication List        START taking these medications      LIDOcaine 5 %  Commonly known as: LIDODERM  Place 1 patch onto the skin once daily. Remove & Discard patch within 12 hours or as directed by MD     melatonin 3 mg tablet  Commonly known as: MELATIN  Take 3 tablets (9 mg total) by mouth nightly as needed for Insomnia.     methocarbamoL 1,000 mg Tab  Take 1,000 mg by mouth every 6 (six) hours. for 10 days     ondansetron 4 MG Tbdl  Commonly known as: ZOFRAN-ODT  Take 1 tablet (4 mg total) by mouth every 6 (six) hours as needed.     oxyCODONE 10 mg Tab immediate release tablet  Commonly known as: ROXICODONE  Take 1 tablet (10 mg total) by mouth every 4 (four) hours as needed for Pain.     polyethylene glycol 17  gram Pwpk  Commonly known as: GLYCOLAX  Take 17 g by mouth 2 (two) times daily.     tamsulosin 0.4 mg Cap  Commonly known as: FLOMAX  Take 2 capsules (0.8 mg total) by mouth once daily.  Start taking on: June 30, 2025            CONTINUE taking these medications      amLODIPine 5 MG tablet  Commonly known as: NORVASC  TAKE 1 TABLET BY MOUTH EVERY DAY     baclofen 10 MG tablet  Commonly known as: LIORESAL  Take 1 tablet (10 mg total) by mouth nightly.     EScitalopram oxalate 20 MG tablet  Commonly known as: LEXAPRO  TAKE 1 TABLET BY MOUTH EVERY DAY     fluconazole 200 MG Tab  Commonly known as: DIFLUCAN  Take 2 tablets (400 mg total) by mouth once daily. for 21 days     gabapentin 300 MG capsule  Commonly known as: NEURONTIN  Take 300 mg by mouth every evening.     loratadine 10 mg tablet  Commonly known as: CLARITIN  TAKE 1 TABLET BY MOUTH EVERY DAY     sumatriptan 50 MG tablet  Commonly known as: IMITREX  Take 1 tablet (50 mg total) by mouth as needed for Migraine.                I have seen and examined this patient within the last 30 days. My clinical findings that support the need for the home health skilled services and home bound status are the following:no   Weakness/numbness causing balance and gait disturbance due to Surgery making it taxing to leave home.     Diet:   regular diet    Labs:  N/a    Referrals/ Consults  Physical Therapy to evaluate and treat. Evaluate for home safety and equipment needs; Establish/upgrade home exercise program. Perform / instruct on therapeutic exercises, gait training, transfer training, and Range of Motion.  Occupational Therapy to evaluate and treat. Evaluate home environment for safety and equipment needs. Perform/Instruct on transfers, ADL training, ROM, and therapeutic exercises.    Activities:   activity as tolerated    Nursing:   Agency to admit patient within 24 hours of hospital discharge unless specified on physician order or at patient request    SN to complete  comprehensive assessment including routine vital signs. Instruct on disease process and s/s of complications to report to MD. Review/verify medication list sent home with the patient at time of discharge  and instruct patient/caregiver as needed. Frequency may be adjusted depending on start of care date.     Skilled nurse to perform up to 3 visits PRN for symptoms related to diagnosis    Notify MD if SBP > 160 or < 90; DBP > 90 or < 50; HR > 120 or < 50; Temp > 101; O2 < 88%; Other:   none    Ok to schedule additional visits based on staff availability and patient request on consecutive days within the home health episode.    When multiple disciplines ordered:    Start of Care occurs on Sunday - Wednesday schedule remaining discipline evaluations as ordered on separate consecutive days following the start of care.    Thursday SOC -schedule subsequent evaluations Friday and Monday the following week.     Friday - Saturday SOC - schedule subsequent discipline evaluations on consecutive days starting Monday of the following week.    For all post-discharge communication and subsequent orders please contact patient's primary care physician. If unable to reach primary care physician or do not receive response within 30 minutes, please contact neurosurgery clinic for clinical staff order clarification    Miscellaneous   Rolling walker    Home Health Aide:  Physical Therapy Three times weekly and every 48 hours and Occupational Therapy Three times weekly and every 48 hours    Wound Care Orders  no    I certify that this patient is confined to her home and needs physical therapy and occupational therapy.               [1]   Current Facility-Administered Medications   Medication Dose Route Frequency Provider Last Rate Last Admin    aluminum-magnesium hydroxide-simethicone 200-200-20 mg/5 mL suspension 30 mL  30 mL Oral Q4H PRN Mariza Chatman MD        baclofen tablet 10 mg  10 mg Oral Nightly Mariza Chatman  MD Joseph   10 mg at 06/28/25 2206    bisacodyL suppository 10 mg  10 mg Rectal Daily Natacha Marte PA-C   10 mg at 06/28/25 0951    calcium carbonate 200 mg calcium (500 mg) chewable tablet 500 mg  500 mg Oral BID Mariza Chatman MD   500 mg at 06/29/25 0859    ceFAZolin injection 1 g  1 g Intravenous Q8H Campos Erwin MD   1 g at 06/29/25 0828    enoxaparin injection 40 mg  40 mg Subcutaneous Q24H (prophylaxis, 1700) Les Martell MD   40 mg at 06/28/25 1707    EScitalopram oxalate tablet 20 mg  20 mg Oral QHS Alfie Soliz MD   20 mg at 06/28/25 2207    famotidine tablet 40 mg  40 mg Oral Daily Mariza Chatman MD   40 mg at 06/29/25 0859    fluconazole tablet 400 mg  400 mg Oral Daily Mariza Chatman MD   400 mg at 06/29/25 0859    gabapentin capsule 600 mg  600 mg Oral Q8H Natacha Marte PA-C   600 mg at 06/29/25 0548    HYDROmorphone injection 1 mg  1 mg Intravenous Q4H PRN Mariza Chatman MD   1 mg at 06/28/25 0237    hydrOXYzine HCL tablet 25 mg  25 mg Oral Q4H PRN Natacha Marte PA-C   25 mg at 06/28/25 0734    LIDOcaine 5 % patch 1 patch  1 patch Transdermal Q24H Natacha Marte PA-C   1 patch at 06/28/25 2208    magnesium oxide tablet 800 mg  800 mg Oral PRN Erlinda Tovar NP   800 mg at 06/27/25 0447    magnesium oxide tablet 800 mg  800 mg Oral PRN Erlinda Tovar NP        melatonin tablet 9 mg  9 mg Oral Nightly PRN Natacha Marte PA-C        methocarbamoL tablet 1,000 mg  1,000 mg Oral Q6H Natacha Marte PA-C   1,000 mg at 06/29/25 0548    ondansetron injection 8 mg  8 mg Intravenous Q6H PRN Cecilia Nelson PA-C   8 mg at 06/25/25 1924    oxyCODONE immediate release tablet 15 mg  15 mg Oral Q4H PRN Mariza Chatman MD   15 mg at 06/29/25 0718    oxyCODONE immediate release tablet Tab 10 mg  10 mg Oral Q4H PRN Mariza Chatman MD   10 mg at 06/28/25 0734    polyethylene glycol packet 17 g  17  g Oral BID Erlinda Tovar R, NP   17 g at 06/29/25 0858    potassium bicarbonate disintegrating tablet 35 mEq  35 mEq Oral PRN Erlinda Tovar R, NP        potassium bicarbonate disintegrating tablet 50 mEq  50 mEq Oral PRN Erlinda Tovar R, NP   50 mEq at 06/27/25 0105    potassium bicarbonate disintegrating tablet 60 mEq  60 mEq Oral PRN Erlinda Tovar R, NP        potassium, sodium phosphates 280-160-250 mg packet 2 packet  2 packet Oral PRN Erlinda Tovar R, NP   2 packet at 06/26/25 0957    potassium, sodium phosphates 280-160-250 mg packet 2 packet  2 packet Oral PRN Erlinda Tovar R, NP        potassium, sodium phosphates 280-160-250 mg packet 2 packet  2 packet Oral PRN Erlinda Tovar, NP        prochlorperazine injection Soln 5 mg  5 mg Intravenous Q6H PRN Mariza Chatman MD        senna-docusate 8.6-50 mg per tablet 2 tablet  2 tablet Oral BID Mariza Chatman MD   2 tablet at 06/29/25 0859    sumatriptan tablet 50 mg  50 mg Oral PRN Mariza Chatman MD   50 mg at 06/28/25 0026    tamsulosin 24 hr capsule 0.8 mg  0.8 mg Oral Daily Natacha Marte PA-C   0.8 mg at 06/29/25 0859

## 2025-06-29 NOTE — DISCHARGE SUMMARY
"[facility] - Surgery  Neurosurgery  Discharge Summary      Patient Name: Kayla Clay  MRN: [id number]  Admission Date: 6/24/2025  Hospital Length of Stay: 5 days  Discharge Date and Time: 06/29/2025 9:33 AM  Attending Physician: Les Vieyra MD   Discharging Provider: Carl Oliva MD  Primary Care Provider: Cee Mccauley NP    HPI:   Ms. Kayla Clay is a 40 y.o. 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 Per last ID note, "7/5/23: Ms. Miller presents for follow-up today. She continues to have ongoing lower extremity weakness. She uses a cane for assistance with ambulation. She denies any fevers, chills, nausea, vomiting and diarrhea. She had been working with PT, but hasn't been able to follow-up in the past month. She saw a neurosurgeon on 5/26/23 in which he reports her most recent MRI showed resolution of epidural abscess and degenerative disc disease. No planned interventions at this time. Completed 6 weeks of IV antibiotics, empirically since initial cultures were negative using Ceftriaxone and Daptomycin. Completed 3 months of Fluconazole high dose at 400mg PO q24h given Candida albicans positive cultures. Did not tolerate Vancomycin or Doxycycline. MRI with no findings significant for residual infection. Neurosurgery without any interventions planned at this time. MRI with resolution of epidural abscess. Continue to work with PT to work on gait strength. No need for ongoing infectious workup at this time."     NSGY now planning for deformity surgery due to severe stenosis of lumbar spine. T10-pelvis fusion recommended by surgeons. Explained to patient that she completed adequate antimicrobial coverage for prior infection. IVDU is a huge risk factor for recurrence. To ensure sterility of site will prescribe course of PO fluconazole which she can stop one week post op. If evidence of infection seen in OR will ask " surgeons to send specimen for cultures. Patient voiced understanding.     Procedure(s) (LRB):  *AIRO* T10 TO PELVIS POSTERIOR INSTRUMENTED FUSION/AIRO (N/A)     Hospital Course: 06/25/2025: POD1 s/p T10-pelvis. NAEON. AFVSS. Exam stable. Drain output (R/L/L) 83/525/5cc. Will keep drains today.  6/26: Keep drains until pod 5, pod 2 today. Neuro stable, full strength, working with therapies, pending step down  6/27: POD 3. Neuro stable, full strength. Ambulated down owen. Pending step down. Voiding spont.   6/28: POD 4. Neuro stable. BM finally. Voiding. Still pending step down  6/29: POD 5. Drains removing and wound vac. Voiding, +BM. Discharging today    Goals of Care Treatment Preferences:  Code Status: Full Code    Physical exam:  GENERAL: resting comfortably  HEENT: NCAT, PERRL, mucous membranes moist  NECK: supple, trachea midline  CV: normal capillary refill  PULM: aerating well, symmetric expansion, no distress  ABD: soft, NT, ND  EXT: no c/c/e    NEURO:    AAO x 3  CN II-XII grossly intact  Fc x 4 antigravity  SILT    No drift or dysmetria      Consults: ICU    Significant Diagnostic Studies: Labs: All labs within the past 24 hours have been reviewed    Pending Diagnostic Studies:       Procedure Component Value Units Date/Time    CT Interoperative Limited [[id number]] Resulted: 06/24/25 0949    Order Status: Sent Lab Status: In process Updated: 06/24/25 1353          Final Active Diagnoses:    Diagnosis Date Noted POA    PRINCIPAL PROBLEM:  Degenerative scoliosis in adult patient [M41.50] 05/28/2025 Yes    Hypertension [I10] 08/17/2022 Yes     Chronic      Problems Resolved During this Admission:      Discharged Condition: stable     Disposition: Home or Self Care    Follow Up:    Patient Instructions:   --Patient stable for discharge to home with home health    --Please take prescriptions as detailed in medication list    --All questions/concerns addressed and answered    --Please followup with neurosurgery  clinic in 2 weeks to be arranged by Neurosurgery Clinic     --Please call immediately for any new onset nausea/vomiting/fever/chills, wound breakdown, numbness/tingling/weakness    Wound Care Instructions:  -If you have staples, do not remove, as they will be removed at clinic follow up.  -You may shower daily but do not soak or submerge wound in water. Pat incision dry, do not rub.  -Keep all incisions clean, dry, and open to air.  -Do not apply creams or ointments to the wound.  -No driving while on narcotic pain medications  --No excessive bending, twisting or lifting >10 lbs until clinic follow up  -If you were given a brace for spine surgery, please remove to shower, may remove while in bed, no driving, and must be worn for 6 weeks when out of bed.  -Call Neurosurgery if the wound opens, drains, or becomes red.   Medications:  Reconciled Home Medications:      Medication List        START taking these medications      LIDOcaine 5 %  Commonly known as: LIDODERM  Place 1 patch onto the skin once daily. Remove & Discard patch within 12 hours or as directed by MD     melatonin 3 mg tablet  Commonly known as: MELATIN  Take 3 tablets (9 mg total) by mouth nightly as needed for Insomnia.     methocarbamoL 1,000 mg Tab  Take 1,000 mg by mouth every 6 (six) hours. for 10 days     ondansetron 4 MG Tbdl  Commonly known as: ZOFRAN-ODT  Take 1 tablet (4 mg total) by mouth every 6 (six) hours as needed.     oxyCODONE 10 mg Tab immediate release tablet  Commonly known as: ROXICODONE  Take 1 tablet (10 mg total) by mouth every 4 (four) hours as needed for Pain.     polyethylene glycol 17 gram Pwpk  Commonly known as: GLYCOLAX  Take 17 g by mouth 2 (two) times daily.     tamsulosin 0.4 mg Cap  Commonly known as: FLOMAX  Take 2 capsules (0.8 mg total) by mouth once daily.  Start taking on: June 30, 2025            CONTINUE taking these medications      amLODIPine 5 MG tablet  Commonly known as: NORVASC  TAKE 1 TABLET BY MOUTH  EVERY DAY     baclofen 10 MG tablet  Commonly known as: LIORESAL  Take 1 tablet (10 mg total) by mouth nightly.     EScitalopram oxalate 20 MG tablet  Commonly known as: LEXAPRO  TAKE 1 TABLET BY MOUTH EVERY DAY     fluconazole 200 MG Tab  Commonly known as: DIFLUCAN  Take 2 tablets (400 mg total) by mouth once daily. for 21 days     gabapentin 300 MG capsule  Commonly known as: NEURONTIN  Take 300 mg by mouth every evening.     loratadine 10 mg tablet  Commonly known as: CLARITIN  TAKE 1 TABLET BY MOUTH EVERY DAY     sumatriptan 50 MG tablet  Commonly known as: IMITREX  Take 1 tablet (50 mg total) by mouth as needed for Migraine.              Carl Oliva MD  Neurosurgery  [facility] - Surgery

## 2025-06-29 NOTE — PLAN OF CARE
Referrals have been sent out for Home Health.   06/29/25 9265   Post-Acute Status   Post-Acute Authorization Home Health   Home Health Status Referrals Sent   Hospital Resources/Appts/Education Provided Appointments scheduled and added to AVS   Discharge Delays None known at this time   Discharge Plan   Discharge Plan A Home Health   Discharge Plan B Home with family

## 2025-06-29 NOTE — PLAN OF CARE
Patient will discharge home with family.  Home Health referrals have been sent out. [facility] has accepted patient for services.  Patient will discharge home with family.   06/29/25 1255   Final Note   Assessment Type Final Discharge Note   Anticipated Discharge Disposition Home-Health   What phone number can be called within the next 1-3 days to see how you are doing after discharge? [phone]   Hospital Resources/Appts/Education Provided Appointments scheduled and added to AVS   Post-Acute Status   Post-Acute Authorization Home Health   Home Health Status Referrals Sent   Discharge Delays None known at this time     [facility] - Surgery  Discharge Final Note    Primary Care Provider: Cee Mccauley NP    Expected Discharge Date: 6/29/2025    Final Discharge Note (most recent)       Final Note - 06/29/25 1255          Final Note    Assessment Type Final Discharge Note (P)      Anticipated Discharge Disposition Home-Health Care Svc (P)      What phone number can be called within the next 1-3 days to see how you are doing after discharge? [phone] (P)      Hospital Resources/Appts/Education Provided Appointments scheduled and added to AVS (P)         Post-Acute Status    Post-Acute Authorization Home Health (P)      Home Health Status Referrals Sent (P)      Discharge Delays None known at this time (P)                      Important Message from Medicare

## 2025-07-01 ENCOUNTER — TELEPHONE (OUTPATIENT)
Dept: NEUROSURGERY | Facility: CLINIC | Age: 40
End: 2025-07-01
Payer: MEDICAID

## 2025-07-01 PROCEDURE — G0180 MD CERTIFICATION HHA PATIENT: HCPCS | Mod: ,,, | Performed by: NEUROLOGICAL SURGERY

## 2025-07-01 NOTE — TELEPHONE ENCOUNTER
L/m on her v/m that pt. Should have discharge instructions in her portal as well as in the paperwork given to her at discharge.  Pt. Should refer to it, however pts are generally told they are to have brace on when she is up during the day, she can remove to sleep, shower or use bathroom, however she should refer to her specific instructions given upon d;c

## 2025-07-01 NOTE — TELEPHONE ENCOUNTER
Copied from CRM #[id number]. Topic: General Inquiry - Return Call  >> Jul 1, 2025 12:53 PM Claudine wrote:  JUSTUS Andrews calling to request a return call in regards to finding out when she need to wear the brace also if there is any other precautions. She would like to know if she should wear the brace when she go to bed. Also the patient stated she gets up a lot to use the bathroom when she goes to bed. Please call to confirm that letter has been sent to email.

## 2025-07-02 ENCOUNTER — TELEPHONE (OUTPATIENT)
Dept: NEUROSURGERY | Facility: CLINIC | Age: 40
End: 2025-07-02
Payer: MEDICAID

## 2025-07-02 ENCOUNTER — HOSPITAL ENCOUNTER (EMERGENCY)
Facility: HOSPITAL | Age: 40
Discharge: HOME | End: 2025-07-02
Attending: FAMILY MEDICINE
Payer: MEDICAID

## 2025-07-02 VITALS
RESPIRATION RATE: 16 BRPM | HEART RATE: 68 BPM | TEMPERATURE: 98 F | OXYGEN SATURATION: 97 % | WEIGHT: 190 LBS | SYSTOLIC BLOOD PRESSURE: 114 MMHG | DIASTOLIC BLOOD PRESSURE: 66 MMHG | BODY MASS INDEX: 29.82 KG/M2 | HEIGHT: 67 IN

## 2025-07-02 DIAGNOSIS — E86.0 DEHYDRATION: Primary | ICD-10-CM

## 2025-07-02 DIAGNOSIS — K59.00 CONSTIPATION, UNSPECIFIED CONSTIPATION TYPE: ICD-10-CM

## 2025-07-02 DIAGNOSIS — N30.00 ACUTE CYSTITIS WITHOUT HEMATURIA: ICD-10-CM

## 2025-07-02 LAB
BACTERIA #/AREA URNS AUTO: ABNORMAL /HPF
BILIRUB UR QL STRIP.AUTO: NEGATIVE
CLARITY UR: CLEAR
COLOR UR AUTO: YELLOW
GLUCOSE UR QL STRIP: NEGATIVE
HGB UR QL STRIP: ABNORMAL
KETONES UR QL STRIP: NEGATIVE
LEUKOCYTE ESTERASE UR QL STRIP: ABNORMAL
NITRITE UR QL STRIP: NEGATIVE
PH UR STRIP: 7 [PH]
PROT UR QL STRIP: NEGATIVE
RBC #/AREA URNS AUTO: ABNORMAL /HPF
SP GR UR STRIP.AUTO: <=1.005 (ref 1–1.03)
SQUAMOUS #/AREA URNS AUTO: ABNORMAL /HPF
UROBILINOGEN UR STRIP-ACNC: 0.2
WBC #/AREA URNS AUTO: ABNORMAL /HPF

## 2025-07-02 PROCEDURE — 96372 THER/PROPH/DIAG INJ SC/IM: CPT | Performed by: FAMILY MEDICINE

## 2025-07-02 PROCEDURE — 63600175 PHARM REV CODE 636 W HCPCS: Performed by: FAMILY MEDICINE

## 2025-07-02 PROCEDURE — 81003 URINALYSIS AUTO W/O SCOPE: CPT | Performed by: FAMILY MEDICINE

## 2025-07-02 PROCEDURE — 51798 US URINE CAPACITY MEASURE: CPT

## 2025-07-02 PROCEDURE — 99284 EMERGENCY DEPT VISIT MOD MDM: CPT | Mod: 25

## 2025-07-02 PROCEDURE — 87186 SC STD MICRODIL/AGAR DIL: CPT | Performed by: FAMILY MEDICINE

## 2025-07-02 RX ORDER — SULFAMETHOXAZOLE AND TRIMETHOPRIM 800; 160 MG/1; MG/1
1 TABLET ORAL 2 TIMES DAILY
Qty: 20 TABLET | Refills: 0 | Status: SHIPPED | OUTPATIENT
Start: 2025-07-02 | End: 2025-07-12

## 2025-07-02 RX ADMIN — LIDOCAINE HYDROCHLORIDE 1.02 G: 10 INJECTION, SOLUTION INFILTRATION; PERINEURAL at 04:07

## 2025-07-02 NOTE — ED PROVIDER NOTES
[facility]: [facility]       History     Chief Complaint   Patient presents with    Urinary Retention     Back surgery on 6/24, after catheter removed in hospital, pt had urinary retention, reports that they in and out catheterized her several times until d/c on Sunday.  Patient now feels like she is not urinating enough when she urinates.      This patient is a 40 year old female who recently had a lumbar fusion from T10 to Pelvis, and was released from the hospital on 6/29/25.  Patient states that she has been having a hard time urinating that only a little bit comes out at a time.  Also however states that she has not really been eating or drinking much and she is also on strong pain medicine    The history is provided by the patient.     Review of patient's allergies indicates:   Allergen Reactions    Acetaminophen Hives    Adhesive Dermatitis    Doxycycline Rash     Rash on face and arms     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    Generalized anxiety disorder     Hypertension     IVDU (intravenous drug user) 08/18/2022    Leukopenia 09/09/2022    Obesity, unspecified     Scoliosis     Substance abuse Age 18    I used drugs off and on since i was 18 but im sober now and have been since early August 2022     Past Surgical History:   Procedure Laterality Date    LUMBAR FUSION N/A 6/24/2025    Procedure: *AIRO* T10 TO PELVIS POSTERIOR INSTRUMENTED FUSION/AIRO;  Surgeon: Les Vieyra MD;  Location: [facility] OR [address] [place] [address];  Service: Neurosurgery;  Laterality: N/A;  T10 TO PELVIS POSTERIOR INSTRUMENTED FUSION/ AIRO    LUMBAR LAMINECTOMY WITH SURGICAL REMOVAL OF LESION OF SPINAL CORD BY POSTERIOR APPROACH N/A 08/18/2022    Procedure: LAMINECTOMY, SPINE, LUMBAR, POSTERIOR APPROACH, WITH EXCISION OF NEOPLASM OR LESION OF SPINAL CORD;  Surgeon: Floyd Brewer MD;  Location: [facility];   Service: Neurosurgery;  Laterality: N/A;    SPINE SURGERY  August 16th, 2022    Spinal absess and Laminectomy     Family History   Problem Relation Name Age of Onset    Pancreatic cancer Paternal Grandfather          80's    Stomach cancer Maternal Grandfather          70's - +metastasis    No Known Problems Father      No Known Problems Mother      Spine Surgery Paternal Uncle Reggie Engel         On my dads side of the family a lot of us have spine issues.    Breast cancer Neg Hx      Colon cancer Neg Hx      Ovarian cancer Neg Hx      Cervical cancer Neg Hx      Uterine cancer Neg Hx       Social History[1]  Review of Systems   Constitutional: Negative.    HENT: Negative.     Respiratory: Negative.     Cardiovascular: Negative.    Endocrine: Negative.    Genitourinary:  Positive for difficulty urinating.   Musculoskeletal: Negative.    Neurological: Negative.    Psychiatric/Behavioral: Negative.     All other systems reviewed and are negative.      Physical Exam     Initial Vitals   BP Pulse Resp Temp SpO2   07/02/25 1504 07/02/25 1504 07/02/25 1504 07/02/25 1504 07/02/25 1508   120/64 71 16 97.7 °F (36.5 °C) 96 %      MAP       --                Physical Exam    Nursing note and vitals reviewed.  Constitutional: She appears well-developed.   HENT:   Head: Normocephalic.   Eyes: Pupils are equal, round, and reactive to light.   Neck: Trachea normal.   Patient is a brace on covers her whole thorax and her back   Normal range of motion.  Cardiovascular:  Normal rate, regular rhythm and normal heart sounds.           Pulmonary/Chest: Breath sounds normal.   Abdominal: Abdomen is soft.   Musculoskeletal:         General: Normal range of motion.      Cervical back: Normal range of motion.     Neurological: She is alert and oriented to person, place, and time. GCS score is 15. GCS eye subscore is 4. GCS verbal subscore is 5. GCS motor subscore is 6.   Skin: Skin is warm and dry.   Psychiatric: She has a normal mood  and affect.         ED Course   Procedures  Labs Reviewed   URINALYSIS, REFLEX TO URINE CULTURE - Abnormal       Result Value    Color, UA Yellow      Appearance, UA Clear      Specific Gravity, UA <=1.005      pH, UA 7.0      Protein, UA Negative      Glucose, UA Negative      Ketones, UA Negative      Blood, UA 3+ (*)     Bilirubin, UA Negative      Urobilinogen, UA 0.2      Nitrites, UA Negative      Leukocyte Esterase, UA 2+ (*)    URINALYSIS, MICROSCOPIC - Abnormal    Bacteria, UA Few (*)     RBC, UA 3-5      WBC, UA 11-20 (*)     Squamous Epithelial Cells, UA Few (*)    CULTURE, URINE          Imaging Results    None          Medications   cefTRIAXone (Rocephin) 1.0151 g in LIDOcaine HCL 10 mg/ml (1%) 2.9 mL IM only syringe (has no administration in time range)     Medical Decision Making  This patient is a 40-year-old female who comes in with urinary retention.  Patient was given 1 16 oz glass of water and after she drank it she was able to void 300 cc  .  Differential diagnosis: Urinary tract infection, hydration    Amount and/or Complexity of Data Reviewed  Labs: ordered.     Details: The labs on this patient show that on her urinalysis she has few bacteria, 11-20 WBCs, few squamous epithelial cells, 3+ blood and 2+ leukocyte esterase    Risk  Prescription drug management.                                      Clinical Impression:  Final diagnoses:  [E86.0] Dehydration (Primary)  [K59.00] Constipation, unspecified constipation type  [N30.00] Acute cystitis without hematuria          ED Disposition Condition    Discharge Stable          ED Prescriptions       Medication Sig Dispense Start Date End Date Auth. Provider    lactulose (CHRONULAC) 20 gram/30 mL Soln Take 15 mLs (10 g total) by mouth 3 (three) times daily. for 5 days 225 mL 7/2/2025 7/7/2025 Anuradha Sandoval MD    sulfamethoxazole-trimethoprim 800-160mg (BACTRIM DS) 800-160 mg Tab Take 1 tablet by mouth 2 (two) times daily. for 10 days 20  tablet 7/2/2025 7/12/2025 Anuradha Sandoval MD          Follow-up Information       Follow up With Specialties Details Why Contact Cee Gurrola, NP Family Medicine Schedule an appointment as soon as possible for a visit in 2 days  [address] [place] [id number]  [phone]                     [1]   Social History  Tobacco Use    Smoking status: Never     Passive exposure: Current    Smokeless tobacco: Never    Tobacco comments:     Social smoker off and on since 14 but never regular smoker   Substance Use Topics    Alcohol use: Not Currently    Drug use: Not Currently     Types: Heroin, Methamphetamines        Anuradha Sandoval MD  07/02/25 1606

## 2025-07-02 NOTE — ED NOTES
Pt ambulated to ed rm 2 from [facility]. Aaox4. Reports unable to fully urinate since d/c from hospital for spinal sx. Pt had gotten in and out caths at hospital prior to dc. Able to urinate but states it doesn't seem like its enough. Beside bladder scanner done with 18ml. Wctm

## 2025-07-02 NOTE — TELEPHONE ENCOUNTER
Instructed that pt. Should go to local ER to be evaluated to see if she is having urinary retention.  She verbalized understanding and will instruct pt. To go. (Discussed with TOMASA Mckinney    Copied from CRM #[id number]. Topic: General Inquiry - Patient Advice  >> Jul 2, 2025 12:42 PM Judy wrote:  José ZEE with [facility] care is calling to report urinary retention and pt still feels like she has it pls advise     PH: [phone]  >> Jul 2, 2025  1:06 PM SAADIA Song wrote:  Do I tell her to go to ER?  ----- Message -----  From: Judy Arreola  Sent: 7/2/2025  12:44 PM CDT  To: Azalia Rios

## 2025-07-02 NOTE — TELEPHONE ENCOUNTER
P/c to [facility], went over pt. BLT restrictions.  No bending/lifting/twisting more than 45 degrees from neutral.  No lifting more than 5-10 lbs.  Pt.to wear brace when oob.  Remove to sleep,shower,  We want pt. To walk, not do any heavy exercise. She verbalized understanding    Copied from CRM #[id number]. Topic: General Inquiry - Patient Advice  >> Jul 2, 2025  9:03 AM Rayna wrote:  [facility]/ [facility] is calling to speak to nurse about hip precautions, states she needs to speak to a nurse please call     [facility] [phone]  >> Jul 2, 2025  9:06 AM Elizabeth Rivera NP wrote:

## 2025-07-03 ENCOUNTER — PATIENT MESSAGE (OUTPATIENT)
Dept: NEUROSURGERY | Facility: CLINIC | Age: 40
End: 2025-07-03
Payer: MEDICAID

## 2025-07-03 DIAGNOSIS — M41.50 DEGENERATIVE SCOLIOSIS IN ADULT PATIENT: ICD-10-CM

## 2025-07-03 RX ORDER — METHOCARBAMOL 500 MG/1
1000 TABLET, FILM COATED ORAL EVERY 6 HOURS
Qty: 80 TABLET | Refills: 0 | Status: SHIPPED | OUTPATIENT
Start: 2025-07-03 | End: 2025-07-13

## 2025-07-03 RX ORDER — OXYCODONE HYDROCHLORIDE 10 MG/1
10 TABLET ORAL EVERY 4 HOURS PRN
Qty: 30 TABLET | Refills: 0 | Status: SHIPPED | OUTPATIENT
Start: 2025-07-03

## 2025-07-05 ENCOUNTER — PATIENT MESSAGE (OUTPATIENT)
Dept: NEUROSURGERY | Facility: CLINIC | Age: 40
End: 2025-07-05
Payer: MEDICAID

## 2025-07-06 LAB
BACTERIA UR CULT: ABNORMAL
BACTERIA UR CULT: ABNORMAL

## 2025-07-07 ENCOUNTER — PATIENT MESSAGE (OUTPATIENT)
Dept: NEUROSURGERY | Facility: CLINIC | Age: 40
End: 2025-07-07
Payer: MEDICAID

## 2025-07-09 ENCOUNTER — PATIENT MESSAGE (OUTPATIENT)
Dept: NEUROSURGERY | Facility: CLINIC | Age: 40
End: 2025-07-09
Payer: MEDICAID

## 2025-07-09 DIAGNOSIS — M41.50 DEGENERATIVE SCOLIOSIS IN ADULT PATIENT: ICD-10-CM

## 2025-07-10 RX ORDER — OXYCODONE HYDROCHLORIDE 10 MG/1
10 TABLET ORAL EVERY 4 HOURS PRN
Qty: 42 TABLET | Refills: 0 | Status: SHIPPED | OUTPATIENT
Start: 2025-07-10

## 2025-07-10 RX ORDER — NALOXONE HYDROCHLORIDE 4 MG/.1ML
SPRAY NASAL
Qty: 1 EACH | Refills: 11 | Status: SHIPPED | OUTPATIENT
Start: 2025-07-10

## 2025-07-14 DIAGNOSIS — M62.838 MUSCLE SPASM: Primary | ICD-10-CM

## 2025-07-14 DIAGNOSIS — I10 HYPERTENSION, UNSPECIFIED TYPE: ICD-10-CM

## 2025-07-14 RX ORDER — GABAPENTIN 300 MG/1
300 CAPSULE ORAL NIGHTLY
Qty: 30 CAPSULE | Refills: 1 | Status: SHIPPED | OUTPATIENT
Start: 2025-07-14

## 2025-07-14 RX ORDER — AMLODIPINE BESYLATE 5 MG/1
5 TABLET ORAL DAILY
Qty: 90 TABLET | Refills: 1 | Status: SHIPPED | OUTPATIENT
Start: 2025-07-14

## 2025-07-16 ENCOUNTER — OFFICE VISIT (OUTPATIENT)
Dept: INFECTIOUS DISEASES | Facility: CLINIC | Age: 40
End: 2025-07-16
Payer: MEDICAID

## 2025-07-16 DIAGNOSIS — I10 HYPERTENSION, UNSPECIFIED TYPE: ICD-10-CM

## 2025-07-16 DIAGNOSIS — G06.2 EPIDURAL ABSCESS: Primary | ICD-10-CM

## 2025-07-16 DIAGNOSIS — M46.46 LUMBAR DISCITIS: ICD-10-CM

## 2025-07-16 PROCEDURE — 98005 SYNCH AUDIO-VIDEO EST LOW 20: CPT | Mod: 95,,, | Performed by: STUDENT IN AN ORGANIZED HEALTH CARE EDUCATION/TRAINING PROGRAM

## 2025-07-16 NOTE — PROGRESS NOTES
"The patient location is: [place]  The chief complaint leading to consultation is: Post op follow up; hx of epidural abscess/discitis     Visit type: audiovisual    Face to Face time with patient: 2 minutes   20 minutes of total time spent on the encounter, which includes face to face time and non-face to face time preparing to see the patient (eg, review of tests), Obtaining and/or reviewing separately obtained history, Documenting clinical information in the electronic or other health record, Independently interpreting results (not separately reported) and communicating results to the patient/family/caregiver, or Care coordination (not separately reported).     Each patient to whom he or she provides medical services by telemedicine is:  (1) informed of the relationship between the physician and patient and the respective role of any other health care provider with respect to management of the patient; and (2) notified that he or she may decline to receive medical services by telemedicine and may withdraw from such care at any time.     Notes:     Subjective:     HPI: 40 y.o. 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 Per last ID note, "7/5/23: Ms. Miller presents for follow-up today. She continues to have ongoing lower extremity weakness. She uses a cane for assistance with ambulation. She denies any fevers, chills, nausea, vomiting and diarrhea. She had been working with PT, but hasn't been able to follow-up in the past month. She saw a neurosurgeon on 5/26/23 in which he reports her most recent MRI showed resolution of epidural abscess and degenerative disc disease. No planned interventions at this time. Completed 6 weeks of IV antibiotics, empirically since initial cultures were negative using Ceftriaxone and Daptomycin. Completed 3 months of Fluconazole high dose at 400mg PO q24h given Candida albicans positive cultures. Did not " "tolerate Vancomycin or Doxycycline. MRI with no findings significant for residual infection. Neurosurgery without any interventions planned at this time. MRI with resolution of epidural abscess. Continue to work with PT to work on gait strength. No need for ongoing infectious workup at this time."     NSGY now planning for deformity surgery due to severe stenosis of lumbar spine. T10-pelvis fusion recommended by surgeons. Explained to patient that she completed adequate antimicrobial coverage for prior infection. IVDU is a huge risk factor for recurrence. To ensure sterility of site will prescribe course of PO fluconazole which she can stop one week post op. If evidence of infection seen in OR will ask surgeons to send specimen for cultures. Patient voiced understanding.     7/16:  Postop follow-up. Had spinal fusion on 6/24. Patient doing really well.  Will be wearing a brace for about 3 months.  Feels much better than before the surgery.  Completed course of fluconazole as prescribed.  Tolerated without incident.  Discussed washing nicks and cuts with antibacterial soap and warm water in the future. To continue her regular dental follow ups. Patient voiced understanding.     DATE OF SURGERY: 6/24/25     PREOPERATIVE DIAGNOSIS:  1. Kyphoscoliosis with sagittal imbalance  2. History of lumbar laminectomy for candida a. abscess, and IV drug abuse, Class I obesity     POSTOPERATIVE DIAGNOSIS:  Same.     PROCEDURE PERFORMED:  1. Posterior spinal fusion, T10 to pelvis   2. Posterior segmental spinal fixation, T10 to S1 (DePuy Synthes)  3. Pelvic fixation with S2AI screws (Depuy Synthes)  4. Posterior lumbar interbody fusion, L5-S1, L2-3 and L1-2  5.  Application of titanium interbody spacer, L5-S1, L2-3 and L1-2 (DePuy Synthes)  6.  Bernice osteotomy, T12, L1, L2, L5  7.  Use of intraoperative fluoroscopy  8.  Use of intraoperative neuro-monitoring with MEPs  9. Use of intraoperative CT neuronavigation  10. FIbergraft and " "local bone grafting  12. 15cm complex wound revision by advancement of myofascial paraspinal flaps     SURGEON: Les Vieyra M.D.     ASSISTANT: Mariza Chatman M.D.     ANESTHESIA: GETA     ESTIMATED BLOOD LOSS: 500mL     COMPLICATIONS: None     DRAINS: Two deep Hemovacs and a Prevena incisional woundvac     SPECIMENS SENT: None     Last ID note reviewed: "Hospital consultation 09/30/2022:  29-year-old female patient known to have a past medical history significant for active IVDU (drug of choice is methamphetamine), complicated by more epidural abscess status post washout and laminectomy who presented for continuation of IV antibiotics, ID is consulted for assistance and antibiotics management.  1.Epidural abscess  2.Candida albicans abscess  3.Surgical site swelling  4.IVDU  5.Rash  6.Leukopenia  -patient's major risk factor is IVDU, Candida albicans found in operative cultures on 08/18/2022, unfortunately this will be a difficult infection to treat, likely requiring prolonged period of anti fungal, it is also likely that this is a polymicrobial infection, the patient had been on at least 3-4 days of IV antibiotics prior to surgery being done, it is therefore likely that other pathogens may have been partly responsible for epidural abscess however masked by use of IV antibiotics    -continue ceftriaxone 2g IV q12h  -Continue Daptomycin 8mg/Kg IV q24h  -Continue fluconazole 400 mg p.o. Q 24 hours   -patient will require at least 6 weeks antibiotics from day of surgery 08/18/2022   -anticipated end date 09/29/2022   -following which would transition the patient to doxycycline 100 mg p.o. Q 12 hours and fluconazole 200 mg p.o. Q 24 hours  -will likely need this p.o. tail of antibiotics for at least 3 additional months depending on her clinical progression     12/28/2022:  Doing well, no residual pain, no fevers, no chills, completed Fluconazole 3 months course 2 weeks ago. Neurosurgery has scheduled a follow up " "MRI but otherwise she is doing very well.      03/30/2023:  Patient presents today with no new infectious concerns but has requested evaluation due to ongoing lower extremity weakness and reported inability to be seen by her neurosurgeons until May. She is concerned that she is not improving and her lower extremities have been weak to the point where she is unable to perform her daily tasks and she has significantly limited mobility. This has not been necessarily worsening but certainly not improving, she does have some neuropathic pain in he r back and down her legs which occurs sporadically and she used to be on gabapentin and baclofen for and she thinks this was helping. She also was using a walker and other assistance devices while in hospital but has not been able to get help with this as outpatient.      She is very overwhelmed and crying int he room, she is accompanied by her mother. Patient reports feelings of doom and feels like she will die in the next few years if this continues. She is on Escitalopram but does not think this is helping. She had an MRI done in early 02/2023 which has no evidence of ongoing infection, no enhancement and no ongoing inflammatory findings, only post operative changes per the report obtained (scanned in media)"     EXAMINATION:  MRI THORACIC SPINE WITHOUT CONTRAST:     CLINICAL HISTORY:  Radiculopathy, lumbar region, Myelopathy, chronic, thoracic spine;evaluation stenosis;     COMPARISON:  06/07/2024     FINDINGS:  Serial axial and sagittal 1.5 Jany images obtained using T1 and T2 weighted techniques without the administration of intravenous contrast. Vertebral body height and alignment are grossly intact. No fracture or subluxation is seen. The thoracic cord is normal in size and caliber. No abnormal intramedullary or intradural mass is identified without the administration of IV contrast. There is again considerable sinusoidal curvature of the thoracolumbar spine with the " upper convexity to the right which has a similar appearance to the previous exam.  Disc space narrowing, degenerative disc changes, and osteophyte formation is seen.  There are suspect mild Modic type 3 degenerative plate changes at T12-L1.  No abnormal paraspinal mass is identified without the administration of IV contrast.  There is mild motion artifact.  There is mild heterogeneity again noted to bone marrow signal intensity diffusely throughout.  No central spinal stenosis is identified.  There is mild encroachment into the left neural foramina again identified at T8-9, T9-10, and T10-11 secondary to mild posterior disc bulge and facet arthrosis.     Impression:     1. Motion artifact  2. Marked sinusoidal curvature of the thoracolumbar spine with the upper thoracic convexity to the right  3. Thoracic spondylosis  4. Mild encroachment into the left neural foramina at the lower thoracic spine again identified as described  5. Findings and other details as above        Electronically signed by:Param Roy  Date:                                            05/07/2025  Time:                                           16:06              Exam Ended: 05/07/25 09:51 CDT Last Resulted: 05/07/25 16:06 CDT      EXAMINATION:  MRI LUMBAR SPINE WITHOUT  CONTRAST:     CLINICAL HISTORY:  Radiculopathy, lumbar region; Lumbar radiculopathy, symptoms persist with conservative treatment;.     COMPARISON:  10/12/2024     FINDINGS:  Serial axial and sagittal 1.5 Jany images were obtained of the lumbar spine using T1 and T2- weighted techniques  without  the administration of IV contrast. Vertebral body height is grossly intact.  There is marked curvature of the lumbar spine with the convexity to the left apex at L2.  There is intervertebral body fusion evident at the L 3 4 are with slight left lateral position of L3 on L4.  There is slight left lateral listhesis of L2 on L3.  Conus medullaris is normal in size and caliber.  No  abnormal intramedullary or intradural mass identified without the administration of IV contrast.  Disc space narrowing, degenerative disc changes, degenerative endplate changes, osteophyte formation, and facet arthrosis are identified.  No abnormal paraspinal mass identified without the administration of IV contrast.  There is heterogeneity of bone marrow signal intensity diffusely throughout.     T12-L1: Moderate encroachment into the neural foramina bilaterally secondary to posterior disc bulge, posterior osteophyte formation, and facet arthrosis.     L1-2: Moderate encroachment into the neural foramina secondary to posterior disc bulge, posterior osteophyte formation, and facet arthrosis     L2-3: Mild encroachment into the neural foramina and mild encroachment into the central spinal canal secondary to posterior osteophyte formation, posterior disc bulge, facet arthrosis, and ligament hypertrophy     L3-4: Moderate to severe encroachment into the neural foramina bilaterally secondary to posterior osteophyte formation and facet arthrosis     L4-5: Severe encroachment into the left neural foramina and moderate encroachment into the right neural foramina and mild to moderate encroachment into the central spinal canal secondary to posterior eccentric disc bulge to the left, posterior osteophyte formation, facet arthrosis, and ligament hypertrophy     L5-S1: Moderate to severe encroachment into the neural foramina bilaterally with the left side greater than right secondary to posterior disc bulge, posterior osteophyte formation, and facet arthrosis     Impression:     1. Multilevel moderate to severe encroachment into the neural foramina and to a lesser extent the central spinal canal as described  2. Marked levo lumbar scoliosis with slight the left lateral listhesis of L2 on L3 intervertebral body fusion at L3-4 with slight left lateral position of L3 on L4  3. Moderate to advanced lumbar spondylosis  4. Findings  "and other details as above        Electronically signed by:Param Roy  Date:                                            05/07/2025  Time:                                           15:55              Exam Ended: 05/07/25 09:50 CDT Last Resulted: 05/07/25 15:55 CDT         Last NSGY note reviewed from 5/15/25: "Interval Hx 5/15/25: After the last appointment with Dr. Vieyra, he recommended additional testing for preoperative planning for her deformity surgery. She is being seen in clinic today with myself and Dr. Vieyra to discuss surgery in more detail. She is eager to proceed. Denies changes in her symptoms since the last appointment.      Kayla Clay is a 40 y.o. female seen in clinic today with myself and Dr. Vieyra to discuss deformity surgery in more detail for her progressive scoliosis despite conservative treatment. Patient has a surgical hx of L2-4 right sided hemilaminectomy for epidural abscess (Candida albicans found in operative cultures on 08/18/2022). After reviewing the imaging and concerns, Dr. Vieyra recommends a T10-pelvis due to severe stenosis and scoliosis. R/B/A/I/M were reviewed in detail and she wishes to proceed. Will consult ID for additional recs regarding infectious hx."      Component  Ref Range & Units (hover) 2 yr ago   Fungus (Mycology) Culture  Abnormal   GERALDINE ALBICANS  Rare    Resulting Agency [facility]                Narrative  Performed by: [facility]      Epidural fluid collection   Specimen Collected: 08/18/22 15:59 CDT Last Resulted: 09/19/22 07:59 CDT        Review of Systems:   Negative unless otherwise noted positive-  Gen- Weakness/ Fatigue  Neuro- Confusion  CV- Chest Pain/ Palpitations  : Dysuria/Frequency/Urgency   Resp: Cough/ SOB  GI- Nausea/vomiting  MSK- Arthralgias/myalgias      Objective:     Physical Exam:  General- Patient alert and oriented x3  HEENT- PERRLA, EOMI, OP clear  Resp- No increased WOB noted. Not using accessory muscles.  Extrem- No " cyanosis, clubbing, edema.   Skin-  No Jaundice. No visible skin lesions.        Plan:    Lumbar epidural abscess/discitis  --This is a prior diagnosis  --Cultures from surgery in 2022 grew Candida albicans   --Treated in 2022 with 6 weeks of IV daptomycin + ceftriaxone + 3 months of PO fluconazole 400 mg daily   --No evidence of new infection on MRI lumbar spine from 5/7/25  --Now s/p spinal fusion on June 24th; no cultures collected   --Treated with PO fluconazole 400 mg daily up until one week post op to ensure sterile site for hardware placement   --Follow up with ID as needed     HTN  Continue current medications and follow up with PCP                               Drysol Counseling:  I discussed with the patient the risks of drysol/aluminum chloride including but not limited to skin rash, itching, irritation, burning.

## 2025-07-21 ENCOUNTER — PATIENT MESSAGE (OUTPATIENT)
Dept: NEUROSURGERY | Facility: CLINIC | Age: 40
End: 2025-07-21
Payer: MEDICAID

## 2025-07-24 ENCOUNTER — PATIENT MESSAGE (OUTPATIENT)
Dept: NEUROSURGERY | Facility: CLINIC | Age: 40
End: 2025-07-24
Payer: MEDICAID

## 2025-07-24 DIAGNOSIS — M41.50 DEGENERATIVE SCOLIOSIS IN ADULT PATIENT: ICD-10-CM

## 2025-07-24 RX ORDER — METHOCARBAMOL 500 MG/1
500 TABLET, FILM COATED ORAL 4 TIMES DAILY
Qty: 40 TABLET | Refills: 0 | Status: SHIPPED | OUTPATIENT
Start: 2025-07-24 | End: 2025-08-03

## 2025-07-24 RX ORDER — OXYCODONE HYDROCHLORIDE 10 MG/1
10 TABLET ORAL EVERY 6 HOURS PRN
Qty: 28 TABLET | Refills: 0 | Status: SHIPPED | OUTPATIENT
Start: 2025-07-24

## 2025-08-03 ENCOUNTER — PATIENT MESSAGE (OUTPATIENT)
Dept: NEUROSURGERY | Facility: CLINIC | Age: 40
End: 2025-08-03
Payer: MEDICAID

## 2025-08-04 DIAGNOSIS — Z98.1 S/P LUMBAR SPINAL FUSION: Primary | ICD-10-CM

## 2025-08-04 DIAGNOSIS — M41.50 DEGENERATIVE SCOLIOSIS IN ADULT PATIENT: ICD-10-CM

## 2025-08-04 RX ORDER — OXYCODONE AND ACETAMINOPHEN 5; 325 MG/1; MG/1
1 TABLET ORAL EVERY 6 HOURS PRN
Qty: 28 TABLET | Refills: 0 | Status: SHIPPED | OUTPATIENT
Start: 2025-08-04 | End: 2025-08-11

## 2025-08-05 ENCOUNTER — HOSPITAL ENCOUNTER (OUTPATIENT)
Dept: RADIOLOGY | Facility: HOSPITAL | Age: 40
Discharge: HOME | End: 2025-08-05
Attending: NEUROLOGICAL SURGERY
Payer: MEDICAID

## 2025-08-05 ENCOUNTER — OFFICE VISIT (OUTPATIENT)
Dept: NEUROSURGERY | Facility: CLINIC | Age: 40
End: 2025-08-05
Payer: MEDICAID

## 2025-08-05 VITALS
DIASTOLIC BLOOD PRESSURE: 72 MMHG | WEIGHT: 190.06 LBS | TEMPERATURE: 99 F | SYSTOLIC BLOOD PRESSURE: 111 MMHG | HEART RATE: 73 BPM | BODY MASS INDEX: 29.76 KG/M2

## 2025-08-05 DIAGNOSIS — Z98.1 S/P LUMBAR SPINAL FUSION: ICD-10-CM

## 2025-08-05 DIAGNOSIS — M48.07 SPINAL STENOSIS, LUMBOSACRAL REGION: ICD-10-CM

## 2025-08-05 DIAGNOSIS — M41.9 SCOLIOSIS, UNSPECIFIED SCOLIOSIS TYPE, UNSPECIFIED SPINAL REGION: Primary | ICD-10-CM

## 2025-08-05 PROCEDURE — 3074F SYST BP LT 130 MM HG: CPT | Mod: CPTII,,, | Performed by: NURSE PRACTITIONER

## 2025-08-05 PROCEDURE — 72082 X-RAY EXAM ENTIRE SPI 2/3 VW: CPT | Mod: TC

## 2025-08-05 PROCEDURE — 99024 POSTOP FOLLOW-UP VISIT: CPT | Mod: ,,, | Performed by: NURSE PRACTITIONER

## 2025-08-05 PROCEDURE — 72082 X-RAY EXAM ENTIRE SPI 2/3 VW: CPT | Mod: 26,,, | Performed by: RADIOLOGY

## 2025-08-05 PROCEDURE — 1160F RVW MEDS BY RX/DR IN RCRD: CPT | Mod: CPTII,,, | Performed by: NURSE PRACTITIONER

## 2025-08-05 PROCEDURE — 3078F DIAST BP <80 MM HG: CPT | Mod: CPTII,,, | Performed by: NURSE PRACTITIONER

## 2025-08-05 PROCEDURE — 1159F MED LIST DOCD IN RCRD: CPT | Mod: CPTII,,, | Performed by: NURSE PRACTITIONER

## 2025-08-05 PROCEDURE — 99999 PR PBB SHADOW E&M-EST. PATIENT-LVL IV: CPT | Mod: PBBFAC,,, | Performed by: NURSE PRACTITIONER

## 2025-08-05 PROCEDURE — 99214 OFFICE O/P EST MOD 30 MIN: CPT | Mod: PBBFAC,25 | Performed by: NURSE PRACTITIONER

## 2025-08-05 NOTE — PROGRESS NOTES
Neurosurgery  History & Physical    SUBJECTIVE:     History of Present Illness    Kayla presents today for 6 week post-operative follow-up. She is s/p T10-pelvis PSF on 6/24/25 with Dr. Vieyra. Denies fever or chills. She has completed infectious disease consultation and fluconazole treatment. She continues to experience persistent generalized weakness but notes improvement in mobility, now able to stand for approximately 10 minutes compared to previous inability to stand for more than one minute. She remains motivated to improve strength and recover from surgery. She is currently receiving home physical therapy with a few remaining visits anticipated.   Review of patient's allergies indicates:   Allergen Reactions    Acetaminophen Hives    Adhesive Dermatitis    Doxycycline Rash     Rash on face and arms       Current Medications[1]    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    Generalized anxiety disorder     Hypertension     IVDU (intravenous drug user) 08/18/2022    Leukopenia 09/09/2022    Obesity, unspecified     Scoliosis     Substance abuse Age 18    I used drugs off and on since i was 18 but im sober now and have been since early August 2022     Past Surgical History:   Procedure Laterality Date    LUMBAR FUSION N/A 6/24/2025    Procedure: *AIRO* T10 TO PELVIS POSTERIOR INSTRUMENTED FUSION/AIRO;  Surgeon: Les Vieyra MD;  Location: [address];  Service: Neurosurgery;  Laterality: N/A;  T10 TO PELVIS POSTERIOR INSTRUMENTED FUSION/ AIRO    LUMBAR LAMINECTOMY WITH SURGICAL REMOVAL OF LESION OF SPINAL CORD BY POSTERIOR APPROACH N/A 08/18/2022    Procedure: LAMINECTOMY, SPINE, LUMBAR, POSTERIOR APPROACH, WITH EXCISION OF NEOPLASM OR LESION OF SPINAL CORD;  Surgeon: Floyd Brewer MD;  Location: [facility] OR;  Service: Neurosurgery;  Laterality: N/A;    SPINE SURGERY  August  16th, 2022    Spinal absess and Laminectomy     Family History       Problem Relation (Age of Onset)    No Known Problems Father, Mother    Pancreatic cancer Paternal Grandfather    Spine Surgery Paternal Uncle    Stomach cancer Maternal Grandfather          Social History     Socioeconomic History    Marital status:    Tobacco Use    Smoking status: Never     Passive exposure: Current    Smokeless tobacco: Never    Tobacco comments:     Social smoker off and on since 14 but never regular smoker   Substance and Sexual Activity    Alcohol use: Not Currently    Drug use: Not Currently     Types: Heroin, Methamphetamines    Sexual activity: Not Currently     Partners: Male     Birth control/protection: Abstinence     Comment: STI: Chlamydia, Gonorrhea, Trich     Social Drivers of Health     Financial Resource Strain: Low Risk  (6/25/2025)    Overall Financial Resource Strain (CARDIA)     Difficulty of Paying Living Expenses: Not hard at all   Food Insecurity: No Food Insecurity (6/25/2025)    Hunger Vital Sign     Worried About Running Out of Food in the Last Year: Never true     Ran Out of Food in the Last Year: Never true   Transportation Needs: No Transportation Needs (6/25/2025)    PRAPARE - Transportation     Lack of Transportation (Medical): No     Lack of Transportation (Non-Medical): No   Physical Activity: Inactive (6/25/2025)    Exercise Vital Sign     Days of Exercise per Week: 0 days     Minutes of Exercise per Session: 0 min   Stress: No Stress Concern Present (6/25/2025)    [employer] - Occupational Stress Questionnaire     Feeling of Stress : Not at all   Housing Stability: Low Risk  (6/25/2025)    Housing Stability Vital Sign     Unable to Pay for Housing in the Last Year: No     Number of Times Moved in the Last Year: 0     Homeless in the Last Year: No       Review of Systems    OBJECTIVE:     Vital Signs  Temp: 98.6 °F (37 °C)  Pulse: 73  BP: 111/72  Pain Score:    7  Weight: 86.2 kg (190 lb 0.6 oz)  Body mass index is 29.76 kg/m².      Neurosurgery Physical Exam  General: well developed, well nourished, no distress.   Head: normocephalic, atraumatic  Neurologic: Alert and oriented. Thought content appropriate.  GCS: Motor: 6/Verbal: 5/Eyes: 4 GCS Total: 15  Mental Status: Awake, Alert, Oriented x 4  Language: No aphasia  Speech: No dysarthria  Cranial nerves: face symmetric, tongue midline, CN II-XII grossly intact.   Eyes: pupils equal, round, reactive to light with accomodation, EOMI.   Pulmonary: normal respirations, no signs of respiratory distress  Abdomen: soft, non-distended  Skin: Skin is warm, dry and intact. Incision well-healed  Sensory: intact to light touch throughout  Motor Strength:Moves all extremities spontaneously with good tone.       Diagnostic Results:  X-Ray Scoliosis Complete Including Supine And Erect  Narrative: EXAMINATION:  XR SCOLIOSIS COMPLETE    CLINICAL HISTORY:  Arthrodesis status    FINDINGS:  Scoliosis complete.    There is a dextroconvex curvature T-spine, levoconvex curvature L-spine.  There are pedicle screws and fixation rods between T10 and sacrum and SI joints.  There is disc fusion at L3-L4.  There are disc implants at L1-L2, L2-L3, and L5-S1.  There is methylmethacrylate within T10, T11, and L2.  No hardware failure seen.  No complication seen.  There is DJD.  Impression: No acute process or hardware failure seen.    Electronically signed by: Srikanth Mccarty MD  Date:    08/05/2025  Time:    13:43      ASSESSMENT/PLAN:   Kayla Clay is a 40 y.o. female presents today for 6 week post-operative follow-up. She is s/p T10-pelvis PSF on 6/24/25 with Dr. Vieyra.     PLAN SUMMARY:   Continue use of bone stimulator as previously instructed   Maintain daily home exercises as prescribed by physical therapists   Research and identify preferred outpatient physical therapy facility closer to home   CT thoracic and lumbar spine ordered  for 3 month post-op visit   Follow up in 6 weeks with Dr. Vieyra for the 3 month post-op        CORNELIA Hinojosa  Neurosurgery  [facility].      This note was generated with the assistance of ambient listening technology. Verbal consent was obtained by the patient and accompanying visitor(s) for the recording of patient appointment to facilitate this note. I attest to having reviewed and edited the generated note for accuracy, though some syntax or spelling errors may persist. Please contact the author of this note for any clarification.            [1]   Current Outpatient Medications   Medication Sig Dispense Refill    amLODIPine (NORVASC) 5 MG tablet Take 1 tablet (5 mg total) by mouth once daily. 90 tablet 1    baclofen (LIORESAL) 10 MG tablet Take 1 tablet (10 mg total) by mouth nightly. 30 tablet 11    EScitalopram oxalate (LEXAPRO) 20 MG tablet TAKE 1 TABLET BY MOUTH EVERY DAY 30 tablet 5    gabapentin (NEURONTIN) 300 MG capsule Take 1 capsule (300 mg total) by mouth every evening. 30 capsule 1    LIDOcaine (LIDODERM) 5 % Place 1 patch onto the skin once daily. Remove & Discard patch within 12 hours or as directed by MD 30 patch 0    loratadine (CLARITIN) 10 mg tablet TAKE 1 TABLET BY MOUTH EVERY DAY 30 tablet 5    naloxone (NARCAN) 4 mg/actuation Spry 4mg by nasal route as needed for opioid overdose; may repeat every 2-3 minutes in alternating nostrils until medical help arrives. Call 911 1 each 11    oxyCODONE-acetaminophen (PERCOCET) 5-325 mg per tablet Take 1 tablet by mouth every 6 (six) hours as needed for Pain. 28 tablet 0    polyethylene glycol (GLYCOLAX) 17 gram/dose powder Use cap to measure out (17 g) mix with a liquid and take by mouth 2 (two) times daily. 510 g 0    sumatriptan (IMITREX) 50 MG tablet Take 1 tablet (50 mg total) by mouth as needed for Migraine. 9 tablet 5    melatonin (MELATIN) 3 mg tablet Take 3 tablets (9 mg total) by mouth nightly as needed for  Insomnia. 30 tablet 0    ondansetron (ZOFRAN-ODT) 4 MG TbDL Dissolve 1 tablet (4 mg total) by mouth every 6 (six) hours as needed. 30 tablet 0    tamsulosin (FLOMAX) 0.4 mg Cap Take 2 capsules (0.8 mg total) by mouth once daily. 60 capsule 11     No current facility-administered medications for this visit.

## 2025-08-11 DIAGNOSIS — Z98.1 S/P LUMBAR SPINAL FUSION: ICD-10-CM

## 2025-08-11 DIAGNOSIS — M41.50 DEGENERATIVE SCOLIOSIS IN ADULT PATIENT: ICD-10-CM

## 2025-08-11 RX ORDER — OXYCODONE AND ACETAMINOPHEN 5; 325 MG/1; MG/1
1 TABLET ORAL EVERY 8 HOURS PRN
Qty: 21 TABLET | Refills: 0 | Status: SHIPPED | OUTPATIENT
Start: 2025-08-11 | End: 2025-08-18

## 2025-08-11 RX ORDER — METHOCARBAMOL 500 MG/1
500 TABLET, FILM COATED ORAL 4 TIMES DAILY
Qty: 40 TABLET | Refills: 0 | Status: SHIPPED | OUTPATIENT
Start: 2025-08-11 | End: 2025-08-21

## 2025-08-12 ENCOUNTER — DOCUMENT SCAN (OUTPATIENT)
Dept: HOME HEALTH SERVICES | Facility: HOSPITAL | Age: 40
End: 2025-08-12
Payer: MEDICAID

## 2025-08-13 ENCOUNTER — PATIENT MESSAGE (OUTPATIENT)
Dept: NEUROSURGERY | Facility: CLINIC | Age: 40
End: 2025-08-13
Payer: MEDICAID

## 2025-08-13 DIAGNOSIS — M41.50 DEGENERATIVE SCOLIOSIS IN ADULT PATIENT: ICD-10-CM

## 2025-08-13 DIAGNOSIS — Z98.1 S/P LUMBAR SPINAL FUSION: Primary | ICD-10-CM

## 2025-08-13 DIAGNOSIS — M48.07 SPINAL STENOSIS, LUMBOSACRAL REGION: ICD-10-CM

## 2025-08-14 DIAGNOSIS — G43.809 OTHER MIGRAINE WITHOUT STATUS MIGRAINOSUS, NOT INTRACTABLE: ICD-10-CM

## 2025-08-14 RX ORDER — SUMATRIPTAN SUCCINATE 50 MG/1
TABLET ORAL
Qty: 9 TABLET | Refills: 5 | Status: SHIPPED | OUTPATIENT
Start: 2025-08-14

## 2025-08-20 ENCOUNTER — PATIENT MESSAGE (OUTPATIENT)
Dept: NEUROSURGERY | Facility: CLINIC | Age: 40
End: 2025-08-20
Payer: MEDICAID

## 2025-08-21 RX ORDER — BACLOFEN 10 MG/1
10 TABLET ORAL 3 TIMES DAILY
Qty: 42 TABLET | Refills: 0 | Status: SHIPPED | OUTPATIENT
Start: 2025-08-21 | End: 2025-09-04

## 2025-08-26 ENCOUNTER — CLINICAL SUPPORT (OUTPATIENT)
Dept: REHABILITATION | Facility: HOSPITAL | Age: 40
End: 2025-08-26
Payer: MEDICAID

## 2025-08-26 DIAGNOSIS — R26.89 DECREASED SPINAL MOBILITY: ICD-10-CM

## 2025-08-26 DIAGNOSIS — M62.81 WEAKNESS OF TRUNK MUSCULATURE: ICD-10-CM

## 2025-08-26 DIAGNOSIS — M41.50 DEGENERATIVE SCOLIOSIS IN ADULT PATIENT: Primary | ICD-10-CM

## 2025-08-26 PROCEDURE — 97163 PT EVAL HIGH COMPLEX 45 MIN: CPT

## 2025-08-27 ENCOUNTER — EXTERNAL HOME HEALTH (OUTPATIENT)
Dept: HOME HEALTH SERVICES | Facility: HOSPITAL | Age: 40
End: 2025-08-27
Payer: MEDICAID

## 2025-09-02 ENCOUNTER — OFFICE VISIT (OUTPATIENT)
Dept: FAMILY MEDICINE | Facility: CLINIC | Age: 40
End: 2025-09-02
Payer: MEDICAID

## 2025-09-02 VITALS
HEART RATE: 69 BPM | WEIGHT: 195 LBS | HEIGHT: 67 IN | BODY MASS INDEX: 30.61 KG/M2 | SYSTOLIC BLOOD PRESSURE: 109 MMHG | RESPIRATION RATE: 20 BRPM | DIASTOLIC BLOOD PRESSURE: 69 MMHG | TEMPERATURE: 98 F

## 2025-09-02 DIAGNOSIS — I10 HYPERTENSION, UNSPECIFIED TYPE: Chronic | ICD-10-CM

## 2025-09-02 DIAGNOSIS — G47.00 INSOMNIA, UNSPECIFIED TYPE: ICD-10-CM

## 2025-09-02 DIAGNOSIS — E66.811 CLASS 1 OBESITY DUE TO EXCESS CALORIES WITH SERIOUS COMORBIDITY AND BODY MASS INDEX (BMI) OF 30.0 TO 30.9 IN ADULT: ICD-10-CM

## 2025-09-02 DIAGNOSIS — M41.50 DEGENERATIVE SCOLIOSIS IN ADULT PATIENT: ICD-10-CM

## 2025-09-02 DIAGNOSIS — F32.89 OTHER DEPRESSION: ICD-10-CM

## 2025-09-02 DIAGNOSIS — F41.9 ANXIETY: Chronic | ICD-10-CM

## 2025-09-02 DIAGNOSIS — M62.838 MUSCLE SPASM: ICD-10-CM

## 2025-09-02 DIAGNOSIS — E66.09 CLASS 1 OBESITY DUE TO EXCESS CALORIES WITH SERIOUS COMORBIDITY AND BODY MASS INDEX (BMI) OF 30.0 TO 30.9 IN ADULT: ICD-10-CM

## 2025-09-02 PROCEDURE — 3078F DIAST BP <80 MM HG: CPT | Mod: CPTII,,, | Performed by: NURSE PRACTITIONER

## 2025-09-02 PROCEDURE — 1159F MED LIST DOCD IN RCRD: CPT | Mod: CPTII,,, | Performed by: NURSE PRACTITIONER

## 2025-09-02 PROCEDURE — 99214 OFFICE O/P EST MOD 30 MIN: CPT | Mod: ,,, | Performed by: NURSE PRACTITIONER

## 2025-09-02 PROCEDURE — 3008F BODY MASS INDEX DOCD: CPT | Mod: CPTII,,, | Performed by: NURSE PRACTITIONER

## 2025-09-02 PROCEDURE — 3074F SYST BP LT 130 MM HG: CPT | Mod: CPTII,,, | Performed by: NURSE PRACTITIONER

## 2025-09-02 RX ORDER — BACLOFEN 10 MG/1
10 TABLET ORAL 3 TIMES DAILY
Qty: 90 TABLET | Refills: 1 | Status: SHIPPED | OUTPATIENT
Start: 2025-09-02 | End: 2025-11-01

## (undated) DEVICE — ELECTRODE MEGADYNE RETURN DUAL

## (undated) DEVICE — CONTAINER SPECIMEN OR STER 4OZ

## (undated) DEVICE — COVER LIGHT HANDLE 80/CA

## (undated) DEVICE — TUBE FRAZIER 5MM 2FT SOFT TIP

## (undated) DEVICE — LOTION DURA PREP REMOVER 40Z

## (undated) DEVICE — SPONGE NEURO 1/4X1/4

## (undated) DEVICE — DRESSING TELFA STRL 4X3 LF

## (undated) DEVICE — SPONGE PATTY SURGICAL .5X3IN

## (undated) DEVICE — SYR 10CC LUER LOCK

## (undated) DEVICE — SPONGE LAP 4X18 PREWASHED

## (undated) DEVICE — DRESSING AQUACEL FOAM 3 X 3

## (undated) DEVICE — SUT VICRYL+ 2-0 SH 18IN

## (undated) DEVICE — CANNULA EXPEDIUM OPN 16GX160MM

## (undated) DEVICE — KIT EVACUATOR 3-SPRING 1/8 DRN

## (undated) DEVICE — DRAPE MOBILE C-ARM

## (undated) DEVICE — TRAY CATH FOL SIL URIMTR 16FR

## (undated) DEVICE — BLADE 4IN EDGE INSULATED

## (undated) DEVICE — DRESSING MEPILEX BORDER 4 X 4

## (undated) DEVICE — DRAPE TOP 53X102IN

## (undated) DEVICE — ALCOHOL 70% ISOP RUBBING 4OZ

## (undated) DEVICE — DRESSING SURGICAL 1/2X1/2

## (undated) DEVICE — GOWN POLY REINF BRTH SLV XL

## (undated) DEVICE — WAX BONE STERILE 2.5G

## (undated) DEVICE — DRESSING AQUACEL FOAM 4 X 12

## (undated) DEVICE — STAPLER SKIN PROXIMATE WIDE

## (undated) DEVICE — BLADE EZ CLEAN 2.5IN MODIFIED

## (undated) DEVICE — CORD BIPOLAR 12 FOOT

## (undated) DEVICE — SYR IRRIGATION BULB STER 60ML

## (undated) DEVICE — DRESSING AQUACEL AG 3.5X10IN

## (undated) DEVICE — Device

## (undated) DEVICE — SPHERE MARKER REFLECTIVE DISP

## (undated) DEVICE — SEALER AQUAMANTYS 2.3 BIPOLAR

## (undated) DEVICE — DRESSING AQUACEL AG ADV 3.5X6

## (undated) DEVICE — DRAPE STERI-DRAPE 1000 17X11IN

## (undated) DEVICE — DRAPE STERI INSTRUMENT 1018

## (undated) DEVICE — DRAPE C-ARMOR EQUIPMENT COVER

## (undated) DEVICE — PENCIL ROCKER SWITCH 10FT CORD

## (undated) DEVICE — KIT SPINAL PATIENT CARE JACK

## (undated) DEVICE — TAP 8MM SPINAL POWER

## (undated) DEVICE — ELECTRODE REM PLYHSV RETURN 9

## (undated) DEVICE — GLOVE BIOGEL ECLIPSE SZ 8

## (undated) DEVICE — DRAPE CORETEMP FLD WRM 56X62IN

## (undated) DEVICE — TOWEL OR DISP STRL BLUE 4/PK

## (undated) DEVICE — NDL SPINAL 20GX3.5 HUB

## (undated) DEVICE — TRAY NEURO OMC

## (undated) DEVICE — TAP POWER 6MM DOUBLE LEAD

## (undated) DEVICE — DRAPE THREE-QTR REINF 53X77IN

## (undated) DEVICE — BUR BONE CUT MICRO TPS 3X3.8MM

## (undated) DEVICE — SKIN MARKER DEVON 160

## (undated) DEVICE — RASP ELITE HELICOIDAL

## (undated) DEVICE — DRAPE SURG W/TWL 17 5/8X23

## (undated) DEVICE — SUT SILK 3-0 BLK BR SH 30IN

## (undated) DEVICE — SPONGE COTTON TRAY 4X4IN

## (undated) DEVICE — CLIP MED TICALL

## (undated) DEVICE — NDL ECLIPSE SAFETY 18GX1-1/2IN

## (undated) DEVICE — DURAPREP SURG SCRUB 26ML

## (undated) DEVICE — DRESSING AQUACEL FOAM 5 X 5

## (undated) DEVICE — NDL SPINAL 18GX3.5 SPINOCAN

## (undated) DEVICE — GLOVE SURGICAL LATEX SZ 7

## (undated) DEVICE — MARKER SKIN RULER STERILE

## (undated) DEVICE — TUBING MEDI-VAC 20FT .25IN

## (undated) DEVICE — GAUZE SPONGE 4X4 12PLY

## (undated) DEVICE — DRAPE C-ARM ELAS CLIP 42X120IN

## (undated) DEVICE — SUT VICRYL+ 1 CT1 18IN

## (undated) DEVICE — APPLICATOR CHLORAPREP ORN 26ML

## (undated) DEVICE — UNDERGLOVE BIOGEL PI SZ 6.5 LF

## (undated) DEVICE — MANIFOLD 4 PORT

## (undated) DEVICE — COVER BACK TBL HD 2-TIER 72IN

## (undated) DEVICE — TAP 5MM SPINAL POWER

## (undated) DEVICE — DRESSING AQUACEL SACRAL 9 X 9

## (undated) DEVICE — PACK BASIC SETUP SC BR

## (undated) DEVICE — KIT SURGIFLO HEMOSTATIC MATRIX

## (undated) DEVICE — SUT 0 VICRYL / CT-1

## (undated) DEVICE — DRAPE INCISE IOBAN 2 23X17IN

## (undated) DEVICE — SUT STRATAFIX PDS PLUS VIO

## (undated) DEVICE — TIP YANKAUERS BULB NO VENT

## (undated) DEVICE — TRAY CATH 1-LYR URIMTR 16FR

## (undated) DEVICE — SUT CTD VICRYL 2-0 CR/CT-2

## (undated) DEVICE — UNDERGLOVES BIOGEL PI SIZE 8

## (undated) DEVICE — ADHESIVE MASTISOL VIAL 48/BX

## (undated) DEVICE — SPONGE GAUZE 16PLY 4X4

## (undated) DEVICE — DRAPE ABDOMINAL TIBURON 14X11

## (undated) DEVICE — KIT PREVENA PLUS

## (undated) DEVICE — INSERT CUSHIONPRONE VIEW LARGE

## (undated) DEVICE — DRESSING COVER AQUACEL AG SURG

## (undated) DEVICE — DRAPE LAP T SHT W/ INSTR PAD

## (undated) DEVICE — BUR LEGEND MIDAS REX 14CM 13MM

## (undated) DEVICE — SYR ONLY LUER LOCK 20CC